# Patient Record
Sex: FEMALE | Race: BLACK OR AFRICAN AMERICAN | NOT HISPANIC OR LATINO | Employment: OTHER | URBAN - METROPOLITAN AREA
[De-identification: names, ages, dates, MRNs, and addresses within clinical notes are randomized per-mention and may not be internally consistent; named-entity substitution may affect disease eponyms.]

---

## 2019-06-27 ENCOUNTER — OFFICE VISIT (OUTPATIENT)
Dept: HEMATOLOGY/ONCOLOGY | Facility: CLINIC | Age: 84
End: 2019-06-27
Payer: MEDICARE

## 2019-06-27 VITALS
DIASTOLIC BLOOD PRESSURE: 59 MMHG | OXYGEN SATURATION: 97 % | WEIGHT: 125.38 LBS | HEART RATE: 66 BPM | HEIGHT: 62 IN | RESPIRATION RATE: 10 BRPM | BODY MASS INDEX: 23.07 KG/M2 | SYSTOLIC BLOOD PRESSURE: 111 MMHG | TEMPERATURE: 98 F

## 2019-06-27 DIAGNOSIS — G89.3 CHRONIC NEOPLASM-RELATED PAIN: ICD-10-CM

## 2019-06-27 DIAGNOSIS — D63.0 ANEMIA IN NEOPLASTIC DISEASE: ICD-10-CM

## 2019-06-27 DIAGNOSIS — R11.2 CHEMOTHERAPY-INDUCED NAUSEA AND VOMITING: ICD-10-CM

## 2019-06-27 DIAGNOSIS — C53.0 MALIGNANT NEOPLASM OF ENDOCERVIX: Primary | ICD-10-CM

## 2019-06-27 DIAGNOSIS — Z71.89 ADVANCE CARE PLANNING: ICD-10-CM

## 2019-06-27 DIAGNOSIS — T45.1X5A CHEMOTHERAPY-INDUCED NAUSEA AND VOMITING: ICD-10-CM

## 2019-06-27 DIAGNOSIS — E44.1 MILD PROTEIN-CALORIE MALNUTRITION: ICD-10-CM

## 2019-06-27 PROBLEM — C53.1 MALIGNANT NEOPLASM OF EXOCERVIX: Status: ACTIVE | Noted: 2019-06-27

## 2019-06-27 PROCEDURE — 99497 ADVNCD CARE PLAN 30 MIN: CPT | Mod: S$GLB,,, | Performed by: INTERNAL MEDICINE

## 2019-06-27 PROCEDURE — 99205 OFFICE O/P NEW HI 60 MIN: CPT | Mod: S$GLB,,, | Performed by: INTERNAL MEDICINE

## 2019-06-27 PROCEDURE — 99205 PR OFFICE/OUTPT VISIT, NEW, LEVL V, 60-74 MIN: ICD-10-PCS | Mod: S$GLB,,, | Performed by: INTERNAL MEDICINE

## 2019-06-27 PROCEDURE — 99497 PR ADVNCD CARE PLAN 30 MIN: ICD-10-PCS | Mod: S$GLB,,, | Performed by: INTERNAL MEDICINE

## 2019-06-27 RX ORDER — POTASSIUM CHLORIDE 750 MG/1
10 CAPSULE, EXTENDED RELEASE ORAL DAILY
COMMUNITY
End: 2020-01-24

## 2019-06-27 RX ORDER — ONDANSETRON 8 MG/1
8 TABLET, ORALLY DISINTEGRATING ORAL EVERY 8 HOURS PRN
Qty: 40 TABLET | Refills: 5 | Status: SHIPPED | OUTPATIENT
Start: 2019-06-27

## 2019-06-27 RX ORDER — SIMVASTATIN 20 MG/1
20 TABLET, FILM COATED ORAL NIGHTLY
COMMUNITY
End: 2021-03-11 | Stop reason: SDUPTHER

## 2019-06-27 RX ORDER — OXYCODONE HYDROCHLORIDE 5 MG/1
5 TABLET ORAL EVERY 6 HOURS PRN
Qty: 30 TABLET | Refills: 0 | Status: SHIPPED | OUTPATIENT
Start: 2019-06-27 | End: 2019-08-12 | Stop reason: SDUPTHER

## 2019-06-27 RX ORDER — NIFEDIPINE 30 MG/1
30 TABLET, EXTENDED RELEASE ORAL DAILY
COMMUNITY
End: 2020-03-27 | Stop reason: SDUPTHER

## 2019-06-27 RX ORDER — FERROUS SULFATE 325(65) MG
TABLET ORAL
Refills: 1 | COMMUNITY
Start: 2019-05-08 | End: 2021-05-06 | Stop reason: SDUPTHER

## 2019-06-27 RX ORDER — CLONIDINE HYDROCHLORIDE 0.3 MG/1
TABLET ORAL
Refills: 3 | COMMUNITY
Start: 2019-05-28 | End: 2020-02-21 | Stop reason: SDUPTHER

## 2019-06-27 RX ORDER — CYPROHEPTADINE HYDROCHLORIDE 4 MG/1
4 TABLET ORAL 2 TIMES DAILY WITH MEALS
Qty: 60 TABLET | Refills: 1 | Status: SHIPPED | OUTPATIENT
Start: 2019-06-27 | End: 2019-07-01 | Stop reason: ALTCHOICE

## 2019-06-27 NOTE — PROGRESS NOTES
"PATIENT: Adri Seay  MRN: [id number]  DATE: 6/27/2019    Diagnosis:   1. Malignant neoplasm of endocervix    2. Anemia in neoplastic disease    3. Chemotherapy-induced nausea and vomiting    4. Chronic neoplasm-related pain    5. Mild protein-calorie malnutrition    6. Advance care planning      Chief Complaint: Cervical Cancer    Oncologic History:      Oncologic History 1. Cervical cancer - stage IV      Oncologic Treatment Planned therapy: single-agent carboplatin +/- radiation      Pathology 6/19/19:  - uterine cervix, biopsy:  - poorly differentiated squamous carcinoma with extensive necrosis        Subjective:    History of Present Illness: Ms. Seay is a 84 y.o. female who presents for evaluation and management of cervical cancer. SHe was seen at [facility] on 6/25/19.     Information from Dr. Reese's and Dr. 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 The patient had a CT scan consistent with widely metastatic disease including multiple sites of lymphadenopathy, a possible lung metastases, liver metastases, nodule in the anterior abdominal wall, and carcinomatosis. The patient is mostly asymptomatic but does have a vaginal discharge. Her performance status is 0. On exam, there is a 6 centimeter mass in the anterior abdominal wall superior to the umbilicus. On bimanual examination, tumor is a place the entire cervix apex of the vagina, and on rectovaginal examination, there is a large pelvic mass extending to the bilateral pelvic sidewalls.    We will get her CT reviewed at [facility] and if it is consistent with metastatic disease, we have recommended palliative chemotherapy with either carboplatinum as a single agent or carboplatinum and paclitaxel. Due to concern for bowel involvement on the CT scan, I have recommended not giving bevacizumab as part of this " "regimen. Patient will return home to [place] and begin chemotherapy with a medical oncologist there. I've also recommended a consultation with radiation oncology in [place] for consideration of palliative radiation to lower the chances of significant vaginal bleeding. We will see her back on an as-needed basis."      Today, she endorses an unintentional weight loss (20 lbs) over the past two months, appetite change. She denies shortness of breath, chest pain, nausea, vomiting, diarrhea, constipation.    Past medical, surgical, family, and social histories have been reviewed and updated below.    Past Medical History:   Past Medical History:   Diagnosis Date    Anemia     Cataract     Cervical cancer 05/2019    Hypertension        Past Surgical History:   Past Surgical History:   Procedure Laterality Date    CATARACT EXTRACTION, BILATERAL      WRIST SURGERY  1984       Family History:   Family History   Problem Relation Age of Onset    No Known Problems Mother     No Known Problems Father     Breast cancer Sister     No Known Problems Brother     Leukemia Daughter     No Known Problems Son        Social History:      Allergies:  Review of patient's allergies indicates:  No Known Allergies    Medications:  Current Outpatient Medications   Medication Sig Dispense Refill    cloNIDine (CATAPRES) 0.3 MG tablet TK 1 T PO BID  3    ferrous sulfate (FEOSOL) 325 mg (65 mg iron) Tab tablet TK 1 T PO BID  1    NIFEdipine (PROCARDIA-XL) 30 MG (OSM) 24 hr tablet Take 30 mg by mouth once daily.      potassium chloride (MICRO-K) 10 MEQ CpSR Take 10 mEq by mouth once.      simvastatin (ZOCOR) 20 MG tablet Take 20 mg by mouth every evening.       No current facility-administered medications for this visit.        Review of Systems   Constitutional: Positive for fatigue and unexpected weight change.   HENT: Negative for sore throat.    Eyes: Negative for visual disturbance.   Respiratory: Negative for cough " "and shortness of breath.    Cardiovascular: Negative for chest pain.   Gastrointestinal: Negative for constipation, diarrhea, nausea and vomiting.   Genitourinary: Negative for dysuria.   Musculoskeletal: Negative for back pain.   Skin: Negative for rash.   Neurological: Negative for headaches.   Hematological: Negative for adenopathy.   Psychiatric/Behavioral: The patient is not nervous/anxious.      ECOG Performance Status:   ECOG SCORE 0       Objective:      Vitals:   Vitals:    06/27/19 0807   BP: (!) 111/59   BP Location: Right arm   Patient Position: Sitting   BP Method: Large (Automatic)   Pulse: 66   Resp: 10   Temp: 97.8 °F (36.6 °C)   TempSrc: Temporal   SpO2: 97%   Weight: 56.9 kg (125 lb 6.4 oz)   Height: 5' 2" (1.575 m)     BMI: Body mass index is 22.94 kg/m².    Physical Exam   Constitutional: She is oriented to person, place, and time. She appears well-developed and well-nourished.   HENT:   Head: Normocephalic and atraumatic.   Eyes: Pupils are equal, round, and reactive to light. EOM are normal.   Neck: Normal range of motion. Neck supple.   Cardiovascular: Normal rate and regular rhythm.   Pulmonary/Chest: Effort normal and breath sounds normal.   Abdominal: Soft. Bowel sounds are normal. She exhibits mass (mass palpated in ryann-umbilical/epigastric area).   Musculoskeletal: Normal range of motion. She exhibits edema.   Neurological: She is alert and oriented to person, place, and time.   Skin: Skin is warm and dry.   Psychiatric: She has a normal mood and affect. Her behavior is normal. Judgment and thought content normal.   Nursing note and vitals reviewed.    Laboratory Data:  Labs have been reviewed.    Imaging:     CT abdomen/pelvis (6/18/19):  1.  Large cervical mass that appears to invade the rectosigmoid and is inseparable from the bladder and urethra, compatible with the known primary.  2.  Large complex cystic mass probably arising from the left ovary that may represent metastatic " disease versus another primary.  3.  Retroperitoneal and anterior diaphragmatic adenopathy suspect for metastatic disease.  4.  Supraumbilical anterior abdominal wall mass compatible with metastatic disease.        Assessment:       1. Malignant neoplasm of endocervix    2. Anemia in neoplastic disease    3. Chemotherapy-induced nausea and vomiting    4. Chronic neoplasm-related pain    5. Mild protein-calorie malnutrition    6. Advance care planning           Plan:     1. Cervical cancer  - biopsy (6/19/19) reveals cervical cancer. Imaging reveals stage IV disease  - she has been evaluated at [facility]. Per their recommendations, as well as family preference, we will proceed with single-agent carboplatin AUC5 q28 days. I will refer to radiation oncology for evaluation; we may give radiation therapy after a few cycles of chemotherapy.  - The risks and benefits of chemotherapy were discussed, written information was given, and informed consent was obtained.  - refer for port-a-cath placement  - refer to radiation oncology for evaluation  - return to clinic in 1-2 weeks to initiate single-agent carboplatin chemotherapy.    2. Anemia in neoplastic disease  - hemoglobin from mid-June was 9.3 g/dL  - will monitor labs closely during chemotherapy    3. Chemotherapy-induced nausea and vomiting  - we will give premedications with chemotherapy  - I have sent a prescription for ondansetron ODT to her pharmacy  - continue to monitor    4. Mild protein malnutrition  - I will send a prescription for periactin to her pharmacy  - continue to monitor    5. Neoplasm-related pain  - she does not have pain at this time, but she would like an as-needed medication just in case.  - I will send a prescription for oxycodone to her pharmacy  - continue to monitor    6. Advance Care Planning   Power of   I initiated the process of advance care planning today and explained the importance of this process to the patient.  I  introduced the concept of advance directives to the patient, as well. Then the patient received detailed information about the importance of designating a Health Care Power of  (HCPOA). She was also instructed to communicate with this person about their wishes for future healthcare, should she become sick and lose decision-making capacity. The patient has not previously appointed a HCPOA. After our discussion, the patient has decided to complete a HCPOA and has appointed her daughters Erika Seay ([phone]) and Sophy Seay ([phone]) and grand-daughter Jessica King (cell [phone], office [phone]). I spent a total time of 16 minutes discussing this issue with the patient.     - return to clinic in 1-2 weeks to initiate single-agent carboplatin chemotherapy. Refer to internal medicine physician Dr. Clair Davis.    Jaime Pinon M.D.  Hematology/Oncology  [facility] - [address], [address]  [place] [address]  Phone: [phone]  Fax: [phone]

## 2019-06-27 NOTE — PLAN OF CARE
START OFF PATHWAY REGIMEN - [Other Dx]            Carboplatin (Paraplatin(R))           Additional Orders: Give with concurrent radiation. Carboplatin dose in   literature is 150 mg weekly x 6 doses (900 mg total).  The committee prefers   Carboplatin dose of AUC2 to account for renal dysfunction.  This is extrapolated   from AUC2 dosing in lung and esophageal.    **Always confirm dose/schedule in your pharmacy ordering system**    Patient Characteristics:  Intent of Therapy:  Non-Curative / Palliative Intent, Discussed with Patient

## 2019-06-27 NOTE — PLAN OF CARE
DISCONTINUE OFF PATHWAY REGIMEN - [Other Dx]            Carboplatin (Paraplatin(R))           Additional Orders: Give with concurrent radiation. Carboplatin dose in   literature is 150 mg weekly x 6 doses (900 mg total).  The committee prefers   Carboplatin dose of AUC2 to account for renal dysfunction.  This is extrapolated   from AUC2 dosing in lung and esophageal.    **Always confirm dose/schedule in your pharmacy ordering system**    REASON: Other Reason  PRIOR TREATMENT: Carboplatin 150 mg + RT  TREATMENT RESPONSE: Unable to Evaluate    START OFF PATHWAY REGIMEN - [Other Dx]            Paclitaxel (Taxol(R))       Carboplatin (Paraplatin(R))           Additional Orders: * All AUC calculations intended to be used in [place]   formula    **Always confirm dose/schedule in your pharmacy ordering system**    Patient Characteristics:  Intent of Therapy:  Non-Curative / Palliative Intent, Discussed with Patient

## 2019-07-01 DIAGNOSIS — E44.1 MILD PROTEIN-CALORIE MALNUTRITION: Primary | ICD-10-CM

## 2019-07-01 RX ORDER — MEGESTROL ACETATE 40 MG/1
40 TABLET ORAL 2 TIMES DAILY
Qty: 60 TABLET | Refills: 6 | Status: SHIPPED | OUTPATIENT
Start: 2019-07-01 | End: 2020-07-30

## 2019-07-09 ENCOUNTER — OFFICE VISIT (OUTPATIENT)
Dept: HEMATOLOGY/ONCOLOGY | Facility: CLINIC | Age: 84
End: 2019-07-09
Payer: MEDICARE

## 2019-07-09 VITALS
HEIGHT: 62 IN | SYSTOLIC BLOOD PRESSURE: 96 MMHG | OXYGEN SATURATION: 98 % | DIASTOLIC BLOOD PRESSURE: 59 MMHG | RESPIRATION RATE: 12 BRPM | TEMPERATURE: 98 F | BODY MASS INDEX: 22.38 KG/M2 | HEART RATE: 79 BPM | WEIGHT: 121.63 LBS

## 2019-07-09 DIAGNOSIS — D63.0 ANEMIA IN NEOPLASTIC DISEASE: ICD-10-CM

## 2019-07-09 DIAGNOSIS — C53.0 MALIGNANT NEOPLASM OF ENDOCERVIX: Primary | ICD-10-CM

## 2019-07-09 PROBLEM — D64.9 ABSOLUTE ANEMIA: Status: ACTIVE | Noted: 2019-07-09

## 2019-07-09 PROCEDURE — 99215 OFFICE O/P EST HI 40 MIN: CPT | Mod: S$GLB,,, | Performed by: NURSE PRACTITIONER

## 2019-07-09 PROCEDURE — 99215 PR OFFICE/OUTPT VISIT, EST, LEVL V, 40-54 MIN: ICD-10-PCS | Mod: S$GLB,,, | Performed by: NURSE PRACTITIONER

## 2019-07-09 RX ORDER — DIPHENHYDRAMINE HYDROCHLORIDE 50 MG/ML
50 INJECTION INTRAMUSCULAR; INTRAVENOUS ONCE AS NEEDED
Status: CANCELLED | OUTPATIENT
Start: 2019-07-09

## 2019-07-09 RX ORDER — SODIUM CHLORIDE 0.9 % (FLUSH) 0.9 %
10 SYRINGE (ML) INJECTION
Status: CANCELLED | OUTPATIENT
Start: 2019-07-09

## 2019-07-09 RX ORDER — HEPARIN 100 UNIT/ML
500 SYRINGE INTRAVENOUS
Status: CANCELLED | OUTPATIENT
Start: 2019-07-09

## 2019-07-09 RX ORDER — EPINEPHRINE 0.3 MG/.3ML
0.3 INJECTION SUBCUTANEOUS ONCE AS NEEDED
Status: CANCELLED | OUTPATIENT
Start: 2019-07-09

## 2019-07-09 RX ORDER — FAMOTIDINE 10 MG/ML
20 INJECTION INTRAVENOUS
Status: CANCELLED | OUTPATIENT
Start: 2019-07-09

## 2019-07-09 NOTE — PROGRESS NOTES
"PATIENT: Adri Seay  MRN: [id number]  DATE: 7/9/2019    Diagnosis:   1. Malignant neoplasm of endocervix    2. Anemia in neoplastic disease      Chief Complaint: Cancer (Neoplasm of endocervix )    Oncologic History:      Oncologic History 1. Cervical cancer - stage IV      Oncologic Treatment Planned therapy: single-agent carboplatin +/- radiation      Pathology 6/19/19:  - uterine cervix, biopsy:  - poorly differentiated squamous carcinoma with extensive necrosis        Subjective:    History of Present Illness: Ms. Seay is a 84 y.o. female who presents for evaluation and management of cervical cancer. SHe was seen at [facility] on 6/25/19.     Information from Dr. Reese's and Dr. 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 The patient had a CT scan consistent with widely metastatic disease including multiple sites of lymphadenopathy, a possible lung metastases, liver metastases, nodule in the anterior abdominal wall, and carcinomatosis. The patient is mostly asymptomatic but does have a vaginal discharge. Her performance status is 0. On exam, there is a 6 centimeter mass in the anterior abdominal wall superior to the umbilicus. On bimanual examination, tumor is a place the entire cervix apex of the vagina, and on rectovaginal examination, there is a large pelvic mass extending to the bilateral pelvic sidewalls.    We will get her CT reviewed at [facility] and if it is consistent with metastatic disease, we have recommended palliative chemotherapy with either carboplatinum as a single agent or carboplatinum and paclitaxel. Due to concern for bowel involvement on the CT scan, I have recommended not giving bevacizumab as part of this regimen. Patient will return home to [place] and begin chemotherapy with a medical oncologist there. I've also recommended a " "consultation with radiation oncology in [place] for consideration of palliative radiation to lower the chances of significant vaginal bleeding. We will see her back on an as-needed basis."      Today, she endorses an unintentional weight loss (20 lbs) over the past two months, appetite change. She c/o occasional bloody spotting but not daily. She is symptomatic for anemia with c/o of fatigue, LUNA, and feeling run down. We will plan to transfuse 1 unit of PRBC today.  She denies chest pain, nausea, vomiting, diarrhea, constipation.    Past medical, surgical, family, and social histories have been reviewed and updated below.    Past Medical History:   Past Medical History:   Diagnosis Date    Anemia     Cataract     Cervical cancer 05/2019    Hypertension        Past Surgical History:   Past Surgical History:   Procedure Laterality Date    CATARACT EXTRACTION, BILATERAL      WRIST SURGERY  1984       Family History:   Family History   Problem Relation Age of Onset    No Known Problems Mother     No Known Problems Father     Breast cancer Sister     No Known Problems Brother     Leukemia Daughter     No Known Problems Son        Social History:  reports that she has never smoked. She has never used smokeless tobacco. She reports that she drank alcohol. She reports that she does not use drugs.    Allergies:  Review of patient's allergies indicates:  No Known Allergies    Medications:  Current Outpatient Medications   Medication Sig Dispense Refill    cloNIDine (CATAPRES) 0.3 MG tablet TK 1 T PO BID  3    ferrous sulfate (FEOSOL) 325 mg (65 mg iron) Tab tablet TK 1 T PO BID  1    megestrol (MEGACE) 40 MG Tab Take 1 tablet (40 mg total) by mouth 2 (two) times daily. 60 tablet 6    NIFEdipine (PROCARDIA-XL) 30 MG (OSM) 24 hr tablet Take 30 mg by mouth once daily.      ondansetron (ZOFRAN-ODT) 8 MG TbDL Take 1 tablet (8 mg total) by mouth every 8 (eight) hours as needed (chemotherapy-induced nausea and " "vomiting). 40 tablet 5    oxyCODONE (ROXICODONE) 5 MG immediate release tablet Take 1 tablet (5 mg total) by mouth every 6 (six) hours as needed for Pain (cancer-related pain). 30 tablet 0    potassium chloride (MICRO-K) 10 MEQ CpSR Take 10 mEq by mouth once.      simvastatin (ZOCOR) 20 MG tablet Take 20 mg by mouth every evening.       No current facility-administered medications for this visit.        Review of Systems   Constitutional: Positive for fatigue and unexpected weight change.   HENT: Negative for sore throat.    Eyes: Negative for visual disturbance.   Respiratory: Positive for shortness of breath (LUNA). Negative for cough.    Cardiovascular: Negative for chest pain and palpitations.   Gastrointestinal: Negative for constipation, diarrhea, nausea and vomiting.   Genitourinary: Negative for dysuria.   Musculoskeletal: Negative for back pain.   Skin: Negative for rash.   Neurological: Positive for light-headedness. Negative for headaches.   Hematological: Negative for adenopathy.   Psychiatric/Behavioral: The patient is not nervous/anxious.      ECOG Performance Status:   ECOG SCORE 0       Objective:      Vitals:   Vitals:    07/09/19 0849   BP: (!) 96/59   BP Location: Right arm   Patient Position: Sitting   BP Method: Large (Automatic)   Pulse: 79   Resp: 12   Temp: 98.4 °F (36.9 °C)   TempSrc: Temporal   SpO2: 98%   Weight: 55.2 kg (121 lb 9.6 oz)   Height: 5' 2" (1.575 m)     BMI: Body mass index is 22.24 kg/m².    Physical Exam   Constitutional: She is oriented to person, place, and time. She appears well-developed and well-nourished.   HENT:   Head: Normocephalic and atraumatic.   Eyes: Pupils are equal, round, and reactive to light. EOM are normal.   Neck: Normal range of motion. Neck supple.   Cardiovascular: Normal rate and regular rhythm.   Pulmonary/Chest: Effort normal and breath sounds normal.   Abdominal: Soft. Bowel sounds are normal. She exhibits mass (mass palpated in " ryann-umbilical/epigastric area).   Musculoskeletal: Normal range of motion. She exhibits edema.   Neurological: She is alert and oriented to person, place, and time.   Skin: Skin is warm and dry.   Psychiatric: She has a normal mood and affect. Her behavior is normal. Judgment and thought content normal.   Nursing note and vitals reviewed.    Laboratory Data:    7/3/19 WBC 11.43 HGB 9.2 Hct 30.5  Cr 0.84 LFT WNL     Imaging:     CT abdomen/pelvis (6/18/19):  1.  Large cervical mass that appears to invade the rectosigmoid and is inseparable from the bladder and urethra, compatible with the known primary.  2.  Large complex cystic mass probably arising from the left ovary that may represent metastatic disease versus another primary.  3.  Retroperitoneal and anterior diaphragmatic adenopathy suspect for metastatic disease.  4.  Supraumbilical anterior abdominal wall mass compatible with metastatic disease.        Assessment:       1. Malignant neoplasm of endocervix    2. Anemia in neoplastic disease           Plan:     1. Cervical cancer  - biopsy (6/19/19) reveals cervical cancer. Imaging reveals stage IV disease  - she has been evaluated at [facility]. Per their recommendations, as well as family preference, we will proceed with single-agent carboplatin AUC5 q28 days. I will refer to radiation oncology for evaluation; we may give radiation therapy after a few cycles of chemotherapy.  - The risks and benefits of chemotherapy were discussed, written information was given, and informed consent was obtained.  - Pt is cleared to start cycle 1 of treatment today with single agent carbo.     -2. Anemia in neoplastic disease, pt symptomatic   - hemoglobin from mid-June was 9.2 g/dL  - transfuse 1 unit PRBC today    3. Chemotherapy-induced nausea and vomiting  - we will give premedications with chemotherapy  - I have sent a prescription for ondansetron ODT to her pharmacy  - continue to monitor    4. Mild protein  malnutrition  - I will send a prescription for periactin to her pharmacy  - continue to monitor    5. Neoplasm-related pain  - she does not have pain at this time, but she would like an as-needed medication just in case.  - I will send a prescription for oxycodone to her pharmacy  - continue to monitor        - return to clinic in 2 weeks to to evaluate SE profile of therapy.     Annette Donis NP

## 2019-07-12 ENCOUNTER — TELEPHONE (OUTPATIENT)
Dept: HEMATOLOGY/ONCOLOGY | Facility: CLINIC | Age: 84
End: 2019-07-12

## 2019-07-12 NOTE — TELEPHONE ENCOUNTER
Voicemail left for grand daughter.    Also called her on her cell, and was able to speak to her. Advised to use Miralax, increase fiber, increase fluids, and also a stool softener, docusate sodium. Educated on cause being related to pain medications and iron pills. Jessica verbalized understanding. Also instructed to call on Monday if no improvement, and we could set up for outpatient xray.

## 2019-07-12 NOTE — TELEPHONE ENCOUNTER
----- Message from Ayla Puga sent at 7/11/2019  1:34 PM CDT -----  Contact: Jessica called [phone]  Grandmother constipated... Only problem..and was wondering what to give her..

## 2019-07-23 ENCOUNTER — OFFICE VISIT (OUTPATIENT)
Dept: HEMATOLOGY/ONCOLOGY | Facility: CLINIC | Age: 84
End: 2019-07-23
Payer: MEDICARE

## 2019-07-23 VITALS
WEIGHT: 121.63 LBS | TEMPERATURE: 99 F | OXYGEN SATURATION: 99 % | HEIGHT: 62 IN | HEART RATE: 65 BPM | RESPIRATION RATE: 12 BRPM | DIASTOLIC BLOOD PRESSURE: 62 MMHG | SYSTOLIC BLOOD PRESSURE: 120 MMHG | BODY MASS INDEX: 22.38 KG/M2

## 2019-07-23 DIAGNOSIS — C53.0 MALIGNANT NEOPLASM OF ENDOCERVIX: ICD-10-CM

## 2019-07-23 DIAGNOSIS — D63.0 ANEMIA IN NEOPLASTIC DISEASE: Primary | ICD-10-CM

## 2019-07-23 PROBLEM — C53.9 CERVICAL CANCER: Status: ACTIVE | Noted: 2019-06-25

## 2019-07-23 PROCEDURE — 99214 PR OFFICE/OUTPT VISIT, EST, LEVL IV, 30-39 MIN: ICD-10-PCS | Mod: S$GLB,,, | Performed by: NURSE PRACTITIONER

## 2019-07-23 PROCEDURE — 99214 OFFICE O/P EST MOD 30 MIN: CPT | Mod: S$GLB,,, | Performed by: NURSE PRACTITIONER

## 2019-07-23 NOTE — PROGRESS NOTES
"PATIENT: Adri Seay  MRN: [id number]  DATE: 7/23/2019    Diagnosis:   1. Anemia in neoplastic disease    2. Malignant neoplasm of endocervix      Chief Complaint: Cancer (Endocervix )    Oncologic History:      Oncologic History 1. Cervical cancer - stage IV      Oncologic Treatment Planned therapy: single-agent carboplatin +/- radiation      Pathology 6/19/19:  - uterine cervix, biopsy:  - poorly differentiated squamous carcinoma with extensive necrosis        Subjective:    History of Present Illness: Ms. Seay is a 84 y.o. female who presents for evaluation and management of cervical cancer. SHe was seen at [facility] on 6/25/19.     Information from Dr. Reese's and Dr. 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 The patient had a CT scan consistent with widely metastatic disease including multiple sites of lymphadenopathy, a possible lung metastases, liver metastases, nodule in the anterior abdominal wall, and carcinomatosis. The patient is mostly asymptomatic but does have a vaginal discharge. Her performance status is 0. On exam, there is a 6 centimeter mass in the anterior abdominal wall superior to the umbilicus. On bimanual examination, tumor is a place the entire cervix apex of the vagina, and on rectovaginal examination, there is a large pelvic mass extending to the bilateral pelvic sidewalls.    We will get her CT reviewed at [facility] and if it is consistent with metastatic disease, we have recommended palliative chemotherapy with either carboplatinum as a single agent or carboplatinum and paclitaxel. Due to concern for bowel involvement on the CT scan, I have recommended not giving bevacizumab as part of this regimen. Patient will return home to [place] and begin chemotherapy with a medical oncologist there. I've also recommended a consultation " "with radiation oncology in [place] for consideration of palliative radiation to lower the chances of significant vaginal bleeding. We will see her back on an as-needed basis."      Today, she is s/p ccyle 1 of carbo approximately 2 weeks ago. She c/o occasional bloody spotting but not daily but states less since chemotherapy. She is symptomatic for anemia with c/o of fatigue, LUNA, and feeling run down. We will plan to transfuse 1 unit of PRBC tomorrow.  She denies chest pain, nausea, vomiting, mild nausea the day after chemo thougth. Bowel habits are normal with occasional constipation. Advised daily miralax.     Past medical, surgical, family, and social histories have been reviewed and updated below.    Past Medical History:   Past Medical History:   Diagnosis Date    Anemia     Cataract     Cervical cancer 05/2019    Hypertension        Past Surgical History:   Past Surgical History:   Procedure Laterality Date    CATARACT EXTRACTION, BILATERAL      WRIST SURGERY  1984       Family History:   Family History   Problem Relation Age of Onset    No Known Problems Mother     No Known Problems Father     Breast cancer Sister     No Known Problems Brother     Leukemia Daughter     No Known Problems Son        Social History:  reports that she has never smoked. She has never used smokeless tobacco. She reports that she drank alcohol. She reports that she does not use drugs.    Allergies:  Review of patient's allergies indicates:  No Known Allergies    Medications:  Current Outpatient Medications   Medication Sig Dispense Refill    cloNIDine (CATAPRES) 0.3 MG tablet TK 1 T PO BID  3    ferrous sulfate (FEOSOL) 325 mg (65 mg iron) Tab tablet TK 1 T PO BID  1    megestrol (MEGACE) 40 MG Tab Take 1 tablet (40 mg total) by mouth 2 (two) times daily. 60 tablet 6    NIFEdipine (PROCARDIA-XL) 30 MG (OSM) 24 hr tablet Take 30 mg by mouth once daily.      ondansetron (ZOFRAN-ODT) 8 MG TbDL Take 1 tablet (8 mg " "total) by mouth every 8 (eight) hours as needed (chemotherapy-induced nausea and vomiting). 40 tablet 5    oxyCODONE (ROXICODONE) 5 MG immediate release tablet Take 1 tablet (5 mg total) by mouth every 6 (six) hours as needed for Pain (cancer-related pain). 30 tablet 0    potassium chloride (MICRO-K) 10 MEQ CpSR Take 10 mEq by mouth once.      simvastatin (ZOCOR) 20 MG tablet Take 20 mg by mouth every evening.       No current facility-administered medications for this visit.        Review of Systems   Constitutional: Positive for fatigue and unexpected weight change.   HENT: Negative for sore throat.    Eyes: Negative for visual disturbance.   Respiratory: Positive for shortness of breath (LUNA). Negative for cough.    Cardiovascular: Negative for chest pain and palpitations.   Gastrointestinal: Negative for constipation, diarrhea, nausea and vomiting.   Genitourinary: Negative for dysuria.   Musculoskeletal: Negative for back pain.   Skin: Negative for rash.   Neurological: Positive for light-headedness. Negative for headaches.   Hematological: Negative for adenopathy.   Psychiatric/Behavioral: The patient is not nervous/anxious.      ECOG Performance Status:   ECOG SCORE 0       Objective:      Vitals:   Vitals:    07/23/19 1346   BP: 120/62   BP Location: Right arm   Patient Position: Sitting   BP Method: Large (Automatic)   Pulse: 65   Resp: 12   Temp: 98.7 °F (37.1 °C)   TempSrc: Temporal   SpO2: 99%   Weight: 55.2 kg (121 lb 9.6 oz)   Height: 5' 2" (1.575 m)     BMI: Body mass index is 22.24 kg/m².    Physical Exam   Constitutional: She is oriented to person, place, and time. She appears well-developed and well-nourished.   HENT:   Head: Normocephalic and atraumatic.   Eyes: Pupils are equal, round, and reactive to light. EOM are normal.   Neck: Normal range of motion. Neck supple.   Cardiovascular: Normal rate and regular rhythm.   Pulmonary/Chest: Effort normal and breath sounds normal.   Abdominal: Soft. " Bowel sounds are normal. She exhibits mass (mass palpated in ryann-umbilical/epigastric area).   Musculoskeletal: Normal range of motion. She exhibits edema.   Neurological: She is alert and oriented to person, place, and time.   Skin: Skin is warm and dry.   Psychiatric: She has a normal mood and affect. Her behavior is normal. Judgment and thought content normal.   Nursing note and vitals reviewed.    Laboratory Data:  7/19/19:   Glucose 82 - 115 mg/dL 123High     BUN, Bld 8 - 23 mg/dL 22.2    Creatinine 0.50 - 0.90 mg/dL 0.80    AST 0 - 32 U/L 25    ALT (SGPT) 0 - 33 U/L 19    Alkaline Phosphatase 35 - 105 IU/L 61    Calcium 8.6 - 10.2 mg/dL 9.3    Protein, Total 6.4 - 8.3 g/dL 6.6    Albumin 3.5 - 5.2 g/dL 3.5    BILIRUBIN, TOTAL 0.00 - 1.20 mg/dL 0.29    Sodium 136 - 145 mmol/L 138    Potassium 3.5 - 5.1 mmol/L 4.4    Chloride 98 - 107 mmol/L 107    CO2 22 - 29 mmol/L 21Low     Globulin 1.5 - 4.5 g/dL 3.1    Albumin/Globulin Ratio 1.0 - 2.7 1.1    BUN/Creatinine Ratio 6 - 20 27.8High     GFR ESTIMATION >60.00 68.67      WBC 4.3 - 10.8 X 10 3/ul 6.98    RBC 4.2 - 5.4 X 10 6/ul 2.53Low     RDW-SD 37 - 54 fl 47.9    Hemoglobin 12 - 16 g/dL 7.5Low     Hematocrit 37 - 47 % 24.1Low     MCV 82 - 100 fl 95.3    MCH 27 - 32 pg 29.6    MCHC 32 - 36 g/dL 31.1Low     Platelets 135 - 400 X 10 3/ul 210    Neutrophils % 70.4    Lymphocytes % 19.8    Monocytes % 8.3    Eosinophils % 0.9    Basophils % 0.3    Neutrophils Absolute 2.15 - 7.56 X 10 3/ul 4.92    ABSOLUTE LYMPHOCYTE 0.86 - 4.75 X 10 3/ul 1.38    ABSOLUTE MONOCYTE 0.22 - 1.08 X 10 3/ul 0.58    ABSOLUTE EOSINOPHIL 0.04 - 0.54 X 10 3/ul 0.06    ABSOLUTE BASOPHIL 0.00 - 0.22 X 10 3/ul 0.02    ABSOLUTE IMMATURE GRAN 0 - 0.04 X 10 3/ul 0.02    Immature Granulocytes 0 - 0.5 % 0.3          Imaging:     CT abdomen/pelvis (6/18/19):  1.  Large cervical mass that appears to invade the rectosigmoid and is inseparable from the bladder and urethra, compatible with the known  primary.  2.  Large complex cystic mass probably arising from the left ovary that may represent metastatic disease versus another primary.  3.  Retroperitoneal and anterior diaphragmatic adenopathy suspect for metastatic disease.  4.  Supraumbilical anterior abdominal wall mass compatible with metastatic disease.        Assessment:       1. Anemia in neoplastic disease    2. Malignant neoplasm of endocervix           Plan:     1. Cervical cancer  - biopsy (6/19/19) reveals cervical cancer. Imaging reveals stage IV disease  - she has been evaluated at [facility]. Per their recommendations, as well as family preference, we will proceed with single-agent carboplatin AUC5 q28 days. I will refer to radiation oncology for evaluation; we may give radiation therapy after a few cycles of chemotherapy.  - The risks and benefits of chemotherapy were discussed, written information was given, and informed consent was obtained.  - Pt is cleared to start cycle 1 of treatment today with single agent carbo.     -2. Anemia in neoplastic disease, pt symptomatic   - hemoglobin 7.5  - will set up for transfusion for tomorrow     3. Chemotherapy-induced nausea and vomiting  - we will give premedications with chemotherapy  - continue to monitor    4. Mild protein malnutrition  - periactin not covered by insurance, changed to megace BID  - continue to monitor    5. Neoplasm-related pain  - she does not have pain at this time, but she would like an as-needed medication just in case.  - I will send a prescription for oxycodone to her pharmacy  - continue to monitor        - return to clinic in 2 weeks with labs prior to cycle 2.     Annette Donis NP

## 2019-07-23 NOTE — PROGRESS NOTES
Clinic Note  7/24/2019      Subjective:       Patient ID:  Adri is a 84 y.o. female being seen for a new visit.      Chief Complaint: Establish Care; Hypertension; and Hyperlipidemia    HPI  Ms. Seay is a 84 y.o. female in clinic today to establish new PCP. She is followed by Gillian Donis NP (Hem-Onc) for the management of cervical cancer and anemia in neoplastic disease. Hypertension, chronic, stable  on medications. Hyperlipidemia, chronic, stable on medications. Chronic constipation, stable with OTC miralax. Anemia of chronic disease, reports that she will need blood transfusion today per QING Donis NP.  Reports unintentional weight loss related to cancer diagnosis. States that she does occasionally become nauseated, stable with current medication. Denies fever or chills.      Family History   Problem Relation Age of Onset    Hypertension Mother     Pneumonia Father     Breast cancer Sister     No Known Problems Brother     Leukemia Daughter     No Known Problems Son      Social History     Socioeconomic History    Marital status:      Spouse name: Not on file    Number of children: Not on file    Years of education: Not on file    Highest education level: Not on file   Occupational History    Not on file   Social Needs    Financial resource strain: Patient refused    Food insecurity:     Worry: Patient refused     Inability: Patient refused    Transportation needs:     Medical: Patient refused     Non-medical: Patient refused   Tobacco Use    Smoking status: Never Smoker    Smokeless tobacco: Never Used   Substance and Sexual Activity    Alcohol use: Not Currently    Drug use: Never    Sexual activity: Not on file   Lifestyle    Physical activity:     Days per week: Patient refused     Minutes per session: Patient refused    Stress: Patient refused   Relationships    Social connections:     Talks on phone: Patient refused     Gets together: Patient refused     Attends Muslim  service: Patient refused     Active member of club or organization: Patient refused     Attends meetings of clubs or organizations: Patient refused     Relationship status: Patient refused   Other Topics Concern    Not on file   Social History Narrative    Not on file     Past Surgical History:   Procedure Laterality Date    CATARACT EXTRACTION, BILATERAL      WRIST SURGERY  1984     Medication List with Changes/Refills   Current Medications    CLONIDINE (CATAPRES) 0.3 MG TABLET    TK 1 T PO BID    FERROUS SULFATE (FEOSOL) 325 MG (65 MG IRON) TAB TABLET    TK 1 T PO BID    MEGESTROL (MEGACE) 40 MG TAB    Take 1 tablet (40 mg total) by mouth 2 (two) times daily.    NIFEDIPINE (PROCARDIA-XL) 30 MG (OSM) 24 HR TABLET    Take 30 mg by mouth once daily.    ONDANSETRON (ZOFRAN-ODT) 8 MG TBDL    Take 1 tablet (8 mg total) by mouth every 8 (eight) hours as needed (chemotherapy-induced nausea and vomiting).    OXYCODONE (ROXICODONE) 5 MG IMMEDIATE RELEASE TABLET    Take 1 tablet (5 mg total) by mouth every 6 (six) hours as needed for Pain (cancer-related pain).    POTASSIUM CHLORIDE (MICRO-K) 10 MEQ CPSR    Take 10 mEq by mouth once.    SIMVASTATIN (ZOCOR) 20 MG TABLET    Take 20 mg by mouth every evening.     Patient Active Problem List   Diagnosis    Malignant neoplasm of endocervix    Absolute anemia    Cervical cancer     Review of Systems   Constitutional: Positive for weight loss. Negative for chills, fever and malaise/fatigue.   HENT: Negative for congestion, ear pain, sore throat and tinnitus.    Eyes: Negative for blurred vision, double vision, pain and discharge.   Respiratory: Negative for cough, sputum production, shortness of breath and wheezing.    Cardiovascular: Negative for chest pain, palpitations and claudication.   Gastrointestinal: Positive for constipation and nausea. Negative for diarrhea and vomiting.   Genitourinary: Negative for dysuria, frequency, hematuria and urgency.   Skin: Negative for  "itching and rash.   Neurological: Negative for dizziness, seizures, weakness and headaches.         Objective:      /67 (BP Location: Left arm, Patient Position: Sitting, BP Method: Medium (Automatic))   Pulse 82   Temp 98.2 °F (36.8 °C)   Ht 5' 2" (1.575 m)   Wt 55.1 kg (121 lb 8 oz)   SpO2 99%   BMI 22.22 kg/m²   Estimated body mass index is 22.22 kg/m² as calculated from the following:    Height as of this encounter: 5' 2" (1.575 m).    Weight as of this encounter: 55.1 kg (121 lb 8 oz).     Imaging:     CT abdomen/pelvis (6/18/19):  1.  Large cervical mass that appears to invade the rectosigmoid and is inseparable from the bladder and urethra, compatible with the known primary.  2.  Large complex cystic mass probably arising from the left ovary that may represent metastatic disease versus another primary.  3.  Retroperitoneal and anterior diaphragmatic adenopathy suspect for metastatic disease.  4.  Supraumbilical anterior abdominal wall mass compatible with metastatic disease.           Physical Exam   Constitutional: She is oriented to person, place, and time and well-developed, well-nourished, and in no distress. No distress.   HENT:   Head: Normocephalic and atraumatic.   Right Ear: External ear normal.   Left Ear: External ear normal.   Mouth/Throat: No oropharyngeal exudate.   Eyes: Conjunctivae and EOM are normal. Right eye exhibits no discharge. Left eye exhibits no discharge. No scleral icterus.   Neck: Normal range of motion. No JVD present. No tracheal deviation present.   Cardiovascular: Normal rate and regular rhythm. Exam reveals no friction rub.   No murmur heard.  Pulmonary/Chest: Effort normal and breath sounds normal. No respiratory distress. She has no wheezes. She has no rales.   Abdominal: Soft. Bowel sounds are normal. She exhibits mass. There is no tenderness.   Mass palpated at periumbilical area   Musculoskeletal: Normal range of motion. She exhibits no edema, tenderness or " deformity.   Neurological: She is alert and oriented to person, place, and time. Gait normal. GCS score is 15.   Skin: Skin is warm and dry. No rash noted. She is not diaphoretic. No erythema.   Left upper chest port inserted, incision without signs of infection.   Psychiatric: Mood, memory, affect and judgment normal.         Assessment and Plan:   Encounter for routine adult health examination with abnormal findings.    Continue all current medications.  Obtain previous PCP records  Lipid Profile if not done in last year  Follow up in 3 months and as needed        Problem List Items Addressed This Visit        Oncology    Malignant neoplasm of endocervix      Other Visit Diagnoses     Encounter for routine adult health examination with abnormal findings    -  Primary          Follow up:   Follow up in about 3 months (around 10/24/2019), or if symptoms worsen or fail to improve.     Other Orders Placed This Visit:  No orders of the defined types were placed in this encounter.          Bria Mitchell    Problem List Items Addressed This Visit        Oncology    Malignant neoplasm of endocervix      Other Visit Diagnoses     Encounter for routine adult health examination with abnormal findings    -  Primary

## 2019-07-24 ENCOUNTER — OFFICE VISIT (OUTPATIENT)
Dept: FAMILY MEDICINE | Facility: CLINIC | Age: 84
End: 2019-07-24
Payer: MEDICARE

## 2019-07-24 VITALS
HEIGHT: 62 IN | BODY MASS INDEX: 22.36 KG/M2 | HEART RATE: 82 BPM | SYSTOLIC BLOOD PRESSURE: 108 MMHG | WEIGHT: 121.5 LBS | OXYGEN SATURATION: 99 % | DIASTOLIC BLOOD PRESSURE: 67 MMHG | TEMPERATURE: 98 F

## 2019-07-24 DIAGNOSIS — C53.0 MALIGNANT NEOPLASM OF ENDOCERVIX: ICD-10-CM

## 2019-07-24 DIAGNOSIS — Z00.01 ENCOUNTER FOR ROUTINE ADULT HEALTH EXAMINATION WITH ABNORMAL FINDINGS: Primary | ICD-10-CM

## 2019-07-24 PROCEDURE — 99214 PR OFFICE/OUTPT VISIT, EST, LEVL IV, 30-39 MIN: ICD-10-PCS | Mod: ICN,S$GLB,, | Performed by: NURSE PRACTITIONER

## 2019-07-24 PROCEDURE — 99214 OFFICE O/P EST MOD 30 MIN: CPT | Mod: ICN,S$GLB,, | Performed by: NURSE PRACTITIONER

## 2019-07-24 NOTE — PATIENT INSTRUCTIONS
Iron-Deficiency Anemia (Adult)  Red blood cells carry oxygen to the tissues of your body. Anemia is a condition in which you have too few red blood cells. You need iron to make red cells. Anemia makes you feel tired and run down. When anemia becomes severe, your skin becomes pale. You may feel short of breath after physical activity. Other symptoms include:  · Headaches  · Dizziness  · Leg cramps with physical activity  · Drowsiness  · Restless legs  Your anemia is caused by not having enough iron in your body. This may be because of:  · Loss of blood. This can be caused by heavy menstrual periods. It can also be caused by bleeding from the stomach or intestines.  · Poor diet. You may not be eating enough foods that contain iron.  · Inability to absorb iron from the foods you eat  · Pregnancy  If your blood count is low enough, your healthcare provider may prescribe an iron supplement. It usually takes about 2 to 3 months of treatment with iron supplements to correct anemia. Severe cases of anemia need a blood transfusion to quickly ease symptoms and deliver more oxygen to the cells.  Home care  Follow these guidelines when caring for yourself at home:  · Eat foods high in iron. This will boost the amount of iron stored in your body. It is a natural way to build up the number of blood cells. Good sources of iron include beef, liver, spinach and other dark green leafy vegetables, whole grains, beans, and nuts.  · Do not overexert yourself.  · Talk with your healthcare provider before traveling by air or traveling to high altitudes.  Follow-up care  Follow up with your healthcare provider in 2 months, or as advised. This is to have another red blood cell count to be sure your anemia has been fixed.  When to seek medical advice  Call your healthcare provider right away if any of these occur:  · Shortness of breath or chest pain  · Dizziness or fainting  · Vomiting blood or passing red or black-colored stool   Date  Last Reviewed: 2/25/2016  © 8623-3389 [employer]. [address] [place] [address], [place] [address]. All rights reserved. This information is not intended as a substitute for professional medical care. Always follow your healthcare professional's instructions.

## 2019-08-05 ENCOUNTER — OFFICE VISIT (OUTPATIENT)
Dept: HEMATOLOGY/ONCOLOGY | Facility: CLINIC | Age: 84
End: 2019-08-05
Payer: MEDICARE

## 2019-08-05 VITALS
DIASTOLIC BLOOD PRESSURE: 65 MMHG | TEMPERATURE: 98 F | SYSTOLIC BLOOD PRESSURE: 113 MMHG | BODY MASS INDEX: 22.08 KG/M2 | HEART RATE: 63 BPM | OXYGEN SATURATION: 97 % | WEIGHT: 120 LBS | RESPIRATION RATE: 10 BRPM | HEIGHT: 62 IN

## 2019-08-05 DIAGNOSIS — C53.0 MALIGNANT NEOPLASM OF ENDOCERVIX: Primary | ICD-10-CM

## 2019-08-05 DIAGNOSIS — D63.0 ANEMIA IN NEOPLASTIC DISEASE: ICD-10-CM

## 2019-08-05 PROCEDURE — 99214 OFFICE O/P EST MOD 30 MIN: CPT | Mod: S$GLB,,, | Performed by: NURSE PRACTITIONER

## 2019-08-05 PROCEDURE — 99214 PR OFFICE/OUTPT VISIT, EST, LEVL IV, 30-39 MIN: ICD-10-PCS | Mod: S$GLB,,, | Performed by: NURSE PRACTITIONER

## 2019-08-05 RX ORDER — HEPARIN 100 UNIT/ML
500 SYRINGE INTRAVENOUS
Status: CANCELLED | OUTPATIENT
Start: 2019-08-06

## 2019-08-05 RX ORDER — FAMOTIDINE 10 MG/ML
20 INJECTION INTRAVENOUS
Status: CANCELLED | OUTPATIENT
Start: 2019-08-06

## 2019-08-05 RX ORDER — SODIUM CHLORIDE 0.9 % (FLUSH) 0.9 %
10 SYRINGE (ML) INJECTION
Status: CANCELLED | OUTPATIENT
Start: 2019-08-06

## 2019-08-05 RX ORDER — EPINEPHRINE 0.3 MG/.3ML
0.3 INJECTION SUBCUTANEOUS ONCE AS NEEDED
Status: CANCELLED | OUTPATIENT
Start: 2019-08-06

## 2019-08-05 RX ORDER — DIPHENHYDRAMINE HYDROCHLORIDE 50 MG/ML
50 INJECTION INTRAMUSCULAR; INTRAVENOUS ONCE AS NEEDED
Status: CANCELLED | OUTPATIENT
Start: 2019-08-06

## 2019-08-05 NOTE — PROGRESS NOTES
"PATIENT: Adri Seay  MRN: [id number]  DATE: 8/5/2019    Diagnosis:   1. Malignant neoplasm of endocervix    2. Anemia in neoplastic disease      Chief Complaint: Other (Malignant neoplasm of Endocerix )    Oncologic History:      Oncologic History 1. Cervical cancer - stage IV      Oncologic Treatment Planned therapy: single-agent carboplatin +/- radiation      Pathology 6/19/19:  - uterine cervix, biopsy:  - poorly differentiated squamous carcinoma with extensive necrosis        Subjective:    History of Present Illness: Ms. Seay is a 84 y.o. female who presents for evaluation and management of cervical cancer. SHe was seen at [facility] on 6/25/19.     Information from Dr. Reese's and Dr. 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 The patient had a CT scan consistent with widely metastatic disease including multiple sites of lymphadenopathy, a possible lung metastases, liver metastases, nodule in the anterior abdominal wall, and carcinomatosis. The patient is mostly asymptomatic but does have a vaginal discharge. Her performance status is 0. On exam, there is a 6 centimeter mass in the anterior abdominal wall superior to the umbilicus. On bimanual examination, tumor is a place the entire cervix apex of the vagina, and on rectovaginal examination, there is a large pelvic mass extending to the bilateral pelvic sidewalls.    We will get her CT reviewed at [facility] and if it is consistent with metastatic disease, we have recommended palliative chemotherapy with either carboplatinum as a single agent or carboplatinum and paclitaxel. Due to concern for bowel involvement on the CT scan, I have recommended not giving bevacizumab as part of this regimen. Patient will return home to [place] and begin chemotherapy with a medical oncologist there. I've also recommended " "a consultation with radiation oncology in [place] for consideration of palliative radiation to lower the chances of significant vaginal bleeding. We will see her back on an as-needed basis."      Today, she is here for consideration of  cycle 2 of carbo. She states spotting has resolved since last visit. She received 1 unit of blood aproximately 2 weeks ago for HB 7.4. Her HBG last week was 8.6 and she denies any fatigue, LUNA, and feeling run down. Bowel habits are better since starting Miralax.      Past medical, surgical, family, and social histories have been reviewed and updated below.    Past Medical History:   Past Medical History:   Diagnosis Date    Anemia     Cataract     Cervical cancer 05/2019    Hypertension        Past Surgical History:   Past Surgical History:   Procedure Laterality Date    CATARACT EXTRACTION, BILATERAL      WRIST SURGERY  1984       Family History:   Family History   Problem Relation Age of Onset    Hypertension Mother     Pneumonia Father     Breast cancer Sister     No Known Problems Brother     Leukemia Daughter     No Known Problems Son        Social History:  reports that she has never smoked. She has never used smokeless tobacco. She reports that she drank alcohol. She reports that she does not use drugs.    Allergies:  Review of patient's allergies indicates:  No Known Allergies    Medications:  Current Outpatient Medications   Medication Sig Dispense Refill    cloNIDine (CATAPRES) 0.3 MG tablet TK 1 T PO BID  3    ferrous sulfate (FEOSOL) 325 mg (65 mg iron) Tab tablet TK 1 T PO BID  1    megestrol (MEGACE) 40 MG Tab Take 1 tablet (40 mg total) by mouth 2 (two) times daily. 60 tablet 6    NIFEdipine (PROCARDIA-XL) 30 MG (OSM) 24 hr tablet Take 30 mg by mouth once daily.      ondansetron (ZOFRAN-ODT) 8 MG TbDL Take 1 tablet (8 mg total) by mouth every 8 (eight) hours as needed (chemotherapy-induced nausea and vomiting). 40 tablet 5    oxyCODONE " "(ROXICODONE) 5 MG immediate release tablet Take 1 tablet (5 mg total) by mouth every 6 (six) hours as needed for Pain (cancer-related pain). 30 tablet 0    potassium chloride (MICRO-K) 10 MEQ CpSR Take 10 mEq by mouth once.      simvastatin (ZOCOR) 20 MG tablet Take 20 mg by mouth every evening.       No current facility-administered medications for this visit.        Review of Systems   Constitutional: Positive for fatigue and unexpected weight change.   HENT: Negative for sore throat.    Eyes: Negative for visual disturbance.   Respiratory: Negative for cough and shortness of breath (LUNA).    Cardiovascular: Negative for chest pain and palpitations.   Gastrointestinal: Negative for constipation, diarrhea, nausea and vomiting.   Genitourinary: Negative for dysuria.   Musculoskeletal: Negative for back pain.   Skin: Negative for rash.   Neurological: Negative for light-headedness and headaches.   Hematological: Negative for adenopathy.   Psychiatric/Behavioral: The patient is not nervous/anxious.      ECOG Performance Status:   ECOG SCORE 0       Objective:      Vitals:   Vitals:    08/05/19 1055   BP: 113/65   BP Location: Left arm   Patient Position: Sitting   BP Method: Large (Automatic)   Pulse: 63   Resp: 10   Temp: 98.2 °F (36.8 °C)   TempSrc: Temporal   SpO2: 97%   Weight: 54.4 kg (120 lb)   Height: 5' 2" (1.575 m)     BMI: Body mass index is 21.95 kg/m².    Physical Exam   Constitutional: She is oriented to person, place, and time. She appears well-developed and well-nourished.   HENT:   Head: Normocephalic and atraumatic.   Eyes: Pupils are equal, round, and reactive to light. EOM are normal.   Neck: Normal range of motion. Neck supple.   Cardiovascular: Normal rate and regular rhythm.   Pulmonary/Chest: Effort normal and breath sounds normal.   Abdominal: Soft. Bowel sounds are normal. She exhibits mass (mass palpated in ryann-umbilical/epigastric area).   Musculoskeletal: Normal range of motion. She " exhibits edema.   Neurological: She is alert and oriented to person, place, and time.   Skin: Skin is warm and dry.   Psychiatric: She has a normal mood and affect. Her behavior is normal. Judgment and thought content normal.   Nursing note and vitals reviewed.    Laboratory Data:  7/19/19:  WBC   Date Value Ref Range Status   07/31/2019 6.83 4.3 - 10.8 X 10 3/ul Final   07/19/2019 6.98 4.3 - 10.8 X 10 3/ul Final   07/03/2019 11.43 (H) 4.3 - 10.8 X 10 3/ul Final     Hemoglobin   Date Value Ref Range Status   07/31/2019 8.7 (L) 12 - 16 g/dL Final   07/19/2019 7.5 (L) 12 - 16 g/dL Final   07/03/2019 9.2 (L) 12 - 16 g/dL Final     Hematocrit   Date Value Ref Range Status   07/31/2019 27.7 (L) 37 - 47 % Final   07/19/2019 24.1 (L) 37 - 47 % Final   07/03/2019 30.5 (L) 37 - 47 % Final     Platelets   Date Value Ref Range Status   07/31/2019 113 (L) 135 - 400 X 10 3/ul Final   07/19/2019 210 135 - 400 X 10 3/ul Final   07/03/2019 349 135 - 400 X 10 3/ul Final     Magnesium   Date Value Ref Range Status   07/31/2019 1.64 1.60 - 2.40 mg/dL Final     Comment:     NOTE  Testing performed at:  [facility], [address] [place] [address]  [id number] CLIA #:[id number]     07/19/2019 1.77 1.60 - 2.40 mg/dL Final     Comment:     NOTE  Testing performed at:  [facility], [address] [place] [address]  [id number] CLIA #:[id number]     07/03/2019 1.68 1.60 - 2.40 mg/dL Final     Comment:     NOTE  Testing performed at:  [facility], [address] [place] [address]  [address] CLIA #:[id number]       Phosphorus   Date Value Ref Range Status   07/31/2019 3.1 2.5 - 4.5 mg/dL Final     Comment:     NOTE  Testing performed at:  [facility], [address] [place] [address]  [address] CLIA #:[id number]     07/19/2019 3.5 2.5 - 4.5 mg/dL Final     Comment:     NOTE  Testing performed at:  [facility], [address] [place] [address]  [address] CLIA #:[id number]     07/03/2019 3.0 2.5 - 4.5 mg/dL  Final     Comment:     NOTE  Testing performed at:  [facility], [address] [place]  [address] CLIA #:[id number]       Imaging:     CT abdomen/pelvis (6/18/19):  1.  Large cervical mass that appears to invade the rectosigmoid and is inseparable from the bladder and urethra, compatible with the known primary.  2.  Large complex cystic mass probably arising from the left ovary that may represent metastatic disease versus another primary.  3.  Retroperitoneal and anterior diaphragmatic adenopathy suspect for metastatic disease.  4.  Supraumbilical anterior abdominal wall mass compatible with metastatic disease.        Assessment:       1. Malignant neoplasm of endocervix    2. Anemia in neoplastic disease           Plan:     1. Cervical cancer  - biopsy (6/19/19) reveals cervical cancer. Imaging reveals stage IV disease  - [facility] Per [facility] recommendations, as well as family preference, we will proceed with single-agent carboplatin AUC5 q28 days. I will refer to radiation oncology for evaluation; we may give radiation therapy after a few cycles of chemotherapy.  - The risks and benefits of chemotherapy were discussed, written information was given, and informed consent was obtained.  - Pt is cleared to start cycle 2 of treatment today with single agent carbo.     -2. Anemia in neoplastic disease, pt asymptomatic   - hemoglobin 8.6  - will continue to monitor  - advised to call if she has any new c/o    3. Chemotherapy-induced nausea and vomiting  - we will give premedications with chemotherapy  - continue to monitor    4. Mild protein malnutrition  - periactin not covered by insurance, changed to megace BID  - continue to monitor      - return to clinic in 4 weeks with labs prior to cycle 3. Will order scans after 3 cycles of treatment.     Annette Donis NP

## 2019-08-12 DIAGNOSIS — G89.3 CHRONIC NEOPLASM-RELATED PAIN: ICD-10-CM

## 2019-08-12 DIAGNOSIS — C53.0 MALIGNANT NEOPLASM OF ENDOCERVIX: ICD-10-CM

## 2019-08-12 RX ORDER — OXYCODONE HYDROCHLORIDE 5 MG/1
5 TABLET ORAL EVERY 6 HOURS PRN
Qty: 30 TABLET | Refills: 0 | Status: SHIPPED | OUTPATIENT
Start: 2019-08-12 | End: 2020-05-01

## 2019-09-03 ENCOUNTER — OFFICE VISIT (OUTPATIENT)
Dept: HEMATOLOGY/ONCOLOGY | Facility: CLINIC | Age: 84
End: 2019-09-03
Payer: MEDICARE

## 2019-09-03 VITALS
HEART RATE: 60 BPM | BODY MASS INDEX: 22.6 KG/M2 | DIASTOLIC BLOOD PRESSURE: 67 MMHG | RESPIRATION RATE: 10 BRPM | OXYGEN SATURATION: 99 % | SYSTOLIC BLOOD PRESSURE: 117 MMHG | HEIGHT: 62 IN | WEIGHT: 122.81 LBS | TEMPERATURE: 97 F

## 2019-09-03 DIAGNOSIS — D63.0 ANEMIA IN NEOPLASTIC DISEASE: ICD-10-CM

## 2019-09-03 DIAGNOSIS — C53.0 MALIGNANT NEOPLASM OF ENDOCERVIX: ICD-10-CM

## 2019-09-03 DIAGNOSIS — L08.9 SKIN INFECTION: Primary | ICD-10-CM

## 2019-09-03 PROCEDURE — 99214 PR OFFICE/OUTPT VISIT, EST, LEVL IV, 30-39 MIN: ICD-10-PCS | Mod: S$GLB,,, | Performed by: NURSE PRACTITIONER

## 2019-09-03 PROCEDURE — 99214 OFFICE O/P EST MOD 30 MIN: CPT | Mod: S$GLB,,, | Performed by: NURSE PRACTITIONER

## 2019-09-03 RX ORDER — EPINEPHRINE 0.3 MG/.3ML
0.3 INJECTION SUBCUTANEOUS ONCE AS NEEDED
Status: CANCELLED | OUTPATIENT
Start: 2019-09-03

## 2019-09-03 RX ORDER — MUPIROCIN CALCIUM 20 MG/G
CREAM TOPICAL
Qty: 30 G | Refills: 0 | Status: SHIPPED | OUTPATIENT
Start: 2019-09-03 | End: 2020-05-01

## 2019-09-03 RX ORDER — FAMOTIDINE 10 MG/ML
20 INJECTION INTRAVENOUS
Status: CANCELLED | OUTPATIENT
Start: 2019-09-03

## 2019-09-03 RX ORDER — SODIUM CHLORIDE 0.9 % (FLUSH) 0.9 %
10 SYRINGE (ML) INJECTION
Status: CANCELLED | OUTPATIENT
Start: 2019-09-03

## 2019-09-03 RX ORDER — HEPARIN 100 UNIT/ML
500 SYRINGE INTRAVENOUS
Status: CANCELLED | OUTPATIENT
Start: 2019-09-03

## 2019-09-03 RX ORDER — DIPHENHYDRAMINE HYDROCHLORIDE 50 MG/ML
50 INJECTION INTRAMUSCULAR; INTRAVENOUS ONCE AS NEEDED
Status: CANCELLED | OUTPATIENT
Start: 2019-09-03

## 2019-09-03 NOTE — PROGRESS NOTES
"PATIENT: Adri Seay  MRN: [id number]  DATE: 9/3/2019    Diagnosis:   No diagnosis found.  Chief Complaint: Cancer (Malignant neoplasm of Endocervix )    Oncologic History:      Oncologic History 1. Cervical cancer - stage IV      Oncologic Treatment Planned therapy: single-agent carboplatin +/- radiation      Pathology 6/19/19:  - uterine cervix, biopsy:  - poorly differentiated squamous carcinoma with extensive necrosis        Subjective:    History of Present Illness: Ms. Seay is a 84 y.o. female who presents for evaluation and management of cervical cancer. SHe was seen at [facility] on 6/25/19.     Information from Dr. Reese's and Dr. 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 The patient had a CT scan consistent with widely metastatic disease including multiple sites of lymphadenopathy, a possible lung metastases, liver metastases, nodule in the anterior abdominal wall, and carcinomatosis. The patient is mostly asymptomatic but does have a vaginal discharge. Her performance status is 0. On exam, there is a 6 centimeter mass in the anterior abdominal wall superior to the umbilicus. On bimanual examination, tumor is a place the entire cervix apex of the vagina, and on rectovaginal examination, there is a large pelvic mass extending to the bilateral pelvic sidewalls.    We will get her CT reviewed at [facility] and if it is consistent with metastatic disease, we have recommended palliative chemotherapy with either carboplatinum as a single agent or carboplatinum and paclitaxel. Due to concern for bowel involvement on the CT scan, I have recommended not giving bevacizumab as part of this regimen. Patient will return home to [place] and begin chemotherapy with a medical oncologist there. I've also recommended a consultation with radiation oncology in [place] for consideration of palliative radiation to lower the chances of significant vaginal bleeding. We will see her back on an as-needed basis."      8/5/19 Visit: She is here for consideration of  cycle 2 of carbo. She states spotting has resolved since last visit. She received 1 unit of blood aproximately 2 weeks ago for HB 7.4. Her HBG last week was 8.6 and she denies any fatigue, LUNA, and feeling run down. Bowel habits are better since starting Miralax.      9/3/19 Visit: Today she is in clinic with her daughter, she is feeling good and has resumed some driving. She states skin around port itchy. Mild redness noted at incision line and lower neck, advised bactroban x 5 days but if worsens change to OTC lotrim. Labs reviewed and HGB 7.4, will set up for 1 unit PRBC for Thursday, but will proceed with treatment as planned for today.     Past medical, surgical, family, and social histories have been reviewed and updated below.    Past Medical History:   Past Medical History:   Diagnosis Date    Anemia     Cataract     Cervical cancer 05/2019    Hypertension        Past Surgical History:   Past Surgical History:   Procedure Laterality Date    CATARACT EXTRACTION, BILATERAL      WRIST SURGERY  1984       Family History:   Family History   Problem Relation Age of Onset    Hypertension Mother     Pneumonia Father     Breast cancer Sister     No Known Problems Brother     Leukemia Daughter     No Known Problems Son        Social History:  reports that she has never smoked. She has never used smokeless tobacco. She reports that she drank alcohol. She reports that she does not use drugs.    Allergies:  Review of patient's allergies indicates:  No Known Allergies    Medications:  Current Outpatient Medications   Medication Sig Dispense Refill    cloNIDine (CATAPRES) 0.3 MG tablet TK 1 T PO BID  3    ferrous sulfate (FEOSOL) 325 mg (65 mg iron) Tab tablet TK 1 T PO BID  1    megestrol (MEGACE) 40 MG Tab " "Take 1 tablet (40 mg total) by mouth 2 (two) times daily. 60 tablet 6    NIFEdipine (PROCARDIA-XL) 30 MG (OSM) 24 hr tablet Take 30 mg by mouth once daily.      ondansetron (ZOFRAN-ODT) 8 MG TbDL Take 1 tablet (8 mg total) by mouth every 8 (eight) hours as needed (chemotherapy-induced nausea and vomiting). 40 tablet 5    oxyCODONE (ROXICODONE) 5 MG immediate release tablet Take 1 tablet (5 mg total) by mouth every 6 (six) hours as needed for Pain (cancer-related pain). 30 tablet 0    potassium chloride (MICRO-K) 10 MEQ CpSR Take 10 mEq by mouth once.      simvastatin (ZOCOR) 20 MG tablet Take 20 mg by mouth every evening.       No current facility-administered medications for this visit.        Review of Systems   Constitutional: Positive for fatigue and unexpected weight change.   HENT: Negative for sore throat.    Eyes: Negative for visual disturbance.   Respiratory: Negative for cough and shortness of breath (LUNA).    Cardiovascular: Negative for chest pain and palpitations.   Gastrointestinal: Negative for constipation, diarrhea, nausea and vomiting.   Genitourinary: Negative for dysuria.   Musculoskeletal: Negative for back pain.   Skin: Negative for rash.   Neurological: Negative for light-headedness and headaches.   Hematological: Negative for adenopathy.   Psychiatric/Behavioral: The patient is not nervous/anxious.      ECOG Performance Status:   ECOG SCORE 0       Objective:      Vitals:   Vitals:    09/03/19 0836   BP: 117/67   BP Location: Right arm   Patient Position: Sitting   BP Method: Large (Automatic)   Pulse: 60   Resp: 10   Temp: 97.2 °F (36.2 °C)   TempSrc: Temporal   SpO2: 99%   Weight: 55.7 kg (122 lb 12.8 oz)   Height: 5' 2" (1.575 m)     BMI: Body mass index is 22.46 kg/m².    Physical Exam   Constitutional: She is oriented to person, place, and time. She appears well-developed and well-nourished.   HENT:   Head: Normocephalic and atraumatic.   Eyes: Pupils are equal, round, and reactive " to light. EOM are normal.   Neck: Normal range of motion. Neck supple.   Cardiovascular: Normal rate and regular rhythm.   Pulmonary/Chest: Effort normal and breath sounds normal.   Abdominal: Soft. Bowel sounds are normal. She exhibits mass (mass palpated in ryann-umbilical/epigastric area).   Musculoskeletal: Normal range of motion. She exhibits edema.   Neurological: She is alert and oriented to person, place, and time.   Skin: Skin is warm and dry.   Psychiatric: She has a normal mood and affect. Her behavior is normal. Judgment and thought content normal.   Nursing note and vitals reviewed.    Laboratory Data:  7/19/19:  WBC   Date Value Ref Range Status   08/30/2019 5.07 4.3 - 10.8 X 10 3/ul Final   07/31/2019 6.83 4.3 - 10.8 X 10 3/ul Final   07/19/2019 6.98 4.3 - 10.8 X 10 3/ul Final   07/03/2019 11.43 (H) 4.3 - 10.8 X 10 3/ul Final     Hemoglobin   Date Value Ref Range Status   08/30/2019 7.4 (L) 12 - 16 g/dL Final   07/31/2019 8.7 (L) 12 - 16 g/dL Final   07/19/2019 7.5 (L) 12 - 16 g/dL Final   07/03/2019 9.2 (L) 12 - 16 g/dL Final     Hematocrit   Date Value Ref Range Status   08/30/2019 23.9 (L) 37 - 47 % Final   07/31/2019 27.7 (L) 37 - 47 % Final   07/19/2019 24.1 (L) 37 - 47 % Final   07/03/2019 30.5 (L) 37 - 47 % Final     Platelets   Date Value Ref Range Status   08/30/2019 121 (L) 135 - 400 X 10 3/ul Final   07/31/2019 113 (L) 135 - 400 X 10 3/ul Final   07/19/2019 210 135 - 400 X 10 3/ul Final   07/03/2019 349 135 - 400 X 10 3/ul Final     Magnesium   Date Value Ref Range Status   08/30/2019 1.50 (L) 1.60 - 2.40 mg/dL Final     Comment:     NOTE  Testing performed at:  [facility], [address] [place] [address]  [address] CLIA #:[id number]     07/31/2019 1.64 1.60 - 2.40 mg/dL Final     Comment:     NOTE  Testing performed at:  [facility], [address] [place] [address]  [address] CLIA #:[id number]     07/19/2019 1.77 1.60 - 2.40 mg/dL Final     Comment:     NOTE  Testing  performed at:  [facility], [address] [place] [address]  [address] CLIA #:[id number]     07/03/2019 1.68 1.60 - 2.40 mg/dL Final     Comment:     NOTE  Testing performed at:  [facility], [address] [place] [address]  [address] CLIA #:[id number]       Phosphorus   Date Value Ref Range Status   08/30/2019 3.7 2.5 - 4.5 mg/dL Final     Comment:     NOTE  Testing performed at:  [facility], [address] [place] [address]  [address] CLIA #:[id number]     07/31/2019 3.1 2.5 - 4.5 mg/dL Final     Comment:     NOTE  Testing performed at:  [facility], [address] [place] [address]  [address] CLIA #:[id number]     07/19/2019 3.5 2.5 - 4.5 mg/dL Final     Comment:     NOTE  Testing performed at:  [facility], [address] [place] [address]  [address] CLIA #:[id number]     07/03/2019 3.0 2.5 - 4.5 mg/dL Final     Comment:     NOTE  Testing performed at:  [facility], [address] [place] [address]  [address] CLIA #:[id number]       Imaging:     CT abdomen/pelvis (6/18/19):  1.  Large cervical mass that appears to invade the rectosigmoid and is inseparable from the bladder and urethra, compatible with the known primary.  2.  Large complex cystic mass probably arising from the left ovary that may represent metastatic disease versus another primary.  3.  Retroperitoneal and anterior diaphragmatic adenopathy suspect for metastatic disease.  4.  Supraumbilical anterior abdominal wall mass compatible with metastatic disease.        Assessment:       1. Skin infection    2. Malignant neoplasm of endocervix    3. Anemia in neoplastic disease           Plan:     1. Cervical cancer  - biopsy (6/19/19) reveals cervical cancer. Imaging reveals stage IV disease  - [facility] Per [facility] recommendations, as well as family preference, we will proceed with single-agent carboplatin AUC5 q28 days. I will refer to radiation oncology for evaluation; we  may give radiation therapy after a few cycles of chemotherapy.  - The risks and benefits of chemotherapy were discussed, written information was given, and informed consent was obtained.  - Pt is cleared to start cycle 3 of treatment today with single agent carbo.     -2. Anemia in neoplastic disease, pt asymptomatic   - hemoglobin 7.4  - will set up for out pt blood     3. Chemotherapy-induced nausea and vomiting  - currently well controlled with meds    4. Mild protein malnutrition  - periactin not covered by insurance, changed to megace BID  - continue to monitor      - return to clinic in 4 weeks with labs prior to cycle 4. Will order scans after 4 cycles of treatment.     Annette Donis NP

## 2019-09-26 DIAGNOSIS — C53.0 MALIGNANT NEOPLASM OF ENDOCERVIX: Primary | ICD-10-CM

## 2019-10-01 ENCOUNTER — OFFICE VISIT (OUTPATIENT)
Dept: HEMATOLOGY/ONCOLOGY | Facility: CLINIC | Age: 84
End: 2019-10-01
Payer: MEDICARE

## 2019-10-01 VITALS
SYSTOLIC BLOOD PRESSURE: 121 MMHG | HEART RATE: 58 BPM | TEMPERATURE: 98 F | DIASTOLIC BLOOD PRESSURE: 58 MMHG | OXYGEN SATURATION: 98 % | WEIGHT: 124.63 LBS | BODY MASS INDEX: 22.93 KG/M2 | RESPIRATION RATE: 14 BRPM | HEIGHT: 62 IN

## 2019-10-01 DIAGNOSIS — C53.0 MALIGNANT NEOPLASM OF ENDOCERVIX: Primary | ICD-10-CM

## 2019-10-01 PROCEDURE — 99214 PR OFFICE/OUTPT VISIT, EST, LEVL IV, 30-39 MIN: ICD-10-PCS | Mod: S$GLB,,, | Performed by: NURSE PRACTITIONER

## 2019-10-01 PROCEDURE — 99214 OFFICE O/P EST MOD 30 MIN: CPT | Mod: S$GLB,,, | Performed by: NURSE PRACTITIONER

## 2019-10-01 RX ORDER — HEPARIN 100 UNIT/ML
500 SYRINGE INTRAVENOUS
Status: CANCELLED | OUTPATIENT
Start: 2019-10-01

## 2019-10-01 RX ORDER — SODIUM CHLORIDE 0.9 % (FLUSH) 0.9 %
10 SYRINGE (ML) INJECTION
Status: CANCELLED | OUTPATIENT
Start: 2019-10-01

## 2019-10-01 RX ORDER — DIPHENHYDRAMINE HYDROCHLORIDE 50 MG/ML
50 INJECTION INTRAMUSCULAR; INTRAVENOUS ONCE AS NEEDED
Status: CANCELLED | OUTPATIENT
Start: 2019-10-01

## 2019-10-01 RX ORDER — FAMOTIDINE 10 MG/ML
20 INJECTION INTRAVENOUS
Status: CANCELLED | OUTPATIENT
Start: 2019-10-01

## 2019-10-01 RX ORDER — EPINEPHRINE 0.3 MG/.3ML
0.3 INJECTION SUBCUTANEOUS ONCE AS NEEDED
Status: CANCELLED | OUTPATIENT
Start: 2019-10-01

## 2019-10-01 NOTE — PROGRESS NOTES
"PATIENT: Adri Seay  MRN: [id number]  DATE: 10/1/2019    Diagnosis:   No diagnosis found.  Chief Complaint: Malignant of Endocervix    Oncologic History:      Oncologic History 1. Cervical cancer - stage IV      Oncologic Treatment Planned therapy: single-agent carboplatin +/- radiation      Pathology 6/19/19:  - uterine cervix, biopsy:  - poorly differentiated squamous carcinoma with extensive necrosis        Subjective:    History of Present Illness: Ms. Seay is a 84 y.o. female who presents for evaluation and management of cervical cancer. SHe was seen at [facility] on 6/25/19.     Information from Dr. Reese's and Dr. 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 The patient had a CT scan consistent with widely metastatic disease including multiple sites of lymphadenopathy, a possible lung metastases, liver metastases, nodule in the anterior abdominal wall, and carcinomatosis. The patient is mostly asymptomatic but does have a vaginal discharge. Her performance status is 0. On exam, there is a 6 centimeter mass in the anterior abdominal wall superior to the umbilicus. On bimanual examination, tumor is a place the entire cervix apex of the vagina, and on rectovaginal examination, there is a large pelvic mass extending to the bilateral pelvic sidewalls.    We will get her CT reviewed at [facility] and if it is consistent with metastatic disease, we have recommended palliative chemotherapy with either carboplatinum as a single agent or carboplatinum and paclitaxel. Due to concern for bowel involvement on the CT scan, I have recommended not giving bevacizumab as part of this regimen. Patient will return home to [place] and begin chemotherapy with a medical oncologist there. I've also recommended a consultation with radiation oncology in [place] for " "consideration of palliative radiation to lower the chances of significant vaginal bleeding. We will see her back on an as-needed basis."      8/5/19 Visit: She is here for consideration of  cycle 2 of carbo. She states spotting has resolved since last visit. She received 1 unit of blood aproximately 2 weeks ago for HB 7.4. Her HBG last week was 8.6 and she denies any fatigue, LUNA, and feeling run down. Bowel habits are better since starting Miralax.      9/3/19 Visit: Today she is in clinic with her daughter, she is feeling good and has resumed some driving. She states skin around port itchy. Mild redness noted at incision line and lower neck, advised bactroban x 5 days but if worsens change to OTC lotrim. Labs reviewed and HGB 7.4, will set up for 1 unit PRBC for Thursday, but will proceed with treatment as planned for today.     10/1/19 visit: She states she is feeling good except for fatigue after her 3rd cycle of chemo. Denies any bleeding, belly pain, or difficulty with bowels. Labs reviewed and mild dehydration noted, Will increase post chemo fluids to 1 L today. Plan is to repeat scans after cycle 4 .     Past medical, surgical, family, and social histories have been reviewed and updated below.    Past Medical History:   Past Medical History:   Diagnosis Date    Anemia     Cataract     Cervical cancer 05/2019    Hypertension        Past Surgical History:   Past Surgical History:   Procedure Laterality Date    CATARACT EXTRACTION, BILATERAL      WRIST SURGERY  1984       Family History:   Family History   Problem Relation Age of Onset    Hypertension Mother     Pneumonia Father     Breast cancer Sister     No Known Problems Brother     Leukemia Daughter     No Known Problems Son        Social History:  reports that she has never smoked. She has never used smokeless tobacco. She reports that she drank alcohol. She reports that she does not use drugs.    Allergies:  Review of patient's allergies " indicates:  No Known Allergies    Medications:  Current Outpatient Medications   Medication Sig Dispense Refill    cloNIDine (CATAPRES) 0.3 MG tablet TK 1 T PO BID  3    ferrous sulfate (FEOSOL) 325 mg (65 mg iron) Tab tablet TK 1 T PO BID  1    megestrol (MEGACE) 40 MG Tab Take 1 tablet (40 mg total) by mouth 2 (two) times daily. 60 tablet 6    mupirocin calcium 2% (BACTROBAN) 2 % cream Apply to affected area 3 times daily 30 g 0    NIFEdipine (PROCARDIA-XL) 30 MG (OSM) 24 hr tablet Take 30 mg by mouth once daily.      ondansetron (ZOFRAN-ODT) 8 MG TbDL Take 1 tablet (8 mg total) by mouth every 8 (eight) hours as needed (chemotherapy-induced nausea and vomiting). 40 tablet 5    oxyCODONE (ROXICODONE) 5 MG immediate release tablet Take 1 tablet (5 mg total) by mouth every 6 (six) hours as needed for Pain (cancer-related pain). 30 tablet 0    potassium chloride (MICRO-K) 10 MEQ CpSR Take 10 mEq by mouth once.      simvastatin (ZOCOR) 20 MG tablet Take 20 mg by mouth every evening.       No current facility-administered medications for this visit.        Review of Systems   Constitutional: Positive for fatigue and unexpected weight change.   HENT: Negative for sore throat.    Eyes: Negative for visual disturbance.   Respiratory: Negative for cough and shortness of breath (LUNA).    Cardiovascular: Negative for chest pain and palpitations.   Gastrointestinal: Negative for constipation, diarrhea, nausea and vomiting.   Genitourinary: Negative for dysuria.   Musculoskeletal: Negative for back pain.   Skin: Negative for rash.   Neurological: Negative for light-headedness and headaches.   Hematological: Negative for adenopathy.   Psychiatric/Behavioral: The patient is not nervous/anxious.      ECOG Performance Status:   ECOG SCORE 0       Objective:      Vitals:   Vitals:    10/01/19 0923   BP: (!) 121/58   BP Location: Right arm   Patient Position: Sitting   BP Method: Large (Automatic)   Pulse: (!) 58   Resp: 14  "  Temp: 97.7 °F (36.5 °C)   TempSrc: Temporal   SpO2: 98%   Weight: 56.5 kg (124 lb 9.6 oz)   Height: 5' 2" (1.575 m)     BMI: Body mass index is 22.79 kg/m².    Physical Exam   Constitutional: She is oriented to person, place, and time. She appears well-developed and well-nourished.   HENT:   Head: Normocephalic and atraumatic.   Eyes: Pupils are equal, round, and reactive to light. EOM are normal.   Neck: Normal range of motion. Neck supple.   Cardiovascular: Normal rate and regular rhythm.   Pulmonary/Chest: Effort normal and breath sounds normal.   Abdominal: Soft. Bowel sounds are normal. She exhibits mass (mass palpated in ryann-umbilical/epigastric area).   Musculoskeletal: Normal range of motion. She exhibits edema.   Neurological: She is alert and oriented to person, place, and time.   Skin: Skin is warm and dry.   Psychiatric: She has a normal mood and affect. Her behavior is normal. Judgment and thought content normal.   Nursing note and vitals reviewed.    Laboratory Data:  7/19/19:  WBC   Date Value Ref Range Status   09/30/2019 6.50 4.3 - 10.8 X 10 3/ul Final   08/30/2019 5.07 4.3 - 10.8 X 10 3/ul Final   07/31/2019 6.83 4.3 - 10.8 X 10 3/ul Final   07/19/2019 6.98 4.3 - 10.8 X 10 3/ul Final   07/03/2019 11.43 (H) 4.3 - 10.8 X 10 3/ul Final     Hemoglobin   Date Value Ref Range Status   09/30/2019 9.1 (L) 12 - 16 g/dL Final   08/30/2019 7.4 (L) 12 - 16 g/dL Final   07/31/2019 8.7 (L) 12 - 16 g/dL Final   07/19/2019 7.5 (L) 12 - 16 g/dL Final   07/03/2019 9.2 (L) 12 - 16 g/dL Final     Hematocrit   Date Value Ref Range Status   09/30/2019 28.7 (L) 37 - 47 % Final   08/30/2019 23.9 (L) 37 - 47 % Final   07/31/2019 27.7 (L) 37 - 47 % Final   07/19/2019 24.1 (L) 37 - 47 % Final   07/03/2019 30.5 (L) 37 - 47 % Final     Platelets   Date Value Ref Range Status   09/30/2019 136 135 - 400 X 10 3/ul Final   08/30/2019 121 (L) 135 - 400 X 10 3/ul Final   07/31/2019 113 (L) 135 - 400 X 10 3/ul Final   07/19/2019 " 210 135 - 400 X 10 3/ul Final   07/03/2019 349 135 - 400 X 10 3/ul Final     Magnesium   Date Value Ref Range Status   09/30/2019 1.72 1.60 - 2.40 mg/dL Final     Comment:     NOTE  Testing performed at:  [facility], [address] [place], [place] [id number] CLIA #:[id number]     08/30/2019 1.50 (L) 1.60 - 2.40 mg/dL Final     Comment:     NOTE  Testing performed at:  [facility], [address] [place], [place] [id number] CLIA #:[id number]     07/31/2019 1.64 1.60 - 2.40 mg/dL Final     Comment:     NOTE  Testing performed at:  [facility], [address] [place] [address]  [address] CLIA #:[id number]     07/19/2019 1.77 1.60 - 2.40 mg/dL Final     Comment:     NOTE  Testing performed at:  [facility], [address] [place] [address]  [address] CLIA #:[id number]     07/03/2019 1.68 1.60 - 2.40 mg/dL Final     Comment:     NOTE  Testing performed at:  [facility], [address] [place] [address]  [address] CLIA #:[id number]       Phosphorus   Date Value Ref Range Status   09/30/2019 3.2 2.5 - 4.5 mg/dL Final     Comment:     NOTE  Testing performed at:  [facility], [address] [place] [address]  [address] CLIA #:[id number]     08/30/2019 3.7 2.5 - 4.5 mg/dL Final     Comment:     NOTE  Testing performed at:  [facility], [address] [place] [address]  [address] CLIA #:[id number]     07/31/2019 3.1 2.5 - 4.5 mg/dL Final     Comment:     NOTE  Testing performed at:  [facility], [address] [place] [address]  [address] CLIA #:[id number]     07/19/2019 3.5 2.5 - 4.5 mg/dL Final     Comment:     NOTE  Testing performed at:  [facility], [address] [place] [address]  [address] CLIA #:[id number]     07/03/2019 3.0 2.5 - 4.5 mg/dL Final     Comment:     NOTE  Testing performed at:  [facility], [address] [place] [address]  [address] CLIA #:[id number]       Imaging:     CT abdomen/pelvis (6/18/19):  1.  Large cervical  mass that appears to invade the rectosigmoid and is inseparable from the bladder and urethra, compatible with the known primary.  2.  Large complex cystic mass probably arising from the left ovary that may represent metastatic disease versus another primary.  3.  Retroperitoneal and anterior diaphragmatic adenopathy suspect for metastatic disease.  4.  Supraumbilical anterior abdominal wall mass compatible with metastatic disease.        Assessment:       No diagnosis found.       Plan:     1. Cervical cancer  - biopsy (6/19/19) reveals cervical cancer. Imaging reveals stage IV disease  - [facility] Per their recommendations, as well as family preference, we will proceed with single-agent carboplatin AUC5 q28 days. I will refer to radiation oncology for evaluation; we may give radiation therapy after a few cycles of chemotherapy.  - The risks and benefits of chemotherapy were discussed, written information was given, and informed consent was obtained.  - Pt is cleared to start cycle 4 of treatment today with single agent carbo.     -2. Anemia in neoplastic disease, pt asymptomatic   - hemoglobin 9.1    3. Chemotherapy-induced nausea and vomiting  - currently well controlled with meds    4. Mild protein malnutrition  - periactin not covered by insurance, changed to megace BID  - continue to monitor      - return to clinic in 4 weeks with labs prior to cycle 5. with scans after 4 cycles of treatment.     Annette Donis NP

## 2019-10-24 ENCOUNTER — OFFICE VISIT (OUTPATIENT)
Dept: FAMILY MEDICINE | Facility: CLINIC | Age: 84
End: 2019-10-24
Payer: MEDICARE

## 2019-10-24 VITALS
SYSTOLIC BLOOD PRESSURE: 160 MMHG | OXYGEN SATURATION: 100 % | DIASTOLIC BLOOD PRESSURE: 63 MMHG | HEART RATE: 51 BPM | TEMPERATURE: 99 F | WEIGHT: 126 LBS | HEIGHT: 62 IN | BODY MASS INDEX: 23.19 KG/M2

## 2019-10-24 DIAGNOSIS — J00 ACUTE RHINITIS: ICD-10-CM

## 2019-10-24 DIAGNOSIS — I10 ESSENTIAL HYPERTENSION: Primary | ICD-10-CM

## 2019-10-24 PROCEDURE — 99213 OFFICE O/P EST LOW 20 MIN: CPT | Mod: ICN,CMP,S$GLB, | Performed by: NURSE PRACTITIONER

## 2019-10-24 PROCEDURE — 99213 PR OFFICE/OUTPT VISIT, EST, LEVL III, 20-29 MIN: ICD-10-PCS | Mod: ICN,CMP,S$GLB, | Performed by: NURSE PRACTITIONER

## 2019-10-24 RX ORDER — LORATADINE 10 MG/1
10 TABLET ORAL DAILY
Qty: 90 TABLET | Refills: 3 | Status: SHIPPED | OUTPATIENT
Start: 2019-10-24 | End: 2020-02-04 | Stop reason: ALTCHOICE

## 2019-10-24 NOTE — PROGRESS NOTES
Clinic Note  10/24/2019      Subjective:       Patient ID:  Adri is a 84 y.o. female being seen for an established visit.     Chief Complaint: Follow-up (discuss medications)    HPI   Adri is an 84 year old female in clinic today 3 month follow up. Third cycle of chemo complete. States that she is feeling good, fatigue has subsided no complaints. BP in clinic today is elevated, however she had not taken morning medication. Instructed her to take, will recheck manual BP after visit.   Hypertension  Patient is here for follow-up of elevated blood pressure. She is exercising and is adherent to a low-salt diet. Blood pressure is well controlled at home. Cardiac symptoms: none. Patient denies chest pressure/discomfort, dyspnea, lower extremity edema and near-syncope. Cardiovascular risk factors: dyslipidemia and hypertension. Use of agents associated with hypertension: none. History of target organ damage: none.      Review of Systems   Constitutional: Negative for chills, diaphoresis, fever, malaise/fatigue and weight loss.   HENT: Positive for congestion. Negative for ear discharge, ear pain, nosebleeds, sinus pain and sore throat.         Clear nasal drainage, acute, began 2 weeks ago.   Respiratory: Negative for cough, hemoptysis, sputum production, shortness of breath and wheezing.    Cardiovascular: Negative for chest pain, palpitations, claudication and leg swelling.   Gastrointestinal: Positive for constipation. Negative for diarrhea, nausea and vomiting.        Occasional constipation, chronic, treated with OTC stool softener as needed.   Genitourinary: Negative for dysuria, frequency, hematuria and urgency.       Medication List with Changes/Refills   New Medications    LORATADINE (CLARITIN) 10 MG TABLET    Take 1 tablet (10 mg total) by mouth once daily.   Current Medications    CLONIDINE (CATAPRES) 0.3 MG TABLET    TK 1 T PO BID    FERROUS SULFATE (FEOSOL) 325 MG (65 MG IRON) TAB TABLET    TK 1 T PO BID     "MEGESTROL (MEGACE) 40 MG TAB    Take 1 tablet (40 mg total) by mouth 2 (two) times daily.    MUPIROCIN CALCIUM 2% (BACTROBAN) 2 % CREAM    Apply to affected area 3 times daily    NIFEDIPINE (PROCARDIA-XL) 30 MG (OSM) 24 HR TABLET    Take 30 mg by mouth once daily.    ONDANSETRON (ZOFRAN-ODT) 8 MG TBDL    Take 1 tablet (8 mg total) by mouth every 8 (eight) hours as needed (chemotherapy-induced nausea and vomiting).    OXYCODONE (ROXICODONE) 5 MG IMMEDIATE RELEASE TABLET    Take 1 tablet (5 mg total) by mouth every 6 (six) hours as needed for Pain (cancer-related pain).    POTASSIUM CHLORIDE (MICRO-K) 10 MEQ CPSR    Take 10 mEq by mouth once.    SIMVASTATIN (ZOCOR) 20 MG TABLET    Take 20 mg by mouth every evening.       Patient Active Problem List   Diagnosis    Malignant neoplasm of endocervix    Absolute anemia    Cervical cancer    Skin infection    Acute rhinitis           Objective:      BP (!) 160/63 (BP Location: Left arm, Patient Position: Sitting, BP Method: Medium (Manual))   Pulse (!) 51   Temp 98.9 °F (37.2 °C)   Ht 5' 2" (1.575 m)   Wt 57.2 kg (126 lb)   SpO2 100%   BMI 23.05 kg/m²   Estimated body mass index is 23.05 kg/m² as calculated from the following:    Height as of this encounter: 5' 2" (1.575 m).    Weight as of this encounter: 57.2 kg (126 lb).  Physical Exam   Constitutional: She is oriented to person, place, and time and well-developed, well-nourished, and in no distress. Vital signs are normal. She appears not dehydrated. She appears healthy. She does not have a sickly appearance. No distress.   HENT:   Head: Normocephalic and atraumatic.   Right Ear: External ear normal.   Left Ear: External ear normal.   Nose: Rhinorrhea present. No mucosal edema, sinus tenderness or nasal deformity.   Mouth/Throat: Oropharynx is clear and moist. No oropharyngeal exudate, posterior oropharyngeal edema or posterior oropharyngeal erythema.   Eyes: Conjunctivae are normal. Right eye exhibits no " discharge. Left eye exhibits no discharge. No scleral icterus.   Cardiovascular: Normal rate, regular rhythm and normal heart sounds.   No murmur heard.  Pulmonary/Chest: Effort normal and breath sounds normal. No respiratory distress. She has no wheezes.   Neurological: She is alert and oriented to person, place, and time. Gait normal. GCS score is 15.   Skin: She is not diaphoretic.   Psychiatric: Mood, memory, affect and judgment normal.   Nursing note and vitals reviewed.        Assessment and Plan:   Hypertension, essential   Medication: no change.  Dietary sodium restriction.  Regular aerobic exercise.  Check blood pressures weekly and record.  Recheck manual BP in clinic 160/63  Allergic rhinitis  Claritin 10 mg by mouth daily  Follow up: 3 months and as needed.         Problem List Items Addressed This Visit        ENT    Acute rhinitis      Other Visit Diagnoses     Essential hypertension    -  Primary          Follow Up:   Follow up in about 3 months (around 1/24/2020), or if symptoms worsen or fail to improve.    Other Orders Placed This Visit:  No orders of the defined types were placed in this encounter.        Bria Mitchell    Problem List Items Addressed This Visit        ENT    Acute rhinitis      Other Visit Diagnoses     Essential hypertension    -  Primary

## 2019-10-29 ENCOUNTER — OFFICE VISIT (OUTPATIENT)
Dept: HEMATOLOGY/ONCOLOGY | Facility: CLINIC | Age: 84
End: 2019-10-29
Payer: MEDICARE

## 2019-10-29 VITALS
BODY MASS INDEX: 23.59 KG/M2 | OXYGEN SATURATION: 99 % | WEIGHT: 128.19 LBS | RESPIRATION RATE: 10 BRPM | DIASTOLIC BLOOD PRESSURE: 64 MMHG | HEIGHT: 62 IN | SYSTOLIC BLOOD PRESSURE: 110 MMHG | HEART RATE: 73 BPM | TEMPERATURE: 98 F

## 2019-10-29 DIAGNOSIS — C53.0 MALIGNANT NEOPLASM OF ENDOCERVIX: Primary | ICD-10-CM

## 2019-10-29 DIAGNOSIS — D63.0 ANEMIA IN NEOPLASTIC DISEASE: ICD-10-CM

## 2019-10-29 PROCEDURE — 99214 PR OFFICE/OUTPT VISIT, EST, LEVL IV, 30-39 MIN: ICD-10-PCS | Mod: S$GLB,,, | Performed by: NURSE PRACTITIONER

## 2019-10-29 PROCEDURE — 99214 OFFICE O/P EST MOD 30 MIN: CPT | Mod: S$GLB,,, | Performed by: NURSE PRACTITIONER

## 2019-10-29 RX ORDER — DIPHENHYDRAMINE HYDROCHLORIDE 50 MG/ML
50 INJECTION INTRAMUSCULAR; INTRAVENOUS ONCE AS NEEDED
Status: CANCELLED | OUTPATIENT
Start: 2019-10-29

## 2019-10-29 RX ORDER — SODIUM CHLORIDE 0.9 % (FLUSH) 0.9 %
10 SYRINGE (ML) INJECTION
Status: CANCELLED | OUTPATIENT
Start: 2019-10-29

## 2019-10-29 RX ORDER — EPINEPHRINE 0.3 MG/.3ML
0.3 INJECTION SUBCUTANEOUS ONCE AS NEEDED
Status: CANCELLED | OUTPATIENT
Start: 2019-10-29

## 2019-10-29 RX ORDER — FAMOTIDINE 10 MG/ML
20 INJECTION INTRAVENOUS
Status: CANCELLED | OUTPATIENT
Start: 2019-10-29

## 2019-10-29 RX ORDER — HEPARIN 100 UNIT/ML
500 SYRINGE INTRAVENOUS
Status: CANCELLED | OUTPATIENT
Start: 2019-10-29

## 2019-10-29 NOTE — PROGRESS NOTES
"PATIENT: Adri Seay  MRN: [id number]  DATE: 10/29/2019    Diagnosis:   No diagnosis found.  Chief Complaint: No chief complaint on file.    Oncologic History:      Oncologic History 1. Cervical cancer - stage IV      Oncologic Treatment Planned therapy: single-agent carboplatin +/- radiation      Pathology 6/19/19:  - uterine cervix, biopsy:  - poorly differentiated squamous carcinoma with extensive necrosis        Subjective:    History of Present Illness: Ms. Seay is a 84 y.o. female who presents for evaluation and management of cervical cancer. SHe was seen at [facility] on 6/25/19.     Information from Dr. Reese's and Dr. 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 The patient had a CT scan consistent with widely metastatic disease including multiple sites of lymphadenopathy, a possible lung metastases, liver metastases, nodule in the anterior abdominal wall, and carcinomatosis. The patient is mostly asymptomatic but does have a vaginal discharge. Her performance status is 0. On exam, there is a 6 centimeter mass in the anterior abdominal wall superior to the umbilicus. On bimanual examination, tumor is a place the entire cervix apex of the vagina, and on rectovaginal examination, there is a large pelvic mass extending to the bilateral pelvic sidewalls.    We will get her CT reviewed at [facility] and if it is consistent with metastatic disease, we have recommended palliative chemotherapy with either carboplatinum as a single agent or carboplatinum and paclitaxel. Due to concern for bowel involvement on the CT scan, I have recommended not giving bevacizumab as part of this regimen. Patient will return home to [place] and begin chemotherapy with a medical oncologist there. I've also recommended a consultation with radiation oncology in [place] for " "consideration of palliative radiation to lower the chances of significant vaginal bleeding. We will see her back on an as-needed basis."      8/5/19 Visit: She is here for consideration of  cycle 2 of carbo. She states spotting has resolved since last visit. She received 1 unit of blood aproximately 2 weeks ago for HB 7.4. Her HBG last week was 8.6 and she denies any fatigue, LUNA, and feeling run down. Bowel habits are better since starting Miralax.      9/3/19 Visit: Today she is in clinic with her daughter, she is feeling good and has resumed some driving. She states skin around port itchy. Mild redness noted at incision line and lower neck, advised bactroban x 5 days but if worsens change to OTC lotrim. Labs reviewed and HGB 7.4, will set up for 1 unit PRBC for Thursday, but will proceed with treatment as planned for today.     10/1/19 visit: She states she is feeling good except for fatigue after her 3rd cycle of chemo. Denies any bleeding, belly pain, or difficulty with bowels. Labs reviewed and mild dehydration noted, Will increase post chemo fluids to 1 L today. Plan is to repeat scans after cycle 4 .     10/29/19 visit:  Today she is in clinic with her daughter. States she is feeling fine, not has noticed some SOB with exertion, talking and reports patient seems more tired. Labs reviewed and HGB 7.6 will set up for blood transfusion today with chemo. She denies any bleeding and states she is tolerating chemo very well.     Past medical, surgical, family, and social histories have been reviewed and updated below.    Past Medical History:   Past Medical History:   Diagnosis Date    Anemia     Cataract     Cervical cancer 05/2019    Hypertension        Past Surgical History:   Past Surgical History:   Procedure Laterality Date    CATARACT EXTRACTION, BILATERAL      WRIST SURGERY  1984       Family History:   Family History   Problem Relation Age of Onset    Hypertension Mother     Pneumonia Father     " Breast cancer Sister     No Known Problems Brother     Leukemia Daughter     No Known Problems Son        Social History:  reports that she has never smoked. She has never used smokeless tobacco. She reports that she drank alcohol. She reports that she does not use drugs.    Allergies:  Review of patient's allergies indicates:  No Known Allergies    Medications:  Current Outpatient Medications   Medication Sig Dispense Refill    cloNIDine (CATAPRES) 0.3 MG tablet TK 1 T PO BID  3    ferrous sulfate (FEOSOL) 325 mg (65 mg iron) Tab tablet TK 1 T PO BID  1    loratadine (CLARITIN) 10 mg tablet Take 1 tablet (10 mg total) by mouth once daily. 90 tablet 3    megestrol (MEGACE) 40 MG Tab Take 1 tablet (40 mg total) by mouth 2 (two) times daily. 60 tablet 6    mupirocin calcium 2% (BACTROBAN) 2 % cream Apply to affected area 3 times daily 30 g 0    NIFEdipine (PROCARDIA-XL) 30 MG (OSM) 24 hr tablet Take 30 mg by mouth once daily.      ondansetron (ZOFRAN-ODT) 8 MG TbDL Take 1 tablet (8 mg total) by mouth every 8 (eight) hours as needed (chemotherapy-induced nausea and vomiting). 40 tablet 5    oxyCODONE (ROXICODONE) 5 MG immediate release tablet Take 1 tablet (5 mg total) by mouth every 6 (six) hours as needed for Pain (cancer-related pain). 30 tablet 0    potassium chloride (MICRO-K) 10 MEQ CpSR Take 10 mEq by mouth once.      simvastatin (ZOCOR) 20 MG tablet Take 20 mg by mouth every evening.       No current facility-administered medications for this visit.        Review of Systems   Constitutional: Positive for fatigue and unexpected weight change.   HENT: Negative for sore throat.    Eyes: Negative for visual disturbance.   Respiratory: Negative for cough and shortness of breath (LUNA).    Cardiovascular: Negative for chest pain and palpitations.   Gastrointestinal: Negative for constipation, diarrhea, nausea and vomiting.   Genitourinary: Negative for dysuria.   Musculoskeletal: Negative for back pain.    Skin: Negative for rash.   Neurological: Negative for light-headedness and headaches.   Hematological: Negative for adenopathy.   Psychiatric/Behavioral: The patient is not nervous/anxious.      ECOG Performance Status:   ECOG SCORE 0       Objective:      Vitals:   There were no vitals filed for this visit.  BMI: There is no height or weight on file to calculate BMI.    Physical Exam   Constitutional: She is oriented to person, place, and time. She appears well-developed and well-nourished.   HENT:   Head: Normocephalic and atraumatic.   Eyes: Pupils are equal, round, and reactive to light. EOM are normal.   Neck: Normal range of motion. Neck supple.   Cardiovascular: Normal rate and regular rhythm.   Pulmonary/Chest: Effort normal and breath sounds normal.   Abdominal: Soft. Bowel sounds are normal. She exhibits mass (mass palpated in ryann-umbilical/epigastric area).   Musculoskeletal: Normal range of motion. She exhibits edema.   Neurological: She is alert and oriented to person, place, and time.   Skin: Skin is warm and dry.   Psychiatric: She has a normal mood and affect. Her behavior is normal. Judgment and thought content normal.   Nursing note and vitals reviewed.    Laboratory Data:    Lab Results   Component Value Date    WBC 3.88 (L) 10/28/2019    HGB 7.8 (L) 10/28/2019    HCT 24.7 (L) 10/28/2019       Lab Results   Component Value Date     (L) 10/28/2019     CMP  Sodium   Date Value Ref Range Status   10/28/2019 140 136 - 145 mmol/L Final     Potassium   Date Value Ref Range Status   10/28/2019 4.2 3.5 - 5.1 mmol/L Final     Chloride   Date Value Ref Range Status   10/28/2019 109 (H) 98 - 107 mmol/L Final     CO2   Date Value Ref Range Status   10/28/2019 23 22 - 29 mmol/L Final     BUN, Bld   Date Value Ref Range Status   10/28/2019 15.5 8 - 23 mg/dL Final     Creatinine   Date Value Ref Range Status   10/28/2019 0.91 (H) 0.50 - 0.90 mg/dL Final     Calcium   Date Value Ref Range Status    10/28/2019 9.3 8.6 - 10.2 mg/dL Final     Albumin   Date Value Ref Range Status   10/28/2019 4.3 3.5 - 5.2 g/dL Final     AST   Date Value Ref Range Status   10/28/2019 17 0 - 32 U/L Final     Anion Gap   Date Value Ref Range Status   10/28/2019 8.0 8.0 - 17.0 mmol/L Final     Comment:     NOTE  Testing performed at:  [facility], [address] [place] [address]  [id number] CLIA #:[id number]               Imaging:     CT abdomen/pelvis (6/18/19):  1.  Large cervical mass that appears to invade the rectosigmoid and is inseparable from the bladder and urethra, compatible with the known primary.  2.  Large complex cystic mass probably arising from the left ovary that may represent metastatic disease versus another primary.  3.  Retroperitoneal and anterior diaphragmatic adenopathy suspect for metastatic disease.  4.  Supraumbilical anterior abdominal wall mass compatible with metastatic disease.        Assessment:       1. Malignant neoplasm of endocervix    2. Anemia in neoplastic disease           Plan:     1. Cervical cancer  - biopsy (6/19/19) reveals cervical cancer. Imaging reveals stage IV disease  - [facility] Per [facility] recommendations, as well as family preference, we will proceed with single-agent carboplatin AUC5 q28 days. I will refer to radiation oncology for evaluation; we may give radiation therapy after a few cycles of chemotherapy.  - The risks and benefits of chemotherapy were discussed, written information was given, and informed consent was obtained.  - Pt is cleared to start cycle 5 of treatment today with single agent carbo.     -2. Anemia in neoplastic disease, pt asymptomatic   - hemoglobin 7.6   -Set up for 1 unit of PRBC today     3. Chemotherapy-induced nausea and vomiting  - currently well controlled with meds    4. Mild protein malnutrition  - periactin not covered by insurance, changed to megace BID  - continue to monitor      - return to clinic in 4  weeks with labs prior to cycle 6. with scans after 6 cycles of treatment.     Annette Donis, NP

## 2019-11-26 ENCOUNTER — OFFICE VISIT (OUTPATIENT)
Dept: HEMATOLOGY/ONCOLOGY | Facility: CLINIC | Age: 84
End: 2019-11-26
Payer: MEDICARE

## 2019-11-26 VITALS
SYSTOLIC BLOOD PRESSURE: 135 MMHG | HEART RATE: 58 BPM | BODY MASS INDEX: 23.25 KG/M2 | HEIGHT: 62 IN | OXYGEN SATURATION: 99 % | WEIGHT: 126.38 LBS | RESPIRATION RATE: 16 BRPM | DIASTOLIC BLOOD PRESSURE: 79 MMHG | TEMPERATURE: 98 F

## 2019-11-26 DIAGNOSIS — D63.0 ANEMIA IN NEOPLASTIC DISEASE: ICD-10-CM

## 2019-11-26 DIAGNOSIS — D69.6 THROMBOCYTOPENIA: ICD-10-CM

## 2019-11-26 DIAGNOSIS — C53.0 MALIGNANT NEOPLASM OF ENDOCERVIX: Primary | ICD-10-CM

## 2019-11-26 PROCEDURE — 1126F AMNT PAIN NOTED NONE PRSNT: CPT | Mod: S$GLB,,, | Performed by: NURSE PRACTITIONER

## 2019-11-26 PROCEDURE — 1126F PR PAIN SEVERITY QUANTIFIED, NO PAIN PRESENT: ICD-10-PCS | Mod: S$GLB,,, | Performed by: NURSE PRACTITIONER

## 2019-11-26 PROCEDURE — 99215 PR OFFICE/OUTPT VISIT, EST, LEVL V, 40-54 MIN: ICD-10-PCS | Mod: S$GLB,,, | Performed by: NURSE PRACTITIONER

## 2019-11-26 PROCEDURE — 1159F MED LIST DOCD IN RCRD: CPT | Mod: S$GLB,,, | Performed by: NURSE PRACTITIONER

## 2019-11-26 PROCEDURE — 1159F PR MEDICATION LIST DOCUMENTED IN MEDICAL RECORD: ICD-10-PCS | Mod: S$GLB,,, | Performed by: NURSE PRACTITIONER

## 2019-11-26 PROCEDURE — 99215 OFFICE O/P EST HI 40 MIN: CPT | Mod: S$GLB,,, | Performed by: NURSE PRACTITIONER

## 2019-11-26 NOTE — PROGRESS NOTES
"PATIENT: Adri Seay  MRN: [id number]  DATE: 11/26/2019    Diagnosis:   No diagnosis found.  Chief Complaint: Malignant neoplasm of endocervix    Oncologic History:      Oncologic History 1. Cervical cancer - stage IV      Oncologic Treatment Planned therapy: single-agent carboplatin +/- radiation      Pathology 6/19/19:  - uterine cervix, biopsy:  - poorly differentiated squamous carcinoma with extensive necrosis        Subjective:    History of Present Illness: Ms. Seay is a 84 y.o. female who presents for evaluation and management of cervical cancer. SHe was seen at [facility] on 6/25/19.     Information from Dr. Reese's and Dr. 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 The patient had a CT scan consistent with widely metastatic disease including multiple sites of lymphadenopathy, a possible lung metastases, liver metastases, nodule in the anterior abdominal wall, and carcinomatosis. The patient is mostly asymptomatic but does have a vaginal discharge. Her performance status is 0. On exam, there is a 6 centimeter mass in the anterior abdominal wall superior to the umbilicus. On bimanual examination, tumor is a place the entire cervix apex of the vagina, and on rectovaginal examination, there is a large pelvic mass extending to the bilateral pelvic sidewalls.    We will get her CT reviewed at [facility] and if it is consistent with metastatic disease, we have recommended palliative chemotherapy with either carboplatinum as a single agent or carboplatinum and paclitaxel. Due to concern for bowel involvement on the CT scan, I have recommended not giving bevacizumab as part of this regimen. Patient will return home to [place] and begin chemotherapy with a medical oncologist there. I've also recommended a consultation with radiation oncology in [place] for " "consideration of palliative radiation to lower the chances of significant vaginal bleeding. We will see her back on an as-needed basis."      8/5/19 Visit: She is here for consideration of  cycle 2 of carbo. She states spotting has resolved since last visit. She received 1 unit of blood aproximately 2 weeks ago for HB 7.4. Her HBG last week was 8.6 and she denies any fatigue, LUNA, and feeling run down. Bowel habits are better since starting Miralax.      9/3/19 Visit: Today she is in clinic with her daughter, she is feeling good and has resumed some driving. She states skin around port itchy. Mild redness noted at incision line and lower neck, advised bactroban x 5 days but if worsens change to OTC lotrim. Labs reviewed and HGB 7.4, will set up for 1 unit PRBC for Thursday, but will proceed with treatment as planned for today.     10/1/19 visit: She states she is feeling good except for fatigue after her 3rd cycle of chemo. Denies any bleeding, belly pain, or difficulty with bowels. Labs reviewed and mild dehydration noted, Will increase post chemo fluids to 1 L today. Plan is to repeat scans after cycle 4 .     10/29/19 visit:  Today she is in clinic with her daughter. States she is feeling fine, not has noticed some SOB with exertion, talking and reports patient seems more tired. Labs reviewed and HGB 7.6 will set up for blood transfusion today with chemo. She denies any bleeding and states she is tolerating chemo very well.     11/26/19 visit:  Today in clinic she is here with her grandaughter and daughter. She states she is feeling well, tolerating chemo without any signifciant SE,  Energy and diet good, no bleeding noted, no changes in bowels. Labs reviewed and thrombocytopenia noted (91). Will hold treatment this week and proceed with scans, previously schedule after cycle 6 but due to tx delay will proceed with imaging.     Past medical, surgical, family, and social histories have been reviewed and updated " below.    Past Medical History:   Past Medical History:   Diagnosis Date    Anemia     Cataract     Cervical cancer 05/2019    Hypertension        Past Surgical History:   Past Surgical History:   Procedure Laterality Date    CATARACT EXTRACTION, BILATERAL      WRIST SURGERY  1984       Family History:   Family History   Problem Relation Age of Onset    Hypertension Mother     Pneumonia Father     Breast cancer Sister     No Known Problems Brother     Leukemia Daughter     No Known Problems Son        Social History:  reports that she has never smoked. She has never used smokeless tobacco. She reports that she drank alcohol. She reports that she does not use drugs.    Allergies:  Review of patient's allergies indicates:  No Known Allergies    Medications:  Current Outpatient Medications   Medication Sig Dispense Refill    cloNIDine (CATAPRES) 0.3 MG tablet TK 1 T PO BID  3    ferrous sulfate (FEOSOL) 325 mg (65 mg iron) Tab tablet TK 1 T PO BID  1    loratadine (CLARITIN) 10 mg tablet Take 1 tablet (10 mg total) by mouth once daily. 90 tablet 3    megestrol (MEGACE) 40 MG Tab Take 1 tablet (40 mg total) by mouth 2 (two) times daily. 60 tablet 6    mupirocin calcium 2% (BACTROBAN) 2 % cream Apply to affected area 3 times daily 30 g 0    NIFEdipine (PROCARDIA-XL) 30 MG (OSM) 24 hr tablet Take 30 mg by mouth once daily.      ondansetron (ZOFRAN-ODT) 8 MG TbDL Take 1 tablet (8 mg total) by mouth every 8 (eight) hours as needed (chemotherapy-induced nausea and vomiting). 40 tablet 5    oxyCODONE (ROXICODONE) 5 MG immediate release tablet Take 1 tablet (5 mg total) by mouth every 6 (six) hours as needed for Pain (cancer-related pain). 30 tablet 0    potassium chloride (MICRO-K) 10 MEQ CpSR Take 10 mEq by mouth once.      simvastatin (ZOCOR) 20 MG tablet Take 20 mg by mouth every evening.       No current facility-administered medications for this visit.        Review of Systems   Constitutional:  "Positive for fatigue and unexpected weight change.   HENT: Negative for sore throat.    Eyes: Negative for visual disturbance.   Respiratory: Negative for cough and shortness of breath (LUNA).    Cardiovascular: Negative for chest pain and palpitations.   Gastrointestinal: Negative for constipation, diarrhea, nausea and vomiting.   Genitourinary: Negative for dysuria.   Musculoskeletal: Negative for back pain.   Skin: Negative for rash.   Neurological: Negative for light-headedness and headaches.   Hematological: Negative for adenopathy.   Psychiatric/Behavioral: The patient is not nervous/anxious.      ECOG Performance Status:   ECOG SCORE 0       Objective:      Vitals:   Vitals:    11/26/19 0845   BP: 135/79   BP Location: Left arm   Patient Position: Sitting   BP Method: Large (Automatic)   Pulse: (!) 58   Resp: 16   Temp: 98 °F (36.7 °C)   TempSrc: Temporal   SpO2: 99%   Weight: 57.3 kg (126 lb 6.4 oz)   Height: 5' 2" (1.575 m)     BMI: Body mass index is 23.12 kg/m².    Physical Exam   Constitutional: She is oriented to person, place, and time. She appears well-developed and well-nourished.   HENT:   Head: Normocephalic and atraumatic.   Eyes: Pupils are equal, round, and reactive to light. EOM are normal.   Neck: Normal range of motion. Neck supple.   Cardiovascular: Normal rate and regular rhythm.   Pulmonary/Chest: Effort normal and breath sounds normal.   Abdominal: Soft. Bowel sounds are normal. She exhibits mass (mass palpated in ryann-umbilical/epigastric area).   Musculoskeletal: Normal range of motion. She exhibits edema.   Neurological: She is alert and oriented to person, place, and time.   Skin: Skin is warm and dry.   Psychiatric: She has a normal mood and affect. Her behavior is normal. Judgment and thought content normal.   Nursing note and vitals reviewed.    Laboratory Data:    Lab Results   11/26/19 WBC 5.6 HGB 9.3 Hct 28.7 PLT 91          Imaging:     CT abdomen/pelvis (6/18/19):  1.  Large " cervical mass that appears to invade the rectosigmoid and is inseparable from the bladder and urethra, compatible with the known primary.  2.  Large complex cystic mass probably arising from the left ovary that may represent metastatic disease versus another primary.  3.  Retroperitoneal and anterior diaphragmatic adenopathy suspect for metastatic disease.  4.  Supraumbilical anterior abdominal wall mass compatible with metastatic disease.        Assessment:       1. Malignant neoplasm of endocervix    2. Thrombocytopenia    3. Anemia in neoplastic disease           Plan:     1. Thrombocytoponia  - will hold cycle 6 to allow counts to recover  - will schedule PET scan for restaging while pt chemo delayed  -discussed bleeding precautions.     2. Cervical cancer  - biopsy (6/19/19) reveals cervical cancer. Imaging reveals stage IV disease  - [facility] Per [facility] recommendations, as well as family preference, we will proceed with single-agent carboplatin AUC5 q28 days. I will refer to radiation oncology for evaluation; we may give radiation therapy after a few cycles of chemotherapy.  - The risks and benefits of chemotherapy were discussed, written information was given, and informed consent was obtained.  - Pt is s/p 5 cycles of single agent carbo.     -3. Anemia in neoplastic disease, pt asymptomatic   - hemoglobin 9.3 will continue to monitor   -Set up for 1 unit of PRBC today       4. Mild protein malnutrition  - periactin not covered by insurance, changed to megace BID  - continue to monitor      - return to clinic in 1 week with labs.     Annette Donis NP

## 2019-12-05 LAB
ALBUMIN SERPL BCP-MCNC: 3.6 G/DL (ref 3.4–5)
ALBUMIN/GLOBULIN RATIO: 1.06 RATIO (ref 1.1–1.8)
ALP SERPL-CCNC: 91 U/L (ref 46–116)
ALT SERPL W P-5'-P-CCNC: 16 U/L (ref 12–78)
ANISOCYTOSIS: ABNORMAL
AST SERPL-CCNC: 13 U/L (ref 15–37)
BILIRUB SERPL-MCNC: 0.5 MG/DL (ref 0–1)
BUN SERPL-MCNC: 19 MG/DL (ref 7–18)
BUN/CREAT SERPL: 17.92 RATIO (ref 7–18)
CALCIUM SERPL-MCNC: 9.3 MG/DL (ref 8.8–10.5)
CELLS COUNTED: 50
CHLORIDE SERPL-SCNC: 108 MMOL/L (ref 100–108)
CO2 SERPL-SCNC: 23 MMOL/L (ref 21–32)
CREAT SERPL-MCNC: 1.06 MG/DL (ref 0.55–1.02)
EOSINOPHIL NFR BLD: 4 % (ref 1–3)
ERYTHROCYTE [DISTWIDTH] IN BLOOD BY AUTOMATED COUNT: 20.2 % (ref 12.5–18)
GFR ESTIMATION: 60
GLOBULIN: 3.4 G/DL (ref 2.3–3.5)
GLUCOSE SERPL-MCNC: 104 MG/DL (ref 70–110)
HCT VFR BLD AUTO: 23.1 % (ref 37–47)
HGB BLD-MCNC: 7.4 G/DL (ref 12–16)
HYPOCHROMIA BLD QL SMEAR: ABNORMAL
LYMPHOCYTES NFR BLD: 46 % (ref 25–40)
MACROCYTES BLD QL SMEAR: ABNORMAL
MAGNESIUM SERPL-MCNC: 1.5 MG/DL (ref 1.8–2.4)
MANUAL NRBC PER 100 CELLS: 0 %
MCH RBC QN AUTO: 34.4 PG (ref 27–31.2)
MCHC RBC AUTO-ENTMCNC: 32 G/DL (ref 31.8–35.4)
MCV RBC AUTO: 107.4 FL (ref 80–97)
MONOCYTES NFR BLD MANUAL: 4 % (ref 1–15)
NEUTROPHILS ABSOLUTE COUNT: 1.4 10*3/UL (ref 1.8–7.7)
NEUTROPHILS NFR BLD: 46 % (ref 37–80)
PHOSPHATE FLD-MCNC: 3.8 MG/DL (ref 2.6–4.7)
PLATELETS: 180 10*3/UL (ref 142–424)
POTASSIUM SERPL-SCNC: 4.3 MMOL/L (ref 3.6–5.2)
PROT SERPL-MCNC: 7 G/DL (ref 6.4–8.2)
RBC # BLD AUTO: 2.15 10*6/UL (ref 4.2–5.4)
SMALL PLATELETS BLD QL SMEAR: ADEQUATE
SODIUM BLD-SCNC: 140 MMOL/L (ref 135–145)
WBC # BLD: 3.1 10*3/UL (ref 4.6–10.2)

## 2019-12-06 ENCOUNTER — OFFICE VISIT (OUTPATIENT)
Dept: HEMATOLOGY/ONCOLOGY | Facility: CLINIC | Age: 84
End: 2019-12-06
Payer: MEDICARE

## 2019-12-06 VITALS
OXYGEN SATURATION: 99 % | TEMPERATURE: 98 F | DIASTOLIC BLOOD PRESSURE: 69 MMHG | BODY MASS INDEX: 23.62 KG/M2 | RESPIRATION RATE: 10 BRPM | SYSTOLIC BLOOD PRESSURE: 121 MMHG | WEIGHT: 128.38 LBS | HEIGHT: 62 IN | HEART RATE: 66 BPM

## 2019-12-06 DIAGNOSIS — C53.0 MALIGNANT NEOPLASM OF ENDOCERVIX: Primary | ICD-10-CM

## 2019-12-06 DIAGNOSIS — D69.6 THROMBOCYTOPENIA: ICD-10-CM

## 2019-12-06 DIAGNOSIS — D63.0 ANEMIA IN NEOPLASTIC DISEASE: ICD-10-CM

## 2019-12-06 PROCEDURE — 1126F AMNT PAIN NOTED NONE PRSNT: CPT | Mod: S$GLB,,, | Performed by: NURSE PRACTITIONER

## 2019-12-06 PROCEDURE — 99215 PR OFFICE/OUTPT VISIT, EST, LEVL V, 40-54 MIN: ICD-10-PCS | Mod: S$GLB,,, | Performed by: NURSE PRACTITIONER

## 2019-12-06 PROCEDURE — 99215 OFFICE O/P EST HI 40 MIN: CPT | Mod: S$GLB,,, | Performed by: NURSE PRACTITIONER

## 2019-12-06 PROCEDURE — 1159F PR MEDICATION LIST DOCUMENTED IN MEDICAL RECORD: ICD-10-PCS | Mod: S$GLB,,, | Performed by: NURSE PRACTITIONER

## 2019-12-06 PROCEDURE — 1126F PR PAIN SEVERITY QUANTIFIED, NO PAIN PRESENT: ICD-10-PCS | Mod: S$GLB,,, | Performed by: NURSE PRACTITIONER

## 2019-12-06 PROCEDURE — 1159F MED LIST DOCD IN RCRD: CPT | Mod: S$GLB,,, | Performed by: NURSE PRACTITIONER

## 2019-12-06 NOTE — PROGRESS NOTES
"PATIENT: Adri Seay  MRN: [id number]  DATE: 12/6/2019    Diagnosis:   1. Malignant neoplasm of endocervix    2. Thrombocytopenia    3. Anemia in neoplastic disease      Chief Complaint: Malignant neoplasm of Endocerix and Results (PET scan )    Oncologic History:      Oncologic History 1. Cervical cancer - stage IV      Oncologic Treatment Planned therapy: single-agent carboplatin +/- radiation      Pathology 6/19/19:  - uterine cervix, biopsy:  - poorly differentiated squamous carcinoma with extensive necrosis        Subjective:    History of Present Illness: Ms. Seay is a 84 y.o. female who presents for evaluation and management of cervical cancer. SHe was seen at [facility] on 6/25/19.     Information from Dr. Reese's and Dr. 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 The patient had a CT scan consistent with widely metastatic disease including multiple sites of lymphadenopathy, a possible lung metastases, liver metastases, nodule in the anterior abdominal wall, and carcinomatosis. The patient is mostly asymptomatic but does have a vaginal discharge. Her performance status is 0. On exam, there is a 6 centimeter mass in the anterior abdominal wall superior to the umbilicus. On bimanual examination, tumor is a place the entire cervix apex of the vagina, and on rectovaginal examination, there is a large pelvic mass extending to the bilateral pelvic sidewalls.    We will get her CT reviewed at [facility] and if it is consistent with metastatic disease, we have recommended palliative chemotherapy with either carboplatinum as a single agent or carboplatinum and paclitaxel. Due to concern for bowel involvement on the CT scan, I have recommended not giving bevacizumab as part of this regimen. Patient will return home to [place] and begin chemotherapy with a medical " "oncologist there. I've also recommended a consultation with radiation oncology in [place] for consideration of palliative radiation to lower the chances of significant vaginal bleeding. We will see her back on an as-needed basis."      8/5/19 Visit: She is here for consideration of  cycle 2 of carbo. She states spotting has resolved since last visit. She received 1 unit of blood aproximately 2 weeks ago for HB 7.4. Her HBG last week was 8.6 and she denies any fatigue, LUNA, and feeling run down. Bowel habits are better since starting Miralax.      9/3/19 Visit: Today she is in clinic with her daughter, she is feeling good and has resumed some driving. She states skin around port itchy. Mild redness noted at incision line and lower neck, advised bactroban x 5 days but if worsens change to OTC lotrim. Labs reviewed and HGB 7.4, will set up for 1 unit PRBC for Thursday, but will proceed with treatment as planned for today.     10/1/19 visit: She states she is feeling good except for fatigue after her 3rd cycle of chemo. Denies any bleeding, belly pain, or difficulty with bowels. Labs reviewed and mild dehydration noted, Will increase post chemo fluids to 1 L today. Plan is to repeat scans after cycle 4 .     10/29/19 visit:  Today she is in clinic with her daughter. States she is feeling fine, not has noticed some SOB with exertion, talking and reports patient seems more tired. Labs reviewed and HGB 7.6 will set up for blood transfusion today with chemo. She denies any bleeding and states she is tolerating chemo very well.     11/26/19 visit:  Today in clinic she is here with her grandaughter and daughter. She states she is feeling well, tolerating chemo without any signifciant SE except fatigue and diet good, no bleeding noted, no changes in bowels. Labs reviewed and thrombocytopenia noted (91). Will hold treatment this week and proceed with scans, previously schedule after cycle 6 but due to tx delay will proceed " with imaging.     12/2/19 visit:  Today in clinic with family to discuss recent PET/CT done on 12/2/19. Previous CT imaging done at outside facility, discussed with Dr Nguyen for comparison and he stated pelvic mass smaller, abdominal mass smaller liver lesion likely benign stable but difficult to say if peritoneal implants stable to progressed.      Past medical, surgical, family, and social histories have been reviewed and updated below.    Past Medical History:   Past Medical History:   Diagnosis Date    Anemia     Cataract     Cervical cancer 05/2019    Hypertension        Past Surgical History:   Past Surgical History:   Procedure Laterality Date    CATARACT EXTRACTION, BILATERAL      WRIST SURGERY  1984       Family History:   Family History   Problem Relation Age of Onset    Hypertension Mother     Pneumonia Father     Breast cancer Sister     No Known Problems Brother     Leukemia Daughter     No Known Problems Son        Social History:  reports that she has never smoked. She has never used smokeless tobacco. She reports that she drank alcohol. She reports that she does not use drugs.    Allergies:  Review of patient's allergies indicates:  No Known Allergies    Medications:  Current Outpatient Medications   Medication Sig Dispense Refill    cloNIDine (CATAPRES) 0.3 MG tablet TK 1 T PO BID  3    ferrous sulfate (FEOSOL) 325 mg (65 mg iron) Tab tablet TK 1 T PO BID  1    loratadine (CLARITIN) 10 mg tablet Take 1 tablet (10 mg total) by mouth once daily. 90 tablet 3    megestrol (MEGACE) 40 MG Tab Take 1 tablet (40 mg total) by mouth 2 (two) times daily. 60 tablet 6    mupirocin calcium 2% (BACTROBAN) 2 % cream Apply to affected area 3 times daily 30 g 0    NIFEdipine (PROCARDIA-XL) 30 MG (OSM) 24 hr tablet Take 30 mg by mouth once daily.      ondansetron (ZOFRAN-ODT) 8 MG TbDL Take 1 tablet (8 mg total) by mouth every 8 (eight) hours as needed (chemotherapy-induced nausea and vomiting). 40  "tablet 5    oxyCODONE (ROXICODONE) 5 MG immediate release tablet Take 1 tablet (5 mg total) by mouth every 6 (six) hours as needed for Pain (cancer-related pain). 30 tablet 0    potassium chloride (MICRO-K) 10 MEQ CpSR Take 10 mEq by mouth once.      simvastatin (ZOCOR) 20 MG tablet Take 20 mg by mouth every evening.       No current facility-administered medications for this visit.        Review of Systems   Constitutional: Positive for fatigue and unexpected weight change.   HENT: Negative for sore throat.    Eyes: Negative for visual disturbance.   Respiratory: Negative for cough and shortness of breath (LUNA).    Cardiovascular: Negative for chest pain and palpitations.   Gastrointestinal: Negative for constipation, diarrhea, nausea and vomiting.   Genitourinary: Negative for dysuria.   Musculoskeletal: Negative for back pain.   Skin: Negative for rash.   Neurological: Negative for light-headedness and headaches.   Hematological: Negative for adenopathy.   Psychiatric/Behavioral: The patient is not nervous/anxious.      ECOG Performance Status:   ECOG SCORE 0       Objective:      Vitals:   Vitals:    12/06/19 0842   BP: 121/69   BP Location: Right arm   Patient Position: Sitting   BP Method: Large (Automatic)   Pulse: 66   Resp: 10   Temp: 97.7 °F (36.5 °C)   TempSrc: Temporal   SpO2: 99%   Weight: 58.2 kg (128 lb 6.4 oz)   Height: 5' 2" (1.575 m)     BMI: Body mass index is 23.48 kg/m².    Physical Exam   Constitutional: She is oriented to person, place, and time. She appears well-developed and well-nourished.   HENT:   Head: Normocephalic and atraumatic.   Eyes: Pupils are equal, round, and reactive to light. EOM are normal.   Neck: Normal range of motion. Neck supple.   Cardiovascular: Normal rate and regular rhythm.   Pulmonary/Chest: Effort normal and breath sounds normal.   Abdominal: Soft. Bowel sounds are normal. She exhibits mass (mass palpated in ryann-umbilical/epigastric area).   Musculoskeletal: " Normal range of motion. She exhibits edema.   Neurological: She is alert and oriented to person, place, and time.   Skin: Skin is warm and dry.   Psychiatric: She has a normal mood and affect. Her behavior is normal. Judgment and thought content normal.   Nursing note and vitals reviewed.    Laboratory Data:    Lab Results   19 WBC 5.6 HGB 9.3 Hct 28.7 PLT 91    19:          Imagin/2/19 PET:      CT abdomen/pelvis (19):  1.  Large cervical mass that appears to invade the rectosigmoid and is inseparable from the bladder and urethra, compatible with the known primary.  2.  Large complex cystic mass probably arising from the left ovary that may represent metastatic disease versus another primary.  3.  Retroperitoneal and anterior diaphragmatic adenopathy suspect for metastatic disease.  4.  Supraumbilical anterior abdominal wall mass compatible with metastatic disease.        Assessment:       1. Malignant neoplasm of endocervix    2. Thrombocytopenia    3. Anemia in neoplastic disease           Plan:     1. Cervical cancer  - biopsy (19) reveals cervical cancer. Imaging reveals stage IV disease  - [facility]. Per their recommendations, as well as family preference, we will proceed with single-agent carboplatin AUC5 q28 days. I will refer to radiation oncology for evaluation; we may give radiation therapy after a few cycles of chemotherapy.  - The risks and benefits of chemotherapy were discussed, written information was given, and informed consent was obtained.  - Pt is s/p 5 cycles of single agent carbo with thrombocytopenia. Discussed recent scan with suspected progression of peritoneal implants noted on Scan  along with recent thrombocytopenia  And worsening fatigue discussed testing for IO therapy vs change in chemotherapy.   - will request IO testing for  PDL1/CPS if measurable on tissue block  - will also check NGS with [email or web address] for MMR,  BRCA 1 and 2 as she reports a sister with Breast cancer who  at age 29 and a niece with pancreatic cancer, age 50.  - If IO is not a tx option, discussed weekly taxol 60-80 mg/m2 as alternative to carbo.   - pt would like to wait for NGS testing to be completed before she makes any treatment decisions.    2. Thrombocytoponia  - improved    -3. Anemia in neoplastic disease, pt asymptomatic   - will continue to monitor     4. Mild protein malnutrition  - periactin not covered by insurance, changed to megace BID  - continue to monitor    - return to clinic in 1 week.     Annette Donis NP

## 2019-12-20 ENCOUNTER — OFFICE VISIT (OUTPATIENT)
Dept: HEMATOLOGY/ONCOLOGY | Facility: CLINIC | Age: 84
End: 2019-12-20
Payer: MEDICARE

## 2019-12-20 VITALS
TEMPERATURE: 97 F | DIASTOLIC BLOOD PRESSURE: 69 MMHG | SYSTOLIC BLOOD PRESSURE: 106 MMHG | BODY MASS INDEX: 24.26 KG/M2 | HEIGHT: 62 IN | RESPIRATION RATE: 14 BRPM | WEIGHT: 131.81 LBS | HEART RATE: 62 BPM | OXYGEN SATURATION: 98 %

## 2019-12-20 DIAGNOSIS — Z15.01 BRCA2 GENE MUTATION POSITIVE IN FEMALE: Primary | ICD-10-CM

## 2019-12-20 DIAGNOSIS — Z15.09 BRCA2 GENE MUTATION POSITIVE IN FEMALE: Primary | ICD-10-CM

## 2019-12-20 DIAGNOSIS — Z15.02 BRCA2 GENE MUTATION POSITIVE IN FEMALE: Primary | ICD-10-CM

## 2019-12-20 DIAGNOSIS — C53.0 MALIGNANT NEOPLASM OF ENDOCERVIX: ICD-10-CM

## 2019-12-20 DIAGNOSIS — E44.1 MILD PROTEIN-CALORIE MALNUTRITION: ICD-10-CM

## 2019-12-20 PROCEDURE — 1159F MED LIST DOCD IN RCRD: CPT | Mod: S$GLB,,, | Performed by: NURSE PRACTITIONER

## 2019-12-20 PROCEDURE — 1126F PR PAIN SEVERITY QUANTIFIED, NO PAIN PRESENT: ICD-10-PCS | Mod: S$GLB,,, | Performed by: NURSE PRACTITIONER

## 2019-12-20 PROCEDURE — 99215 OFFICE O/P EST HI 40 MIN: CPT | Mod: S$GLB,,, | Performed by: NURSE PRACTITIONER

## 2019-12-20 PROCEDURE — 1126F AMNT PAIN NOTED NONE PRSNT: CPT | Mod: S$GLB,,, | Performed by: NURSE PRACTITIONER

## 2019-12-20 PROCEDURE — 1159F PR MEDICATION LIST DOCUMENTED IN MEDICAL RECORD: ICD-10-PCS | Mod: S$GLB,,, | Performed by: NURSE PRACTITIONER

## 2019-12-20 PROCEDURE — 99215 PR OFFICE/OUTPT VISIT, EST, LEVL V, 40-54 MIN: ICD-10-PCS | Mod: S$GLB,,, | Performed by: NURSE PRACTITIONER

## 2019-12-20 NOTE — PROGRESS NOTES
"PATIENT: Adri Seay  MRN: [id number]  DATE: 12/20/2019    Diagnosis:   No diagnosis found.  Chief Complaint: Malignant neoplasm of endocerivx    Oncologic History:      Oncologic History 1. Cervical cancer - stage IV      Oncologic Treatment Planned therapy: single-agent carboplatin +/- radiation      Pathology 6/19/19:  - uterine cervix, biopsy:  - poorly differentiated squamous carcinoma with extensive necrosis        Subjective:    History of Present Illness: Ms. Seay is a 84 y.o. female who presents for evaluation and management of cervical cancer. SHe was seen at [facility] on 6/25/19.     Information from Dr. Reese's and Dr. 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 The patient had a CT scan consistent with widely metastatic disease including multiple sites of lymphadenopathy, a possible lung metastases, liver metastases, nodule in the anterior abdominal wall, and carcinomatosis. The patient is mostly asymptomatic but does have a vaginal discharge. Her performance status is 0. On exam, there is a 6 centimeter mass in the anterior abdominal wall superior to the umbilicus. On bimanual examination, tumor is a place the entire cervix apex of the vagina, and on rectovaginal examination, there is a large pelvic mass extending to the bilateral pelvic sidewalls.    We will get her CT reviewed at [facility] and if it is consistent with metastatic disease, we have recommended palliative chemotherapy with either carboplatinum as a single agent or carboplatinum and paclitaxel. Due to concern for bowel involvement on the CT scan, I have recommended not giving bevacizumab as part of this regimen. Patient will return home to [place] and begin chemotherapy with a medical oncologist there. I've also recommended a consultation with radiation oncology in [place] for " "consideration of palliative radiation to lower the chances of significant vaginal bleeding. We will see her back on an as-needed basis."      8/5/19 Visit: She is here for consideration of  cycle 2 of carbo. She states spotting has resolved since last visit. She received 1 unit of blood aproximately 2 weeks ago for HB 7.4. Her HBG last week was 8.6 and she denies any fatigue, LUNA, and feeling run down. Bowel habits are better since starting Miralax.      9/3/19 Visit: Today she is in clinic with her daughter, she is feeling good and has resumed some driving. She states skin around port itchy. Mild redness noted at incision line and lower neck, advised bactroban x 5 days but if worsens change to OTC lotrim. Labs reviewed and HGB 7.4, will set up for 1 unit PRBC for Thursday, but will proceed with treatment as planned for today.     10/1/19 visit: She states she is feeling good except for fatigue after her 3rd cycle of chemo. Denies any bleeding, belly pain, or difficulty with bowels. Labs reviewed and mild dehydration noted, Will increase post chemo fluids to 1 L today. Plan is to repeat scans after cycle 4 .     10/29/19 visit:  Today she is in clinic with her daughter. States she is feeling fine, not has noticed some SOB with exertion, talking and reports patient seems more tired. Labs reviewed and HGB 7.6 will set up for blood transfusion today with chemo. She denies any bleeding and states she is tolerating chemo very well.     11/26/19 visit:  Today in clinic she is here with her grandaughter and daughter. She states she is feeling well, tolerating chemo without any signifciant SE except fatigue and diet good, no bleeding noted, no changes in bowels. Labs reviewed and thrombocytopenia noted (91). Will hold treatment this week and proceed with scans, previously schedule after cycle 6 but due to tx delay will proceed with imaging.     12/2/19 visit:  Today in clinic with family to discuss recent PET/CT done on " 12/2/19. Previous CT imaging done at outside facility, discussed with Dr Nguyen for comparison and he stated pelvic mass smaller, abdominal mass smaller liver lesion likely benign stable but difficult to say if peritoneal implants stable to progressed.      12/20/19 visit:  Today she and her family are here to discuss recent result from [employer] on 12/16/19. Results show Mrs Seay has a BRCA 2 gene mutation. She does state that she had a sister die at age 29 from Breast cancer about 40-50 years ago. We discussed the accountability of BRCA 2 mutation with targeted PARP inhibitors as a treatment option as well as family implications and recommendation of further testing of children for BRCA 2 mutations. She and her family members [facility] with licensed Genetic counsler. Wanda Ma RN will help facilitate appointment.     Past medical, surgical, family, and social histories have been reviewed and updated below.    Past Medical History:   Past Medical History:   Diagnosis Date    Anemia     Cataract     Cervical cancer 05/2019    Hypertension        Past Surgical History:   Past Surgical History:   Procedure Laterality Date    CATARACT EXTRACTION, BILATERAL      WRIST SURGERY  1984       Family History:   Family History   Problem Relation Age of Onset    Hypertension Mother     Pneumonia Father     Breast cancer Sister     No Known Problems Brother     Leukemia Daughter     No Known Problems Son        Social History:  reports that she has never smoked. She has never used smokeless tobacco. She reports that she drank alcohol. She reports that she does not use drugs.    Allergies:  Review of patient's allergies indicates:  No Known Allergies    Medications:  Current Outpatient Medications   Medication Sig Dispense Refill    cloNIDine (CATAPRES) 0.3 MG tablet TK 1 T PO BID  3    ferrous sulfate (FEOSOL) 325 mg (65 mg iron) Tab tablet TK 1 T PO BID  1  "   loratadine (CLARITIN) 10 mg tablet Take 1 tablet (10 mg total) by mouth once daily. 90 tablet 3    megestrol (MEGACE) 40 MG Tab Take 1 tablet (40 mg total) by mouth 2 (two) times daily. 60 tablet 6    mupirocin calcium 2% (BACTROBAN) 2 % cream Apply to affected area 3 times daily 30 g 0    NIFEdipine (PROCARDIA-XL) 30 MG (OSM) 24 hr tablet Take 30 mg by mouth once daily.      ondansetron (ZOFRAN-ODT) 8 MG TbDL Take 1 tablet (8 mg total) by mouth every 8 (eight) hours as needed (chemotherapy-induced nausea and vomiting). 40 tablet 5    oxyCODONE (ROXICODONE) 5 MG immediate release tablet Take 1 tablet (5 mg total) by mouth every 6 (six) hours as needed for Pain (cancer-related pain). 30 tablet 0    potassium chloride (MICRO-K) 10 MEQ CpSR Take 10 mEq by mouth once.      simvastatin (ZOCOR) 20 MG tablet Take 20 mg by mouth every evening.       No current facility-administered medications for this visit.        Review of Systems   Constitutional: Positive for fatigue and unexpected weight change.   HENT: Negative for sore throat.    Eyes: Negative for visual disturbance.   Respiratory: Negative for cough and shortness of breath (LUNA).    Cardiovascular: Negative for chest pain and palpitations.   Gastrointestinal: Negative for constipation, diarrhea, nausea and vomiting.   Genitourinary: Negative for dysuria.   Musculoskeletal: Negative for back pain.   Skin: Negative for rash.   Neurological: Negative for light-headedness and headaches.   Hematological: Negative for adenopathy.   Psychiatric/Behavioral: The patient is not nervous/anxious.      ECOG Performance Status:   ECOG SCORE 0       Objective:      Vitals:   Vitals:    12/20/19 0922   BP: 106/69   BP Location: Right arm   Patient Position: Sitting   BP Method: Large (Automatic)   Pulse: 62   Resp: 14   Temp: 97.3 °F (36.3 °C)   TempSrc: Temporal   SpO2: 98%   Weight: 59.8 kg (131 lb 12.8 oz)   Height: 5' 2" (1.575 m)     BMI: Body mass index is 24.11 " kg/m².    Physical Exam   Constitutional: She is oriented to person, place, and time. She appears well-developed and well-nourished.   HENT:   Head: Normocephalic and atraumatic.   Eyes: Pupils are equal, round, and reactive to light. EOM are normal.   Neck: Normal range of motion. Neck supple.   Cardiovascular: Normal rate and regular rhythm.   Pulmonary/Chest: Effort normal and breath sounds normal.   Abdominal: Soft. Bowel sounds are normal. She exhibits mass (mass palpated in ryann-umbilical/epigastric area).   Musculoskeletal: Normal range of motion. She exhibits edema.   Neurological: She is alert and oriented to person, place, and time.   Skin: Skin is warm and dry.   Psychiatric: She has a normal mood and affect. Her behavior is normal. Judgment and thought content normal.   Nursing note and vitals reviewed.    Laboratory Data:    Lab Results   19 WBC 5.6 HGB 9.3 Hct 28.7 PLT 91    19:          Imagin/2/19 PET:      CT abdomen/pelvis (19):  1.  Large cervical mass that appears to invade the rectosigmoid and is inseparable from the bladder and urethra, compatible with the known primary.  2.  Large complex cystic mass probably arising from the left ovary that may represent metastatic disease versus another primary.  3.  Retroperitoneal and anterior diaphragmatic adenopathy suspect for metastatic disease.  4.  Supraumbilical anterior abdominal wall mass compatible with metastatic disease.        Assessment:       1. 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 Malignant neoplasm of endocervix    3. Mild protein-calorie malnutrition           Plan:     1. Cervical cancer  - biopsy (19) reveals cervical cancer. Imaging reveals stage IV disease  - she has been evaluated at [facility]. Per their recommendations, as well as family preference, we will proceed with single-agent carboplatin AUC5 q28 days. I will refer to radiation oncology for evaluation; we may give radiation therapy  after a few cycles of chemotherapy.  - The risks and benefits of chemotherapy were discussed, written information was given, and informed consent was obtained.  - Pt is s/p 5 cycles of single agent carbo with thrombocytopenia. Discussed recent scan with suspected progression of peritoneal implants noted on Scan  along with recent thrombocytopenia  And worsening fatigue discussed testing for IO therapy vs change in chemotherapy.   - will request IO testing for  PDL1/CPS if measurable on tissue block  - will also check NGS with [employer] [facility] for MMR, BRCA 1 and 2 as she reports a sister with Breast cancer who  at age 29 and a niece with pancreatic cancer, age 50.  - If IO is not a tx option, discussed weekly taxol 60-80 mg/m2 as alternative to carbo.   - pt would like to wait for NGS testing to be completed before she makes any treatment decisions.    2.BRCA2 +  mutation   -discussed results of [facility] showing BRCA 2 mutation, implications, accountability and additional family testing recommendations  - Discussed attempting to obtain PARP inhibitor approval, will proceed with oral lynparza 300 mg BID, discussed SE and risks vs chemotherapy options. She states she does not want additional chemo.     -3. Anemia in neoplastic disease, pt asymptomatic   - will continue to monitor     4. Mild protein malnutrition  - periactin not covered by insurance, changed to megace BID  - notes increased appetite and wt gain    - return to clinic in 1-2 weeks with labs prior to starting lynparza.     Annette Donis NP

## 2019-12-23 ENCOUNTER — TELEPHONE (OUTPATIENT)
Dept: HEMATOLOGY/ONCOLOGY | Facility: CLINIC | Age: 84
End: 2019-12-23

## 2019-12-23 ENCOUNTER — TELEPHONE (OUTPATIENT)
Dept: PHARMACY | Facility: CLINIC | Age: 84
End: 2019-12-23

## 2019-12-23 NOTE — TELEPHONE ENCOUNTER
Spoke with [facility] they received the medication and spoke to pt. The next step will be getting it approved then sent out.

## 2019-12-23 NOTE — TELEPHONE ENCOUNTER
----- Message from Flor Mitchell, PharmD sent at 12/23/2019 10:32 AM CST -----  Regarding: Lynparza dose  Good morning,    We have received the new script for Ms. Seay for her Lynparza. Based on her most recent Cr 1.02, her CrCl is 34mL/min. From the PI, recommendations for CrCl 31 to 50 mL/minute: Tablets: Reduce dose to 200 mg twice daily. Please let us know if you would like to reduce the dose or continue with current dose.     Thank you!    Flor Mitchell, PharmD  [facility]   [phone]

## 2019-12-23 NOTE — TELEPHONE ENCOUNTER
----- Message from Brianna York RN sent at 12/20/2019  3:46 PM CST -----  Tomas    ----- Message -----  From: Annette Donis NP  Sent: 12/20/2019  12:01 PM CST  To: Brianna York RN    Sent script to [facility]

## 2019-12-31 NOTE — TELEPHONE ENCOUNTER
Attempted to reach patient in regards to initial Lynparza consult/shipment. No answer or VM either mobile or home numbers.  
DOCUMENTATION ONLY:  Prior authorization for  Lynparza 100 mg Tablet #120/30 approved from 10/01/2019 to 12/29/2022    Co-pay: $8.50    Patient assistance IS NOT required. Sending to clinical pharmacist for  and shipment. Jose LEZAMA  
No answer/VM to inform patient that [facility] received prescription for Lynparza and prior authorization is required.  [facility] will be back in touch once insurance determination is received.    
18

## 2020-01-02 LAB
ALBUMIN SERPL BCP-MCNC: 3.6 G/DL (ref 3.4–5)
ALBUMIN/GLOBULIN RATIO: 1 RATIO (ref 1.1–1.8)
ALP SERPL-CCNC: 96 U/L (ref 46–116)
ALT SERPL W P-5'-P-CCNC: 13 U/L (ref 12–78)
AST SERPL-CCNC: 15 U/L (ref 15–37)
BASOPHILS NFR SNV MANUAL: 0.3 % (ref 0–3)
BILIRUB SERPL-MCNC: 0.3 MG/DL (ref 0–1)
BUN SERPL-MCNC: 25 MG/DL (ref 7–18)
BUN/CREAT SERPL: 21.73 RATIO (ref 7–18)
CALCIUM SERPL-MCNC: 9.2 MG/DL (ref 8.8–10.5)
CHLORIDE SERPL-SCNC: 107 MMOL/L (ref 100–108)
CO2 SERPL-SCNC: 23 MMOL/L (ref 21–32)
CREAT SERPL-MCNC: 1.15 MG/DL (ref 0.55–1.02)
EOSINOPHIL NFR SNV MANUAL: 0.8 % (ref 1–3)
ERYTHROCYTE [DISTWIDTH] IN BLOOD BY AUTOMATED COUNT: 14.2 % (ref 12.5–18)
GFR ESTIMATION: 55
GLOBULIN: 3.6 G/DL (ref 2.3–3.5)
GLUCOSE SERPL-MCNC: 154 MG/DL (ref 70–110)
HCT VFR BLD AUTO: 28.7 % (ref 37–47)
HGB BLD-MCNC: 9.1 G/DL (ref 12–16)
LYMPHOCYTES NFR SNV MANUAL: 18.9 % (ref 25–40)
MACROCYTES BLD QL SMEAR: NORMAL
MAGNESIUM SERPL-MCNC: 1.5 MG/DL (ref 1.8–2.4)
MANUAL NRBC PER 100 CELLS: 0 %
MCH RBC QN AUTO: 34 PG (ref 27–31.2)
MCHC RBC AUTO-ENTMCNC: 31.7 G/DL (ref 31.8–35.4)
MCV RBC AUTO: 107.1 FL (ref 80–97)
MONOCYTES/100 LEUKOCYTES: 8.3 % (ref 1–15)
NEUTROPHILS NFR BLD: 2.83 10*3/UL (ref 1.8–7.7)
NEUTROPHILS NFR SNV MANUAL: 71.4 % (ref 37–80)
PHOSPHATE FLD-MCNC: 3 MG/DL (ref 2.6–4.7)
PLATELETS: 165 10*3/UL (ref 142–424)
POTASSIUM SERPL-SCNC: 4.2 MMOL/L (ref 3.6–5.2)
PROT SERPL-MCNC: 7.2 G/DL (ref 6.4–8.2)
RBC # BLD AUTO: 2.68 10*6/UL (ref 4.2–5.4)
SODIUM BLD-SCNC: 139 MMOL/L (ref 135–145)
WBC # BLD: 4 10*3/UL (ref 4.6–10.2)

## 2020-01-03 ENCOUNTER — TELEPHONE (OUTPATIENT)
Dept: PHARMACY | Facility: CLINIC | Age: 85
End: 2020-01-03

## 2020-01-03 ENCOUNTER — OFFICE VISIT (OUTPATIENT)
Dept: HEMATOLOGY/ONCOLOGY | Facility: CLINIC | Age: 85
End: 2020-01-03
Payer: MEDICARE

## 2020-01-03 VITALS
OXYGEN SATURATION: 98 % | RESPIRATION RATE: 14 BRPM | TEMPERATURE: 99 F | HEIGHT: 62 IN | DIASTOLIC BLOOD PRESSURE: 68 MMHG | WEIGHT: 130 LBS | SYSTOLIC BLOOD PRESSURE: 109 MMHG | HEART RATE: 63 BPM | BODY MASS INDEX: 23.92 KG/M2

## 2020-01-03 DIAGNOSIS — D63.0 ANEMIA IN NEOPLASTIC DISEASE: ICD-10-CM

## 2020-01-03 DIAGNOSIS — Z15.01 BRCA2 GENE MUTATION POSITIVE IN FEMALE: Primary | ICD-10-CM

## 2020-01-03 DIAGNOSIS — C53.0 MALIGNANT NEOPLASM OF ENDOCERVIX: ICD-10-CM

## 2020-01-03 DIAGNOSIS — Z15.02 BRCA2 GENE MUTATION POSITIVE IN FEMALE: Primary | ICD-10-CM

## 2020-01-03 DIAGNOSIS — Z15.09 BRCA2 GENE MUTATION POSITIVE IN FEMALE: Primary | ICD-10-CM

## 2020-01-03 PROCEDURE — 1126F AMNT PAIN NOTED NONE PRSNT: CPT | Mod: S$GLB,,, | Performed by: NURSE PRACTITIONER

## 2020-01-03 PROCEDURE — 1159F MED LIST DOCD IN RCRD: CPT | Mod: S$GLB,,, | Performed by: NURSE PRACTITIONER

## 2020-01-03 PROCEDURE — 99214 PR OFFICE/OUTPT VISIT, EST, LEVL IV, 30-39 MIN: ICD-10-PCS | Mod: S$GLB,,, | Performed by: NURSE PRACTITIONER

## 2020-01-03 PROCEDURE — 1126F PR PAIN SEVERITY QUANTIFIED, NO PAIN PRESENT: ICD-10-PCS | Mod: S$GLB,,, | Performed by: NURSE PRACTITIONER

## 2020-01-03 PROCEDURE — 99214 OFFICE O/P EST MOD 30 MIN: CPT | Mod: S$GLB,,, | Performed by: NURSE PRACTITIONER

## 2020-01-03 PROCEDURE — 1159F PR MEDICATION LIST DOCUMENTED IN MEDICAL RECORD: ICD-10-PCS | Mod: S$GLB,,, | Performed by: NURSE PRACTITIONER

## 2020-01-03 NOTE — PROGRESS NOTES
"PATIENT: Adri Seay  MRN: [id number]  DATE: 1/3/2020    Diagnosis:   No diagnosis found.  Chief Complaint: Milgnant neoplam of endocervix    Oncologic History:      Oncologic History 1. Cervical cancer - stage IV      Oncologic Treatment Planned therapy: single-agent carboplatin +/- radiation      Pathology 6/19/19:  - uterine cervix, biopsy:  - poorly differentiated squamous carcinoma with extensive necrosis        Subjective:    History of Present Illness: Ms. Seay is a 84 y.o. female who presents for evaluation and management of cervical cancer. SHe was seen at [facility] on 6/25/19.     Information from Dr. Reese's and Dr. 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 The patient had a CT scan consistent with widely metastatic disease including multiple sites of lymphadenopathy, a possible lung metastases, liver metastases, nodule in the anterior abdominal wall, and carcinomatosis. The patient is mostly asymptomatic but does have a vaginal discharge. Her performance status is 0. On exam, there is a 6 centimeter mass in the anterior abdominal wall superior to the umbilicus. On bimanual examination, tumor is a place the entire cervix apex of the vagina, and on rectovaginal examination, there is a large pelvic mass extending to the bilateral pelvic sidewalls.    We will get her CT reviewed at [facility] and if it is consistent with metastatic disease, we have recommended palliative chemotherapy with either carboplatinum as a single agent or carboplatinum and paclitaxel. Due to concern for bowel involvement on the CT scan, I have recommended not giving bevacizumab as part of this regimen. Patient will return home to [place] and begin chemotherapy with a medical oncologist there. I've also recommended a consultation with radiation oncology in [place] for " "consideration of palliative radiation to lower the chances of significant vaginal bleeding. We will see her back on an as-needed basis."      8/5/19 Visit: She is here for consideration of  cycle 2 of carbo. She states spotting has resolved since last visit. She received 1 unit of blood aproximately 2 weeks ago for HB 7.4. Her HBG last week was 8.6 and she denies any fatigue, LUNA, and feeling run down. Bowel habits are better since starting Miralax.      9/3/19 Visit: Today she is in clinic with her daughter, she is feeling good and has resumed some driving. She states skin around port itchy. Mild redness noted at incision line and lower neck, advised bactroban x 5 days but if worsens change to OTC lotrim. Labs reviewed and HGB 7.4, will set up for 1 unit PRBC for Thursday, but will proceed with treatment as planned for today.     10/1/19 visit: She states she is feeling good except for fatigue after her 3rd cycle of chemo. Denies any bleeding, belly pain, or difficulty with bowels. Labs reviewed and mild dehydration noted, Will increase post chemo fluids to 1 L today. Plan is to repeat scans after cycle 4 .     10/29/19 visit:  Today she is in clinic with her daughter. States she is feeling fine, not has noticed some SOB with exertion, talking and reports patient seems more tired. Labs reviewed and HGB 7.6 will set up for blood transfusion today with chemo. She denies any bleeding and states she is tolerating chemo very well.     11/26/19 visit:  Today in clinic she is here with her grandaughter and daughter. She states she is feeling well, tolerating chemo without any signifciant SE except fatigue and diet good, no bleeding noted, no changes in bowels. Labs reviewed and thrombocytopenia noted (91). Will hold treatment this week and proceed with scans, previously schedule after cycle 6 but due to tx delay will proceed with imaging.     12/2/19 visit:  Today in clinic with family to discuss recent PET/CT done on " 12/2/19. Previous CT imaging done at outside facility, discussed with Dr Nguyen for comparison and he stated pelvic mass smaller, abdominal mass smaller liver lesion likely benign stable but difficult to say if peritoneal implants stable to progressed.      12/20/19 visit:  Today she and her family are here to discuss recent result from [facility]pd [facility] on 12/16/19. Results show Mrs Seay has a BRCA 2 gene mutation. She does state that she had a sister die at age 29 from Breast cancer about 40-50 years ago. We discussed the accountability of BRCA 2 mutation with targeted PARP inhibitors as a treatment option as well as family implications and recommendation of further testing of children for BRCA 2 mutations. She and her family members [facility] with licensed Genetic counsler. Wanda Ma RN will help facilitate appointment.     1/3/20 visit:  PT has yet to receive lynparza, reduced dose due to impared renal fxn. Will f/u with [facility] pharmacy to verify delivery. Progressive anemia noted, will set up 1 unit of PRBC for Monday and will monitor weekly once pt starts PARP inhibitor.      Past medical, surgical, family, and social histories have been reviewed and updated below.    Past Medical History:   Past Medical History:   Diagnosis Date    Anemia     Cataract     Cervical cancer 05/2019    Hypertension        Past Surgical History:   Past Surgical History:   Procedure Laterality Date    CATARACT EXTRACTION, BILATERAL      WRIST SURGERY  1984       Family History:   Family History   Problem Relation Age of Onset    Hypertension Mother     Pneumonia Father     Breast cancer Sister     No Known Problems Brother     Leukemia Daughter     No Known Problems Son        Social History:  reports that she has never smoked. She has never used smokeless tobacco. She reports that she drank alcohol. She reports that she does not use drugs.    Allergies:  Review of  patient's allergies indicates:  No Known Allergies    Medications:  Current Outpatient Medications   Medication Sig Dispense Refill    cloNIDine (CATAPRES) 0.3 MG tablet TK 1 T PO BID  3    ferrous sulfate (FEOSOL) 325 mg (65 mg iron) Tab tablet TK 1 T PO BID  1    loratadine (CLARITIN) 10 mg tablet Take 1 tablet (10 mg total) by mouth once daily. 90 tablet 3    megestrol (MEGACE) 40 MG Tab Take 1 tablet (40 mg total) by mouth 2 (two) times daily. 60 tablet 6    mupirocin calcium 2% (BACTROBAN) 2 % cream Apply to affected area 3 times daily 30 g 0    NIFEdipine (PROCARDIA-XL) 30 MG (OSM) 24 hr tablet Take 30 mg by mouth once daily.      olaparib 100 mg Tab Take 2 tablets (200mg) by mouth 2 (two) times daily 120 tablet 6    ondansetron (ZOFRAN-ODT) 8 MG TbDL Take 1 tablet (8 mg total) by mouth every 8 (eight) hours as needed (chemotherapy-induced nausea and vomiting). 40 tablet 5    oxyCODONE (ROXICODONE) 5 MG immediate release tablet Take 1 tablet (5 mg total) by mouth every 6 (six) hours as needed for Pain (cancer-related pain). 30 tablet 0    potassium chloride (MICRO-K) 10 MEQ CpSR Take 10 mEq by mouth once.      simvastatin (ZOCOR) 20 MG tablet Take 20 mg by mouth every evening.       No current facility-administered medications for this visit.        Review of Systems   Constitutional: Positive for fatigue and unexpected weight change.   HENT: Negative for sore throat.    Eyes: Negative for visual disturbance.   Respiratory: Negative for cough and shortness of breath (LUNA).    Cardiovascular: Negative for chest pain and palpitations.   Gastrointestinal: Negative for constipation, diarrhea, nausea and vomiting.   Genitourinary: Negative for dysuria.   Musculoskeletal: Negative for back pain.   Skin: Negative for rash.   Neurological: Negative for light-headedness and headaches.   Hematological: Negative for adenopathy.   Psychiatric/Behavioral: The patient is not nervous/anxious.      ECOG Performance  "Status:   ECOG SCORE 0       Objective:      Vitals:   Vitals:    01/03/20 0913   BP: 109/68   BP Location: Right arm   Patient Position: Sitting   BP Method: Large (Automatic)   Pulse: 63   Resp: 14   Temp: 98.6 °F (37 °C)   TempSrc: Temporal   SpO2: 98%   Weight: 59 kg (130 lb)   Height: 5' 2" (1.575 m)     BMI: Body mass index is 23.78 kg/m².    Physical Exam   Constitutional: She is oriented to person, place, and time. She appears well-developed and well-nourished.   HENT:   Head: Normocephalic and atraumatic.   Eyes: Pupils are equal, round, and reactive to light. EOM are normal.   Neck: Normal range of motion. Neck supple.   Cardiovascular: Normal rate and regular rhythm.   Pulmonary/Chest: Effort normal and breath sounds normal.   Abdominal: Soft. Bowel sounds are normal. She exhibits mass (mass palpated in ryann-umbilical/epigastric area).   Musculoskeletal: Normal range of motion. She exhibits edema.   Neurological: She is alert and oriented to person, place, and time.   Skin: Skin is warm and dry.   Psychiatric: She has a normal mood and affect. Her behavior is normal. Judgment and thought content normal.   Nursing note and vitals reviewed.    Laboratory Data:    Lab Results   Component Value Date    WBC 4.0 (L) 01/02/2020    HGB 9.1 (L) 01/02/2020    HCT 28.7 (L) 01/02/2020    LABPLAT 165 01/02/2020       CMP  Sodium   Date Value Ref Range Status   01/02/2020 139 135 - 145 mmol/L Final   11/25/2019 140 136 - 145 mmol/L Final     Potassium   Date Value Ref Range Status   01/02/2020 4.2 3.6 - 5.2 mmol/L Final   11/25/2019 4.5 3.5 - 5.1 mmol/L Final     Chloride   Date Value Ref Range Status   01/02/2020 107 100 - 108 mmol/L Final   11/25/2019 106 98 - 107 mmol/L Final     CO2   Date Value Ref Range Status   01/02/2020 23 21 - 32 mmol/L Final   11/25/2019 21 (L) 22 - 29 mmol/L Final     Glucose   Date Value Ref Range Status   01/02/2020 154 (H) 70 - 110 mg/dL Final     BUN, Bld   Date Value Ref Range Status "   2020 25 (H) 7 - 18 mg/dL Final   2019 17.9 8 - 23 mg/dL Final     Creatinine   Date Value Ref Range Status   2020 1.15 (H) 0.55 - 1.02 mg/dL Final   2019 1.02 (H) 0.50 - 0.90 mg/dL Final     Calcium   Date Value Ref Range Status   2020 9.2 8.8 - 10.5 mg/dL Final   2019 10.0 8.6 - 10.2 mg/dL Final     Total Protein   Date Value Ref Range Status   2020 7.2 6.4 - 8.2 g/dL Final     Albumin   Date Value Ref Range Status   2020 3.6 3.4 - 5.0 g/dL Final   2019 4.7 3.5 - 5.2 g/dL Final     Total Bilirubin   Date Value Ref Range Status   2020 0.3 0.0 - 1.0 mg/dL Final     Alkaline Phosphatase   Date Value Ref Range Status   2020 96 46 - 116 U/L Final     AST   Date Value Ref Range Status   2020 15 15 - 37 U/L Final   2019 19 0 - 32 U/L Final     ALT   Date Value Ref Range Status   2020 13 12 - 78 U/L Final     Anion Gap   Date Value Ref Range Status   2019 13.0 8.0 - 17.0 mmol/L Final     Comment:     NOTE  Testing performed at:  [facility], [address] [place] [address] CLIA #:[id number]             Imagin/2/19 PET:      CT abdomen/pelvis (19):  1.  Large cervical mass that appears to invade the rectosigmoid and is inseparable from the bladder and urethra, compatible with the known primary.  2.  Large complex cystic mass probably arising from the left ovary that may represent metastatic disease versus another primary.  3.  Retroperitoneal and anterior diaphragmatic adenopathy suspect for metastatic disease.  4.  Supraumbilical anterior abdominal wall mass compatible with metastatic disease.        Assessment:       1. 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 Malignant neoplasm of endocervix    3. Anemia in neoplastic disease           Plan:     1. Cervical cancer  - biopsy (19) reveals cervical cancer. Imaging reveals stage IV disease  - she has been evaluated at [facility] Per their  recommendations, as well as family preference, we will proceed with single-agent carboplatin AUC5 q28 days. I will refer to radiation oncology for evaluation; we may give radiation therapy after a few cycles of chemotherapy.  - The risks and benefits of chemotherapy were discussed, written information was given, and informed consent was obtained.  - Pt is s/p 5 cycles of single agent carbo with thrombocytopenia. Discussed recent scan with suspected progression of peritoneal implants noted on Scan  along with recent thrombocytopenia  And worsening fatigue discussed testing for IO therapy vs change in chemotherapy.   - will request IO testing for  PDL1/CPS if measurable on tissue block  - will also check NGS with Pixtr CDx for MMR, BRCA 1 and 2 as she reports a sister with Breast cancer who  at age 29 and a niece with pancreatic cancer, age 50.  - If IO is not a tx option, discussed weekly taxol 60-80 mg/m2 as alternative to carbo.   - pt would like to wait for NGS testing to be completed before she makes any treatment decisions.    2.BRCA2 +  mutation   -discussed results of Moximed LqBx showing BRCA 2 mutation, implications, accountability and additional family testing recommendations  - Discussed attempting to obtain PARP inhibitor approval, will proceed with oral lynparza 300 mg BID, discussed SE and risks vs chemotherapy options. She states she does not want additional chemo.   - awaiting lynparza delivery, dtr will call today to schedule .     -3. Anemia in neoplastic disease, pt symptomatic   - will set up for 1 unit PRBC for Monday    4. Mild protein malnutrition  - periactin not covered by insurance, changed to megace BID  - notes increased appetite and wt gain    - return to clinic in 1-2 weeks with labs prior to starting lynparza.     Annette Donis NP

## 2020-01-03 NOTE — PATIENT INSTRUCTIONS
Olaparib oral capsules  What is this medicine?  OLAPARIB (oh LA pa rib) is a chemotherapy drug. It targets specific enzymes within cancer cells and stops the cancer cell from growing. This medicine is used to treat ovarian cancer.  How should I use this medicine?  Take this medicine by mouth with a glass of water. Follow the directions on the prescription label. Do not cut, crush, or chew this medicine. You may take it with food. However, avoid grapefruit juice, grapefruit or [place] oranges while on this medicine. Take your medicine at regular intervals. Do not take it more often than directed. Do not stop taking except on your doctor's advice.  What side effects may I notice from receiving this medicine?  Side effects that you should report to your doctor or health care professional as soon as possible:  · allergic reactions like skin rash, itching or hives, swelling of the face, lips, or tongue  · breathing problems, like shortness of breath, cough, or wheezing  · fever  · low blood counts - this medicine may decrease the number of white blood cells, red blood cells and platelets. You may be at increased risk for infections and bleeding.  · signs and symptoms of bleeding such as bloody or black, tarry stools; red or dark-brown urine; spitting up blood or brown material that looks like coffee grounds; red spots on the skin; unusual bruising or bleeding from the eye, gums, or nose  · signs and symptoms of a blood clot such as changes in vision; chest pain with breathing problems; severe, sudden headache; pain, swelling, warmth in the leg; trouble speaking; sudden numbness or weakness of the face, arm or leg  · signs and symptoms of low magnesium like muscle cramps or muscle pain; tingling or tremors; muscle weakness; seizures; or fast, irregular heartbeat  · signs and symptoms of infection like fever or chills; cough; sore throat; pain or trouble passing urine  · weak or tired  Side effects that usually do not  require medical attention (Report these to your doctor or health care professional if they continue or are bothersome.):  · changes in taste  · diarrhea  · headache  · heartburn, indigestion  · loss of appetite  · muscle or joint pain  · nausea/vomiting  · runny nose  · stomach pain  · weight loss  What may interact with this medicine?  · antiviral medicines for hepatitis, HIV or AIDS  · aprepitant  · boceprevir  · bosentan  · carbamazepine  · certain medicines for fungal infections like fluconazole, ketoconazole, itraconazole, posaconazole, and voriconazole  · certain medicines for infections, such as ciprofloxacin, clarithromycin, erythromycin, telithromycin  · crizotinib  · diltiazem  · grapefruit juice  · imatinib  · modafinil  · nafcillin  · nefazodone  · phenobarbital  · phenytoin  · rifampin  · [place] oranges  · [place] Wort  · telaprevir  · verapamil  What if I miss a dose?  If you miss a dose, take it as soon as you can. If it is almost time for your next dose, take only that dose. Do not take double or extra doses.  Where should I keep my medicine?  Keep out of the reach of children.  Store between 20 and 25 degrees C (68 and 77 degrees F). Do not store at temperatures greater than 40 degrees C (104 degrees F) and do not take this medicine if you think it may have been stored at a temperature greater than 104 degrees F. Throw away any unused medicine after the expiration date.  What should I tell my health care provider before I take this medicine?  They need to know if you have any of these conditions:  · anemia  · kidney disease  · liver disease  · lung disease  · low blood counts, like low white cell, platelet, or red cell counts  · an unusual or allergic reaction to olaparib, other medicines, foods, dyes, or preservatives  · pregnant or trying to get pregnant  · breast-feeding  What should I watch for while using this medicine?  This drug may make you feel generally unwell. This is not uncommon, as  chemotherapy can affect healthy cells as well as cancer cells. Report any side effects. Continue your course of treatment even though you feel ill unless your doctor tells you to stop. You will need blood work done while you are taking this medicine.  This medicine may increase your risk to bruise or bleed. Call your doctor or health care professional if you notice any unusual bleeding.  Call your doctor or health care professional for advice if you get a fever, chills or sore throat, or other symptoms of a cold or flu. Do not treat yourself. This drug decreases your body's ability to fight infections. Try to avoid being around people who are sick.  If you are going to have surgery or any other procedures, tell your doctor you are taking this medicine.  Do not become pregnant while taking this medicine. Women should inform their doctor if they wish to become pregnant or think they might be pregnant. There is a potential for serious side effects to an unborn child. Talk to your health care professional or pharmacist for more information. Women who are able to become pregnant should use effective birth control during treatment and for at least 1 month after receiving the last dose. Talk to your healthcare provider about birth control methods that may be right for you. Do not breast-feed an infant while taking this medicine.  NOTE:This sheet is a summary. It may not cover all possible information. If you have questions about this medicine, talk to your doctor, pharmacist, or health care provider. Copyright© 2017 [employer]        BRCA  Does this test have other names?  BRCA gene 1, BRCA gene 2, breast cancer susceptibility gene 1, breast cancer susceptibility gene 2  What is this test?  This blood test checks for mutations in the BRCA1 and BRCA2 genes. Mutations in these genes can raise the risk for certain cancers, especially breast cancer (in both men and women) and ovarian cancer in women. In both men and women,  BRCA1 and BRCA2 mutations raise the risk for other types of cancers.  The BRCA genes are the most common cause of gene-related breast and ovarian cancers. In the [place] 5% to 10% of all breast cancers and 10% to 15% of all ovarian cancers in white women are related to BRCA mutations.  Why do I need this test?  You may choose to have the BRCA test if you have a personal or family history of breast cancer and want to learn more about your risk. Insurance companies may cover the cost of this test if you have a family history of cancer.  If the test shows that you have a gene mutation, you can take steps to protect your health. This may include having breast cancer screenings more often.  What other tests might I have along with this test?  Other tests can screen for mutations in other genes. If your health care provider thinks you have a genetic risk for cancer, he or she may order other blood tests to screen for other genetic mutations.  What do my test results mean?  Many things may affect your lab test results. These include the method each lab uses to do the test. Even if your test results are different from the normal value, you may not have a problem. To learn what the results mean for you, talk with your health care provider.  The results of a BRCA test are usually simple. They will show any mutation in BRCA1 or BRCA2 genes. As with all genetic tests, though, there is the chance of a false positive or an unclear result. It's important to understand these possibilities before you have genetic testing.  A positive test generally means you have a known mutation in the BRCA1 or BRCA2 genes. You have a higher risk of getting certain cancers. But not all people with the mutation will get cancer.  A negative test means you likely don't have a known mutation of BRCA1 or BRCA2. But it does not mean that you will never get cancer.  It can take several weeks to get your test results.  How is this test done?  The test  requires a blood sample, which is drawn through a needle from a vein in your arm.  Does this test pose any risks?  Taking a blood sample with a needle carries risks that include bleeding, infection, bruising, or feeling dizzy. When the needle pricks your arm, you may feel a slight stinging sensation or pain. Afterward, the site may be slightly sore.  Knowing your genetic status can affect you emotionally and financially. While laws protect against genetic discrimination, there is the potential for privacy and confidentiality issues. Discuss these risks with your genetic counselor.  What might affect my test results?  Other things aren't likely to affect your test results.  How do I get ready for this test?  Your health care provider will recommend genetic counseling before and after testing. This is to help you understand possible risks or unclear test results.  Counseling can also make it easier to cope with the emotional reaction to a positive test result and provide guidance about family planning.   Be sure your provider knows about all medicines, herbs, vitamins, and supplements you are taking. This includes medicines that don't need a prescription and any illicit drugs you may use.  © 4556-0381 [employer]. [address] [place] [address], [place] [address]. All rights reserved. This information is not intended as a substitute for professional medical care. Always follow your healthcare professional's instructions.

## 2020-01-03 NOTE — TELEPHONE ENCOUNTER
"Initial Lynparza consult completed on 1/3/2020 . Lynparza will be shipped on 2020 to arrive at patient's home on 2020 via [employer]. $ 8.50 copay. Patient will start Lynparza on 2020. Address confirmed, CC on file. Confirmed 2 patient identifiers - name and . Therapy Appropriate.     **Patient gave authorization to speak to daughter if we cannot reach her.**    Patient was counseled on the administration directions:  -Take 2 tablets (200mg) by mouth twice a day, with or without food.   -If possible, patient was instructed tip the tablets from the RX bottle to the cap, and take directly from the cap to the mouth.  Patient may handle the medication with their hands if they wear with a latex or nitrile glove and wash their hands before and after handling the tablets.    Patient was counseled on the following possible side effects, which include, but are not limited to:  nausea/vomiting, fatigue, diarrhea, indigestion/heartburn, headache, decreased appetite/taste changes, upper respiratory symptoms, abdominal pain, joint and muscle pain, rash - Hydrocortisone cream provided.  Patient advised to contact provider if signs of infection, and more severe side effects such as: bleeding, irregular heartbeat, shortness of breath, chest pain.     DDIs: medications reviewed, no clinically significant DDIs.  Patient also takes Fenofibrate BID (patient unsure of dose). Patient advised to AVOID Grapefruit and [place] oranges.    No changes in allergies or health conditions.     Energy: "Pretty good" - able to complete daily and social activities.  Appetite: "Real good" - able to eat 3 meals/day.  Pain: 0/10 - denies pain.    Patient was given 2 patient education handouts on how to handle oral chemotherapy and specific recommendations- do's and don'ts. Instructed the patient that if they have any remaining oral chemotherapy, not to flush down the toilet or throw away in the trash; the patient or caregiver should return the " unused oral chemotherapy to either the clinic or to myself in the Pharmacy where the oral chemotherapy can be disposed of properly.     Patient confirmed understanding. Patient did not have additional questions.  Patient will start Lynparza on 1/8/2020. Consultation included: indication; goals of treatment; administration; storage and handling; side effects; how to handle side effects; the importance of compliance; how to handle missed doses; the importance of laboratory monitoring; the importance of keeping all follow up appointments.  Patient understands to report any medication changes to [facility] and provider. All questions answered and addressed to patients satisfaction. I will f/u with her in 1 week from start, [facility] to contact patient in 3 weeks for refills.

## 2020-01-13 LAB — MACROCYTES BLD QL SMEAR: NORMAL

## 2020-01-14 ENCOUNTER — OFFICE VISIT (OUTPATIENT)
Dept: HEMATOLOGY/ONCOLOGY | Facility: CLINIC | Age: 85
End: 2020-01-14
Payer: MEDICARE

## 2020-01-14 VITALS
DIASTOLIC BLOOD PRESSURE: 66 MMHG | HEIGHT: 62 IN | BODY MASS INDEX: 24.59 KG/M2 | WEIGHT: 133.63 LBS | OXYGEN SATURATION: 98 % | TEMPERATURE: 99 F | HEART RATE: 55 BPM | RESPIRATION RATE: 14 BRPM | SYSTOLIC BLOOD PRESSURE: 118 MMHG

## 2020-01-14 DIAGNOSIS — Z15.02 BRCA2 GENE MUTATION POSITIVE IN FEMALE: Primary | ICD-10-CM

## 2020-01-14 DIAGNOSIS — Z15.09 BRCA2 GENE MUTATION POSITIVE IN FEMALE: Primary | ICD-10-CM

## 2020-01-14 DIAGNOSIS — D63.0 ANEMIA IN NEOPLASTIC DISEASE: ICD-10-CM

## 2020-01-14 DIAGNOSIS — Z15.01 BRCA2 GENE MUTATION POSITIVE IN FEMALE: Primary | ICD-10-CM

## 2020-01-14 DIAGNOSIS — C53.0 MALIGNANT NEOPLASM OF ENDOCERVIX: ICD-10-CM

## 2020-01-14 PROCEDURE — 1159F MED LIST DOCD IN RCRD: CPT | Mod: S$GLB,,, | Performed by: NURSE PRACTITIONER

## 2020-01-14 PROCEDURE — 1159F PR MEDICATION LIST DOCUMENTED IN MEDICAL RECORD: ICD-10-PCS | Mod: S$GLB,,, | Performed by: NURSE PRACTITIONER

## 2020-01-14 PROCEDURE — 1126F PR PAIN SEVERITY QUANTIFIED, NO PAIN PRESENT: ICD-10-PCS | Mod: S$GLB,,, | Performed by: NURSE PRACTITIONER

## 2020-01-14 PROCEDURE — 99214 OFFICE O/P EST MOD 30 MIN: CPT | Mod: S$GLB,,, | Performed by: NURSE PRACTITIONER

## 2020-01-14 PROCEDURE — 99214 PR OFFICE/OUTPT VISIT, EST, LEVL IV, 30-39 MIN: ICD-10-PCS | Mod: S$GLB,,, | Performed by: NURSE PRACTITIONER

## 2020-01-14 PROCEDURE — 1126F AMNT PAIN NOTED NONE PRSNT: CPT | Mod: S$GLB,,, | Performed by: NURSE PRACTITIONER

## 2020-01-14 NOTE — PROGRESS NOTES
"PATIENT: Adri Seay  MRN: [id number]  DATE: 1/14/2020    Diagnosis:   No diagnosis found.  Chief Complaint: Malignant neoplasm of endocervix    Oncologic History:      Oncologic History 1. Cervical cancer - stage IV      Oncologic Treatment Planned therapy: single-agent carboplatin +/- radiation      Pathology 6/19/19:  - uterine cervix, biopsy:  - poorly differentiated squamous carcinoma with extensive necrosis        Subjective:    History of Present Illness: Ms. Seay is a 84 y.o. female who presents for evaluation and management of cervical cancer. SHe was seen at [facility] on 6/25/19.     Information from Dr. Reese's and Dr. 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 The patient had a CT scan consistent with widely metastatic disease including multiple sites of lymphadenopathy, a possible lung metastases, liver metastases, nodule in the anterior abdominal wall, and carcinomatosis. The patient is mostly asymptomatic but does have a vaginal discharge. Her performance status is 0. On exam, there is a 6 centimeter mass in the anterior abdominal wall superior to the umbilicus. On bimanual examination, tumor is a place the entire cervix apex of the vagina, and on rectovaginal examination, there is a large pelvic mass extending to the bilateral pelvic sidewalls.    We will get her CT reviewed at [facility] and if it is consistent with metastatic disease, we have recommended palliative chemotherapy with either carboplatinum as a single agent or carboplatinum and paclitaxel. Due to concern for bowel involvement on the CT scan, I have recommended not giving bevacizumab as part of this regimen. Patient will return home to [place] and begin chemotherapy with a medical oncologist there. I've also recommended a consultation with radiation oncology in [place] for " "consideration of palliative radiation to lower the chances of significant vaginal bleeding. We will see her back on an as-needed basis."      8/5/19 Visit: She is here for consideration of  cycle 2 of carbo. She states spotting has resolved since last visit. She received 1 unit of blood aproximately 2 weeks ago for HB 7.4. Her HBG last week was 8.6 and she denies any fatigue, LUNA, and feeling run down. Bowel habits are better since starting Miralax.      9/3/19 Visit: Today she is in clinic with her daughter, she is feeling good and has resumed some driving. She states skin around port itchy. Mild redness noted at incision line and lower neck, advised bactroban x 5 days but if worsens change to OTC lotrim. Labs reviewed and HGB 7.4, will set up for 1 unit PRBC for Thursday, but will proceed with treatment as planned for today.     10/1/19 visit: She states she is feeling good except for fatigue after her 3rd cycle of chemo. Denies any bleeding, belly pain, or difficulty with bowels. Labs reviewed and mild dehydration noted, Will increase post chemo fluids to 1 L today. Plan is to repeat scans after cycle 4 .     10/29/19 visit:  Today she is in clinic with her daughter. States she is feeling fine, not has noticed some SOB with exertion, talking and reports patient seems more tired. Labs reviewed and HGB 7.6 will set up for blood transfusion today with chemo. She denies any bleeding and states she is tolerating chemo very well.     11/26/19 visit:  Today in clinic she is here with her grandaughter and daughter. She states she is feeling well, tolerating chemo without any signifciant SE except fatigue and diet good, no bleeding noted, no changes in bowels. Labs reviewed and thrombocytopenia noted (91). Will hold treatment this week and proceed with scans, previously schedule after cycle 6 but due to tx delay will proceed with imaging.     12/2/19 visit:  Today in clinic with family to discuss recent PET/CT done on " 12/2/19. Previous CT imaging done at outside facility, discussed with Dr Nguyen for comparison and he stated pelvic mass smaller, abdominal mass smaller liver lesion likely benign stable but difficult to say if peritoneal implants stable to progressed.      12/20/19 visit:  Today she and her family are here to discuss recent result from [facility] on 12/16/19. Results show Mrs Seay has a BRCA 2 gene mutation. She does state that she had a sister die at age 29 from Breast cancer about 40-50 years ago. We discussed the accountability of BRCA 2 mutation with targeted PARP inhibitors as a treatment option as well as family implications and recommendation of further testing of children for BRCA 2 mutations. She and her family members [facility] with licensed Genetic counsler. Wanda Ma RN will help facilitate appointment.     1/3/20 visit:  PT has yet to receive lynparza, reduced dose due to impared renal fxn. Will f/u with [facility] pharmacy to verify delivery. Progressive anemia noted, will set up 1 unit of PRBC for Monday and will monitor weekly once pt starts PARP inhibitor.      1/14/20 visit:   Pt started lynparza, low dose 1 week ago and is tolerating very well. Denies any SE at this time. Labs reviewed with mild anemia noted but indication for transfusion. Will continue to monitor.     Past medical, surgical, family, and social histories have been reviewed and updated below.    Past Medical History:   Past Medical History:   Diagnosis Date    Anemia     Cataract     Cervical cancer 05/2019    Hypertension        Past Surgical History:   Past Surgical History:   Procedure Laterality Date    CATARACT EXTRACTION, BILATERAL      WRIST SURGERY  1984       Family History:   Family History   Problem Relation Age of Onset    Hypertension Mother     Pneumonia Father     Breast cancer Sister     No Known Problems Brother     Leukemia Daughter     No Known  Problems Son        Social History:  reports that she has never smoked. She has never used smokeless tobacco. She reports that she drank alcohol. She reports that she does not use drugs.    Allergies:  Review of patient's allergies indicates:  No Known Allergies    Medications:  Current Outpatient Medications   Medication Sig Dispense Refill    cloNIDine (CATAPRES) 0.3 MG tablet TK 1 T PO BID  3    FENOFIBRATE ORAL Take by mouth 2 (two) times daily.      ferrous sulfate (FEOSOL) 325 mg (65 mg iron) Tab tablet TK 1 T PO BID  1    loratadine (CLARITIN) 10 mg tablet Take 1 tablet (10 mg total) by mouth once daily. (Patient taking differently: Take 10 mg by mouth daily as needed. ) 90 tablet 3    megestrol (MEGACE) 40 MG Tab Take 1 tablet (40 mg total) by mouth 2 (two) times daily. 60 tablet 6    mupirocin calcium 2% (BACTROBAN) 2 % cream Apply to affected area 3 times daily (Patient taking differently: daily as needed. Apply to affected area 3 times daily) 30 g 0    NIFEdipine (PROCARDIA-XL) 30 MG (OSM) 24 hr tablet Take 30 mg by mouth once daily.      olaparib 100 mg Tab Take 2 tablets (200mg) by mouth 2 (two) times daily 120 tablet 6    ondansetron (ZOFRAN-ODT) 8 MG TbDL Take 1 tablet (8 mg total) by mouth every 8 (eight) hours as needed (chemotherapy-induced nausea and vomiting). 40 tablet 5    oxyCODONE (ROXICODONE) 5 MG immediate release tablet Take 1 tablet (5 mg total) by mouth every 6 (six) hours as needed for Pain (cancer-related pain). 30 tablet 0    potassium chloride (MICRO-K) 10 MEQ CpSR Take 10 mEq by mouth once daily.       simvastatin (ZOCOR) 20 MG tablet Take 20 mg by mouth every evening.       No current facility-administered medications for this visit.        Review of Systems   Constitutional: Positive for fatigue and unexpected weight change.   HENT: Negative for sore throat.    Eyes: Negative for visual disturbance.   Respiratory: Negative for cough and shortness of breath (LUNA).   "  Cardiovascular: Negative for chest pain and palpitations.   Gastrointestinal: Negative for constipation, diarrhea, nausea and vomiting.   Genitourinary: Negative for dysuria.   Musculoskeletal: Negative for back pain.   Skin: Negative for rash.   Neurological: Negative for light-headedness and headaches.   Hematological: Negative for adenopathy.   Psychiatric/Behavioral: The patient is not nervous/anxious.      ECOG Performance Status:   ECOG SCORE 0       Objective:      Vitals:   Vitals:    20 0922   BP: 118/66   BP Location: Left arm   Patient Position: Sitting   BP Method: Large (Automatic)   Pulse: (!) 55   Resp: 14   Temp: 98.6 °F (37 °C)   TempSrc: Temporal   SpO2: 98%   Weight: 60.6 kg (133 lb 9.6 oz)   Height: 5' 2" (1.575 m)     BMI: Body mass index is 24.44 kg/m².    Physical Exam   Constitutional: She is oriented to person, place, and time. She appears well-developed and well-nourished.   HENT:   Head: Normocephalic and atraumatic.   Eyes: Pupils are equal, round, and reactive to light. EOM are normal.   Neck: Normal range of motion. Neck supple.   Cardiovascular: Normal rate and regular rhythm.   Pulmonary/Chest: Effort normal and breath sounds normal.   Abdominal: Soft. Bowel sounds are normal. She exhibits mass (mass palpated in ryann-umbilical/epigastric area).   Musculoskeletal: Normal range of motion. She exhibits edema.   Neurological: She is alert and oriented to person, place, and time.   Skin: Skin is warm and dry.   Psychiatric: She has a normal mood and affect. Her behavior is normal. Judgment and thought content normal.   Nursing note and vitals reviewed.    Laboratory Data:    Lab Results   20  WBC 7.4 HGB 10.4 HCT 32.4  Cr 1.37 GFR 45     Imagin/2/19 PET:      CT abdomen/pelvis (19):  1.  Large cervical mass that appears to invade the rectosigmoid and is inseparable from the bladder and urethra, compatible with the known primary.  2.  Large complex cystic " mass probably arising from the left ovary that may represent metastatic disease versus another primary.  3.  Retroperitoneal and anterior diaphragmatic adenopathy suspect for metastatic disease.  4.  Supraumbilical anterior abdominal wall mass compatible with metastatic disease.        Assessment:       1. 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 Malignant neoplasm of endocervix    3. Anemia in neoplastic disease           Plan:     1. Cervical cancer  - biopsy (19) reveals cervical cancer. Imaging reveals stage IV disease  - [facility] Per their recommendations, as well as family preference, we will proceed with single-agent carboplatin AUC5 q28 days. I will refer to radiation oncology for evaluation; we may give radiation therapy after a few cycles of chemotherapy.  - The risks and benefits of chemotherapy were discussed, written information was given, and informed consent was obtained.  - Pt is s/p 5 cycles of single agent carbo with thrombocytopenia. Discussed recent scan with suspected progression of peritoneal implants noted on Scan  along with recent thrombocytopenia  And worsening fatigue discussed testing for IO therapy vs change in chemotherapy.   - will request IO testing for  PDL1/CPS if measurable on tissue block  - will also check NGS with [employer] [facility] for MMR, BRCA 1 and 2 as she reports a sister with Breast cancer who  at age 29 and a niece with pancreatic cancer, age 50.  - If IO is not a tx option, discussed weekly taxol 60-80 mg/m2 as alternative to carbo.   - pt would like to wait for NGS testing to be completed before she makes any treatment decisions.    2.BRCA2 +  mutation   -discussed results of [employer] [facility] showing BRCA 2 mutation, implications, accountability and additional family testing recommendations  - Discussed attempting to obtain PARP inhibitor approval, will proceed with oral lynparza 300 mg BID, discussed SE and risks vs  chemotherapy options. She states she does not want additional chemo.   - pt started Lynparza 1 week ago, cory well.     -3. Anemia in neoplastic disease, pt asymptomatic       4. Mild protein malnutrition  - periactin not covered by insurance, changed to megace BID  - notes increased appetite and wt gain    - return to clinic in 1 week with labs.     Annette Donis NP

## 2020-01-20 LAB
ALBUMIN SERPL BCP-MCNC: 4.2 G/DL (ref 3.4–5)
ALBUMIN/GLOBULIN RATIO: 1.31 RATIO (ref 1.1–1.8)
ALP SERPL-CCNC: 108 U/L (ref 46–116)
ALT SERPL W P-5'-P-CCNC: 19 U/L (ref 12–78)
AST SERPL-CCNC: 17 U/L (ref 15–37)
BASOPHILS NFR SNV MANUAL: 0.4 % (ref 0–3)
BILIRUB SERPL-MCNC: 0.7 MG/DL (ref 0–1)
BUN SERPL-MCNC: 27 MG/DL (ref 7–18)
BUN/CREAT SERPL: 20.14 RATIO (ref 7–18)
CALCIUM SERPL-MCNC: 9.6 MG/DL (ref 8.8–10.5)
CHLORIDE SERPL-SCNC: 105 MMOL/L (ref 100–108)
CO2 SERPL-SCNC: 22 MMOL/L (ref 21–32)
CREAT SERPL-MCNC: 1.34 MG/DL (ref 0.55–1.02)
EOSINOPHIL NFR SNV MANUAL: 2.5 % (ref 1–3)
ERYTHROCYTE [DISTWIDTH] IN BLOOD BY AUTOMATED COUNT: 14.7 % (ref 12.5–18)
GFR ESTIMATION: 46
GLOBULIN: 3.2 G/DL (ref 2.3–3.5)
GLUCOSE SERPL-MCNC: 102 MG/DL (ref 70–110)
HCT VFR BLD AUTO: 29.8 % (ref 37–47)
HGB BLD-MCNC: 9.4 G/DL (ref 12–16)
LYMPHOCYTES NFR SNV MANUAL: 31.5 % (ref 25–40)
MACROCYTES BLD QL SMEAR: NORMAL
MANUAL NRBC PER 100 CELLS: 0 %
MCH RBC QN AUTO: 32.6 PG (ref 27–31.2)
MCHC RBC AUTO-ENTMCNC: 31.5 G/DL (ref 31.8–35.4)
MCV RBC AUTO: 103.5 FL (ref 80–97)
MONOCYTES/100 LEUKOCYTES: 6.6 % (ref 1–15)
NEUTROPHILS NFR BLD: 4.21 10*3/UL (ref 1.8–7.7)
NEUTROPHILS NFR SNV MANUAL: 58.7 % (ref 37–80)
PLATELETS: 179 10*3/UL (ref 142–424)
POTASSIUM SERPL-SCNC: 3.9 MMOL/L (ref 3.6–5.2)
PROT SERPL-MCNC: 7.4 G/DL (ref 6.4–8.2)
RBC # BLD AUTO: 2.88 10*6/UL (ref 4.2–5.4)
SODIUM BLD-SCNC: 139 MMOL/L (ref 135–145)
WBC # BLD: 7.2 10*3/UL (ref 4.6–10.2)

## 2020-01-21 ENCOUNTER — OFFICE VISIT (OUTPATIENT)
Dept: HEMATOLOGY/ONCOLOGY | Facility: CLINIC | Age: 85
End: 2020-01-21
Payer: MEDICARE

## 2020-01-21 VITALS
OXYGEN SATURATION: 98 % | SYSTOLIC BLOOD PRESSURE: 123 MMHG | HEART RATE: 63 BPM | RESPIRATION RATE: 10 BRPM | HEIGHT: 62 IN | WEIGHT: 133.38 LBS | TEMPERATURE: 97 F | DIASTOLIC BLOOD PRESSURE: 72 MMHG | BODY MASS INDEX: 24.54 KG/M2

## 2020-01-21 DIAGNOSIS — Z15.01 BRCA2 GENE MUTATION POSITIVE IN FEMALE: Primary | ICD-10-CM

## 2020-01-21 DIAGNOSIS — C53.9 MALIGNANT NEOPLASM OF CERVIX, UNSPECIFIED SITE: ICD-10-CM

## 2020-01-21 DIAGNOSIS — D63.0 ANEMIA IN NEOPLASTIC DISEASE: ICD-10-CM

## 2020-01-21 DIAGNOSIS — E44.1 MILD PROTEIN-CALORIE MALNUTRITION: ICD-10-CM

## 2020-01-21 DIAGNOSIS — R74.8 INCREASED CREATINE KINASE LEVEL: ICD-10-CM

## 2020-01-21 DIAGNOSIS — Z15.09 BRCA2 GENE MUTATION POSITIVE IN FEMALE: Primary | ICD-10-CM

## 2020-01-21 DIAGNOSIS — Z15.02 BRCA2 GENE MUTATION POSITIVE IN FEMALE: Primary | ICD-10-CM

## 2020-01-21 PROCEDURE — 99214 OFFICE O/P EST MOD 30 MIN: CPT | Mod: S$GLB,,, | Performed by: NURSE PRACTITIONER

## 2020-01-21 PROCEDURE — 1159F PR MEDICATION LIST DOCUMENTED IN MEDICAL RECORD: ICD-10-PCS | Mod: S$GLB,,, | Performed by: NURSE PRACTITIONER

## 2020-01-21 PROCEDURE — 1126F PR PAIN SEVERITY QUANTIFIED, NO PAIN PRESENT: ICD-10-PCS | Mod: S$GLB,,, | Performed by: NURSE PRACTITIONER

## 2020-01-21 PROCEDURE — 1126F AMNT PAIN NOTED NONE PRSNT: CPT | Mod: S$GLB,,, | Performed by: NURSE PRACTITIONER

## 2020-01-21 PROCEDURE — 99214 PR OFFICE/OUTPT VISIT, EST, LEVL IV, 30-39 MIN: ICD-10-PCS | Mod: S$GLB,,, | Performed by: NURSE PRACTITIONER

## 2020-01-21 PROCEDURE — 1159F MED LIST DOCD IN RCRD: CPT | Mod: S$GLB,,, | Performed by: NURSE PRACTITIONER

## 2020-01-21 NOTE — PROGRESS NOTES
Clinic Note  1/24/2020      Subjective:       Patient ID:  Adri is a 84 y.o. female being seen for an established visit.    Chief Complaint: Follow-up and Hypertension    HPI  Adri is an 84 year old female in clinic today for 3 month follow up, hypertension. Follows QING Donis NP, hematology/oncology, for cervical cancer treatment. Recently began oral medication Lynparza, denies any side effects. Report overall feeling well without complaints.  Hypertension  Patient is here for follow-up of elevated blood pressure chronic, controlled on current medications. She is exercising and is adherent to a low-salt diet. Blood pressure is well controlled at home. Cardiac symptoms: none. Patient denies chest pressure/discomfort, dyspnea, lower extremity edema and near-syncope. Cardiovascular risk factors: dyslipidemia and hypertension. Use of agents associated with hypertension: none. History of target organ damage: none.    Review of Systems   Constitutional: Negative for chills, fever, malaise/fatigue and weight loss.   HENT: Negative for congestion, ear discharge, ear pain and sinus pain.    Respiratory: Negative for cough, hemoptysis, sputum production, shortness of breath and wheezing.    Cardiovascular: Negative for chest pain, palpitations, orthopnea, claudication and leg swelling.   Gastrointestinal: Negative for abdominal pain, constipation, diarrhea, heartburn and nausea.   Genitourinary: Negative for dysuria, frequency, hematuria and urgency.   Musculoskeletal: Negative for back pain, falls, joint pain and neck pain.   Neurological: Negative for dizziness and headaches.       Medication List with Changes/Refills   Current Medications    CLONIDINE (CATAPRES) 0.3 MG TABLET    TK 1 T PO BID    FENOFIBRATE ORAL    Take by mouth 2 (two) times daily.    FERROUS SULFATE (FEOSOL) 325 MG (65 MG IRON) TAB TABLET    TK 1 T PO BID    LORATADINE (CLARITIN) 10 MG TABLET    Take 1 tablet (10 mg total) by mouth once daily.     "MEGESTROL (MEGACE) 40 MG TAB    Take 1 tablet (40 mg total) by mouth 2 (two) times daily.    MUPIROCIN CALCIUM 2% (BACTROBAN) 2 % CREAM    Apply to affected area 3 times daily    NIFEDIPINE (PROCARDIA-XL) 30 MG (OSM) 24 HR TABLET    Take 30 mg by mouth once daily.    OLAPARIB 100 MG TAB    Take 2 tablets (200mg) by mouth 2 (two) times daily    ONDANSETRON (ZOFRAN-ODT) 8 MG TBDL    Take 1 tablet (8 mg total) by mouth every 8 (eight) hours as needed (chemotherapy-induced nausea and vomiting).    OXYCODONE (ROXICODONE) 5 MG IMMEDIATE RELEASE TABLET    Take 1 tablet (5 mg total) by mouth every 6 (six) hours as needed for Pain (cancer-related pain).    SIMVASTATIN (ZOCOR) 20 MG TABLET    Take 20 mg by mouth every evening.   Discontinued Medications    POTASSIUM CHLORIDE (MICRO-K) 10 MEQ CPSR    Take 10 mEq by mouth once daily.        Patient Active Problem List   Diagnosis    Malignant neoplasm of endocervix    Absolute anemia    Cervical cancer    Skin infection    Acute rhinitis    Thrombocytopenia    BRCA2 gene mutation positive in female    Mild protein-calorie malnutrition    Increased creatine kinase level    Essential hypertension         Objective:      BP (!) 108/57 (BP Location: Left arm, Patient Position: Sitting, BP Method: Medium (Automatic))   Pulse 63   Temp 97.1 °F (36.2 °C)   Ht 5' 2" (1.575 m)   Wt 59.6 kg (131 lb 6 oz)   SpO2 99%   BMI 24.03 kg/m²   Estimated body mass index is 24.03 kg/m² as calculated from the following:    Height as of this encounter: 5' 2" (1.575 m).    Weight as of this encounter: 59.6 kg (131 lb 6 oz).  Physical Exam   Constitutional: She is oriented to person, place, and time and well-developed, well-nourished, and in no distress. Vital signs are normal.  Non-toxic appearance. She does not have a sickly appearance. No distress.   HENT:   Head: Normocephalic and atraumatic.   Right Ear: External ear normal.   Left Ear: External ear normal.   Mouth/Throat: " Oropharynx is clear and moist. No oropharyngeal exudate.   Eyes: Pupils are equal, round, and reactive to light. Conjunctivae are normal. Right eye exhibits no discharge. Left eye exhibits no discharge. No scleral icterus.   Cardiovascular: Normal rate, regular rhythm and normal heart sounds. Exam reveals no friction rub.   No murmur heard.  Pulmonary/Chest: Effort normal and breath sounds normal. No respiratory distress. She has no wheezes. She has no rales.   Abdominal: She exhibits mass. There is no tenderness.   Musculoskeletal: She exhibits edema.        Right lower leg: She exhibits edema.        Left lower leg: She exhibits edema.   BLE 1+ edema   Neurological: She is alert and oriented to person, place, and time. Gait normal. GCS score is 15.   Skin: She is not diaphoretic.   Psychiatric: Mood, memory, affect and judgment normal.   Nursing note and vitals reviewed.        Assessment and Plan:   Essential hypertension, chronic, stable on current medications  Medication: no change.  Dietary sodium restriction.  Regular aerobic exercise.  Keep appointments with Hem/Onc as scheduled  Follow up in 3 months and as needed       Problem List Items Addressed This Visit        Cardiac/Vascular    Essential hypertension          Follow Up:   Follow up in about 3 months (around 4/24/2020), or if symptoms worsen or fail to improve.    Other Orders Placed This Visit:  No orders of the defined types were placed in this encounter.        Bria Mitchell    Problem List Items Addressed This Visit        Cardiac/Vascular    Essential hypertension

## 2020-01-21 NOTE — PROGRESS NOTES
"PATIENT: Adri Seay  MRN: [id number]  DATE: 1/21/2020    Diagnosis:   1. 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 Mild protein-calorie malnutrition    3. Malignant neoplasm of cervix, unspecified site    4. Anemia in neoplastic disease      Chief Complaint: Malignant neoplasm of Endocerix    Oncologic History:      Oncologic History 1. Cervical cancer - stage IV      Oncologic Treatment Planned therapy: single-agent carboplatin +/- radiation      Pathology 6/19/19:  - uterine cervix, biopsy:  - poorly differentiated squamous carcinoma with extensive necrosis        Subjective:    History of Present Illness: Ms. Seay is a 84 y.o. female who presents for evaluation and management of cervical cancer. SHe was seen at [facility] on 6/25/19.     Information from Dr. Reese's and Dr. 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 The patient had a CT scan consistent with widely metastatic disease including multiple sites of lymphadenopathy, a possible lung metastases, liver metastases, nodule in the anterior abdominal wall, and carcinomatosis. The patient is mostly asymptomatic but does have a vaginal discharge. Her performance status is 0. On exam, there is a 6 centimeter mass in the anterior abdominal wall superior to the umbilicus. On bimanual examination, tumor is a place the entire cervix apex of the vagina, and on rectovaginal examination, there is a large pelvic mass extending to the bilateral pelvic sidewalls.    We will get her CT reviewed at [facility] and if it is consistent with metastatic disease, we have recommended palliative chemotherapy with either carboplatinum as a single agent or carboplatinum and paclitaxel. Due to concern for bowel involvement on the CT scan, I have recommended not giving bevacizumab as part of this regimen. Patient will return home to " [place] and begin chemotherapy with a medical oncologist there. I've also recommended a consultation with radiation oncology in [place] for consideration of palliative radiation to lower the chances of significant vaginal bleeding. We will see her back on an as-needed basis."      8/5/19 Visit: She is here for consideration of  cycle 2 of carbo. She states spotting has resolved since last visit. She received 1 unit of blood aproximately 2 weeks ago for HB 7.4. Her HBG last week was 8.6 and she denies any fatigue, LUNA, and feeling run down. Bowel habits are better since starting Miralax.      9/3/19 Visit: Today she is in clinic with her daughter, she is feeling good and has resumed some driving. She states skin around port itchy. Mild redness noted at incision line and lower neck, advised bactroban x 5 days but if worsens change to OTC lotrim. Labs reviewed and HGB 7.4, will set up for 1 unit PRBC for Thursday, but will proceed with treatment as planned for today.     10/1/19 visit: She states she is feeling good except for fatigue after her 3rd cycle of chemo. Denies any bleeding, belly pain, or difficulty with bowels. Labs reviewed and mild dehydration noted, Will increase post chemo fluids to 1 L today. Plan is to repeat scans after cycle 4 .     10/29/19 visit:  Today she is in clinic with her daughter. States she is feeling fine, not has noticed some SOB with exertion, talking and reports patient seems more tired. Labs reviewed and HGB 7.6 will set up for blood transfusion today with chemo. She denies any bleeding and states she is tolerating chemo very well.     11/26/19 visit:  Today in clinic she is here with her grandaughter and daughter. She states she is feeling well, tolerating chemo without any signifciant SE except fatigue and diet good, no bleeding noted, no changes in bowels. Labs reviewed and thrombocytopenia noted (91). Will hold treatment this week and proceed with scans, previously " schedule after cycle 6 but due to tx delay will proceed with imaging.     12/2/19 visit:  Today in clinic with family to discuss recent PET/CT done on 12/2/19. Previous CT imaging done at outside facility, discussed with Dr Nguyen for comparison and he stated pelvic mass smaller, abdominal mass smaller liver lesion likely benign stable but difficult to say if peritoneal implants stable to progressed.      12/20/19 visit:  Today she and her family are here to discuss recent result from [employer] [facility] on 12/16/19. Results show Mrs Seay has a BRCA 2 gene mutation. She does state that she had a sister die at age 29 from Breast cancer about 40-50 years ago. We discussed the accountability of BRCA 2 mutation with targeted PARP inhibitors as a treatment option as well as family implications and recommendation of further testing of children for BRCA 2 mutations. She and her family members [facility] with licensed Genetic counsler. Wanda Ma RN will help facilitate appointment.     1/3/20 visit:  PT has yet to receive lynparza, reduced dose due to impared renal fxn. Will f/u with [facility] pharmacy to verify delivery. Progressive anemia noted, will set up 1 unit of PRBC for Monday and will monitor weekly once pt starts PARP inhibitor.      1/14/20 visit:   Pt started lynparza, low dose 1 week ago and is tolerating very well. Denies any SE at this time. Labs reviewed with mild anemia noted but indication for transfusion. Will continue to monitor.     1/21/20 visit:  She continues to tolerate oral meds well. No c/o SE. Labs reveiwed and SrCr elevated. Will continue to monitor weekly.     Past medical, surgical, family, and social histories have been reviewed and updated below.    Past Medical History:   Past Medical History:   Diagnosis Date    Anemia     Cataract     Cervical cancer 05/2019    Hypertension        Past Surgical History:   Past Surgical History:   Procedure  Laterality Date    CATARACT EXTRACTION, BILATERAL      WRIST SURGERY  1984       Family History:   Family History   Problem Relation Age of Onset    Hypertension Mother     Pneumonia Father     Breast cancer Sister     No Known Problems Brother     Leukemia Daughter     No Known Problems Son        Social History:  reports that she has never smoked. She has never used smokeless tobacco. She reports that she drank alcohol. She reports that she does not use drugs.    Allergies:  Review of patient's allergies indicates:  No Known Allergies    Medications:  Current Outpatient Medications   Medication Sig Dispense Refill    cloNIDine (CATAPRES) 0.3 MG tablet TK 1 T PO BID  3    FENOFIBRATE ORAL Take by mouth 2 (two) times daily.      ferrous sulfate (FEOSOL) 325 mg (65 mg iron) Tab tablet TK 1 T PO BID  1    loratadine (CLARITIN) 10 mg tablet Take 1 tablet (10 mg total) by mouth once daily. (Patient taking differently: Take 10 mg by mouth daily as needed. ) 90 tablet 3    megestrol (MEGACE) 40 MG Tab Take 1 tablet (40 mg total) by mouth 2 (two) times daily. 60 tablet 6    mupirocin calcium 2% (BACTROBAN) 2 % cream Apply to affected area 3 times daily (Patient taking differently: daily as needed. Apply to affected area 3 times daily) 30 g 0    NIFEdipine (PROCARDIA-XL) 30 MG (OSM) 24 hr tablet Take 30 mg by mouth once daily.      olaparib 100 mg Tab Take 2 tablets (200mg) by mouth 2 (two) times daily 120 tablet 6    ondansetron (ZOFRAN-ODT) 8 MG TbDL Take 1 tablet (8 mg total) by mouth every 8 (eight) hours as needed (chemotherapy-induced nausea and vomiting). 40 tablet 5    oxyCODONE (ROXICODONE) 5 MG immediate release tablet Take 1 tablet (5 mg total) by mouth every 6 (six) hours as needed for Pain (cancer-related pain). 30 tablet 0    potassium chloride (MICRO-K) 10 MEQ CpSR Take 10 mEq by mouth once daily.       simvastatin (ZOCOR) 20 MG tablet Take 20 mg by mouth every evening.       No current  "facility-administered medications for this visit.        Review of Systems   Constitutional: Positive for fatigue and unexpected weight change.   HENT: Negative for sore throat.    Eyes: Negative for visual disturbance.   Respiratory: Negative for cough and shortness of breath (LUNA).    Cardiovascular: Negative for chest pain and palpitations.   Gastrointestinal: Negative for constipation, diarrhea, nausea and vomiting.   Genitourinary: Negative for dysuria.   Musculoskeletal: Negative for back pain.   Skin: Negative for rash.   Neurological: Negative for light-headedness and headaches.   Hematological: Negative for adenopathy.   Psychiatric/Behavioral: The patient is not nervous/anxious.      ECOG Performance Status:   ECOG SCORE 0       Objective:      Vitals:   Vitals:    01/21/20 1116   BP: 123/72   BP Location: Right arm   Patient Position: Sitting   BP Method: Large (Automatic)   Pulse: 63   Resp: 10   Temp: 96.6 °F (35.9 °C)   TempSrc: Temporal   SpO2: 98%   Weight: 60.5 kg (133 lb 6.4 oz)   Height: 5' 2" (1.575 m)     BMI: Body mass index is 24.4 kg/m².    Physical Exam   Constitutional: She is oriented to person, place, and time. She appears well-developed and well-nourished.   HENT:   Head: Normocephalic and atraumatic.   Eyes: Pupils are equal, round, and reactive to light. EOM are normal.   Neck: Normal range of motion. Neck supple.   Cardiovascular: Normal rate and regular rhythm.   Pulmonary/Chest: Effort normal and breath sounds normal.   Abdominal: Soft. Bowel sounds are normal. She exhibits mass (mass palpated in ryann-umbilical/epigastric area).   Musculoskeletal: Normal range of motion. She exhibits edema.   Neurological: She is alert and oriented to person, place, and time.   Skin: Skin is warm and dry.   Psychiatric: She has a normal mood and affect. Her behavior is normal. Judgment and thought content normal.   Nursing note and vitals reviewed.    Laboratory Data:    Lab Results   1/13/20  WBC " 7.4 HGB 10.4 HCT 32.4  Cr 1.37 GFR 45     Imagin/2/19 PET:      CT abdomen/pelvis (19):  1.  Large cervical mass that appears to invade the rectosigmoid and is inseparable from the bladder and urethra, compatible with the known primary.  2.  Large complex cystic mass probably arising from the left ovary that may represent metastatic disease versus another primary.  3.  Retroperitoneal and anterior diaphragmatic adenopathy suspect for metastatic disease.  4.  Supraumbilical anterior abdominal wall mass compatible with metastatic disease.        Assessment:       1. 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 Mild protein-calorie malnutrition    3. Malignant neoplasm of cervix, unspecified site    4. Anemia in neoplastic disease    5. Increased creatine kinase level           Plan:     1. Cervical cancer  - biopsy (19) reveals cervical cancer. Imaging reveals stage IV disease  - [facility] Per their recommendations, as well as family preference, we will proceed with single-agent carboplatin AUC5 q28 days. I will refer to radiation oncology for evaluation; we may give radiation therapy after a few cycles of chemotherapy.  - The risks and benefits of chemotherapy were discussed, written information was given, and informed consent was obtained.  - Pt is s/p 5 cycles of single agent carbo with thrombocytopenia. Discussed recent scan with suspected progression of peritoneal implants noted on Scan  along with recent thrombocytopenia  And worsening fatigue discussed testing for IO therapy vs change in chemotherapy.   - will request IO testing for  PDL1/CPS if measurable on tissue block  - will also check NGS with [facility] CD for MMR, BRCA 1 and 2 as she reports a sister with Breast cancer who  at age 29 and a niece with pancreatic cancer, age 50.  - If IO is not a tx option, discussed weekly taxol 60-80 mg/m2 as alternative to carbo.   - pt would like to  wait for NGS testing to be completed before she makes any treatment decisions.    2.BRCA2 +  mutation   -discussed results of [facility] showing BRCA 2 mutation, implications, accountability and additional family testing recommendations  - Discussed attempting to obtain PARP inhibitor approval, will proceed with oral lynparza 300 mg BID, discussed SE and risks vs chemotherapy options. She states she does not want additional chemo.   - pt started Lynparza 2 week ago, cory well.     -3. Anemia in neoplastic disease, pt asymptomatic       4Elevated SrCr  -weekly labs to monitor  - if continues to increase may need to hold lynparza    - return to clinic in 2 week with weekly labs.     Annette Donis NP

## 2020-01-24 ENCOUNTER — OFFICE VISIT (OUTPATIENT)
Dept: FAMILY MEDICINE | Facility: CLINIC | Age: 85
End: 2020-01-24
Payer: MEDICARE

## 2020-01-24 VITALS
TEMPERATURE: 97 F | WEIGHT: 131.38 LBS | HEART RATE: 63 BPM | DIASTOLIC BLOOD PRESSURE: 57 MMHG | OXYGEN SATURATION: 99 % | SYSTOLIC BLOOD PRESSURE: 108 MMHG | HEIGHT: 62 IN | BODY MASS INDEX: 24.18 KG/M2

## 2020-01-24 DIAGNOSIS — I10 ESSENTIAL HYPERTENSION: ICD-10-CM

## 2020-01-24 PROCEDURE — 1159F PR MEDICATION LIST DOCUMENTED IN MEDICAL RECORD: ICD-10-PCS | Mod: S$GLB,,, | Performed by: NURSE PRACTITIONER

## 2020-01-24 PROCEDURE — 99213 PR OFFICE/OUTPT VISIT, EST, LEVL III, 20-29 MIN: ICD-10-PCS | Mod: S$GLB,,, | Performed by: NURSE PRACTITIONER

## 2020-01-24 PROCEDURE — 99213 OFFICE O/P EST LOW 20 MIN: CPT | Mod: S$GLB,,, | Performed by: NURSE PRACTITIONER

## 2020-01-24 PROCEDURE — 1159F MED LIST DOCD IN RCRD: CPT | Mod: S$GLB,,, | Performed by: NURSE PRACTITIONER

## 2020-01-27 LAB
BASOPHILS NFR SNV MANUAL: 0.4 % (ref 0–3)
EOSINOPHIL NFR SNV MANUAL: 1.9 % (ref 1–3)
ERYTHROCYTE [DISTWIDTH] IN BLOOD BY AUTOMATED COUNT: 15.3 % (ref 12.5–18)
HCT VFR BLD AUTO: 26.7 % (ref 37–47)
HGB BLD-MCNC: 8.6 G/DL (ref 12–16)
HYPOCHROMIA BLD QL SMEAR: NORMAL
LYMPHOCYTES NFR SNV MANUAL: 29.5 % (ref 25–40)
MACROCYTES BLD QL SMEAR: NORMAL
MANUAL NRBC PER 100 CELLS: 0.6 %
MCH RBC QN AUTO: 33 PG (ref 27–31.2)
MCHC RBC AUTO-ENTMCNC: 32.2 G/DL (ref 31.8–35.4)
MCV RBC AUTO: 102.3 FL (ref 80–97)
MONOCYTES/100 LEUKOCYTES: 9.1 % (ref 1–15)
NEUTROPHILS NFR BLD: 3.1 10*3/UL (ref 1.8–7.7)
NEUTROPHILS NFR SNV MANUAL: 58.7 % (ref 37–80)
PLATELETS: 183 10*3/UL (ref 142–424)
RBC # BLD AUTO: 2.61 10*6/UL (ref 4.2–5.4)
WBC # BLD: 5.3 10*3/UL (ref 4.6–10.2)

## 2020-01-31 ENCOUNTER — TELEPHONE (OUTPATIENT)
Dept: PHARMACY | Facility: CLINIC | Age: 85
End: 2020-01-31

## 2020-01-31 NOTE — TELEPHONE ENCOUNTER
RX call attempt 1 regarding refill on Lynparza from [facility]. Patient reached-- shipping out  for  arrival with patients consent. Copay of $8.95 charging [employer] ([id number]) @ 821. Address and  confirmed. Patient has 7 days of medication on hand at this time. Patient has not started any new medications, has had no missed doses and no side effects present. Patient is currently taking the medication as directed by doctors instruction:    Lynparza- Take 2 tablets (200mg) by mouth 2 (two) times daily    Patient does have a safe place in their residence to keep medication at desired temperature away from small children and pets. Patient also does have the capability of contacting 911 in the event of an emergency. Patient states they do not have any questions or concerns at this time.

## 2020-02-03 LAB
BASOPHILS NFR SNV MANUAL: 0.6 % (ref 0–3)
EOSINOPHIL NFR SNV MANUAL: 2.1 % (ref 1–3)
ERYTHROCYTE [DISTWIDTH] IN BLOOD BY AUTOMATED COUNT: 16.5 % (ref 12.5–18)
HCT VFR BLD AUTO: 27.5 % (ref 37–47)
HGB BLD-MCNC: 8.8 G/DL (ref 12–16)
HYPOCHROMIA BLD QL SMEAR: NORMAL
LYMPHOCYTES NFR SNV MANUAL: 30.1 % (ref 25–40)
MACROCYTES BLD QL SMEAR: NORMAL
MANUAL NRBC PER 100 CELLS: 0.6 %
MCH RBC QN AUTO: 33.7 PG (ref 27–31.2)
MCHC RBC AUTO-ENTMCNC: 32 G/DL (ref 31.8–35.4)
MCV RBC AUTO: 105.4 FL (ref 80–97)
MONOCYTES/100 LEUKOCYTES: 10.6 % (ref 1–15)
NEUTROPHILS NFR BLD: 2.71 10*3/UL (ref 1.8–7.7)
NEUTROPHILS NFR SNV MANUAL: 56.2 % (ref 37–80)
PLATELETS: 164 10*3/UL (ref 142–424)
RBC # BLD AUTO: 2.61 10*6/UL (ref 4.2–5.4)
WBC # BLD: 4.8 10*3/UL (ref 4.6–10.2)

## 2020-02-04 ENCOUNTER — OFFICE VISIT (OUTPATIENT)
Dept: HEMATOLOGY/ONCOLOGY | Facility: CLINIC | Age: 85
End: 2020-02-04
Payer: MEDICARE

## 2020-02-04 VITALS
RESPIRATION RATE: 16 BRPM | SYSTOLIC BLOOD PRESSURE: 125 MMHG | HEIGHT: 62 IN | BODY MASS INDEX: 24.63 KG/M2 | HEART RATE: 68 BPM | TEMPERATURE: 98 F | DIASTOLIC BLOOD PRESSURE: 65 MMHG | OXYGEN SATURATION: 99 % | WEIGHT: 133.81 LBS

## 2020-02-04 DIAGNOSIS — Z15.01 BRCA2 GENE MUTATION POSITIVE IN FEMALE: ICD-10-CM

## 2020-02-04 DIAGNOSIS — Z15.02 BRCA2 GENE MUTATION POSITIVE IN FEMALE: ICD-10-CM

## 2020-02-04 DIAGNOSIS — C53.0 MALIGNANT NEOPLASM OF ENDOCERVIX: ICD-10-CM

## 2020-02-04 DIAGNOSIS — C53.9 MALIGNANT NEOPLASM OF CERVIX, UNSPECIFIED SITE: Primary | ICD-10-CM

## 2020-02-04 DIAGNOSIS — Z15.09 BRCA2 GENE MUTATION POSITIVE IN FEMALE: ICD-10-CM

## 2020-02-04 PROCEDURE — 99213 OFFICE O/P EST LOW 20 MIN: CPT | Mod: S$GLB,,, | Performed by: NURSE PRACTITIONER

## 2020-02-04 PROCEDURE — 99213 PR OFFICE/OUTPT VISIT, EST, LEVL III, 20-29 MIN: ICD-10-PCS | Mod: S$GLB,,, | Performed by: NURSE PRACTITIONER

## 2020-02-04 NOTE — PROGRESS NOTES
"PATIENT: Adri Seay  MRN: [id number]  DATE: 2/4/2020    Diagnosis:   No diagnosis found.  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 Cervical cancer - stage IV      Oncologic Treatment Planned therapy: single-agent carboplatin +/- radiation      Pathology 6/19/19:  - uterine cervix, biopsy:  - poorly differentiated squamous carcinoma with extensive necrosis        Subjective:    History of Present Illness: Ms. Seay is a 84 y.o. female who presents for evaluation and management of cervical cancer. SHe was seen at [facility] on 6/25/19.     Information from Dr. Reese's and Dr. 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 The patient had a CT scan consistent with widely metastatic disease including multiple sites of lymphadenopathy, a possible lung metastases, liver metastases, nodule in the anterior abdominal wall, and carcinomatosis. The patient is mostly asymptomatic but does have a vaginal discharge. Her performance status is 0. On exam, there is a 6 centimeter mass in the anterior abdominal wall superior to the umbilicus. On bimanual examination, tumor is a place the entire cervix apex of the vagina, and on rectovaginal examination, there is a large pelvic mass extending to the bilateral pelvic sidewalls.    We will get her CT reviewed at [facility] and if it is consistent with metastatic disease, we have recommended palliative chemotherapy with either carboplatinum as a single agent or carboplatinum and paclitaxel. Due to concern for bowel involvement on the CT scan, I have recommended not giving bevacizumab as part of this regimen. Patient will return home to [place] and begin chemotherapy with a medical oncologist there. I've also recommended a consultation with radiation " "oncology in [place] for consideration of palliative radiation to lower the chances of significant vaginal bleeding. We will see her back on an as-needed basis."      8/5/19 Visit: She is here for consideration of  cycle 2 of carbo. She states spotting has resolved since last visit. She received 1 unit of blood aproximately 2 weeks ago for HB 7.4. Her HBG last week was 8.6 and she denies any fatigue, LUNA, and feeling run down. Bowel habits are better since starting Miralax.      9/3/19 Visit: Today she is in clinic with her daughter, she is feeling good and has resumed some driving. She states skin around port itchy. Mild redness noted at incision line and lower neck, advised bactroban x 5 days but if worsens change to OTC lotrim. Labs reviewed and HGB 7.4, will set up for 1 unit PRBC for Thursday, but will proceed with treatment as planned for today.     10/1/19 visit: She states she is feeling good except for fatigue after her 3rd cycle of chemo. Denies any bleeding, belly pain, or difficulty with bowels. Labs reviewed and mild dehydration noted, Will increase post chemo fluids to 1 L today. Plan is to repeat scans after cycle 4 .     10/29/19 visit:  Today she is in clinic with her daughter. States she is feeling fine, not has noticed some SOB with exertion, talking and reports patient seems more tired. Labs reviewed and HGB 7.6 will set up for blood transfusion today with chemo. She denies any bleeding and states she is tolerating chemo very well.     11/26/19 visit:  Today in clinic she is here with her grandaughter and daughter. She states she is feeling well, tolerating chemo without any signifciant SE except fatigue and diet good, no bleeding noted, no changes in bowels. Labs reviewed and thrombocytopenia noted (91). Will hold treatment this week and proceed with scans, previously schedule after cycle 6 but due to tx delay will proceed with imaging.     12/2/19 visit:  Today in clinic with family to " discuss recent PET/CT done on 12/2/19. Previous CT imaging done at outside facility, discussed with Dr Nguyen for comparison and he stated pelvic mass smaller, abdominal mass smaller liver lesion likely benign stable but difficult to say if peritoneal implants stable to progressed.      12/20/19 visit:  Today she and her family are here to discuss recent result from [email or web address] on 12/16/19. Results show Mrs Seay has a BRCA 2 gene mutation. She does state that she had a sister die at age 29 from Breast cancer about 40-50 years ago. We discussed the accountability of BRCA 2 mutation with targeted PARP inhibitors as a treatment option as well as family implications and recommendation of further testing of children for BRCA 2 mutations. She and her family members [facility] with licensed Genetic counsler. Wanda Ma RN will help facilitate appointment.     1/3/20 visit:  PT has yet to receive lynparza, reduced dose due to impared renal fxn. Will f/u with [facility] pharmacy to verify delivery. Progressive anemia noted, will set up 1 unit of PRBC for Monday and will monitor weekly once pt starts PARP inhibitor.      1/14/20 visit:   Pt started lynparza, low dose 1 week ago and is tolerating very well. Denies any SE at this time. Labs reviewed with mild anemia noted but indication for transfusion. Will continue to monitor.     2/4/20 visit:  She continues to tolerate oral meds well. No c/o SE. Labs reveiwed and SrCr elevated. Will continue to monitor.     Past medical, surgical, family, and social histories have been reviewed and updated below.    Past Medical History:   Past Medical History:   Diagnosis Date    Anemia     Cataract     Cervical cancer 05/2019    Hypertension        Past Surgical History:   Past Surgical History:   Procedure Laterality Date    CATARACT EXTRACTION, BILATERAL      WRIST SURGERY  1984       Family History:   Family History   Problem  Relation Age of Onset    Hypertension Mother     Pneumonia Father     Breast cancer Sister     No Known Problems Brother     Leukemia Daughter     No Known Problems Son        Social History:  reports that she has never smoked. She has never used smokeless tobacco. She reports that she drank alcohol. She reports that she does not use drugs.    Allergies:  Review of patient's allergies indicates:  No Known Allergies    Medications:  Current Outpatient Medications   Medication Sig Dispense Refill    cloNIDine (CATAPRES) 0.3 MG tablet TK 1 T PO BID  3    FENOFIBRATE ORAL Take by mouth 2 (two) times daily.      ferrous sulfate (FEOSOL) 325 mg (65 mg iron) Tab tablet TK 1 T PO BID  1    loratadine (CLARITIN) 10 mg tablet Take 1 tablet (10 mg total) by mouth once daily. (Patient taking differently: Take 10 mg by mouth daily as needed. ) 90 tablet 3    megestrol (MEGACE) 40 MG Tab Take 1 tablet (40 mg total) by mouth 2 (two) times daily. 60 tablet 6    mupirocin calcium 2% (BACTROBAN) 2 % cream Apply to affected area 3 times daily (Patient taking differently: daily as needed. Apply to affected area 3 times daily) 30 g 0    NIFEdipine (PROCARDIA-XL) 30 MG (OSM) 24 hr tablet Take 30 mg by mouth once daily.      olaparib 100 mg Tab Take 2 tablets (200mg) by mouth 2 (two) times daily 120 tablet 6    ondansetron (ZOFRAN-ODT) 8 MG TbDL Take 1 tablet (8 mg total) by mouth every 8 (eight) hours as needed (chemotherapy-induced nausea and vomiting). 40 tablet 5    oxyCODONE (ROXICODONE) 5 MG immediate release tablet Take 1 tablet (5 mg total) by mouth every 6 (six) hours as needed for Pain (cancer-related pain). 30 tablet 0    simvastatin (ZOCOR) 20 MG tablet Take 20 mg by mouth every evening.       No current facility-administered medications for this visit.        Review of Systems   Constitutional: Positive for fatigue and unexpected weight change.   HENT: Negative for sore throat.    Eyes: Negative for visual  "disturbance.   Respiratory: Negative for cough and shortness of breath (LUNA).    Cardiovascular: Negative for chest pain and palpitations.   Gastrointestinal: Negative for constipation, diarrhea, nausea and vomiting.   Genitourinary: Negative for dysuria.   Musculoskeletal: Negative for back pain.   Skin: Negative for rash.   Neurological: Negative for light-headedness and headaches.   Hematological: Negative for adenopathy.   Psychiatric/Behavioral: The patient is not nervous/anxious.      ECOG Performance Status:   ECOG SCORE 0       Objective:      Vitals:   Vitals:    20 0946   BP: 125/65   BP Location: Right arm   Patient Position: Sitting   BP Method: Large (Automatic)   Pulse: 68   Resp: 16   Temp: 97.7 °F (36.5 °C)   TempSrc: Temporal   SpO2: 99%   Weight: 60.7 kg (133 lb 12.8 oz)   Height: 5' 2" (1.575 m)     BMI: Body mass index is 24.47 kg/m².    Physical Exam   Constitutional: She is oriented to person, place, and time. She appears well-developed and well-nourished.   HENT:   Head: Normocephalic and atraumatic.   Eyes: Pupils are equal, round, and reactive to light. EOM are normal.   Neck: Normal range of motion. Neck supple.   Cardiovascular: Normal rate and regular rhythm.   Pulmonary/Chest: Effort normal and breath sounds normal.   Abdominal: Soft. Bowel sounds are normal. She exhibits mass (mass palpated in ryann-umbilical/epigastric area).   Musculoskeletal: Normal range of motion. She exhibits edema.   Neurological: She is alert and oriented to person, place, and time.   Skin: Skin is warm and dry.   Psychiatric: She has a normal mood and affect. Her behavior is normal. Judgment and thought content normal.   Nursing note and vitals reviewed.    Laboratory Data:    Lab Results   20  WBC 7.4 HGB 10.4 HCT 32.4  Cr 1.37 GFR 45     Imagin/2/19 PET:      CT abdomen/pelvis (19):  1.  Large cervical mass that appears to invade the rectosigmoid and is inseparable from the " bladder and urethra, compatible with the known primary.  2.  Large complex cystic mass probably arising from the left ovary that may represent metastatic disease versus another primary.  3.  Retroperitoneal and anterior diaphragmatic adenopathy suspect for metastatic disease.  4.  Supraumbilical anterior abdominal wall mass compatible with metastatic disease.        Assessment:       1. Malignant neoplasm of cervix, unspecified site    2. BRCA2 gene mutation positive in female    3. Malignant neoplasm of endocervix           Plan:     1. Cervical cancer  - biopsy (19) reveals cervical cancer. Imaging reveals stage IV disease  - [facility] Per [facility] recommendations, as well as family preference, we will proceed with single-agent carboplatin AUC5 q28 days. I will refer to radiation oncology for evaluation; we may give radiation therapy after a few cycles of chemotherapy.  - The risks and benefits of chemotherapy were discussed, written information was given, and informed consent was obtained.  - Pt is s/p 5 cycles of single agent carbo with thrombocytopenia. Discussed recent scan with suspected progression of peritoneal implants noted on Scan  along with recent thrombocytopenia  And worsening fatigue discussed testing for IO therapy vs change in chemotherapy.   - will request IO testing for  PDL1/CPS if measurable on tissue block  - will also check NGS with [employer] [facility] for MMR, BRCA 1 and 2 as she reports a sister with Breast cancer who  at age 29 and a niece with pancreatic cancer, age 50.  - If IO is not a tx option, discussed weekly taxol 60-80 mg/m2 as alternative to carbo.   - pt would like to wait for NGS testing to be completed before she makes any treatment decisions.    2.BRCA2 +  mutation   -discussed results of [facility] showing BRCA 2 mutation, implications, accountability and additional family testing recommendations  - Discussed attempting to  obtain PARP inhibitor approval, will proceed with oral lynparza 300 mg BID, discussed SE and risks vs chemotherapy options. She states she does not want additional chemo.   - pt started Lynparza 2 week ago, cory well.     -3. Anemia in neoplastic disease, pt asymptomatic       4Elevated SrCr  -weekly labs to monitor  - if continues to increase may need to hold lynparza    - return to clinic in 2 week with  labs.     Annette Donis NP

## 2020-02-17 LAB
ALBUMIN SERPL BCP-MCNC: 4.3 G/DL (ref 3.4–5)
ALBUMIN/GLOBULIN RATIO: 1.34 RATIO (ref 1.1–1.8)
ALP SERPL-CCNC: 102 U/L (ref 46–116)
ALT SERPL W P-5'-P-CCNC: 22 U/L (ref 12–78)
AST SERPL-CCNC: 21 U/L (ref 15–37)
BASOPHILS NFR SNV MANUAL: 0.6 % (ref 0–3)
BILIRUB SERPL-MCNC: 0.6 MG/DL (ref 0–1)
BUN SERPL-MCNC: 22 MG/DL (ref 7–18)
BUN/CREAT SERPL: 16.79 RATIO (ref 7–18)
CALCIUM SERPL-MCNC: 9.3 MG/DL (ref 8.8–10.5)
CHLORIDE SERPL-SCNC: 105 MMOL/L (ref 100–108)
CO2 SERPL-SCNC: 22 MMOL/L (ref 21–32)
CREAT SERPL-MCNC: 1.31 MG/DL (ref 0.55–1.02)
EOSINOPHIL NFR SNV MANUAL: 2.3 % (ref 1–3)
ERYTHROCYTE [DISTWIDTH] IN BLOOD BY AUTOMATED COUNT: 16.9 % (ref 12.5–18)
GFR ESTIMATION: 47
GLOBULIN: 3.2 G/DL (ref 2.3–3.5)
GLUCOSE SERPL-MCNC: 101 MG/DL (ref 70–110)
HCT VFR BLD AUTO: 27.9 % (ref 37–47)
HGB BLD-MCNC: 9.3 G/DL (ref 12–16)
LYMPHOCYTES NFR SNV MANUAL: 32.5 % (ref 25–40)
MACROCYTES BLD QL SMEAR: NORMAL
MAGNESIUM SERPL-MCNC: 1.7 MG/DL (ref 1.8–2.4)
MANUAL NRBC PER 100 CELLS: 0.4 %
MCH RBC QN AUTO: 35.9 PG (ref 27–31.2)
MCHC RBC AUTO-ENTMCNC: 33.3 G/DL (ref 31.8–35.4)
MCV RBC AUTO: 107.7 FL (ref 80–97)
MONOCYTES/100 LEUKOCYTES: 9.8 % (ref 1–15)
NEUTROPHILS NFR BLD: 2.86 10*3/UL (ref 1.8–7.7)
NEUTROPHILS NFR SNV MANUAL: 54.6 % (ref 37–80)
PHOSPHATE FLD-MCNC: 3.5 MG/DL (ref 2.6–4.7)
PLATELETS: 173 10*3/UL (ref 142–424)
POTASSIUM SERPL-SCNC: 3.7 MMOL/L (ref 3.6–5.2)
PROT SERPL-MCNC: 7.5 G/DL (ref 6.4–8.2)
RBC # BLD AUTO: 2.59 10*6/UL (ref 4.2–5.4)
SODIUM BLD-SCNC: 139 MMOL/L (ref 135–145)
WBC # BLD: 5.2 10*3/UL (ref 4.6–10.2)

## 2020-02-18 ENCOUNTER — OFFICE VISIT (OUTPATIENT)
Dept: HEMATOLOGY/ONCOLOGY | Facility: CLINIC | Age: 85
End: 2020-02-18
Payer: MEDICARE

## 2020-02-18 VITALS
RESPIRATION RATE: 18 BRPM | WEIGHT: 131 LBS | HEART RATE: 68 BPM | HEIGHT: 62 IN | OXYGEN SATURATION: 99 % | TEMPERATURE: 98 F | SYSTOLIC BLOOD PRESSURE: 148 MMHG | DIASTOLIC BLOOD PRESSURE: 75 MMHG | BODY MASS INDEX: 24.11 KG/M2

## 2020-02-18 DIAGNOSIS — Z15.02 BRCA2 GENE MUTATION POSITIVE IN FEMALE: ICD-10-CM

## 2020-02-18 DIAGNOSIS — C53.9 MALIGNANT NEOPLASM OF CERVIX, UNSPECIFIED SITE: Primary | ICD-10-CM

## 2020-02-18 DIAGNOSIS — Z15.01 BRCA2 GENE MUTATION POSITIVE IN FEMALE: ICD-10-CM

## 2020-02-18 DIAGNOSIS — Z15.09 BRCA2 GENE MUTATION POSITIVE IN FEMALE: ICD-10-CM

## 2020-02-18 DIAGNOSIS — D63.0 ANEMIA IN NEOPLASTIC DISEASE: ICD-10-CM

## 2020-02-18 PROCEDURE — 99213 PR OFFICE/OUTPT VISIT, EST, LEVL III, 20-29 MIN: ICD-10-PCS | Mod: S$GLB,,, | Performed by: NURSE PRACTITIONER

## 2020-02-18 PROCEDURE — 99213 OFFICE O/P EST LOW 20 MIN: CPT | Mod: S$GLB,,, | Performed by: NURSE PRACTITIONER

## 2020-02-18 NOTE — PROGRESS NOTES
"PATIENT: Adri Seay  MRN: [id number]  DATE: 2/18/2020    Diagnosis:   No diagnosis found.  Chief Complaint: No chief complaint on file.    Oncologic History:      Oncologic History 1. Cervical cancer - stage IV      Oncologic Treatment Planned therapy: single-agent carboplatin +/- radiation      Pathology 6/19/19:  - uterine cervix, biopsy:  - poorly differentiated squamous carcinoma with extensive necrosis        Subjective:    History of Present Illness: Ms. Seay is a 84 y.o. female who presents for evaluation and management of cervical cancer. SHe was seen at [facility] on 6/25/19.     Information from Dr. Reese's and Dr. 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 The patient had a CT scan consistent with widely metastatic disease including multiple sites of lymphadenopathy, a possible lung metastases, liver metastases, nodule in the anterior abdominal wall, and carcinomatosis. The patient is mostly asymptomatic but does have a vaginal discharge. Her performance status is 0. On exam, there is a 6 centimeter mass in the anterior abdominal wall superior to the umbilicus. On bimanual examination, tumor is a place the entire cervix apex of the vagina, and on rectovaginal examination, there is a large pelvic mass extending to the bilateral pelvic sidewalls.    We will get her CT reviewed at [facility] and if it is consistent with metastatic disease, we have recommended palliative chemotherapy with either carboplatinum as a single agent or carboplatinum and paclitaxel. Due to concern for bowel involvement on the CT scan, I have recommended not giving bevacizumab as part of this regimen. Patient will return home to [place] and begin chemotherapy with a medical oncologist there. I've also recommended a consultation with radiation oncology in [place] for " "consideration of palliative radiation to lower the chances of significant vaginal bleeding. We will see her back on an as-needed basis."      8/5/19 Visit: She is here for consideration of  cycle 2 of carbo. She states spotting has resolved since last visit. She received 1 unit of blood aproximately 2 weeks ago for HB 7.4. Her HBG last week was 8.6 and she denies any fatigue, LUNA, and feeling run down. Bowel habits are better since starting Miralax.      9/3/19 Visit: Today she is in clinic with her daughter, she is feeling good and has resumed some driving. She states skin around port itchy. Mild redness noted at incision line and lower neck, advised bactroban x 5 days but if worsens change to OTC lotrim. Labs reviewed and HGB 7.4, will set up for 1 unit PRBC for Thursday, but will proceed with treatment as planned for today.     10/1/19 visit: She states she is feeling good except for fatigue after her 3rd cycle of chemo. Denies any bleeding, belly pain, or difficulty with bowels. Labs reviewed and mild dehydration noted, Will increase post chemo fluids to 1 L today. Plan is to repeat scans after cycle 4 .     10/29/19 visit:  Today she is in clinic with her daughter. States she is feeling fine, not has noticed some SOB with exertion, talking and reports patient seems more tired. Labs reviewed and HGB 7.6 will set up for blood transfusion today with chemo. She denies any bleeding and states she is tolerating chemo very well.     11/26/19 visit:  Today in clinic she is here with her grandaughter and daughter. She states she is feeling well, tolerating chemo without any signifciant SE except fatigue and diet good, no bleeding noted, no changes in bowels. Labs reviewed and thrombocytopenia noted (91). Will hold treatment this week and proceed with scans, previously schedule after cycle 6 but due to tx delay will proceed with imaging.     12/2/19 visit:  Today in clinic with family to discuss recent PET/CT done on " 12/2/19. Previous CT imaging done at outside facility, discussed with Dr Nguyen for comparison and he stated pelvic mass smaller, abdominal mass smaller liver lesion likely benign stable but difficult to say if peritoneal implants stable to progressed.      12/20/19 visit:  Today she and her family are here to discuss recent result from [facility] on 12/16/19. Results show Mrs Seay has a BRCA 2 gene mutation. She does state that she had a sister die at age 29 from Breast cancer about 40-50 years ago. We discussed the accountability of BRCA 2 mutation with targeted PARP inhibitors as a treatment option as well as family implications and recommendation of further testing of children for BRCA 2 mutations. She and her family members [facility] with licensed Genetic counsler. Wanda Ma RN will help facilitate appointment.     1/3/20 visit:  PT has yet to receive lynparza, reduced dose due to impared renal fxn. Will f/u with [facility] pharmacy to verify delivery. Progressive anemia noted, will set up 1 unit of PRBC for Monday and will monitor weekly once pt starts PARP inhibitor.      1/14/20 visit:   Pt started lynparza, low dose 1 week ago and is tolerating very well. Denies any SE at this time. Labs reviewed with mild anemia noted but indication for transfusion. Will continue to monitor.     2/18/20 visit:  She continues to tolerate oral meds well. No c/o SE. Labs reveiwed and SrCr elevated. Will continue to monitor monthly. Scans due in April.     Past medical, surgical, family, and social histories have been reviewed and updated below.    Past Medical History:   Past Medical History:   Diagnosis Date    Anemia     Cataract     Cervical cancer 05/2019    Hypertension        Past Surgical History:   Past Surgical History:   Procedure Laterality Date    CATARACT EXTRACTION, BILATERAL      WRIST SURGERY  1984       Family History:   Family History   Problem  Relation Age of Onset    Hypertension Mother     Pneumonia Father     Breast cancer Sister     No Known Problems Brother     Leukemia Daughter     No Known Problems Son        Social History:  reports that she has never smoked. She has never used smokeless tobacco. She reports that she drank alcohol. She reports that she does not use drugs.    Allergies:  Review of patient's allergies indicates:  No Known Allergies    Medications:  Current Outpatient Medications   Medication Sig Dispense Refill    cloNIDine (CATAPRES) 0.3 MG tablet TK 1 T PO BID  3    FENOFIBRATE ORAL Take by mouth 2 (two) times daily.      ferrous sulfate (FEOSOL) 325 mg (65 mg iron) Tab tablet TK 1 T PO BID  1    megestrol (MEGACE) 40 MG Tab Take 1 tablet (40 mg total) by mouth 2 (two) times daily. 60 tablet 6    mupirocin calcium 2% (BACTROBAN) 2 % cream Apply to affected area 3 times daily (Patient taking differently: daily as needed. Apply to affected area 3 times daily) 30 g 0    NIFEdipine (PROCARDIA-XL) 30 MG (OSM) 24 hr tablet Take 30 mg by mouth once daily.      olaparib 100 mg Tab Take 2 tablets (200mg) by mouth 2 (two) times daily 120 tablet 6    ondansetron (ZOFRAN-ODT) 8 MG TbDL Take 1 tablet (8 mg total) by mouth every 8 (eight) hours as needed (chemotherapy-induced nausea and vomiting). 40 tablet 5    oxyCODONE (ROXICODONE) 5 MG immediate release tablet Take 1 tablet (5 mg total) by mouth every 6 (six) hours as needed for Pain (cancer-related pain). 30 tablet 0    simvastatin (ZOCOR) 20 MG tablet Take 20 mg by mouth every evening.       No current facility-administered medications for this visit.        Review of Systems   Constitutional: Positive for fatigue and unexpected weight change.   HENT: Negative for sore throat.    Eyes: Negative for visual disturbance.   Respiratory: Negative for cough and shortness of breath (LUNA).    Cardiovascular: Negative for chest pain and palpitations.   Gastrointestinal: Negative  "for constipation, diarrhea, nausea and vomiting.   Genitourinary: Negative for dysuria.   Musculoskeletal: Negative for back pain.   Skin: Negative for rash.   Neurological: Negative for light-headedness and headaches.   Hematological: Negative for adenopathy.   Psychiatric/Behavioral: The patient is not nervous/anxious.      ECOG Performance Status:   ECOG SCORE 0       Objective:      Vitals:   Vitals:    20 0957   BP: (!) 148/75   BP Location: Left arm   Patient Position: Sitting   BP Method: Medium (Automatic)   Pulse: 68   Resp: 18   Temp: 98 °F (36.7 °C)   TempSrc: Oral   SpO2: 99%   Weight: 59.4 kg (131 lb)   Height: 5' 2" (1.575 m)     BMI: Body mass index is 23.96 kg/m².    Physical Exam   Constitutional: She is oriented to person, place, and time. She appears well-developed and well-nourished.   HENT:   Head: Normocephalic and atraumatic.   Eyes: Pupils are equal, round, and reactive to light. EOM are normal.   Neck: Normal range of motion. Neck supple.   Cardiovascular: Normal rate and regular rhythm.   Pulmonary/Chest: Effort normal and breath sounds normal.   Abdominal: Soft. Bowel sounds are normal. She exhibits mass (mass palpated in ryann-umbilical/epigastric area).   Musculoskeletal: Normal range of motion. She exhibits edema.   Neurological: She is alert and oriented to person, place, and time.   Skin: Skin is warm and dry.   Psychiatric: She has a normal mood and affect. Her behavior is normal. Judgment and thought content normal.   Nursing note and vitals reviewed.    Laboratory Data:    Lab Results   20  WBC 7.4 HGB 10.4 HCT 32.4  Cr 1.37 GFR 45     Imagin/2/19 PET:      CT abdomen/pelvis (19):  1.  Large cervical mass that appears to invade the rectosigmoid and is inseparable from the bladder and urethra, compatible with the known primary.  2.  Large complex cystic mass probably arising from the left ovary that may represent metastatic disease versus another " primary.  3.  Retroperitoneal and anterior diaphragmatic adenopathy suspect for metastatic disease.  4.  Supraumbilical anterior abdominal wall mass compatible with metastatic disease.        Assessment:       1. Malignant neoplasm of cervix, unspecified site    2. BRCA2 gene mutation positive in female    3. Anemia in neoplastic disease           Plan:     1. Cervical cancer  - biopsy (19) reveals cervical cancer. Imaging reveals stage IV disease  - she has been evaluated at [facility]. Per their recommendations, as well as family preference, we will proceed with single-agent carboplatin AUC5 q28 days. I will refer to radiation oncology for evaluation; we may give radiation therapy after a few cycles of chemotherapy.  - The risks and benefits of chemotherapy were discussed, written information was given, and informed consent was obtained.  - Pt is s/p 5 cycles of single agent carbo with thrombocytopenia. Discussed recent scan with suspected progression of peritoneal implants noted on Scan  along with recent thrombocytopenia  And worsening fatigue discussed testing for IO therapy vs change in chemotherapy.   - will request IO testing for  PDL1/CPS if measurable on tissue block  - will also check NGS with [employer] [facility] for MMR, BRCA 1 and 2 as she reports a sister with Breast cancer who  at age 29 and a niece with pancreatic cancer, age 50.  - If IO is not a tx option, discussed weekly taxol 60-80 mg/m2 as alternative to carbo.   - pt would like to wait for NGS testing to be completed before she makes any treatment decisions.    2.BRCA2 +  mutation   -discussed results of [facility] showing BRCA 2 mutation, implications, accountability and additional family testing recommendations  - Discussed attempting to obtain PARP inhibitor approval, will proceed with oral lynparza 300 mg BID, discussed SE and risks vs chemotherapy options. She states she does not want additional chemo.   - pt  started Lynparza in early January. Will repeat scans in April.      -3. Anemia in neoplastic disease, pt asymptomatic will continue to monitor.      4Elevated SrCr stable   -weekly labs to monitor  - if continues to increase may need to hold lynparza    - return to clinic in 2 week with  Labs 4 weeks for visit.      Annette Donis NP

## 2020-02-21 DIAGNOSIS — I10 ESSENTIAL HYPERTENSION: Primary | ICD-10-CM

## 2020-02-21 RX ORDER — CLONIDINE HYDROCHLORIDE 0.3 MG/1
0.3 TABLET ORAL 2 TIMES DAILY
Qty: 180 TABLET | Refills: 2 | Status: SHIPPED | OUTPATIENT
Start: 2020-02-21 | End: 2020-08-24

## 2020-03-02 ENCOUNTER — TELEPHONE (OUTPATIENT)
Dept: PHARMACY | Facility: CLINIC | Age: 85
End: 2020-03-02

## 2020-03-02 LAB
BASOPHILS NFR SNV MANUAL: 0.8 % (ref 0–3)
EOSINOPHIL NFR SNV MANUAL: 1.5 % (ref 1–3)
ERYTHROCYTE [DISTWIDTH] IN BLOOD BY AUTOMATED COUNT: 16.3 % (ref 12.5–18)
HCT VFR BLD AUTO: 27.3 % (ref 37–47)
HGB BLD-MCNC: 9.1 G/DL (ref 12–16)
LYMPHOCYTES NFR SNV MANUAL: 24.2 % (ref 25–40)
MACROCYTES BLD QL SMEAR: NORMAL
MANUAL NRBC PER 100 CELLS: 0.4 %
MCH RBC QN AUTO: 35.5 PG (ref 27–31.2)
MCHC RBC AUTO-ENTMCNC: 33.3 G/DL (ref 31.8–35.4)
MCV RBC AUTO: 106.6 FL (ref 80–97)
MONOCYTES/100 LEUKOCYTES: 8.5 % (ref 1–15)
NEUTROPHILS NFR BLD: 3.37 10*3/UL (ref 1.8–7.7)
NEUTROPHILS NFR SNV MANUAL: 64.8 % (ref 37–80)
PLATELETS: 171 10*3/UL (ref 142–424)
RBC # BLD AUTO: 2.56 10*6/UL (ref 4.2–5.4)
WBC # BLD: 5.2 10*3/UL (ref 4.6–10.2)

## 2020-03-02 NOTE — TELEPHONE ENCOUNTER
Refill call regarding Lynparza at [facility]. Will prepare for shipment with consent of patient on 3/9 to arrive 3/10. Copay 0.00. Patient has not started any new medications including OTC drugs. Patient has not had any medication/ dose or instruction changes. No new allergies or side effects reported with this shipment. Medication is being taken as prescribed by physician and properly stored. Two patient identifiers:  and Address verified. Patient has 8 days on hand.

## 2020-03-16 LAB
ABS NRBC COUNT: 0.02 X 10 3/UL (ref 0–0.01)
ABSOLUTE BASOPHIL: 0.02 X 10 3/UL (ref 0–0.22)
ABSOLUTE EOSINOPHIL: 0.07 X 10 3/UL (ref 0.04–0.54)
ABSOLUTE IMMATURE GRAN: 0.01 X 10 3/UL (ref 0–0.04)
ABSOLUTE LYMPHOCYTE: 1.4 X 10 3/UL (ref 0.86–4.75)
ABSOLUTE MONOCYTE: 0.5 X 10 3/UL (ref 0.22–1.08)
ALBUMIN SERPL-MCNC: 4.6 G/DL (ref 3.5–5.2)
ALBUMIN/GLOB SERPL ELPH: 1.5 {RATIO} (ref 1–2.7)
ALP ISOS SERPL LEV INH-CCNC: 97 U/L (ref 35–105)
ALT (SGPT): 11 U/L (ref 0–33)
ANION GAP SERPL CALC-SCNC: 12 MMOL/L (ref 8–17)
AST SERPL-CCNC: 17 U/L (ref 0–32)
BASOPHILS NFR BLD: 0.4 % (ref 0.2–1.2)
BILIRUBIN, TOTAL: 0.47 MG/DL (ref 0–1.2)
BUN/CREAT SERPL: 14.7 (ref 6–20)
CALCIUM SERPL-MCNC: 10 MG/DL (ref 8.6–10.2)
CARBON DIOXIDE, CO2: 21 MMOL/L (ref 22–29)
CHLORIDE: 107 MMOL/L (ref 98–107)
CREAT SERPL-MCNC: 1.17 MG/DL (ref 0.5–0.9)
EOSINOPHIL NFR BLD: 1.5 % (ref 0.7–7)
GFR ESTIMATION: 44.28
GLOBULIN: 3 G/DL (ref 1.5–4.5)
GLUCOSE: 103 MG/DL (ref 82–115)
HCT VFR BLD AUTO: 30.3 % (ref 37–47)
HGB BLD-MCNC: 9.7 G/DL (ref 12–16)
IMMATURE GRANULOCYTES: 0.2 % (ref 0–0.5)
LYMPHOCYTES NFR BLD: 29.2 % (ref 19.3–53.1)
MAGNESIUM SERPL-MCNC: 1.81 MG/DL (ref 1.6–2.4)
MCH RBC QN AUTO: 36.2 PG (ref 27–32)
MCHC RBC AUTO-ENTMCNC: 32 G/DL (ref 32–36)
MCV RBC AUTO: 113.1 FL (ref 82–100)
MONOCYTES NFR BLD: 10.4 % (ref 4.7–12.5)
NEUTROPHILS ABSOLUTE COUNT: 2.79 X 10 3/UL (ref 2.15–7.56)
NEUTROPHILS NFR BLD: 58.3 %
NUCLEATED RED BLOOD CELLS: 0.4 /100 WBC (ref 0–0.2)
PHOSPHATE FLD-MCNC: 3.2 MG/DL (ref 2.5–4.5)
PLATELET # BLD AUTO: 163 X 10 3/UL (ref 135–400)
POTASSIUM: 4.5 MMOL/L (ref 3.5–5.1)
PROT SNV-MCNC: 7.6 G/DL (ref 6.4–8.3)
RBC # BLD AUTO: 2.68 X 10 6/UL (ref 4.2–5.4)
RDW-SD: 67.5 FL (ref 37–54)
SODIUM: 140 MMOL/L (ref 136–145)
UREA NITROGEN (BUN): 17.2 MG/DL (ref 8–23)
WBC # BLD: 4.79 X 10 3/UL (ref 4.3–10.8)

## 2020-03-19 ENCOUNTER — OFFICE VISIT (OUTPATIENT)
Dept: HEMATOLOGY/ONCOLOGY | Facility: CLINIC | Age: 85
End: 2020-03-19
Payer: MEDICARE

## 2020-03-19 DIAGNOSIS — Z15.02 BRCA2 GENE MUTATION POSITIVE IN FEMALE: Primary | ICD-10-CM

## 2020-03-19 DIAGNOSIS — C53.9 MALIGNANT NEOPLASM OF CERVIX, UNSPECIFIED SITE: ICD-10-CM

## 2020-03-19 DIAGNOSIS — C53.0 MALIGNANT NEOPLASM OF ENDOCERVIX: ICD-10-CM

## 2020-03-19 DIAGNOSIS — Z15.01 BRCA2 GENE MUTATION POSITIVE IN FEMALE: Primary | ICD-10-CM

## 2020-03-19 DIAGNOSIS — D63.0 ANEMIA IN NEOPLASTIC DISEASE: ICD-10-CM

## 2020-03-19 DIAGNOSIS — Z15.09 BRCA2 GENE MUTATION POSITIVE IN FEMALE: Primary | ICD-10-CM

## 2020-03-19 PROCEDURE — 99214 OFFICE O/P EST MOD 30 MIN: CPT | Mod: 95,,, | Performed by: NURSE PRACTITIONER

## 2020-03-19 PROCEDURE — 99214 PR OFFICE/OUTPT VISIT, EST, LEVL IV, 30-39 MIN: ICD-10-PCS | Mod: 95,,, | Performed by: NURSE PRACTITIONER

## 2020-03-19 NOTE — PROGRESS NOTES
"PATIENT: Adri Seay  MRN: [id number]  DATE: 3/19/2020    Diagnosis:   1. 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 Malignant neoplasm of cervix, unspecified site    3. Malignant neoplasm of endocervix    4. Anemia in neoplastic disease      Chief Complaint: No chief complaint on file.    Oncologic History:      Oncologic History 1. Cervical cancer - stage IV      Oncologic Treatment Planned therapy: single-agent carboplatin +/- radiation      Pathology 6/19/19:  - uterine cervix, biopsy:  - poorly differentiated squamous carcinoma with extensive necrosis        Subjective:    History of Present Illness: Ms. Seay is a 84 y.o. female who presents for evaluation and management of cervical cancer. SHe was seen at [facility] on 6/25/19.     Information from Dr. Reese's and Dr. 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 The patient had a CT scan consistent with widely metastatic disease including multiple sites of lymphadenopathy, a possible lung metastases, liver metastases, nodule in the anterior abdominal wall, and carcinomatosis. The patient is mostly asymptomatic but does have a vaginal discharge. Her performance status is 0. On exam, there is a 6 centimeter mass in the anterior abdominal wall superior to the umbilicus. On bimanual examination, tumor is a place the entire cervix apex of the vagina, and on rectovaginal examination, there is a large pelvic mass extending to the bilateral pelvic sidewalls.    We will get her CT reviewed at [facility] and if it is consistent with metastatic disease, we have recommended palliative chemotherapy with either carboplatinum as a single agent or carboplatinum and paclitaxel. Due to concern for bowel involvement on the CT scan, I have recommended not giving bevacizumab as part of this regimen. Patient will return [place] to [place] and begin chemotherapy with a medical oncologist there. I've also recommended a consultation with radiation oncology in [place] for consideration of palliative radiation to lower the chances of significant vaginal bleeding. We will see her back on an as-needed basis."      8/5/19 Visit: She is here for consideration of  cycle 2 of carbo. She states spotting has resolved since last visit. She received 1 unit of blood aproximately 2 weeks ago for HB 7.4. Her HBG last week was 8.6 and she denies any fatigue, LUNA, and feeling run down. Bowel habits are better since starting Miralax.      9/3/19 Visit: Today she is in clinic with her daughter, she is feeling good and has resumed some driving. She states skin around port itchy. Mild redness noted at incision line and lower neck, advised bactroban x 5 days but if worsens change to OTC lotrim. Labs reviewed and HGB 7.4, will set up for 1 unit PRBC for Thursday, but will proceed with treatment as planned for today.     10/1/19 visit: She states she is feeling good except for fatigue after her 3rd cycle of chemo. Denies any bleeding, belly pain, or difficulty with bowels. Labs reviewed and mild dehydration noted, Will increase post chemo fluids to 1 L today. Plan is to repeat scans after cycle 4 .     10/29/19 visit:  Today she is in clinic with her daughter. States she is feeling fine, not has noticed some SOB with exertion, talking and reports patient seems more tired. Labs reviewed and HGB 7.6 will set up for blood transfusion today with chemo. She denies any bleeding and states she is tolerating chemo very well.     11/26/19 visit:  Today in clinic she is here with her grandaughter and daughter. She states she is feeling well, tolerating chemo without any signifciant SE except fatigue and diet good, no bleeding noted, no changes in bowels. Labs reviewed and thrombocytopenia noted (91). Will hold treatment this week and proceed with scans, previously schedule " after cycle 6 but due to tx delay will proceed with imaging.     12/2/19 visit:  Today in clinic with family to discuss recent PET/CT done on 12/2/19. Previous CT imaging done at outside facility, discussed with Dr Nguyen for comparison and he stated pelvic mass smaller, abdominal mass smaller liver lesion likely benign stable but difficult to say if peritoneal implants stable to progressed.      12/20/19 visit:  Today she and her family are here to discuss recent result from [facility] on 12/16/19. Results show Mrs Seay has a BRCA 2 gene mutation. She does state that she had a sister die at age 29 from Breast cancer about 40-50 years ago. We discussed the accountability of BRCA 2 mutation with targeted PARP inhibitors as a treatment option as well as family implications and recommendation of further testing of children for BRCA 2 mutations. She and her family members [facility] with licensed Genetic counsler. Wanda Ma, RN will help facilitate appointment.     1/3/20 visit:  PT has yet to receive lynparza, reduced dose due to impared renal fxn. Will f/u with [facility] pharmacy to verify delivery. Progressive anemia noted, will set up 1 unit of PRBC for Monday and will monitor weekly once pt starts PARP inhibitor.      1/14/20 visit:   Pt started lynparza, low dose 1 week ago and is tolerating very well. Denies any SE at this time. Labs reviewed with mild anemia noted but indication for transfusion. Will continue to monitor.     2/18/20 visit:  She continues to tolerate oral meds well. No c/o SE. Labs reveiwed and SrCr elevated. Will continue to monitor monthly. Scans due in April.     Past medical, surgical, family, and social histories have been reviewed and updated below.    Past Medical History:   Past Medical History:   Diagnosis Date    Anemia     Cataract     Cervical cancer 05/2019    Hypertension        Past Surgical History:   Past Surgical History:    Procedure Laterality Date    CATARACT EXTRACTION, BILATERAL      WRIST SURGERY  1984       Family History:   Family History   Problem Relation Age of Onset    Hypertension Mother     Pneumonia Father     Breast cancer Sister     No Known Problems Brother     Leukemia Daughter     No Known Problems Son        Social History:  reports that she has never smoked. She has never used smokeless tobacco. She reports that she drank alcohol. She reports that she does not use drugs.    Allergies:  Review of patient's allergies indicates:  No Known Allergies    Medications:  Current Outpatient Medications   Medication Sig Dispense Refill    cloNIDine (CATAPRES) 0.3 MG tablet Take 1 tablet (0.3 mg total) by mouth 2 (two) times daily. 180 tablet 2    FENOFIBRATE ORAL Take by mouth 2 (two) times daily.      ferrous sulfate (FEOSOL) 325 mg (65 mg iron) Tab tablet TK 1 T PO BID  1    megestrol (MEGACE) 40 MG Tab Take 1 tablet (40 mg total) by mouth 2 (two) times daily. 60 tablet 6    mupirocin calcium 2% (BACTROBAN) 2 % cream Apply to affected area 3 times daily (Patient taking differently: daily as needed. Apply to affected area 3 times daily) 30 g 0    NIFEdipine (PROCARDIA-XL) 30 MG (OSM) 24 hr tablet Take 30 mg by mouth once daily.      olaparib 100 mg Tab Take 2 tablets (200mg) by mouth 2 (two) times daily 120 tablet 6    ondansetron (ZOFRAN-ODT) 8 MG TbDL Take 1 tablet (8 mg total) by mouth every 8 (eight) hours as needed (chemotherapy-induced nausea and vomiting). 40 tablet 5    oxyCODONE (ROXICODONE) 5 MG immediate release tablet Take 1 tablet (5 mg total) by mouth every 6 (six) hours as needed for Pain (cancer-related pain). 30 tablet 0    simvastatin (ZOCOR) 20 MG tablet Take 20 mg by mouth every evening.       No current facility-administered medications for this visit.        Review of Systems   Constitutional: Positive for fatigue and unexpected weight change.   HENT: Negative for sore throat.     Eyes: Negative for visual disturbance.   Respiratory: Negative for cough and shortness of breath (LUNA).    Cardiovascular: Negative for chest pain and palpitations.   Gastrointestinal: Negative for constipation, diarrhea, nausea and vomiting.   Genitourinary: Negative for dysuria.   Musculoskeletal: Negative for back pain.   Skin: Negative for rash.   Neurological: Negative for light-headedness and headaches.   Hematological: Negative for adenopathy.   Psychiatric/Behavioral: The patient is not nervous/anxious.      ECOG Performance Status:   ECOG SCORE 0       Objective:      Vitals:   There were no vitals filed for this visit.  BMI: There is no height or weight on file to calculate BMI.    Physical Exam   Constitutional: She is oriented to person, place, and time. She appears well-developed and well-nourished.   HENT:   Head: Normocephalic and atraumatic.   Eyes: Pupils are equal, round, and reactive to light. EOM are normal.   Neck: Normal range of motion. Neck supple.   Cardiovascular: Denies any CP or palpations.   Pulmonary/Chest: Effort normal    Abdominal: Soft. No c/o of pain, swelling or bowel problems.   Musculoskeletal:   Neurological: She is alert and oriented to person, place, and time.   Skin:   Psychiatric: She has a normal mood and affect. Her behavior is normal. Judgment and thought content normal.   Nursing note and vitals reviewed.    Laboratory Data:    Lab Results   3/16/20 Reviewed, mild anemia noted, improved SrCr noted     Imagin/2/19 PET:      CT abdomen/pelvis (19):  1.  Large cervical mass that appears to invade the rectosigmoid and is inseparable from the bladder and urethra, compatible with the known primary.  2.  Large complex cystic mass probably arising from the left ovary that may represent metastatic disease versus another primary.  3.  Retroperitoneal and anterior diaphragmatic adenopathy suspect for metastatic disease.  4.  Supraumbilical anterior abdominal wall  mass compatible with metastatic disease.        Assessment:       1. 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 Malignant neoplasm of cervix, unspecified site    3. Malignant neoplasm of endocervix    4. Anemia in neoplastic disease           Plan:     1. Cervical cancer  - biopsy (19) reveals cervical cancer. Imaging reveals stage IV disease  - she has been evaluated at [facility]. Per their recommendations, as well as family preference, we will proceed with single-agent carboplatin AUC5 q28 days. I will refer to radiation oncology for evaluation; we may give radiation therapy after a few cycles of chemotherapy.  - The risks and benefits of chemotherapy were discussed, written information was given, and informed consent was obtained.  - Pt is s/p 5 cycles of single agent carbo with thrombocytopenia. Discussed recent scan with suspected progression of peritoneal implants noted on Scan  along with recent thrombocytopenia  And worsening fatigue discussed testing for IO therapy vs change in chemotherapy.   - will request IO testing for  PDL1/CPS if measurable on tissue block  - will also check NGS with [facility] for MMR, BRCA 1 and 2 as she reports a sister with Breast cancer who  at age 29 and a niece with pancreatic cancer, age 50.  - If IO is not a tx option, discussed weekly taxol 60-80 mg/m2 as alternative to carbo.   - pt would like to wait for NGS testing to be completed before she makes any treatment decisions.    2.BRCA2 +  mutation   -discussed results of [facility] showing BRCA 2 mutation, implications, accountability and additional family testing recommendations  - Discussed attempting to obtain PARP inhibitor approval, will proceed with oral lynparza 300 mg BID, discussed SE and risks vs chemotherapy options. She states she does not want additional chemo.   - pt started Lynparza in early January. Will repeat scans in April.      -3. Anemia in neoplastic disease, pt  asymptomatic will continue to monitor.      4Elevated SrCr stable   -weekly labs to monitor  - if continues to increase may need to hold lynparza    - return to clinic in 2 week with  Labs 4 weeks for visit.      Annette Donis NP The patient location is: home  The chief complaint leading to consultation is: medication SE evaluation  Visit type: Virtual visit with synchronous audio and video  Total time spent with patient: 15 minutes   Each patient to whom he or she provides medical services by telemedicine is:  (1) informed of the relationship between the physician and patient and the respective role of any other health care provider with respect to management of the patient; and (2) notified that he or she may decline to receive medical services by telemedicine and may withdraw from such care at any time.    Notes: see above

## 2020-03-26 ENCOUNTER — TELEPHONE (OUTPATIENT)
Dept: PHARMACY | Facility: CLINIC | Age: 85
End: 2020-03-26

## 2020-03-26 NOTE — TELEPHONE ENCOUNTER
"Dosing, how taking Lynparza: Takes 2 tablets (200mg) in the morning and at night. Eats a little bit. Denies any missed doses.   Storage: room temperature  Handling: aware of proper handling precautions and pours medication into cap of container and takes directly.   Side effects: no side effects - denies any N/V/D  Recent infections: denies any s/sx of infection (fever, chills, cough)  Pain: 0/10 - denies any pain.  Appetite: pretty good. 3 meals a day with snacks between. Drinks plenty of water. Taking Megace to help with appetite.   Energy, fatigue: "pretty good." Staying active in the house. Taking proper precautions to limit exposure to coronavirus.   Health, mood, QOL: 8-10/10  ED/UC visits: no  Next clinical follow up: 3 months  Medication list reviewed. No new allergies or health conditions.     "

## 2020-03-27 DIAGNOSIS — Z15.09 BRCA2 GENE MUTATION POSITIVE IN FEMALE: Primary | ICD-10-CM

## 2020-03-27 DIAGNOSIS — Z15.02 BRCA2 GENE MUTATION POSITIVE IN FEMALE: Primary | ICD-10-CM

## 2020-03-27 DIAGNOSIS — Z15.01 BRCA2 GENE MUTATION POSITIVE IN FEMALE: Primary | ICD-10-CM

## 2020-03-27 RX ORDER — NIFEDIPINE 30 MG/1
30 TABLET, EXTENDED RELEASE ORAL DAILY
Status: CANCELLED | OUTPATIENT
Start: 2020-03-27

## 2020-03-27 RX ORDER — NIFEDIPINE 30 MG/1
30 TABLET, EXTENDED RELEASE ORAL DAILY
Qty: 30 TABLET | Refills: 0 | Status: SHIPPED | OUTPATIENT
Start: 2020-03-27 | End: 2020-03-30

## 2020-03-27 NOTE — TELEPHONE ENCOUNTER
----- Message from Yolanda Tinsley sent at 3/27/2020  1:21 PM CDT -----  .Type:  RX Refill Request    Who Called: self  Refill or New Rx:new  RX Name and Strength:NIFEdipine 30mg ER  How is the patient currently taking it? (ex. 1XDay): 1/day  Is this a 30 day or 90 day RX: 90  Preferred Pharmacy with phone number:.  [id number] - [place], [place] - [address] AT [place] [address]  [address]  [place] [address]  Phone: [phone] Fax: [phone]    Local or Mail Order:local  Ordering Provider:see  Would the patient rather a call back or a response via [facility]? call  Best Call Back Number:.[phone] (home)   Additional Information: URGENT PER PT//NONE LEFT

## 2020-03-30 DIAGNOSIS — Z15.09 BRCA2 GENE MUTATION POSITIVE IN FEMALE: ICD-10-CM

## 2020-03-30 DIAGNOSIS — Z15.02 BRCA2 GENE MUTATION POSITIVE IN FEMALE: ICD-10-CM

## 2020-03-30 DIAGNOSIS — Z15.01 BRCA2 GENE MUTATION POSITIVE IN FEMALE: ICD-10-CM

## 2020-03-30 RX ORDER — NIFEDIPINE 30 MG/1
TABLET, EXTENDED RELEASE ORAL
Qty: 90 TABLET | Refills: 5 | Status: SHIPPED | OUTPATIENT
Start: 2020-03-30 | End: 2021-03-25 | Stop reason: SDUPTHER

## 2020-04-01 ENCOUNTER — TELEPHONE (OUTPATIENT)
Dept: PHARMACY | Facility: CLINIC | Age: 85
End: 2020-04-01

## 2020-04-22 ENCOUNTER — OFFICE VISIT (OUTPATIENT)
Dept: HEMATOLOGY/ONCOLOGY | Facility: CLINIC | Age: 85
End: 2020-04-22
Payer: MEDICARE

## 2020-04-22 VITALS — BODY MASS INDEX: 23.96 KG/M2 | HEIGHT: 62 IN

## 2020-04-22 DIAGNOSIS — Z15.09 BRCA2 GENE MUTATION POSITIVE IN FEMALE: ICD-10-CM

## 2020-04-22 DIAGNOSIS — Z15.02 BRCA2 GENE MUTATION POSITIVE IN FEMALE: ICD-10-CM

## 2020-04-22 DIAGNOSIS — C53.9 MALIGNANT NEOPLASM OF CERVIX, UNSPECIFIED SITE: Primary | ICD-10-CM

## 2020-04-22 DIAGNOSIS — Z15.01 BRCA2 GENE MUTATION POSITIVE IN FEMALE: ICD-10-CM

## 2020-04-22 LAB
ALBUMIN SERPL BCP-MCNC: 4 G/DL (ref 3.4–5)
ALBUMIN/GLOBULIN RATIO: 1.08 RATIO (ref 1.1–1.8)
ALP SERPL-CCNC: 98 U/L (ref 46–116)
ALT SERPL W P-5'-P-CCNC: 18 U/L (ref 12–78)
AST SERPL-CCNC: 18 U/L (ref 15–37)
BASOPHILS NFR SNV MANUAL: 0.8 % (ref 0–3)
BILIRUB SERPL-MCNC: 0.6 MG/DL (ref 0–1)
BUN SERPL-MCNC: 21 MG/DL (ref 7–18)
BUN/CREAT SERPL: 14.09 RATIO (ref 7–18)
CALCIUM SERPL-MCNC: 9.6 MG/DL (ref 8.8–10.5)
CHLORIDE SERPL-SCNC: 104 MMOL/L (ref 100–108)
CO2 SERPL-SCNC: 23 MMOL/L (ref 21–32)
CREAT SERPL-MCNC: 1.49 MG/DL (ref 0.55–1.02)
EOSINOPHIL NFR SNV MANUAL: 1.9 % (ref 1–3)
ERYTHROCYTE [DISTWIDTH] IN BLOOD BY AUTOMATED COUNT: 15 % (ref 12.5–18)
GFR ESTIMATION: 41
GLOBULIN: 3.7 G/DL (ref 2.3–3.5)
GLUCOSE SERPL-MCNC: 110 MG/DL (ref 70–110)
HCT VFR BLD AUTO: 27.3 % (ref 37–47)
HGB BLD-MCNC: 9.3 G/DL (ref 12–16)
LYMPHOCYTES NFR SNV MANUAL: 27.4 % (ref 25–40)
MACROCYTES BLD QL SMEAR: NORMAL
MAGNESIUM SERPL-MCNC: 1.5 MG/DL (ref 1.8–2.4)
MANUAL NRBC PER 100 CELLS: 0.6 %
MCH RBC QN AUTO: 38.1 PG (ref 27–31.2)
MCHC RBC AUTO-ENTMCNC: 34.1 G/DL (ref 31.8–35.4)
MCV RBC AUTO: 111.9 FL (ref 80–97)
MONOCYTES/100 LEUKOCYTES: 9 % (ref 1–15)
NEUTROPHILS NFR BLD: 3.18 10*3/UL (ref 1.8–7.7)
NEUTROPHILS NFR SNV MANUAL: 60.5 % (ref 37–80)
PHOSPHATE FLD-MCNC: 3.9 MG/DL (ref 2.6–4.7)
PLATELETS: 164 10*3/UL (ref 142–424)
POTASSIUM SERPL-SCNC: 3.9 MMOL/L (ref 3.6–5.2)
PROT SERPL-MCNC: 7.7 G/DL (ref 6.4–8.2)
RBC # BLD AUTO: 2.44 10*6/UL (ref 4.2–5.4)
SODIUM BLD-SCNC: 137 MMOL/L (ref 135–145)
WBC # BLD: 5.3 10*3/UL (ref 4.6–10.2)

## 2020-04-22 PROCEDURE — 99442 PR PHYSICIAN TELEPHONE EVALUATION 11-20 MIN: CPT | Mod: 95,,, | Performed by: NURSE PRACTITIONER

## 2020-04-22 PROCEDURE — 99442 PR PHYSICIAN TELEPHONE EVALUATION 11-20 MIN: ICD-10-PCS | Mod: 95,,, | Performed by: NURSE PRACTITIONER

## 2020-04-22 NOTE — PROGRESS NOTES
"Established Patient - Audio Only Telehealth Visit     The patient location is: home  The chief complaint leading to consultation is: chemo evaluation  Visit type: Virtual visit with audio only (telephone)     The reason for the audio only service rather than synchronous audio and video virtual visit was related to technical difficulties or patient preference/necessity.     Each patient to whom I provide medical services by telemedicine is:  (1) informed of the relationship between the physician and patient and the respective role of any other health care provider with respect to management of the patient; and (2) notified that they may decline to receive medical services by telemedicine and may withdraw from such care at any time. Patient verbally consented to receive this service via voice-only telephone call.           PATIENT: Adri Seay  MRN: [id number]  DATE: 4/22/2020    Diagnosis:   1. Malignant neoplasm of cervix, unspecified site    2. BRCA2 gene mutation positive in female      Chief Complaint: Maligant Neoplasm of cervix    Oncologic History:      Oncologic History 1. Cervical cancer - stage IV      Oncologic Treatment Planned therapy: single-agent carboplatin +/- radiation      Pathology 6/19/19:  - uterine cervix, biopsy:  - poorly differentiated squamous carcinoma with extensive necrosis        Subjective:    History of Present Illness: Ms. Seay is a 84 y.o. female who presents for evaluation and management of cervical cancer. [facility]     Information from Dr. Reese's and Dr. 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 The patient had a CT scan consistent with widely metastatic disease including multiple sites of lymphadenopathy, a possible lung metastases, liver metastases, nodule in the anterior abdominal wall, and " "carcinomatosis. The patient is mostly asymptomatic but does have a vaginal discharge. Her performance status is 0. On exam, there is a 6 centimeter mass in the anterior abdominal wall superior to the umbilicus. On bimanual examination, tumor is a place the entire cervix apex of the vagina, and on rectovaginal examination, there is a large pelvic mass extending to the bilateral pelvic sidewalls.    [facility] and if it is consistent with metastatic disease, we have recommended palliative chemotherapy with either carboplatinum as a single agent or carboplatinum and paclitaxel. Due to concern for bowel involvement on the CT scan, I have recommended not giving bevacizumab as part of this regimen. Patient will return home to [place] and begin chemotherapy with a medical oncologist there. I've also recommended a consultation with radiation oncology in [place] for consideration of palliative radiation to lower the chances of significant vaginal bleeding. We will see her back on an as-needed basis."      8/5/19 Visit: She is here for consideration of  cycle 2 of carbo. She states spotting has resolved since last visit. She received 1 unit of blood aproximately 2 weeks ago for HB 7.4. Her HBG last week was 8.6 and she denies any fatigue, LUNA, and feeling run down. Bowel habits are better since starting Miralax.      9/3/19 Visit: Today she is in clinic with her daughter, she is feeling good and has resumed some driving. She states skin around port itchy. Mild redness noted at incision line and lower neck, advised bactroban x 5 days but if worsens change to OTC lotrim. Labs reviewed and HGB 7.4, will set up for 1 unit PRBC for Thursday, but will proceed with treatment as planned for today.     10/1/19 visit: She states she is feeling good except for fatigue after her 3rd cycle of chemo. Denies any bleeding, belly pain, or difficulty with bowels. Labs reviewed and mild dehydration " noted, Will increase post chemo fluids to 1 L today. Plan is to repeat scans after cycle 4 .     10/29/19 visit:  Today she is in clinic with her daughter. States she is feeling fine, not has noticed some SOB with exertion, talking and reports patient seems more tired. Labs reviewed and HGB 7.6 will set up for blood transfusion today with chemo. She denies any bleeding and states she is tolerating chemo very well.     11/26/19 visit:  Today in clinic she is here with her grandaughter and daughter. She states she is feeling well, tolerating chemo without any signifciant SE except fatigue and diet good, no bleeding noted, no changes in bowels. Labs reviewed and thrombocytopenia noted (91). Will hold treatment this week and proceed with scans, previously schedule after cycle 6 but due to tx delay will proceed with imaging.     12/2/19 visit:  Today in clinic with family to discuss recent PET/CT done on 12/2/19. Previous CT imaging done at outside facility, discussed with Dr Nguyen for comparison and he stated pelvic mass smaller, abdominal mass smaller liver lesion likely benign stable but difficult to say if peritoneal implants stable to progressed.      12/20/19 visit:  Today she and her family are here to discuss recent result from [facility] on 12/16/19. Results show Mrs Seay has a BRCA 2 gene mutation. She does state that she had a sister die at age 29 from Breast cancer about 40-50 years ago. We discussed the accountability of BRCA 2 mutation with targeted PARP inhibitors as a treatment option as well as family implications and recommendation of further testing of children for BRCA 2 mutations. She and her family members [facility] with licensed Genetic counsler. Wanda Ma RN will help facilitate appointment.     1/3/20 visit:  PT has yet to receive lynparza, reduced dose due to impared renal fxn. Will f/u with [facility] pharmacy to verify delivery.  Progressive anemia noted, will set up 1 unit of PRBC for Monday and will monitor weekly once pt starts PARP inhibitor.      1/14/20 visit:   Pt started lynparza, low dose 1 week ago and is tolerating very well. Denies any SE at this time. Labs reviewed with mild anemia noted but indication for transfusion. Will continue to monitor.     2/18/20 visit:  She continues to tolerate oral meds well. No c/o SE. Labs reveiwed and SrCr elevated. Will continue to monitor monthly. Scans due in April.     4/22/2020 visit:  Pt doing very well at home. No new c/o, denies any bleeding abd pain wt loss or changes in bowel habits. Labs reviewed and PET scan ordered .     Past medical, surgical, family, and social histories have been reviewed and updated below.    Past Medical History:   Past Medical History:   Diagnosis Date    Anemia     Cataract     Cervical cancer 05/2019    Hypertension        Past Surgical History:   Past Surgical History:   Procedure Laterality Date    CATARACT EXTRACTION, BILATERAL      WRIST SURGERY  1984       Family History:   Family History   Problem Relation Age of Onset    Hypertension Mother     Pneumonia Father     Breast cancer Sister     No Known Problems Brother     Leukemia Daughter     No Known Problems Son        Social History:  reports that she has never smoked. She has never used smokeless tobacco. She reports that she drank alcohol. She reports that she does not use drugs.    Allergies:  Review of patient's allergies indicates:  No Known Allergies    Medications:  Current Outpatient Medications   Medication Sig Dispense Refill    cloNIDine (CATAPRES) 0.3 MG tablet Take 1 tablet (0.3 mg total) by mouth 2 (two) times daily. 180 tablet 2    FENOFIBRATE ORAL Take by mouth 2 (two) times daily.      ferrous sulfate (FEOSOL) 325 mg (65 mg iron) Tab tablet TK 1 T PO BID  1    megestrol (MEGACE) 40 MG Tab Take 1 tablet (40 mg total) by mouth 2 (two) times daily. 60 tablet 6     "mupirocin calcium 2% (BACTROBAN) 2 % cream Apply to affected area 3 times daily (Patient taking differently: daily as needed. Apply to affected area 3 times daily) 30 g 0    NIFEdipine (PROCARDIA-XL) 30 MG (OSM) 24 hr tablet TAKE 1 TABLET BY MOUTH ONCE DAILY 90 tablet 5    olaparib 100 mg Tab Take 2 tablets (200mg) by mouth 2 (two) times daily 120 tablet 6    ondansetron (ZOFRAN-ODT) 8 MG TbDL Take 1 tablet (8 mg total) by mouth every 8 (eight) hours as needed (chemotherapy-induced nausea and vomiting). 40 tablet 5    oxyCODONE (ROXICODONE) 5 MG immediate release tablet Take 1 tablet (5 mg total) by mouth every 6 (six) hours as needed for Pain (cancer-related pain). 30 tablet 0    simvastatin (ZOCOR) 20 MG tablet Take 20 mg by mouth every evening.       No current facility-administered medications for this visit.        Review of Systems   Constitutional: Positive for fatigue and unexpected weight change.   HENT: Negative for sore throat.    Eyes: Negative for visual disturbance.   Respiratory: Negative for cough and shortness of breath (LUNA).    Cardiovascular: Negative for chest pain and palpitations.   Gastrointestinal: Negative for constipation, diarrhea, nausea and vomiting.   Genitourinary: Negative for dysuria.   Musculoskeletal: Negative for back pain.   Skin: Negative for rash.   Neurological: Negative for light-headedness and headaches.   Hematological: Negative for adenopathy.   Psychiatric/Behavioral: The patient is not nervous/anxious.      ECOG Performance Status:   ECOG SCORE 0       Objective:      Vitals:   Vitals:    04/22/20 1502   Height: 5' 2" (1.575 m)     BMI: Body mass index is 23.96 kg/m².    Physical Exam   Constitutional: She is oriented to person, place, and time. She appears well-developed and well-nourished.   HENT:   Head: Normocephalic and atraumatic.   Eyes: Pupils are equal, round, and reactive to light. EOM are normal.   Neck: Normal range of motion. Neck supple. "   Cardiovascular: Denies any CP or palpations.   Pulmonary/Chest: Effort normal    Abdominal: Soft. No c/o of pain, swelling or bowel problems.   Musculoskeletal:   Neurological: She is alert and oriented to person, place, and time.   Skin:   Psychiatric: She has a normal mood and affect. Her behavior is normal. Judgment and thought content normal.   Nursing note and vitals reviewed.    Laboratory Data:    Lab Results   3/16/20 Reviewed, mild anemia noted, improved SrCr noted     Imagin/2/19 PET:      CT abdomen/pelvis (19):  1.  Large cervical mass that appears to invade the rectosigmoid and is inseparable from the bladder and urethra, compatible with the known primary.  2.  Large complex cystic mass probably arising from the left ovary that may represent metastatic disease versus another primary.  3.  Retroperitoneal and anterior diaphragmatic adenopathy suspect for metastatic disease.  4.  Supraumbilical anterior abdominal wall mass compatible with metastatic disease.        Assessment:       1. Malignant neoplasm of cervix, unspecified site    2. BRCA2 gene mutation positive in female           Plan:     1. Cervical cancer  - biopsy (19) reveals cervical cancer. Imaging reveals stage IV disease  - [facility] Per [facility] recommendations, as well as family preference, we will proceed with single-agent carboplatin AUC5 q28 days. I will refer to radiation oncology for evaluation; we may give radiation therapy after a few cycles of chemotherapy.  - The risks and benefits of chemotherapy were discussed, written information was given, and informed consent was obtained.  - Pt is s/p 5 cycles of single agent carbo with thrombocytopenia. Discussed recent scan with suspected progression of peritoneal implants noted on Scan  along with recent thrombocytopenia  And worsening fatigue discussed testing for IO therapy vs change in chemotherapy.   - will request IO testing for   PDL1/CPS if measurable on tissue block  - will also check NGS with [facility] for MMR, BRCA 1 and 2 as she reports a sister with Breast cancer who  at age 29 and a niece with pancreatic cancer, age 50.  - If IO is not a tx option, discussed weekly taxol 60-80 mg/m2 as alternative to carbo.   - pt would like to wait for NGS testing to be completed before she makes any treatment decisions.    2.BRCA2 +  mutation   -discussed results of [facility] showing BRCA 2 mutation, implications, accountability and additional family testing recommendations  - Discussed attempting to obtain PARP inhibitor approval, will proceed with oral lynparza 300 mg BID, discussed SE and risks vs chemotherapy options. She states she does not want additional chemo.   - pt started Lynparza in early January. PET scan ordered today for re-staging purposes.     -3. Anemia in neoplastic disease, pt asymptomatic will continue to monitor.      4Elevated SrCr stable   -weekly labs to monitor  - if continues to increase may need to hold lynparza    - return to clinic in 2 week with  Labs 4 weeks for visit.      Annette Donis NP The patient location is: home  The chief complaint leading to consultation is: medication SE evaluation  Visit type: Virtual visit with synchronous audio and video  Total time spent with patient: 15 minutes   Each patient to whom he or she provides medical services by telemedicine is:  (1) informed of the relationship between the physician and patient and the respective role of any other health care provider with respect to management of the patient; and (2) notified that he or she may decline to receive medical services by telemedicine and may withdraw from such care at any time.    Notes: see above                  This service was not originating from a related E/M service provided within the previous 7 days nor will  to an E/M service or procedure within the next 24 hours or my soonest available  appointment.  Prevailing standard of care was able to be met in this audio-only visit.

## 2020-04-30 ENCOUNTER — TELEPHONE (OUTPATIENT)
Dept: PHARMACY | Facility: CLINIC | Age: 85
End: 2020-04-30

## 2020-04-30 NOTE — TELEPHONE ENCOUNTER
Contacted patient in regards to Lynparza refill. She has ~1 week on hand - refill needed around 5/7. Will ship 5/4 for her to receive 5/5. $0 copay, address confirmed. No changes in other medications, allergies, health conditions or missed doses.

## 2020-05-01 ENCOUNTER — PATIENT OUTREACH (OUTPATIENT)
Dept: ADMINISTRATIVE | Facility: HOSPITAL | Age: 85
End: 2020-05-01

## 2020-05-01 ENCOUNTER — OFFICE VISIT (OUTPATIENT)
Dept: INTERNAL MEDICINE | Facility: CLINIC | Age: 85
End: 2020-05-01

## 2020-05-01 DIAGNOSIS — Z23 NEED FOR SHINGLES VACCINE: ICD-10-CM

## 2020-05-01 DIAGNOSIS — Z78.0 POST-MENOPAUSAL: ICD-10-CM

## 2020-05-01 DIAGNOSIS — I10 ESSENTIAL HYPERTENSION: Primary | ICD-10-CM

## 2020-05-01 DIAGNOSIS — D53.9 MACROCYTIC ANEMIA: ICD-10-CM

## 2020-05-01 DIAGNOSIS — N18.2 CKD (CHRONIC KIDNEY DISEASE) STAGE 2, GFR 60-89 ML/MIN: ICD-10-CM

## 2020-05-01 PROCEDURE — 99214 PR OFFICE/OUTPT VISIT, EST, LEVL IV, 30-39 MIN: ICD-10-PCS | Mod: 95,,, | Performed by: INTERNAL MEDICINE

## 2020-05-01 PROCEDURE — 99214 OFFICE O/P EST MOD 30 MIN: CPT | Mod: 95,,, | Performed by: INTERNAL MEDICINE

## 2020-05-01 NOTE — PROGRESS NOTES
Subjective:      Patient ID: Adri Seay is a 84 y.o. female.    Chief Complaint: Hypertension  The patient location is: (home) [facility]  The chief complaint leading to consultation is: HTN   Visit type: Audio/Visual.  Total time spent with patient: 20    Each patient to whom he or she provides medical services by telemedicine is:  (1) informed of the relationship between the physician and patient and the respective role of any other health care provider with respect to management of the patient; and (2) notified that he or she may decline to receive medical services by telemedicine and may withdraw from such care at any time.      HPI:  Patient with history of hypertension taking clonidine and nifedipine and report blood pressures are under good control.     Patient has history of cervical cancer that is diagnosed in 2018 and is under care of Hematology/Oncology (Annette Donis).  Patient reports improved appetite and has stop taking megestrol.  Review of labs suggest patient has macrocytic anemia.  Will check folate and B12    Review of Systems   Constitutional: Negative for chills, diaphoresis, fever, malaise/fatigue and weight loss.   HENT: Negative for congestion, ear pain, sinus pain, sore throat and tinnitus.    Eyes: Negative for blurred vision and photophobia.   Respiratory: Negative for cough, hemoptysis, shortness of breath and wheezing.    Cardiovascular: Negative for chest pain, palpitations, orthopnea, leg swelling and PND.   Gastrointestinal: Negative for abdominal pain, blood in stool, constipation, diarrhea, heartburn, melena, nausea and vomiting.   Genitourinary: Negative for dysuria, frequency and urgency.   Musculoskeletal: Negative for back pain, myalgias and neck pain.   Skin: Negative for rash.   Neurological: Negative for dizziness, tremors, seizures, loss of consciousness and weakness.   Endo/Heme/Allergies: Negative for polydipsia.   Psychiatric/Behavioral: Negative for depression  and hallucinations. The patient does not have insomnia.      Objective:     Patient not examined    Assessment:       ICD-10-CM ICD-9-CM   1. Essential hypertension I10 401.9   2. CKD (chronic kidney disease) stage 2, GFR 60-89 ml/min N18.2 585.2   3. Post-menopausal Z78.0 V49.81   4. Need for shingles vaccine Z23 V04.89   5. Macrocytic anemia D53.9 281.9       Plan:   Patient is using clonidine for hypertension.  Will try to taper it down and use losartan  Patient has CKD 2 with GFR of about 54.  GFR is stable will continue to monitor.  Will order DEXA scan and shingle vaccine.  Patient has macrocytic anemia will check B12 and folate

## 2020-05-01 NOTE — PROGRESS NOTES
Health Maintenance Updated.  Care everywhere  search bar   Immunizations: Abstracted.  [facility]  abstracted   [facility]  abstracted  Clean up hm done     No records found

## 2020-05-11 ENCOUNTER — TELEPHONE (OUTPATIENT)
Dept: HEMATOLOGY/ONCOLOGY | Facility: CLINIC | Age: 85
End: 2020-05-11

## 2020-05-11 NOTE — TELEPHONE ENCOUNTER
Spoke with Ms. Seay about labs and pt. Understood that she may come in tomorrow to do her labs between 8-12 noon.

## 2020-05-11 NOTE — TELEPHONE ENCOUNTER
----- Message from Ariadne Greco sent at 5/11/2020 11:38 AM CDT -----  Contact: Self  Pt requesting a call back to know if labs will be taking place tomorrow. She has labs that need to be done before her 05/13/2020. Please call pt back at [phone]

## 2020-05-12 LAB
ALBUMIN SERPL BCP-MCNC: 4.2 G/DL (ref 3.4–5)
ALBUMIN/GLOBULIN RATIO: 1.31 RATIO (ref 1.1–1.8)
ALP SERPL-CCNC: 100 U/L (ref 46–116)
ALT SERPL W P-5'-P-CCNC: 21 U/L (ref 12–78)
AST SERPL-CCNC: 22 U/L (ref 15–37)
BASOPHILS NFR SNV MANUAL: 0.8 % (ref 0–3)
BILIRUB SERPL-MCNC: 0.6 MG/DL (ref 0–1)
BUN SERPL-MCNC: 25 MG/DL (ref 7–18)
BUN/CREAT SERPL: 21.36 RATIO (ref 7–18)
CALCIUM SERPL-MCNC: 9.4 MG/DL (ref 8.8–10.5)
CHLORIDE SERPL-SCNC: 104 MMOL/L (ref 100–108)
CO2 SERPL-SCNC: 27 MMOL/L (ref 21–32)
CREAT SERPL-MCNC: 1.17 MG/DL (ref 0.55–1.02)
EOSINOPHIL NFR SNV MANUAL: 1.5 % (ref 1–3)
ERYTHROCYTE [DISTWIDTH] IN BLOOD BY AUTOMATED COUNT: 14.4 % (ref 12.5–18)
GFR ESTIMATION: 54
GLOBULIN: 3.2 G/DL (ref 2.3–3.5)
GLUCOSE SERPL-MCNC: 107 MG/DL (ref 70–110)
HCT VFR BLD AUTO: 30.3 % (ref 37–47)
HGB BLD-MCNC: 10.1 G/DL (ref 12–16)
LYMPHOCYTES NFR SNV MANUAL: 28.5 % (ref 25–40)
MACROCYTES BLD QL SMEAR: NORMAL
MAGNESIUM SERPL-MCNC: 1.5 MG/DL (ref 1.8–2.4)
MANUAL NRBC PER 100 CELLS: 0.4 %
MCH RBC QN AUTO: 37.1 PG (ref 27–31.2)
MCHC RBC AUTO-ENTMCNC: 33.3 G/DL (ref 31.8–35.4)
MCV RBC AUTO: 111.4 FL (ref 80–97)
MONOCYTES/100 LEUKOCYTES: 12.1 % (ref 1–15)
NEUTROPHILS NFR BLD: 2.68 10*3/UL (ref 1.8–7.7)
NEUTROPHILS NFR SNV MANUAL: 56.9 % (ref 37–80)
PHOSPHATE FLD-MCNC: 3.3 MG/DL (ref 2.6–4.7)
PLATELETS: 149 10*3/UL (ref 142–424)
POTASSIUM SERPL-SCNC: 3.8 MMOL/L (ref 3.6–5.2)
PROT SERPL-MCNC: 7.4 G/DL (ref 6.4–8.2)
RBC # BLD AUTO: 2.72 10*6/UL (ref 4.2–5.4)
SODIUM BLD-SCNC: 139 MMOL/L (ref 135–145)
WBC # BLD: 4.7 10*3/UL (ref 4.6–10.2)

## 2020-05-13 ENCOUNTER — PATIENT MESSAGE (OUTPATIENT)
Dept: HEMATOLOGY/ONCOLOGY | Facility: CLINIC | Age: 85
End: 2020-05-13

## 2020-05-13 ENCOUNTER — OFFICE VISIT (OUTPATIENT)
Dept: HEMATOLOGY/ONCOLOGY | Facility: CLINIC | Age: 85
End: 2020-05-13
Payer: MEDICARE

## 2020-05-13 VITALS — HEIGHT: 62 IN | WEIGHT: 129 LBS | BODY MASS INDEX: 23.74 KG/M2

## 2020-05-13 DIAGNOSIS — Z15.01 BRCA2 GENE MUTATION POSITIVE IN FEMALE: ICD-10-CM

## 2020-05-13 DIAGNOSIS — R74.8 INCREASED CREATINE KINASE LEVEL: Primary | ICD-10-CM

## 2020-05-13 DIAGNOSIS — C53.0 MALIGNANT NEOPLASM OF ENDOCERVIX: ICD-10-CM

## 2020-05-13 DIAGNOSIS — Z15.09 BRCA2 GENE MUTATION POSITIVE IN FEMALE: ICD-10-CM

## 2020-05-13 DIAGNOSIS — Z15.02 BRCA2 GENE MUTATION POSITIVE IN FEMALE: ICD-10-CM

## 2020-05-13 PROCEDURE — 99214 OFFICE O/P EST MOD 30 MIN: CPT | Mod: 95,,, | Performed by: NURSE PRACTITIONER

## 2020-05-13 PROCEDURE — 99214 PR OFFICE/OUTPT VISIT, EST, LEVL IV, 30-39 MIN: ICD-10-PCS | Mod: 95,,, | Performed by: NURSE PRACTITIONER

## 2020-05-13 NOTE — PROGRESS NOTES
"Established Patient - Audio Only Telehealth Visit     The patient location is: home  The chief complaint leading to consultation is: chemo evaluation  Visit type: Virtual visit with audio only (telephone)     The reason for the audio only service rather than synchronous audio and video virtual visit was related to technical difficulties or patient preference/necessity.     Each patient to whom I provide medical services by telemedicine is:  (1) informed of the relationship between the physician and patient and the respective role of any other health care provider with respect to management of the patient; and (2) notified that they may decline to receive medical services by telemedicine and may withdraw from such care at any time. Patient verbally consented to receive this service via voice-only telephone call.           PATIENT: Adri Seay  MRN: [id number]  DATE: 5/13/2020    Diagnosis:   1. Increased creatine kinase level    2. Malignant neoplasm of endocervix    3. BRCA2 gene mutation positive in female      Chief Complaint: Malignant neoplasm of cervix, unspecified site    Oncologic History:      Oncologic History 1. Cervical cancer - stage IV      Oncologic Treatment Planned therapy: single-agent carboplatin +/- radiation      Pathology 6/19/19:  - uterine cervix, biopsy:  - poorly differentiated squamous carcinoma with extensive necrosis        Subjective:    History of Present Illness: Ms. Seay is a 84 y.o. female who presents for evaluation and management of cervical cancer. [facility] [place] [facility]     Information from Dr. Reese's and Dr. 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 The patient had a CT scan consistent with widely metastatic disease including multiple sites of lymphadenopathy, a possible lung metastases, liver metastases, nodule in " "the anterior abdominal wall, and carcinomatosis. The patient is mostly asymptomatic but does have a vaginal discharge. Her performance status is 0. On exam, there is a 6 centimeter mass in the anterior abdominal wall superior to the umbilicus. On bimanual examination, tumor is a place the entire cervix apex of the vagina, and on rectovaginal examination, there is a large pelvic mass extending to the bilateral pelvic sidewalls.    [facility] and if it is consistent with metastatic disease, we have recommended palliative chemotherapy with either carboplatinum as a single agent or carboplatinum and paclitaxel. Due to concern for bowel involvement on the CT scan, I have recommended not giving bevacizumab as part of this regimen. Patient will return home to [place] and begin chemotherapy with a medical oncologist there. I've also recommended a consultation with radiation oncology in [place] for consideration of palliative radiation to lower the chances of significant vaginal bleeding. We will see her back on an as-needed basis."      8/5/19 Visit: She is here for consideration of  cycle 2 of carbo. She states spotting has resolved since last visit. She received 1 unit of blood aproximately 2 weeks ago for HB 7.4. Her HBG last week was 8.6 and she denies any fatigue, LUNA, and feeling run down. Bowel habits are better since starting Miralax.      9/3/19 Visit: Today she is in clinic with her daughter, she is feeling good and has resumed some driving. She states skin around port itchy. Mild redness noted at incision line and lower neck, advised bactroban x 5 days but if worsens change to OTC lotrim. Labs reviewed and HGB 7.4, will set up for 1 unit PRBC for Thursday, but will proceed with treatment as planned for today.     10/1/19 visit: She states she is feeling good except for fatigue after her 3rd cycle of chemo. Denies any bleeding, belly pain, or difficulty with bowels. Labs " reviewed and mild dehydration noted, Will increase post chemo fluids to 1 L today. Plan is to repeat scans after cycle 4 .     10/29/19 visit:  Today she is in clinic with her daughter. States she is feeling fine, not has noticed some SOB with exertion, talking and reports patient seems more tired. Labs reviewed and HGB 7.6 will set up for blood transfusion today with chemo. She denies any bleeding and states she is tolerating chemo very well.     11/26/19 visit:  Today in clinic she is here with her grandaughter and daughter. She states she is feeling well, tolerating chemo without any signifciant SE except fatigue and diet good, no bleeding noted, no changes in bowels. Labs reviewed and thrombocytopenia noted (91). Will hold treatment this week and proceed with scans, previously schedule after cycle 6 but due to tx delay will proceed with imaging.     12/2/19 visit:  Today in clinic with family to discuss recent PET/CT done on 12/2/19. Previous CT imaging done at outside facility, discussed with Dr Nguyen for comparison and he stated pelvic mass smaller, abdominal mass smaller liver lesion likely benign stable but difficult to say if peritoneal implants stable to progressed.      12/20/19 visit:  Today she and her family are here to discuss recent result from [facility] on 12/16/19. Results show Mrs Seay has a BRCA 2 gene mutation. She does state that she had a sister die at age 29 from Breast cancer about 40-50 years ago. We discussed the accountability of BRCA 2 mutation with targeted PARP inhibitors as a treatment option as well as family implications and recommendation of further testing of children for BRCA 2 mutations. She and her family members [facility] with licensed Genetic counsler. Wanda Ma RN will help facilitate appointment.     1/3/20 visit:  PT has yet to receive lynparza, reduced dose due to impared renal fxn. Will f/u with [facility]  to verify delivery. Progressive anemia noted, will set up 1 unit of PRBC for Monday and will monitor weekly once pt starts PARP inhibitor.      1/14/20 visit:   Pt started lynparza, low dose 1 week ago and is tolerating very well. Denies any SE at this time. Labs reviewed with mild anemia noted but indication for transfusion. Will continue to monitor.     2/18/20 visit:  She continues to tolerate oral meds well. No c/o SE. Labs reveiwed and SrCr elevated. Will continue to monitor monthly. Scans due in April.     4/22/2020 visit:  Pt doing very well at home. No new c/o, denies any bleeding abd pain wt loss or changes in bowel habits. Labs reviewed and PET scan ordered .     5/13/2020  Discussed recent PET showing early progression. Discussed with Dr Tabares and recommended to resume low dose carbo and taxol.     Past medical, surgical, family, and social histories have been reviewed and updated below.    Past Medical History:   Past Medical History:   Diagnosis Date    Anemia     Cataract     Cervical cancer 05/2019    Hypertension        Past Surgical History:   Past Surgical History:   Procedure Laterality Date    CATARACT EXTRACTION, BILATERAL      WRIST SURGERY  1984       Family History:   Family History   Problem Relation Age of Onset    Hypertension Mother     Pneumonia Father     Breast cancer Sister     No Known Problems Brother     Leukemia Daughter     No Known Problems Son        Social History:  reports that she has never smoked. She has never used smokeless tobacco. She reports that she drank alcohol. She reports that she does not use drugs.    Allergies:  Review of patient's allergies indicates:  No Known Allergies    Medications:  Current Outpatient Medications   Medication Sig Dispense Refill    cloNIDine (CATAPRES) 0.3 MG tablet Take 1 tablet (0.3 mg total) by mouth 2 (two) times daily. 180 tablet 2    FENOFIBRATE ORAL Take by mouth 2 (two) times daily.      ferrous sulfate (FEOSOL) 325  "mg (65 mg iron) Tab tablet TK 1 T PO BID  1    loratadine (CLARITIN) 10 mg tablet Take 10 mg by mouth once daily.      megestrol (MEGACE) 40 MG Tab Take 1 tablet (40 mg total) by mouth 2 (two) times daily. 60 tablet 6    NIFEdipine (PROCARDIA-XL) 30 MG (OSM) 24 hr tablet TAKE 1 TABLET BY MOUTH ONCE DAILY 90 tablet 5    olaparib 100 mg Tab Take 2 tablets (200mg) by mouth 2 (two) times daily 120 tablet 6    ondansetron (ZOFRAN-ODT) 8 MG TbDL Take 1 tablet (8 mg total) by mouth every 8 (eight) hours as needed (chemotherapy-induced nausea and vomiting). 40 tablet 5    simvastatin (ZOCOR) 20 MG tablet Take 20 mg by mouth every evening.      varicella-zoster gE-AS01B, PF, (SHINGRIX, PF,) 50 mcg/0.5 mL injection Administer two doses 2 months apart 0.5 mL 0     No current facility-administered medications for this visit.        Review of Systems   Constitutional: Positive for fatigue and unexpected weight change.   HENT: Negative for sore throat.    Eyes: Negative for visual disturbance.   Respiratory: Negative for cough and shortness of breath (LUNA).    Cardiovascular: Negative for chest pain and palpitations.   Gastrointestinal: Negative for constipation, diarrhea, nausea and vomiting.   Genitourinary: Negative for dysuria.   Musculoskeletal: Negative for back pain.   Skin: Negative for rash.   Neurological: Negative for light-headedness and headaches.   Hematological: Negative for adenopathy.   Psychiatric/Behavioral: The patient is not nervous/anxious.      ECOG Performance Status:   ECOG SCORE 0       Objective:      Vitals:   Vitals:    05/13/20 1335   Weight: 58.5 kg (129 lb)   Height: 5' 2" (1.575 m)     BMI: Body mass index is 23.59 kg/m².    Physical Exam   Constitutional: She is oriented to person, place, and time. She appears well-developed and well-nourished.   HENT:   Head: Normocephalic and atraumatic.   Eyes: Pupils are equal, round, and reactive to light. EOM are normal.   Neck: Normal range of " motion. Neck supple.   Cardiovascular: Denies any CP or palpations.   Pulmonary/Chest: Effort normal    Abdominal: Soft. No c/o of pain, swelling or bowel problems.   Musculoskeletal:   Neurological: She is alert and oriented to person, place, and time.   Skin:   Psychiatric: She has a normal mood and affect. Her behavior is normal. Judgment and thought content normal.   Nursing note and vitals reviewed.    Laboratory Data:    Lab Results   3/16/20 Reviewed, mild anemia noted, improved SrCr noted     Imagin/2/19 PET:      CT abdomen/pelvis (19):  1.  Large cervical mass that appears to invade the rectosigmoid and is inseparable from the bladder and urethra, compatible with the known primary.  2.  Large complex cystic mass probably arising from the left ovary that may represent metastatic disease versus another primary.  3.  Retroperitoneal and anterior diaphragmatic adenopathy suspect for metastatic disease.  4.  Supraumbilical anterior abdominal wall mass compatible with metastatic disease.        Assessment:       1. Increased creatine kinase level    2. Malignant neoplasm of endocervix    3. BRCA2 gene mutation positive in female           Plan:     1. Cervical cancer  - biopsy (19) reveals cervical cancer. Imaging reveals stage IV disease  - [facility] Per their recommendations, as well as family preference, we will proceed with single-agent carboplatin AUC5 q28 days. I will refer to radiation oncology for evaluation; we may give radiation therapy after a few cycles of chemotherapy.  - The risks and benefits of chemotherapy were discussed, written information was given, and informed consent was obtained.  - Pt is s/p 5 cycles of single agent carbo with thrombocytopenia. Discussed recent scan with suspected progression of peritoneal implants noted on Scan  along with recent thrombocytopenia  And worsening fatigue discussed testing for IO therapy vs change  in chemotherapy.   - will request IO testing for  PDL1/CPS if measurable on tissue block  - will also check NGS with [facility] for MMR, BRCA 1 and 2 as she reports a sister with Breast cancer who  at age 29 and a niece with pancreatic cancer, age 50.  - If IO is not a tx option, discussed weekly taxol 60-80 mg/m2 as alternative to carbo.   - pt would like to wait for NGS testing to be completed before she makes any treatment decisions.    2.BRCA2 +  mutation   -discussed results of [facility] showing BRCA 2 mutation, implications, accountability and additional family testing recommendations  - Discussed attempting to obtain PARP inhibitor approval, will proceed with oral lynparza 300 mg BID, discussed SE and risks vs chemotherapy options. She states she does not want additional chemo.   - pt started Lynparza in early January. PET scan shows early progression  -plan to resume low dose weekly carbo/taxol once approved        - return to clinic in 2 week to start chemo.      Annette Donis NP The patient location is: home  The chief complaint leading to consultation is: medication SE evaluation  Visit type: Virtual visit with synchronous audio and video  Total time spent with patient: 40 minutes   Each patient to whom he or she provides medical services by telemedicine is:  (1) informed of the relationship between the physician and patient and the respective role of any other health care provider with respect to management of the patient; and (2) notified that he or she may decline to receive medical services by telemedicine and may withdraw from such care at any time.    Notes: see above                  This service was not originating from a related E/M service provided within the previous 7 days nor will  to an E/M service or procedure within the next 24 hours or my soonest available appointment.  Prevailing standard of care was able to be met in this audio-only visit.

## 2020-05-15 ENCOUNTER — OFFICE VISIT (OUTPATIENT)
Dept: HEMATOLOGY/ONCOLOGY | Facility: CLINIC | Age: 85
End: 2020-05-15
Payer: MEDICARE

## 2020-05-15 VITALS
TEMPERATURE: 98 F | OXYGEN SATURATION: 98 % | BODY MASS INDEX: 24.04 KG/M2 | HEIGHT: 62 IN | HEART RATE: 72 BPM | WEIGHT: 130.63 LBS | DIASTOLIC BLOOD PRESSURE: 74 MMHG | SYSTOLIC BLOOD PRESSURE: 160 MMHG | RESPIRATION RATE: 16 BRPM

## 2020-05-15 DIAGNOSIS — C53.9 MALIGNANT NEOPLASM OF CERVIX, UNSPECIFIED SITE: ICD-10-CM

## 2020-05-15 DIAGNOSIS — Z15.01 BRCA2 GENE MUTATION POSITIVE IN FEMALE: Primary | ICD-10-CM

## 2020-05-15 DIAGNOSIS — C53.0 MALIGNANT NEOPLASM OF ENDOCERVIX: ICD-10-CM

## 2020-05-15 DIAGNOSIS — Z15.02 BRCA2 GENE MUTATION POSITIVE IN FEMALE: Primary | ICD-10-CM

## 2020-05-15 DIAGNOSIS — Z15.09 BRCA2 GENE MUTATION POSITIVE IN FEMALE: Primary | ICD-10-CM

## 2020-05-15 PROCEDURE — 99215 OFFICE O/P EST HI 40 MIN: CPT | Mod: S$GLB,,, | Performed by: NURSE PRACTITIONER

## 2020-05-15 PROCEDURE — 99215 PR OFFICE/OUTPT VISIT, EST, LEVL V, 40-54 MIN: ICD-10-PCS | Mod: S$GLB,,, | Performed by: NURSE PRACTITIONER

## 2020-05-15 RX ORDER — SODIUM CHLORIDE 0.9 % (FLUSH) 0.9 %
10 SYRINGE (ML) INJECTION
Status: CANCELLED | OUTPATIENT
Start: 2020-05-26

## 2020-05-15 RX ORDER — HEPARIN 100 UNIT/ML
500 SYRINGE INTRAVENOUS
Status: CANCELLED | OUTPATIENT
Start: 2020-06-16

## 2020-05-15 RX ORDER — SODIUM CHLORIDE 0.9 % (FLUSH) 0.9 %
10 SYRINGE (ML) INJECTION
Status: CANCELLED | OUTPATIENT
Start: 2020-06-09

## 2020-05-15 RX ORDER — HEPARIN 100 UNIT/ML
500 SYRINGE INTRAVENOUS
Status: CANCELLED | OUTPATIENT
Start: 2020-06-02

## 2020-05-15 RX ORDER — FAMOTIDINE 10 MG/ML
20 INJECTION INTRAVENOUS
Status: CANCELLED | OUTPATIENT
Start: 2020-06-16

## 2020-05-15 RX ORDER — DIPHENHYDRAMINE HYDROCHLORIDE 50 MG/ML
50 INJECTION INTRAMUSCULAR; INTRAVENOUS ONCE AS NEEDED
Status: CANCELLED | OUTPATIENT
Start: 2020-05-26

## 2020-05-15 RX ORDER — FAMOTIDINE 10 MG/ML
20 INJECTION INTRAVENOUS
Status: CANCELLED | OUTPATIENT
Start: 2020-06-02

## 2020-05-15 RX ORDER — DIPHENHYDRAMINE HYDROCHLORIDE 50 MG/ML
50 INJECTION INTRAMUSCULAR; INTRAVENOUS ONCE AS NEEDED
Status: CANCELLED | OUTPATIENT
Start: 2020-06-09

## 2020-05-15 RX ORDER — FAMOTIDINE 10 MG/ML
20 INJECTION INTRAVENOUS
Status: CANCELLED | OUTPATIENT
Start: 2020-05-19

## 2020-05-15 RX ORDER — SODIUM CHLORIDE 0.9 % (FLUSH) 0.9 %
10 SYRINGE (ML) INJECTION
Status: CANCELLED | OUTPATIENT
Start: 2020-06-16

## 2020-05-15 RX ORDER — SODIUM CHLORIDE 0.9 % (FLUSH) 0.9 %
10 SYRINGE (ML) INJECTION
Status: CANCELLED | OUTPATIENT
Start: 2020-06-02

## 2020-05-15 RX ORDER — DIPHENHYDRAMINE HYDROCHLORIDE 50 MG/ML
50 INJECTION INTRAMUSCULAR; INTRAVENOUS ONCE AS NEEDED
Status: CANCELLED | OUTPATIENT
Start: 2020-06-02

## 2020-05-15 RX ORDER — HEPARIN 100 UNIT/ML
500 SYRINGE INTRAVENOUS
Status: CANCELLED | OUTPATIENT
Start: 2020-06-09

## 2020-05-15 RX ORDER — EPINEPHRINE 0.3 MG/.3ML
0.3 INJECTION SUBCUTANEOUS ONCE AS NEEDED
Status: CANCELLED | OUTPATIENT
Start: 2020-05-26

## 2020-05-15 RX ORDER — EPINEPHRINE 0.3 MG/.3ML
0.3 INJECTION SUBCUTANEOUS ONCE AS NEEDED
Status: CANCELLED | OUTPATIENT
Start: 2020-06-02

## 2020-05-15 RX ORDER — SODIUM CHLORIDE 0.9 % (FLUSH) 0.9 %
10 SYRINGE (ML) INJECTION
Status: CANCELLED | OUTPATIENT
Start: 2020-05-19

## 2020-05-15 RX ORDER — HEPARIN 100 UNIT/ML
500 SYRINGE INTRAVENOUS
Status: CANCELLED | OUTPATIENT
Start: 2020-05-19

## 2020-05-15 RX ORDER — DIPHENHYDRAMINE HYDROCHLORIDE 50 MG/ML
50 INJECTION INTRAMUSCULAR; INTRAVENOUS ONCE AS NEEDED
Status: CANCELLED | OUTPATIENT
Start: 2020-05-19

## 2020-05-15 RX ORDER — EPINEPHRINE 0.3 MG/.3ML
0.3 INJECTION SUBCUTANEOUS ONCE AS NEEDED
Status: CANCELLED | OUTPATIENT
Start: 2020-05-19

## 2020-05-15 RX ORDER — EPINEPHRINE 0.3 MG/.3ML
0.3 INJECTION SUBCUTANEOUS ONCE AS NEEDED
Status: CANCELLED | OUTPATIENT
Start: 2020-06-09

## 2020-05-15 RX ORDER — DIPHENHYDRAMINE HYDROCHLORIDE 50 MG/ML
50 INJECTION INTRAMUSCULAR; INTRAVENOUS ONCE AS NEEDED
Status: CANCELLED | OUTPATIENT
Start: 2020-06-16

## 2020-05-15 RX ORDER — EPINEPHRINE 0.3 MG/.3ML
0.3 INJECTION SUBCUTANEOUS ONCE AS NEEDED
Status: CANCELLED | OUTPATIENT
Start: 2020-06-16

## 2020-05-15 RX ORDER — HEPARIN 100 UNIT/ML
500 SYRINGE INTRAVENOUS
Status: CANCELLED | OUTPATIENT
Start: 2020-05-26

## 2020-05-15 RX ORDER — FAMOTIDINE 10 MG/ML
20 INJECTION INTRAVENOUS
Status: CANCELLED | OUTPATIENT
Start: 2020-05-26

## 2020-05-15 RX ORDER — FAMOTIDINE 10 MG/ML
20 INJECTION INTRAVENOUS
Status: CANCELLED | OUTPATIENT
Start: 2020-06-09

## 2020-05-15 NOTE — PROGRESS NOTES
"Established Patient - Audio Only Telehealth Visit     The patient location is: home  The chief complaint leading to consultation is: chemo evaluation  Visit type: Virtual visit with audio only (telephone)     The reason for the audio only service rather than synchronous audio and video virtual visit was related to technical difficulties or patient preference/necessity.     Each patient to whom I provide medical services by telemedicine is:  (1) informed of the relationship between the physician and patient and the respective role of any other health care provider with respect to management of the patient; and (2) notified that they may decline to receive medical services by telemedicine and may withdraw from such care at any time. Patient verbally consented to receive this service via voice-only telephone call.           PATIENT: Adri Seay  MRN: [id number]  DATE: 5/15/2020    Diagnosis:   1. 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 Malignant neoplasm of cervix, unspecified site    3. Malignant neoplasm of endocervix      Chief Complaint: Increased Creatine Kinase Level    Oncologic History:      Oncologic History 1. Cervical cancer - stage IV      Oncologic Treatment Planned therapy: single-agent carboplatin +/- radiation      Pathology 6/19/19:  - uterine cervix, biopsy:  - poorly differentiated squamous carcinoma with extensive necrosis        Subjective:    History of Present Illness: Ms. Seay is a 84 y.o. female who presents for evaluation and management of cervical cancer. [facility]     Information from Dr. Reese's and Dr. 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 The patient had a CT scan consistent with widely metastatic disease including multiple sites of lymphadenopathy, a possible lung metastases, liver metastases, nodule in " "the anterior abdominal wall, and carcinomatosis. The patient is mostly asymptomatic but does have a vaginal discharge. Her performance status is 0. On exam, there is a 6 centimeter mass in the anterior abdominal wall superior to the umbilicus. On bimanual examination, tumor is a place the entire cervix apex of the vagina, and on rectovaginal examination, there is a large pelvic mass extending to the bilateral pelvic sidewalls.    [facility] and if it is consistent with metastatic disease, we have recommended palliative chemotherapy with either carboplatinum as a single agent or carboplatinum and paclitaxel. Due to concern for bowel involvement on the CT scan, I have recommended not giving bevacizumab as part of this regimen. Patient will return home to [place] and begin chemotherapy with a medical oncologist there. I've also recommended a consultation with radiation oncology in [place] for consideration of palliative radiation to lower the chances of significant vaginal bleeding. We will see her back on an as-needed basis."      8/5/19 Visit: She is here for consideration of  cycle 2 of carbo. She states spotting has resolved since last visit. She received 1 unit of blood aproximately 2 weeks ago for HB 7.4. Her HBG last week was 8.6 and she denies any fatigue, LUNA, and feeling run down. Bowel habits are better since starting Miralax.      9/3/19 Visit: Today she is in clinic with her daughter, she is feeling good and has resumed some driving. She states skin around port itchy. Mild redness noted at incision line and lower neck, advised bactroban x 5 days but if worsens change to OTC lotrim. Labs reviewed and HGB 7.4, will set up for 1 unit PRBC for Thursday, but will proceed with treatment as planned for today.     10/1/19 visit: She states she is feeling good except for fatigue after her 3rd cycle of chemo. Denies any bleeding, belly pain, or difficulty with bowels. Labs " reviewed and mild dehydration noted, Will increase post chemo fluids to 1 L today. Plan is to repeat scans after cycle 4 .     10/29/19 visit:  Today she is in clinic with her daughter. States she is feeling fine, not has noticed some SOB with exertion, talking and reports patient seems more tired. Labs reviewed and HGB 7.6 will set up for blood transfusion today with chemo. She denies any bleeding and states she is tolerating chemo very well.     11/26/19 visit:  Today in clinic she is here with her grandaughter and daughter. She states she is feeling well, tolerating chemo without any signifciant SE except fatigue and diet good, no bleeding noted, no changes in bowels. Labs reviewed and thrombocytopenia noted (91). Will hold treatment this week and proceed with scans, previously schedule after cycle 6 but due to tx delay will proceed with imaging.     12/2/19 visit:  Today in clinic with family to discuss recent PET/CT done on 12/2/19. Previous CT imaging done at outside facility, discussed with Dr Nguyen for comparison and he stated pelvic mass smaller, abdominal mass smaller liver lesion likely benign stable but difficult to say if peritoneal implants stable to progressed.      12/20/19 visit:  Today she and her family are here to discuss recent result from [facility] on 12/16/19. Results show Mrs Seay has a BRCA 2 gene mutation. She does state that she had a sister die at age 29 from Breast cancer about 40-50 years ago. We discussed the accountability of BRCA 2 mutation with targeted PARP inhibitors as a treatment option as well as family implications and recommendation of further testing of children for BRCA 2 mutations. She and her family members [facility] speak with licensed Genetic counsler. Wanda Ma RN will help facilitate appointment.     1/3/20 visit:  PT has yet to receive lynparza, reduced dose due to impared renal fxn. Will f/u with [facility] pharmacy  to verify delivery. Progressive anemia noted, will set up 1 unit of PRBC for Monday and will monitor weekly once pt starts PARP inhibitor.      1/14/20 visit:   Pt started lynparza, low dose 1 week ago and is tolerating very well. Denies any SE at this time. Labs reviewed with mild anemia noted but indication for transfusion. Will continue to monitor.     2/18/20 visit:  She continues to tolerate oral meds well. No c/o SE. Labs reveiwed and SrCr elevated. Will continue to monitor monthly. Scans due in April.     4/22/2020 visit:  Pt doing very well at home. No new c/o, denies any bleeding abd pain wt loss or changes in bowel habits. Labs reviewed and PET scan ordered .     5/13/2020  Discussed recent PET showing early progression. Discussed with Dr Tabares and recommended to resume low dose carbo and taxol.     Past medical, surgical, family, and social histories have been reviewed and updated below.    Past Medical History:   Past Medical History:   Diagnosis Date    Anemia     Cataract     Cervical cancer 05/2019    Hypertension        Past Surgical History:   Past Surgical History:   Procedure Laterality Date    CATARACT EXTRACTION, BILATERAL      WRIST SURGERY  1984       Family History:   Family History   Problem Relation Age of Onset    Hypertension Mother     Pneumonia Father     Breast cancer Sister     No Known Problems Brother     Leukemia Daughter     No Known Problems Son        Social History:  reports that she has never smoked. She has never used smokeless tobacco. She reports that she drank alcohol. She reports that she does not use drugs.    Allergies:  Review of patient's allergies indicates:  No Known Allergies    Medications:  Current Outpatient Medications   Medication Sig Dispense Refill    cloNIDine (CATAPRES) 0.3 MG tablet Take 1 tablet (0.3 mg total) by mouth 2 (two) times daily. 180 tablet 2    FENOFIBRATE ORAL Take by mouth 2 (two) times daily.      ferrous sulfate (FEOSOL) 325  "mg (65 mg iron) Tab tablet TK 1 T PO BID  1    loratadine (CLARITIN) 10 mg tablet Take 10 mg by mouth once daily.      megestrol (MEGACE) 40 MG Tab Take 1 tablet (40 mg total) by mouth 2 (two) times daily. 60 tablet 6    NIFEdipine (PROCARDIA-XL) 30 MG (OSM) 24 hr tablet TAKE 1 TABLET BY MOUTH ONCE DAILY 90 tablet 5    olaparib 100 mg Tab Take 2 tablets (200mg) by mouth 2 (two) times daily 120 tablet 6    ondansetron (ZOFRAN-ODT) 8 MG TbDL Take 1 tablet (8 mg total) by mouth every 8 (eight) hours as needed (chemotherapy-induced nausea and vomiting). 40 tablet 5    simvastatin (ZOCOR) 20 MG tablet Take 20 mg by mouth every evening.      varicella-zoster gE-AS01B, PF, (SHINGRIX, PF,) 50 mcg/0.5 mL injection Administer two doses 2 months apart 0.5 mL 0     No current facility-administered medications for this visit.        Review of Systems   Constitutional: Positive for fatigue and unexpected weight change.   HENT: Negative for sore throat.    Eyes: Negative for visual disturbance.   Respiratory: Negative for cough and shortness of breath (LUNA).    Cardiovascular: Negative for chest pain and palpitations.   Gastrointestinal: Negative for constipation, diarrhea, nausea and vomiting.   Genitourinary: Negative for dysuria.   Musculoskeletal: Negative for back pain.   Skin: Negative for rash.   Neurological: Negative for light-headedness and headaches.   Hematological: Negative for adenopathy.   Psychiatric/Behavioral: The patient is not nervous/anxious.      ECOG Performance Status:   ECOG SCORE 0       Objective:      Vitals:   Vitals:    05/15/20 0958   BP: (!) 160/74   BP Location: Left arm   Patient Position: Sitting   BP Method: Large (Automatic)   Pulse: 72   Resp: 16   Temp: 98.3 °F (36.8 °C)   TempSrc: Oral   SpO2: 98%   Weight: 59.2 kg (130 lb 9.6 oz)   Height: 5' 2" (1.575 m)     BMI: Body mass index is 23.89 kg/m².    Physical Exam   Constitutional: She is oriented to person, place, and time. She " appears well-developed and well-nourished.   HENT:   Head: Normocephalic and atraumatic.   Eyes: Pupils are equal, round, and reactive to light. EOM are normal.   Neck: Normal range of motion. Neck supple.   Cardiovascular: Denies any CP or palpations.   Pulmonary/Chest: Effort normal    Abdominal: Soft. No c/o of pain, swelling or bowel problems.   Musculoskeletal:   Neurological: She is alert and oriented to person, place, and time.   Skin:   Psychiatric: She has a normal mood and affect. Her behavior is normal. Judgment and thought content normal.   Nursing note and vitals reviewed.    Laboratory Data:    Lab Results   3/16/20 Reviewed, mild anemia noted, improved SrCr noted     Imagin/2/19 PET:      CT abdomen/pelvis (19):  1.  Large cervical mass that appears to invade the rectosigmoid and is inseparable from the bladder and urethra, compatible with the known primary.  2.  Large complex cystic mass probably arising from the left ovary that may represent metastatic disease versus another primary.  3.  Retroperitoneal and anterior diaphragmatic adenopathy suspect for metastatic disease.  4.  Supraumbilical anterior abdominal wall mass compatible with metastatic disease.        Assessment:       1. 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 Malignant neoplasm of cervix, unspecified site    3. Malignant neoplasm of endocervix           Plan:     1. Cervical cancer  - biopsy (19) reveals cervical cancer. Imaging reveals stage IV disease  - [facility] Per [facility] recommendations, as well as family preference, we will proceed with single-agent carboplatin AUC5 q28 days. I will refer to radiation oncology for evaluation; we may give radiation therapy after a few cycles of chemotherapy.  - The risks and benefits of chemotherapy were discussed, written information was given, and informed consent was obtained.  - Pt is s/p 5 cycles of single agent carbo with  thrombocytopenia. Discussed recent scan with suspected progression of peritoneal implants noted on Scan  along with recent thrombocytopenia  And worsening fatigue discussed testing for IO therapy vs change in chemotherapy.   - will request IO testing for  PDL1/CPS if measurable on tissue block  - will also check NGS with [facility] for MMR, BRCA 1 and 2 as she reports a sister with Breast cancer who  at age 29 and a niece with pancreatic cancer, age 50.  - If IO is not a tx option, discussed weekly taxol 60-80 mg/m2 as alternative to carbo.   - pt would like to wait for NGS testing to be completed before she makes any treatment decisions.    2.BRCA2 +  mutation   -discussed results of [facility] showing BRCA 2 mutation, implications, accountability and additional family testing recommendations  - Discussed attempting to obtain PARP inhibitor approval, will proceed with oral lynparza 300 mg BID, discussed SE and risks vs chemotherapy options. She states she does not want additional chemo.   - pt started Lynparza in early January. PET scan shows early progression 2020  -plan to resume low dose weekly carbo/taxol next week, chemo consents signed, se profile discussed  -- Today I spent  30-45  minutes in patient care.  More than 50% of that time was spent in direct face-to-face patient counseling.   - return to clinic in 1 week.      Annette Donis NP

## 2020-05-22 ENCOUNTER — OFFICE VISIT (OUTPATIENT)
Dept: HEMATOLOGY/ONCOLOGY | Facility: CLINIC | Age: 85
End: 2020-05-22
Payer: MEDICARE

## 2020-05-22 VITALS
HEART RATE: 62 BPM | DIASTOLIC BLOOD PRESSURE: 73 MMHG | HEIGHT: 62 IN | WEIGHT: 125 LBS | BODY MASS INDEX: 23 KG/M2 | RESPIRATION RATE: 17 BRPM | OXYGEN SATURATION: 98 % | SYSTOLIC BLOOD PRESSURE: 106 MMHG | TEMPERATURE: 98 F

## 2020-05-22 DIAGNOSIS — Z15.09 BRCA2 GENE MUTATION POSITIVE IN FEMALE: Primary | ICD-10-CM

## 2020-05-22 DIAGNOSIS — Z15.01 BRCA2 GENE MUTATION POSITIVE IN FEMALE: Primary | ICD-10-CM

## 2020-05-22 DIAGNOSIS — Z15.02 BRCA2 GENE MUTATION POSITIVE IN FEMALE: Primary | ICD-10-CM

## 2020-05-22 DIAGNOSIS — C53.9 MALIGNANT NEOPLASM OF CERVIX, UNSPECIFIED SITE: ICD-10-CM

## 2020-05-22 DIAGNOSIS — R74.8 INCREASED CREATINE KINASE LEVEL: ICD-10-CM

## 2020-05-22 DIAGNOSIS — R53.0 NEOPLASTIC MALIGNANT RELATED FATIGUE: ICD-10-CM

## 2020-05-22 LAB
ALBUMIN SERPL BCP-MCNC: 3.9 G/DL (ref 3.4–5)
ALBUMIN/GLOBULIN RATIO: 1.34 RATIO (ref 1.1–1.8)
ALP SERPL-CCNC: 92 U/L (ref 46–116)
ALT SERPL W P-5'-P-CCNC: 21 U/L (ref 12–78)
ANION GAP SERPL CALC-SCNC: 10 MMOL/L (ref 3–11)
AST SERPL-CCNC: 21 U/L (ref 15–37)
BASOPHILS NFR SNV MANUAL: 0.3 % (ref 0–3)
BILIRUB SERPL-MCNC: 0.5 MG/DL (ref 0–1)
BUN SERPL-MCNC: 33 MG/DL (ref 7–18)
BUN/CREAT SERPL: 23.57 RATIO (ref 7–18)
CALCIUM SERPL-MCNC: 8.5 MG/DL (ref 8.8–10.5)
CHLORIDE SERPL-SCNC: 102 MMOL/L (ref 100–108)
CO2 SERPL-SCNC: 26 MMOL/L (ref 21–32)
CREAT SERPL-MCNC: 1.4 MG/DL (ref 0.55–1.02)
EOSINOPHIL NFR SNV MANUAL: 0.9 % (ref 1–3)
ERYTHROCYTE [DISTWIDTH] IN BLOOD BY AUTOMATED COUNT: 14.4 % (ref 12.5–18)
GFR ESTIMATION: 44
GLOBULIN: 2.9 G/DL (ref 2.3–3.5)
GLUCOSE SERPL-MCNC: 130 MG/DL (ref 70–110)
HCT VFR BLD AUTO: 27.5 % (ref 37–47)
HGB BLD-MCNC: 9.4 G/DL (ref 12–16)
LYMPHOCYTES NFR SNV MANUAL: 13 % (ref 25–40)
MACROCYTES BLD QL SMEAR: NORMAL
MANUAL NRBC PER 100 CELLS: 0.3 %
MCH RBC QN AUTO: 38.1 PG (ref 27–31.2)
MCHC RBC AUTO-ENTMCNC: 34.2 G/DL (ref 31.8–35.4)
MCV RBC AUTO: 111.3 FL (ref 80–97)
MONOCYTES/100 LEUKOCYTES: 3.3 % (ref 1–15)
NEUTROPHILS NFR BLD: 4.79 10*3/UL (ref 1.8–7.7)
NEUTROPHILS NFR SNV MANUAL: 82 % (ref 37–80)
PLATELETS: 140 10*3/UL (ref 142–424)
POTASSIUM SERPL-SCNC: 4.1 MMOL/L (ref 3.6–5.2)
PROT SERPL-MCNC: 6.8 G/DL (ref 6.4–8.2)
RBC # BLD AUTO: 2.47 10*6/UL (ref 4.2–5.4)
SODIUM BLD-SCNC: 138 MMOL/L (ref 135–145)
WBC # BLD: 5.8 10*3/UL (ref 4.6–10.2)

## 2020-05-22 PROCEDURE — 99214 PR OFFICE/OUTPT VISIT, EST, LEVL IV, 30-39 MIN: ICD-10-PCS | Mod: S$GLB,,, | Performed by: NURSE PRACTITIONER

## 2020-05-22 PROCEDURE — 99214 OFFICE O/P EST MOD 30 MIN: CPT | Mod: S$GLB,,, | Performed by: NURSE PRACTITIONER

## 2020-05-22 RX ORDER — HEPARIN 100 UNIT/ML
500 SYRINGE INTRAVENOUS
Status: CANCELLED | OUTPATIENT
Start: 2020-06-30

## 2020-05-22 RX ORDER — HEPARIN 100 UNIT/ML
500 SYRINGE INTRAVENOUS
Status: CANCELLED | OUTPATIENT
Start: 2020-07-14

## 2020-05-22 RX ORDER — EPINEPHRINE 0.3 MG/.3ML
0.3 INJECTION SUBCUTANEOUS ONCE AS NEEDED
Status: CANCELLED | OUTPATIENT
Start: 2020-06-30

## 2020-05-22 RX ORDER — FAMOTIDINE 10 MG/ML
20 INJECTION INTRAVENOUS
Status: CANCELLED | OUTPATIENT
Start: 2020-07-14

## 2020-05-22 RX ORDER — EPINEPHRINE 0.3 MG/.3ML
0.3 INJECTION SUBCUTANEOUS ONCE AS NEEDED
Status: CANCELLED | OUTPATIENT
Start: 2020-05-22

## 2020-05-22 RX ORDER — DIPHENHYDRAMINE HYDROCHLORIDE 50 MG/ML
50 INJECTION INTRAMUSCULAR; INTRAVENOUS ONCE AS NEEDED
Status: CANCELLED | OUTPATIENT
Start: 2020-07-14

## 2020-05-22 RX ORDER — SODIUM CHLORIDE 0.9 % (FLUSH) 0.9 %
10 SYRINGE (ML) INJECTION
Status: CANCELLED | OUTPATIENT
Start: 2020-05-26

## 2020-05-22 RX ORDER — FAMOTIDINE 10 MG/ML
20 INJECTION INTRAVENOUS
Status: CANCELLED | OUTPATIENT
Start: 2020-05-22

## 2020-05-22 RX ORDER — DIPHENHYDRAMINE HYDROCHLORIDE 50 MG/ML
50 INJECTION INTRAMUSCULAR; INTRAVENOUS ONCE AS NEEDED
Status: CANCELLED | OUTPATIENT
Start: 2020-05-22

## 2020-05-22 RX ORDER — DIPHENHYDRAMINE HYDROCHLORIDE 50 MG/ML
50 INJECTION INTRAMUSCULAR; INTRAVENOUS ONCE AS NEEDED
Status: CANCELLED | OUTPATIENT
Start: 2020-05-26

## 2020-05-22 RX ORDER — HEPARIN 100 UNIT/ML
500 SYRINGE INTRAVENOUS
Status: CANCELLED | OUTPATIENT
Start: 2020-05-22

## 2020-05-22 RX ORDER — SODIUM CHLORIDE 0.9 % (FLUSH) 0.9 %
10 SYRINGE (ML) INJECTION
Status: CANCELLED | OUTPATIENT
Start: 2020-05-22

## 2020-05-22 RX ORDER — FAMOTIDINE 10 MG/ML
20 INJECTION INTRAVENOUS
Status: CANCELLED | OUTPATIENT
Start: 2020-07-07

## 2020-05-22 RX ORDER — SODIUM CHLORIDE 0.9 % (FLUSH) 0.9 %
10 SYRINGE (ML) INJECTION
Status: CANCELLED | OUTPATIENT
Start: 2020-06-30

## 2020-05-22 RX ORDER — DIPHENHYDRAMINE HYDROCHLORIDE 50 MG/ML
50 INJECTION INTRAMUSCULAR; INTRAVENOUS ONCE AS NEEDED
Status: CANCELLED | OUTPATIENT
Start: 2020-07-07

## 2020-05-22 RX ORDER — SODIUM CHLORIDE 0.9 % (FLUSH) 0.9 %
10 SYRINGE (ML) INJECTION
Status: CANCELLED | OUTPATIENT
Start: 2020-07-07

## 2020-05-22 RX ORDER — SODIUM CHLORIDE 0.9 % (FLUSH) 0.9 %
10 SYRINGE (ML) INJECTION
Status: CANCELLED | OUTPATIENT
Start: 2020-07-14

## 2020-05-22 RX ORDER — HEPARIN 100 UNIT/ML
500 SYRINGE INTRAVENOUS
Status: CANCELLED | OUTPATIENT
Start: 2020-07-07

## 2020-05-22 RX ORDER — DIPHENHYDRAMINE HYDROCHLORIDE 50 MG/ML
50 INJECTION INTRAMUSCULAR; INTRAVENOUS ONCE AS NEEDED
Status: CANCELLED | OUTPATIENT
Start: 2020-06-30

## 2020-05-22 RX ORDER — FAMOTIDINE 10 MG/ML
20 INJECTION INTRAVENOUS
Status: CANCELLED | OUTPATIENT
Start: 2020-06-30

## 2020-05-22 RX ORDER — FAMOTIDINE 10 MG/ML
20 INJECTION INTRAVENOUS
Status: CANCELLED | OUTPATIENT
Start: 2020-05-26

## 2020-05-22 RX ORDER — EPINEPHRINE 0.3 MG/.3ML
0.3 INJECTION SUBCUTANEOUS ONCE AS NEEDED
Status: CANCELLED | OUTPATIENT
Start: 2020-05-26

## 2020-05-22 RX ORDER — EPINEPHRINE 0.3 MG/.3ML
0.3 INJECTION SUBCUTANEOUS ONCE AS NEEDED
Status: CANCELLED | OUTPATIENT
Start: 2020-07-07

## 2020-05-22 RX ORDER — HEPARIN 100 UNIT/ML
500 SYRINGE INTRAVENOUS
Status: CANCELLED | OUTPATIENT
Start: 2020-05-26

## 2020-05-22 RX ORDER — EPINEPHRINE 0.3 MG/.3ML
0.3 INJECTION SUBCUTANEOUS ONCE AS NEEDED
Status: CANCELLED | OUTPATIENT
Start: 2020-07-14

## 2020-05-22 NOTE — PROGRESS NOTES
"Established Patient - Audio Only Telehealth Visit     The patient location is: home  The chief complaint leading to consultation is: chemo evaluation  Visit type: Virtual visit with audio only (telephone)     The reason for the audio only service rather than synchronous audio and video virtual visit was related to technical difficulties or patient preference/necessity.     Each patient to whom I provide medical services by telemedicine is:  (1) informed of the relationship between the physician and patient and the respective role of any other health care provider with respect to management of the patient; and (2) notified that they may decline to receive medical services by telemedicine and may withdraw from such care at any time. Patient verbally consented to receive this service via voice-only telephone call.           PATIENT: Adri Seay  MRN: [id number]  DATE: 5/22/2020    Diagnosis:   No diagnosis found.  Chief Complaint: Malignant neoplasm of cervix, unspecified    Oncologic History:      Oncologic History 1. Cervical cancer - stage IV      Oncologic Treatment Planned therapy: single-agent carboplatin +/- radiation      Pathology 6/19/19:  - uterine cervix, biopsy:  - poorly differentiated squamous carcinoma with extensive necrosis        Subjective:    History of Present Illness: Ms. Seay is a 84 y.o. female who presents for evaluation and management of cervical cancer. [facility]     Information from Dr. Reese's and Dr. 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 The patient had a CT scan consistent with widely metastatic disease including multiple sites of lymphadenopathy, a possible lung metastases, liver metastases, nodule in the anterior abdominal wall, and carcinomatosis. The patient is mostly asymptomatic but does have a vaginal " "discharge. Her performance status is 0. On exam, there is a 6 centimeter mass in the anterior abdominal wall superior to the umbilicus. On bimanual examination, tumor is a place the entire cervix apex of the vagina, and on rectovaginal examination, there is a large pelvic mass extending to the bilateral pelvic sidewalls.    We will get her CT reviewed at [facility] and if it is consistent with metastatic disease, we have recommended palliative chemotherapy with either carboplatinum as a single agent or carboplatinum and paclitaxel. Due to concern for bowel involvement on the CT scan, I have recommended not giving bevacizumab as part of this regimen. Patient will return home to [place] and begin chemotherapy with a medical oncologist there. I've also recommended a consultation with radiation oncology in [place] for consideration of palliative radiation to lower the chances of significant vaginal bleeding. We will see her back on an as-needed basis."      8/5/19 Visit: She is here for consideration of  cycle 2 of carbo. She states spotting has resolved since last visit. She received 1 unit of blood aproximately 2 weeks ago for HB 7.4. Her HBG last week was 8.6 and she denies any fatigue, LUNA, and feeling run down. Bowel habits are better since starting Miralax.      9/3/19 Visit: Today she is in clinic with her daughter, she is feeling good and has resumed some driving. She states skin around port itchy. Mild redness noted at incision line and lower neck, advised bactroban x 5 days but if worsens change to OTC lotrim. Labs reviewed and HGB 7.4, will set up for 1 unit PRBC for Thursday, but will proceed with treatment as planned for today.     10/1/19 visit: She states she is feeling good except for fatigue after her 3rd cycle of chemo. Denies any bleeding, belly pain, or difficulty with bowels. Labs reviewed and mild dehydration noted, Will increase post chemo fluids to 1 L today. Plan is to repeat scans " after cycle 4 .     10/29/19 visit:  Today she is in clinic with her daughter. States she is feeling fine, not has noticed some SOB with exertion, talking and reports patient seems more tired. Labs reviewed and HGB 7.6 will set up for blood transfusion today with chemo. She denies any bleeding and states she is tolerating chemo very well.     11/26/19 visit:  Today in clinic she is here with her grandaughter and daughter. She states she is feeling well, tolerating chemo without any signifciant SE except fatigue and diet good, no bleeding noted, no changes in bowels. Labs reviewed and thrombocytopenia noted (91). Will hold treatment this week and proceed with scans, previously schedule after cycle 6 but due to tx delay will proceed with imaging.     12/2/19 visit:  Today in clinic with family to discuss recent PET/CT done on 12/2/19. Previous CT imaging done at outside facility, discussed with Dr Nguyen for comparison and he stated pelvic mass smaller, abdominal mass smaller liver lesion likely benign stable but difficult to say if peritoneal implants stable to progressed.      12/20/19 visit:  Today she and her family are here to discuss recent result from [facility] on 12/16/19. Results show Mrs Seay has a BRCA 2 gene mutation. She does state that she had a sister die at age 29 from Breast cancer about 40-50 years ago. We discussed the accountability of BRCA 2 mutation with targeted PARP inhibitors as a treatment option as well as family implications and recommendation of further testing of children for BRCA 2 mutations. She and her family members [facility] with licensed Genetic counsler. Wanda Ma RN will help facilitate appointment.     1/3/20 visit:  PT has yet to receive lynparza, reduced dose due to impared renal fxn. Will f/u with [facility] to verify delivery. Progressive anemia noted, will set up 1 unit of PRBC for Monday and will monitor weekly  once pt starts PARP inhibitor.      1/14/20 visit:   Pt started lynparza, low dose 1 week ago and is tolerating very well. Denies any SE at this time. Labs reviewed with mild anemia noted but indication for transfusion. Will continue to monitor.     2/18/20 visit:  She continues to tolerate oral meds well. No c/o SE. Labs reveiwed and SrCr elevated. Will continue to monitor monthly. Scans due in April.     4/22/2020 visit:  Pt doing very well at home. No new c/o, denies any bleeding abd pain wt loss or changes in bowel habits. Labs reviewed and PET scan ordered .     5/13/2020  Discussed recent PET showing early progression. Discussed with Dr Tabares and recommended to resume low dose carbo and taxol.     Past medical, surgical, family, and social histories have been reviewed and updated below.    Past Medical History:   Past Medical History:   Diagnosis Date    Anemia     Cataract     Cervical cancer 05/2019    Hypertension        Past Surgical History:   Past Surgical History:   Procedure Laterality Date    CATARACT EXTRACTION, BILATERAL      WRIST SURGERY  1984       Family History:   Family History   Problem Relation Age of Onset    Hypertension Mother     Pneumonia Father     Breast cancer Sister     No Known Problems Brother     Leukemia Daughter     No Known Problems Son        Social History:  reports that she has never smoked. She has never used smokeless tobacco. She reports that she drank alcohol. She reports that she does not use drugs.    Allergies:  Review of patient's allergies indicates:  No Known Allergies    Medications:  Current Outpatient Medications   Medication Sig Dispense Refill    cloNIDine (CATAPRES) 0.3 MG tablet Take 1 tablet (0.3 mg total) by mouth 2 (two) times daily. 180 tablet 2    FENOFIBRATE ORAL Take by mouth 2 (two) times daily.      ferrous sulfate (FEOSOL) 325 mg (65 mg iron) Tab tablet TK 1 T PO BID  1    loratadine (CLARITIN) 10 mg tablet Take 10 mg by mouth once  "daily.      megestrol (MEGACE) 40 MG Tab Take 1 tablet (40 mg total) by mouth 2 (two) times daily. 60 tablet 6    NIFEdipine (PROCARDIA-XL) 30 MG (OSM) 24 hr tablet TAKE 1 TABLET BY MOUTH ONCE DAILY 90 tablet 5    olaparib 100 mg Tab Take 2 tablets (200mg) by mouth 2 (two) times daily 120 tablet 6    ondansetron (ZOFRAN-ODT) 8 MG TbDL Take 1 tablet (8 mg total) by mouth every 8 (eight) hours as needed (chemotherapy-induced nausea and vomiting). 40 tablet 5    simvastatin (ZOCOR) 20 MG tablet Take 20 mg by mouth every evening.      varicella-zoster gE-AS01B, PF, (SHINGRIX, PF,) 50 mcg/0.5 mL injection Administer two doses 2 months apart 0.5 mL 0     No current facility-administered medications for this visit.        Review of Systems   Constitutional: Positive for fatigue and unexpected weight change.   HENT: Negative for sore throat.    Eyes: Negative for visual disturbance.   Respiratory: Negative for cough and shortness of breath (LUNA).    Cardiovascular: Negative for chest pain and palpitations.   Gastrointestinal: Negative for constipation, diarrhea, nausea and vomiting.   Genitourinary: Negative for dysuria.   Musculoskeletal: Negative for back pain.   Skin: Negative for rash.   Neurological: Negative for light-headedness and headaches.   Hematological: Negative for adenopathy.   Psychiatric/Behavioral: The patient is not nervous/anxious.      ECOG Performance Status:   ECOG SCORE 0       Objective:      Vitals:   Vitals:    05/22/20 1025   BP: 106/73   BP Location: Left arm   Patient Position: Sitting   BP Method: Small (Automatic)   Pulse: 62   Resp: 17   Temp: 98.2 °F (36.8 °C)   TempSrc: Temporal   SpO2: 98%   Weight: 56.7 kg (125 lb)   Height: 5' 2" (1.575 m)     BMI: Body mass index is 22.86 kg/m².    Physical Exam   Constitutional: She is oriented to person, place, and time. She appears well-developed and well-nourished.   HENT:   Head: Normocephalic and atraumatic.   Eyes: Pupils are equal, round, " and reactive to light. EOM are normal.   Neck: Normal range of motion. Neck supple.   Cardiovascular: Denies any CP or palpations.   Pulmonary/Chest: Effort normal    Abdominal: Soft. No c/o of pain, swelling or bowel problems.   Musculoskeletal:   Neurological: She is alert and oriented to person, place, and time.   Skin:   Psychiatric: She has a normal mood and affect. Her behavior is normal. Judgment and thought content normal.   Nursing note and vitals reviewed.    Laboratory Data:    Lab Results   3/16/20 Reviewed, mild anemia noted, improved SrCr noted     Imagin/2/19 PET:      CT abdomen/pelvis (19):  1.  Large cervical mass that appears to invade the rectosigmoid and is inseparable from the bladder and urethra, compatible with the known primary.  2.  Large complex cystic mass probably arising from the left ovary that may represent metastatic disease versus another primary.  3.  Retroperitoneal and anterior diaphragmatic adenopathy suspect for metastatic disease.  4.  Supraumbilical anterior abdominal wall mass compatible with metastatic disease.        Assessment:       1. 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 Malignant neoplasm of cervix, unspecified site    3. Increased creatine kinase level    4. Neoplastic malignant related fatigue           Plan:     1. Cervical cancer  - biopsy (19) reveals cervical cancer. Imaging reveals stage IV disease  - she has been evaluated at [facility]. Per their recommendations, as well as family preference, we will proceed with single-agent carboplatin AUC5 q28 days. I will refer to radiation oncology for evaluation; we may give radiation therapy after a few cycles of chemotherapy.  - The risks and benefits of chemotherapy were discussed, written information was given, and informed consent was obtained.  - Pt is s/p 5 cycles of single agent carbo with thrombocytopenia. Discussed recent scan with suspected progression of peritoneal implants  noted on Scan  along with recent thrombocytopenia  And worsening fatigue discussed testing for IO therapy vs change in chemotherapy.   - will request IO testing for  PDL1/CPS if measurable on tissue block  - will also check NGS with [employer] [facility] for MMR, BRCA 1 and 2 as she reports a sister with Breast cancer who  at age 29 and a niece with pancreatic cancer, age 50.  - If IO is not a tx option, discussed weekly taxol 60-80 mg/m2 as alternative to carbo.   - pt would like to wait for NGS testing to be completed before she makes any treatment decisions.    2.BRCA2 +  mutation   -discussed results of [facility] showing BRCA 2 mutation, implications, accountability and additional family testing recommendations  - Discussed attempting to obtain PARP inhibitor approval, will proceed with oral lynparza 300 mg BID, discussed SE and risks vs chemotherapy options. She states she does not want additional chemo.   - pt started Lynparza in early January. PET scan shows early progression 2020  S/p 1 week of  weekly carbo/taxol with c/o of excessive fatigue, will reduce dose for next week  -labs reviewed  - return to clinic in 1 week.      Annette Donis, TIM

## 2020-05-28 ENCOUNTER — TELEPHONE (OUTPATIENT)
Dept: PHARMACY | Facility: CLINIC | Age: 85
End: 2020-05-28

## 2020-05-28 NOTE — TELEPHONE ENCOUNTER
Refill call regarding Lynparza from [facility]. Shipping out Lynparza on  for  arrival with patients consent. Copay of $0 @ 004. Address and  confirmed.

## 2020-06-01 ENCOUNTER — OFFICE VISIT (OUTPATIENT)
Dept: HEMATOLOGY/ONCOLOGY | Facility: CLINIC | Age: 85
End: 2020-06-01
Payer: MEDICARE

## 2020-06-01 ENCOUNTER — TELEPHONE (OUTPATIENT)
Dept: HEMATOLOGY/ONCOLOGY | Facility: CLINIC | Age: 85
End: 2020-06-01

## 2020-06-01 VITALS
OXYGEN SATURATION: 99 % | TEMPERATURE: 97 F | HEART RATE: 60 BPM | RESPIRATION RATE: 18 BRPM | SYSTOLIC BLOOD PRESSURE: 115 MMHG | HEIGHT: 62 IN | WEIGHT: 133.19 LBS | BODY MASS INDEX: 24.51 KG/M2 | DIASTOLIC BLOOD PRESSURE: 73 MMHG

## 2020-06-01 DIAGNOSIS — D63.0 ANEMIA IN NEOPLASTIC DISEASE: ICD-10-CM

## 2020-06-01 DIAGNOSIS — Z15.01 BRCA2 GENE MUTATION POSITIVE IN FEMALE: ICD-10-CM

## 2020-06-01 DIAGNOSIS — Z15.09 BRCA2 GENE MUTATION POSITIVE IN FEMALE: ICD-10-CM

## 2020-06-01 DIAGNOSIS — T45.1X5A CHEMOTHERAPY-INDUCED NAUSEA AND VOMITING: ICD-10-CM

## 2020-06-01 DIAGNOSIS — R11.2 CHEMOTHERAPY-INDUCED NAUSEA AND VOMITING: ICD-10-CM

## 2020-06-01 DIAGNOSIS — C53.9 MALIGNANT NEOPLASM OF CERVIX, UNSPECIFIED SITE: Primary | ICD-10-CM

## 2020-06-01 DIAGNOSIS — Z15.02 BRCA2 GENE MUTATION POSITIVE IN FEMALE: ICD-10-CM

## 2020-06-01 PROCEDURE — 99215 OFFICE O/P EST HI 40 MIN: CPT | Mod: S$GLB,,, | Performed by: INTERNAL MEDICINE

## 2020-06-01 PROCEDURE — 99215 PR OFFICE/OUTPT VISIT, EST, LEVL V, 40-54 MIN: ICD-10-PCS | Mod: S$GLB,,, | Performed by: INTERNAL MEDICINE

## 2020-06-01 NOTE — PROGRESS NOTES
Medical Oncology Progress Note  [facility]     PATIENT: Adri Seay  : 1935 85 y.o. female  MRN [id number]     PCP: Bria Mitchell NP  Consult Requested By:      Date of Service: 2020    Subjective:   Interim History:  Other    Adri Seay is here for follow-up on treatment; the patient is feeling well in general terms and she is doing her daily work without limitations    Oncology History:    History of Present Illness: Ms. Seay is a 84 y.o. female who presents for evaluation and management of cervical cancer. SHe was seen at [facility] on 19.      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 The patient had a CT scan consistent with widely metastatic disease including multiple sites of lymphadenopathy, a possible lung metastases, liver metastases, nodule in the anterior abdominal wall, and carcinomatosis. The patient is mostly asymptomatic but does have a vaginal discharge. Her performance status is 0. On exam, there is a 6 centimeter mass in the anterior abdominal wall superior to the umbilicus. On bimanual examination, tumor is a place the entire cervix apex of the vagina, and on rectovaginal examination, there is a large pelvic mass extending to the bilateral pelvic sidewalls.    We will get her CT reviewed at [facility] and if it is consistent with metastatic disease, we have recommended palliative chemotherapy with either carboplatinum as a single agent or carboplatinum and paclitaxel. Due to concern for bowel involvement on the CT scan, I have recommended not giving bevacizumab as part of this regimen. Patient will return home to [place] and begin chemotherapy with a medical oncologist there. I've also recommended a consultation with radiation oncology in [place] for consideration of palliative radiation to lower the chances of significant  "vaginal bleeding. We will see her back on an as-needed basis."       == 8/5/19 Carbo    ==PET/CT done on 12/2/19. Previous CT imaging done at outside facility, discussed with Dr Nguyen for comparison and he stated pelvic mass smaller, abdominal mass smaller liver lesion likely benign stable but difficult to say if peritoneal implants stable to progressed.    ==FoundationOne Liquid Bx on 12/16/19.  BRCA 2 gene mutation.  ==1/14/20 Pt started lynparza   ==5/13/2020 PET showing early progression.    ==5/2020 resume low dose carbo and taxol.        Malignant neoplasm of endocervix    6/27/2019 Initial Diagnosis     Malignant neoplasm of exocervix      7/8/2019 - 7/8/2019 Chemotherapy     Treatment Summary   Plan Name: OP CARBOPLATIN WEEKLY  Treatment Goal: Palliative  Status: Inactive  Start Date: [No treatment day found]  End Date: [No treatment day found]  Provider: Jaime Pinon MD  Chemotherapy: CARBOplatin (PARAPLATIN) in sodium chloride 0.9% 250 mL chemo infusion, , Intravenous, Clinic/HOD 1 time, 0 of 4 cycles      7/9/2019 - 11/25/2019 Chemotherapy     Treatment Summary   Plan Name: OP GYN CARBOPLATIN (AUC) Q4W  Treatment Goal: Palliative  Status: Inactive  Start Date: 7/9/2019  End Date: 10/29/2019  Provider: Jaime Pinon MD  Chemotherapy: CARBOplatin (PARAPLATIN) in sodium chloride 0.9% 250 mL chemo infusion,  (original dose 341.5 mg), Intravenous, Clinic/HOD 1 time, 5 of 6 cycles  Dose modification:   (original dose 341.5 mg, Cycle 1)      5/19/2020 - 6/16/2020 Chemotherapy     Treatment Summary   Plan Name: OP  PACLITAXEL CARBOPLATIN WEEKLY  Treatment Goal: Palliative  Status: Inactive  Start Date: 5/19/2020  End Date: 5/22/2020  Provider: Annette Donis NP  Chemotherapy: CARBOplatin (PARAPLATIN) 115 mg in sodium chloride 0.9% 250 mL chemo infusion, 115 mg (98.6 % of original dose 118.4 mg), Intravenous, Clinic/HOD 1 time, 0 of 1 cycle  Dose modification:   (original dose 118.4 mg, Cycle 1)  PACLitaxeL " (TAXOL) 80 mg/m2 = 126 mg in sodium chloride 0.9% 250 mL chemo infusion, 80 mg/m2 = 126 mg (100 % of original dose 80 mg/m2), Intravenous, Clinic/HOD 1 time, 0 of 1 cycle  Dose modification: 80 mg/m2 (original dose 80 mg/m2, Cycle 1)      5/19/2020 -  Chemotherapy     Treatment Summary   Plan Name:  PACLITAXEL CARBOPLATIN WEEKLY  Treatment Goal: Palliative  Status: Active  Start Date: 5/19/2020  End Date: 6/16/2020 (Planned)  Provider: Annette Donis NP  Chemotherapy: CARBOplatin (PARAPLATIN) 115 mg in sodium chloride 0.9% 250 mL chemo infusion, 115 mg (100 % of original dose 114.8 mg), Intravenous, Clinic/HOD 1 time, 1 of 1 cycle  Dose modification:   (original dose 114.8 mg, Cycle 1, Reason: MD Discretion), 100 mg (original dose 114.8 mg, Cycle 1)  PACLitaxeL (TAXOL) 80 mg/m2 = 126 mg in sodium chloride 0.9% 250 mL chemo infusion, 80 mg/m2 = 126 mg (100 % of original dose 80 mg/m2), Intravenous, Clinic/HOD 1 time, 1 of 1 cycle  Dose modification: 80 mg/m2 (original dose 80 mg/m2, Cycle 1), 50 mg/m2 (original dose 80 mg/m2, Cycle 1)         Past Medical History:   Past Medical History:   Diagnosis Date    Anemia     Cataract     Cervical cancer 05/2019    Hypertension        Past Surgical HIstory:   Past Surgical History:   Procedure Laterality Date    CATARACT EXTRACTION, BILATERAL      WRIST SURGERY  1984       Allergies:  Review of patient's allergies indicates:  No Known Allergies    Medications:  Current Outpatient Medications   Medication Sig Dispense Refill    cloNIDine (CATAPRES) 0.3 MG tablet Take 1 tablet (0.3 mg total) by mouth 2 (two) times daily. 180 tablet 2    FENOFIBRATE ORAL Take by mouth 2 (two) times daily.      ferrous sulfate (FEOSOL) 325 mg (65 mg iron) Tab tablet TK 1 T PO BID  1    loratadine (CLARITIN) 10 mg tablet Take 10 mg by mouth once daily.      megestrol (MEGACE) 40 MG Tab Take 1 tablet (40 mg total) by mouth 2 (two) times daily. 60 tablet 6    NIFEdipine (PROCARDIA-XL) 30  MG (OSM) 24 hr tablet TAKE 1 TABLET BY MOUTH ONCE DAILY 90 tablet 5    olaparib 100 mg Tab Take 2 tablets (200mg) by mouth 2 (two) times daily 120 tablet 6    ondansetron (ZOFRAN-ODT) 8 MG TbDL Take 1 tablet (8 mg total) by mouth every 8 (eight) hours as needed (chemotherapy-induced nausea and vomiting). 40 tablet 5    simvastatin (ZOCOR) 20 MG tablet Take 20 mg by mouth every evening.      varicella-zoster [id number], PF, (SHINGRIX, PF,) 50 mcg/0.5 mL injection Administer two doses 2 months apart 0.5 mL 0     No current facility-administered medications for this visit.        Family History:   Family History   Problem Relation Age of Onset    Hypertension Mother     Pneumonia Father     Breast cancer Sister     No Known Problems Brother     Leukemia Daughter     No Known Problems Son        Social History:  reports that she has never smoked. She has never used smokeless tobacco. She reports that she drank alcohol. She reports that she does not use drugs.    Review of Systemas  Constitutional: No change in weight, appetie, fatigue, fever, or night sweats  Eyes: No changes in vision  Ears, Nose, Mouth, Throat, and Face: No hearing problems, ear pain, rummy nose, or sore throat  Respiratory: No shortness of breath or cough  Cardiovascular: No chest pain, palpitations or dizziness  Gastrointestinal: No abdominal pain, hematochezia, melena  Genitourinary: No dysuria, hematuria, nocturia, menstrual problems, post menopausal  Integumentary/Breast: No rashes or itching  Hematologic/Lymphatic: No anemia or bleeding abnormalities  Musculoskeletal: No joint or muscle abnormalities  Neurological: No sensory or motor problems, no headaches  Behavioral/Psych: No depression, anxiety, cognitive problems, or stress  Endocrine: No diabetes or thyroid problems  Allergic/Immunologic: See allergy above    Physical Exam      Vitals:   Vitals:    06/01/20 1052   BP: 115/73   BP Location: Left arm   Patient Position: Sitting   BP  "Method: Large (Automatic)   Pulse: 60   Resp: 18   Temp: 97.2 °F (36.2 °C)   SpO2: 99%   Weight: 60.4 kg (133 lb 3.2 oz)   Height: 5' 2" (1.575 m)     BMI: Body mass index is 24.36 kg/m².  BSA Body surface area is 1.63 meters squared.  ECOG Performance Status:   ECOG SCORE         GENERAL APPEARANCE: Well developed, well nourished, in no acute distress.  SKIN: Inspection of the skin reveals no rashes, ulcerations or petechiae.  HEENT: The sclerae were anicteric and conjunctivae were pink and moist. Extraocular movements were intact and pupils were equal, round, and reactive to light with normal accommodation. External inspection of the ears and nose showed no scars, lesions, or masses. Lips, teeth, and gums showed normal mucosa. The oral mucosa, hard and soft palate, tongue and posterior pharynx were normal.  NECK: Supple and symmetric. There was no thyroid enlargement, and no tenderness, or masses were felt.  CRESPIRATORY: Normal AP diameter and normal contour without any kyphoscoliosis. LUNGS: Auscultation of the lungs revealed normal breath sounds without any other adventitious sounds or rubs.  CARDIOVASCULAR: There was a regular rate and rhythm without any murmurs, gallops, rubs. The carotid pulses were normal and 2+ bilaterally without bruits. Peripheral pulses were 2+ and symmetric.  GASTROINTESTINAL: Soft and nontender with normal bowel sounds. The liver span was approximately 5-6 cm in the right midclavicular line by percussion. The liver edge was nontender. The spleen was not palpable. There were no inguinal or umbilical hernias noted. No ascites was noted.  LYMPH NODES: No lymphadenopathy was appreciated in the neck, axillae or groin.  MUSCULOSKELETAL: Gait was normal. There was no tenderness or effusions noted. Muscle strength and tone were normal. EXTREMITIES: No cyanosis, clubbing or edema.  NEUROLOGIC: Alert and oriented x 3. Normal affect. Gait was normal. Normal deep tendon reflexes with no " pathological reflexes. Sensation to touch was normal.  PSYCHIATRIC: good mood, orientated to place, time and person     Labs and Imagings     Orders Only on 05/22/2020   Component Date Value Ref Range Status    Sodium 05/22/2020 138  135 - 145 mmol/L Final    Potassium 05/22/2020 4.1  3.6 - 5.2 mmol/L Final    Chloride 05/22/2020 102  100 - 108 mmol/L Final    CO2 05/22/2020 26  21 - 32 mmol/L Final    Anion Gap 05/22/2020 10.0  3.0 - 11.0 mmol/L Final    BUN, Bld 05/22/2020 33* 7 - 18 mg/dL Final    Creatinine 05/22/2020 1.40* 0.55 - 1.02 mg/dL Final    GFR ESTIMATION 05/22/2020 44* >60 Final               DESCRIPTION       GLOMERULAR FILTRATION RATE             NORMAL                     >60             KIDNEY  DISEASE           15-60             KIDNEY FAILURE             <15    BUN/Creatinine Ratio 05/22/2020 23.57* 7 - 18 Ratio Final    Glucose 05/22/2020 130* 70 - 110 mg/dL Final    Calcium 05/22/2020 8.5* 8.8 - 10.5 mg/dL Final    Total Bilirubin 05/22/2020 0.5  0.0 - 1.0 mg/dL Final    AST 05/22/2020 21  15 - 37 U/L Final    ALT 05/22/2020 21  12 - 78 U/L Final    Total Protein 05/22/2020 6.8  6.4 - 8.2 g/dL Final    Albumin 05/22/2020 3.9  3.4 - 5.0 g/dL Final    Globulin 05/22/2020 2.9  2.3 - 3.5 g/dL Final    Albumin/Globulin Ratio 05/22/2020 1.344  1.1 - 1.8 Ratio Final    Alkaline Phosphatase 05/22/2020 92  46 - 116 U/L Final   Orders Only on 05/22/2020   Component Date Value Ref Range Status    Macrocytes 05/22/2020 2+   Final    WBC 05/22/2020 5.8  4.6 - 10.2 10*3/uL Final    RBC 05/22/2020 2.47* 4.2 - 5.4 10*6/uL Final    Hemoglobin 05/22/2020 9.4* 12.0 - 16.0 g/dL Final    Hematocrit 05/22/2020 27.5* 37.0 - 47.0 % Final    MCV 05/22/2020 111.3* 80 - 97 fL Final    MCH 05/22/2020 38.1* 27.0 - 31.2 pg Final    MCHC 05/22/2020 34.2  31.8 - 35.4 g/dL Final    RDW RBC Auto-Rto 05/22/2020 14.4  12.5 - 18.0 % Final    Platelets 05/22/2020 140* 142 - 424 10*3/uL Final     Neutrophils/100 leukocytes 05/22/2020 82.0* 37 - 80 % Final    Lymphocytes/100 leukocytes 05/22/2020 13.0* 25 - 40 % Final    Monocytes/100 leukocytes 05/22/2020 3.3  1 - 15 % Final    Eosinophils/100 leukocytes 05/22/2020 0.9* 1 - 3 % Final    Basophils/100 leukocytes 05/22/2020 0.3  0 - 3 % Final    Manual nRBC per 100 Cells 05/22/2020 0.3  % Final    Neutrophils 05/22/2020 4.79  1.8 - 7.7 10*3/uL Final   Orders Only on 05/12/2020   Component Date Value Ref Range Status    Macrocytes 05/12/2020 2+   Final    WBC 05/12/2020 4.7  4.6 - 10.2 10*3/uL Final    RBC 05/12/2020 2.72* 4.2 - 5.4 10*6/uL Final    Hemoglobin 05/12/2020 10.1* 12.0 - 16.0 g/dL Final    Hematocrit 05/12/2020 30.3* 37.0 - 47.0 % Final    MCV 05/12/2020 111.4* 80 - 97 fL Final    MCH 05/12/2020 37.1* 27.0 - 31.2 pg Final    MCHC 05/12/2020 33.3  31.8 - 35.4 g/dL Final    RDW RBC Auto-Rto 05/12/2020 14.4  12.5 - 18.0 % Final    Platelets 05/12/2020 149  142 - 424 10*3/uL Final    Neutrophils/100 leukocytes 05/12/2020 56.9  37 - 80 % Final    Lymphocytes/100 leukocytes 05/12/2020 28.5  25 - 40 % Final    Monocytes/100 leukocytes 05/12/2020 12.1  1 - 15 % Final    Eosinophils/100 leukocytes 05/12/2020 1.5  1 - 3 % Final    Basophils/100 leukocytes 05/12/2020 0.8  0 - 3 % Final    Manual nRBC per 100 Cells 05/12/2020 0.4  % Final    Neutrophils 05/12/2020 2.68  1.8 - 7.7 10*3/uL Final   Orders Only on 05/12/2020   Component Date Value Ref Range Status    Sodium 05/12/2020 139  135 - 145 mmol/L Final    Potassium 05/12/2020 3.8  3.6 - 5.2 mmol/L Final    Chloride 05/12/2020 104  100 - 108 mmol/L Final    CO2 05/12/2020 27  21 - 32 mmol/L Final    BUN, Bld 05/12/2020 25* 7 - 18 mg/dL Final    Creatinine 05/12/2020 1.17* 0.55 - 1.02 mg/dL Final    GFR ESTIMATION 05/12/2020 54* >60 Final               DESCRIPTION       GLOMERULAR FILTRATION RATE             NORMAL                     >60             KIDNEY  DISEASE            15-60             KIDNEY FAILURE             <15    BUN/Creatinine Ratio 05/12/2020 21.36* 7 - 18 Ratio Final    Glucose 05/12/2020 107  70 - 110 mg/dL Final    Calcium 05/12/2020 9.4  8.8 - 10.5 mg/dL Final    Total Bilirubin 05/12/2020 0.6  0.0 - 1.0 mg/dL Final    AST 05/12/2020 22  15 - 37 U/L Final    ALT 05/12/2020 21  12 - 78 U/L Final    Total Protein 05/12/2020 7.4  6.4 - 8.2 g/dL Final    Albumin 05/12/2020 4.2  3.4 - 5.0 g/dL Final    Globulin 05/12/2020 3.2  2.3 - 3.5 g/dL Final    Albumin/Globulin Ratio 05/12/2020 1.312  1.1 - 1.8 Ratio Final    Alkaline Phosphatase 05/12/2020 100  46 - 116 U/L Final    Phosphorus 05/12/2020 3.3  2.6 - 4.7 mg/dL Final    Magnesium 05/12/2020 1.5* 1.8 - 2.4 mg/dL Final   Orders Only on 04/22/2020   Component Date Value Ref Range Status    Phosphorus 04/22/2020 3.9  2.6 - 4.7 mg/dL Final    Sodium 04/22/2020 137  135 - 145 mmol/L Final    Potassium 04/22/2020 3.9  3.6 - 5.2 mmol/L Final    Chloride 04/22/2020 104  100 - 108 mmol/L Final    CO2 04/22/2020 23  21 - 32 mmol/L Final    BUN, Bld 04/22/2020 21* 7 - 18 mg/dL Final    Creatinine 04/22/2020 1.49* 0.55 - 1.02 mg/dL Final    GFR ESTIMATION 04/22/2020 41* >60 Final               DESCRIPTION       GLOMERULAR FILTRATION RATE             NORMAL                     >60             KIDNEY  DISEASE           15-60             KIDNEY FAILURE             <15    BUN/Creatinine Ratio 04/22/2020 14.09  7 - 18 Ratio Final    Glucose 04/22/2020 110  70 - 110 mg/dL Final    Calcium 04/22/2020 9.6  8.8 - 10.5 mg/dL Final    Total Bilirubin 04/22/2020 0.6  0.0 - 1.0 mg/dL Final    AST 04/22/2020 18  15 - 37 U/L Final    ALT 04/22/2020 18  12 - 78 U/L Final    Total Protein 04/22/2020 7.7  6.4 - 8.2 g/dL Final    Albumin 04/22/2020 4.0  3.4 - 5.0 g/dL Final    Globulin 04/22/2020 3.7* 2.3 - 3.5 g/dL Final    Albumin/Globulin Ratio 04/22/2020 1.081* 1.1 - 1.8 Ratio Final    Alkaline Phosphatase  04/22/2020 98  46 - 116 U/L Final    Magnesium 04/22/2020 1.5* 1.8 - 2.4 mg/dL Final   Orders Only on 04/22/2020   Component Date Value Ref Range Status    WBC 04/22/2020 5.3  4.6 - 10.2 10*3/uL Final    RBC 04/22/2020 2.44* 4.2 - 5.4 10*6/uL Final    Hemoglobin 04/22/2020 9.3* 12.0 - 16.0 g/dL Final    Hematocrit 04/22/2020 27.3* 37.0 - 47.0 % Final    MCV 04/22/2020 111.9* 80 - 97 fL Final    MCH 04/22/2020 38.1* 27.0 - 31.2 pg Final    MCHC 04/22/2020 34.1  31.8 - 35.4 g/dL Final    RDW RBC Auto-Rto 04/22/2020 15.0  12.5 - 18.0 % Final    Platelets 04/22/2020 164  142 - 424 10*3/uL Final    Neutrophils/100 leukocytes 04/22/2020 60.5  37 - 80 % Final    Lymphocytes/100 leukocytes 04/22/2020 27.4  25 - 40 % Final    Monocytes/100 leukocytes 04/22/2020 9.0  1 - 15 % Final    Eosinophils/100 leukocytes 04/22/2020 1.9  1 - 3 % Final    Basophils/100 leukocytes 04/22/2020 0.8  0 - 3 % Final    Manual nRBC per 100 Cells 04/22/2020 0.6  % Final    Neutrophils 04/22/2020 3.18  1.8 - 7.7 10*3/uL Final    Macrocytes 04/22/2020 1+   Final   Orders Only on 03/16/2020   Component Date Value Ref Range Status    Phosphorus 03/16/2020 3.2  2.5 - 4.5 mg/dL Final    Comment: NOTE  Testing performed at:  [facility], [address] [place] [address]  [address] CLIA #:[id number]      Magnesium 03/16/2020 1.81  1.60 - 2.40 mg/dL Final    Comment: NOTE  Testing performed at:  [facility], [address] [place] [address]  [address] CLIA #:[id number]      Glucose 03/16/2020 103  82 - 115 mg/dL Final    BUN, Bld 03/16/2020 17.2  8 - 23 mg/dL Final    Creatinine 03/16/2020 1.17* 0.50 - 0.90 mg/dL Final    AST 03/16/2020 17  0 - 32 U/L Final    ALT (SGPT) 03/16/2020 11  0 - 33 U/L Final    Alkaline Phosphatase 03/16/2020 97  35 - 105 U/L Final    Calcium 03/16/2020 10.0  8.6 - 10.2 mg/dL Final    Protein, Total 03/16/2020 7.6  6.4 - 8.3 g/dL Final    Albumin 03/16/2020 4.6  3.5 - 5.2 g/dL Final     BILIRUBIN, TOTAL 03/16/2020 0.47  0.00 - 1.20 mg/dL Final    Sodium 03/16/2020 140  136 - 145 mmol/L Final    Potassium 03/16/2020 4.5  3.5 - 5.1 mmol/L Final    Chloride 03/16/2020 107  98 - 107 mmol/L Final    CO2 03/16/2020 21* 22 - 29 mmol/L Final    Globulin 03/16/2020 3.0  1.5 - 4.5 g/dL Final    Albumin/Globulin Ratio 03/16/2020 1.5  1.0 - 2.7 Final    BUN/Creatinine Ratio 03/16/2020 14.7  6 - 20 Final    GFR ESTIMATION 03/16/2020 44.28* >60.00 Final    Comment: GFR estimation is reported as mL/min/1.73m squared.  It is recommended  that GFR values for  be multiplied x 1.212.  The  higher the GFR, the better the kidney function.  A GFR of >60 is  considered normal (depending on age and whether the patient is a male  or female.      Anion Gap 03/16/2020 12.0  8.0 - 17.0 mmol/L Final    Comment: NOTE  Testing performed at:  [facility], [address] [place] [address] CLIA #:[id number]      WBC 03/16/2020 4.79  4.3 - 10.8 X 10 3/ul Final    RBC 03/16/2020 2.68* 4.2 - 5.4 X 10 6/ul Final    RDW-SD 03/16/2020 67.5* 37 - 54 fl Final    Hemoglobin 03/16/2020 9.7* 12 - 16 g/dL Final    Hematocrit 03/16/2020 30.3* 37 - 47 % Final    MCV 03/16/2020 113.1* 82 - 100 fl Final    MCH 03/16/2020 36.2* 27 - 32 pg Final    MCHC 03/16/2020 32.0  32 - 36 g/dL Final    Platelets 03/16/2020 163  135 - 400 X 10 3/ul Final    Neutrophils 03/16/2020 58.3  % Final    Lymphocytes 03/16/2020 29.2  19.3 - 53.1 % Final    Monocytes 03/16/2020 10.4  4.7 - 12.5 % Final    Eosinophils 03/16/2020 1.5  0.7 - 7.0 % Final    Basophils 03/16/2020 0.4  0.2 - 1.2 % Final    Neutrophils Absolute 03/16/2020 2.79  2.15 - 7.56 X 10 3/ul Final    ABSOLUTE LYMPHOCYTE 03/16/2020 1.40  0.86 - 4.75 X 10 3/ul Final    ABSOLUTE MONOCYTE 03/16/2020 0.50  0.22 - 1.08 X 10 3/ul Final    ABSOLUTE EOSINOPHIL 03/16/2020 0.07  0.04 - 0.54 X 10 3/ul Final    ABSOLUTE BASOPHIL 03/16/2020 0.02  0.00  - 0.22 X 10 3/ul Final    ABSOLUTE IMMATURE GRAN 03/16/2020 0.01  0 - 0.04 X 10 3/ul Final    Immature Granulocytes 03/16/2020 0.2  0 - 0.5 % Final    IG includes metamyelocytes, myelocytes, and promyelocytes    nRBC# 03/16/2020 0.4* 0 - 0.2 /100 WBC Final    ABS NRBC COUNT 03/16/2020 0.020* 0 - 0.012 x 10 3/ul Final    Comment: 2+ MACRO, 2+ ANISO, 1+ OVAL, 1+ TEAR DROP CELLS  Platelet morphology normal  NOTE  Testing performed at:  [facility], [address] [place] [address] CLIA #:[id number]     Orders Only on 03/02/2020   Component Date Value Ref Range Status    WBC 03/02/2020 5.2  4.6 - 10.2 10*3/uL Final    RBC 03/02/2020 2.56* 4.2 - 5.4 10*6/uL Final    Hemoglobin 03/02/2020 9.1* 12.0 - 16.0 g/dL Final    Hematocrit 03/02/2020 27.3* 37.0 - 47.0 % Final    MCV 03/02/2020 106.6* 80 - 97 fL Final    MCH 03/02/2020 35.5* 27.0 - 31.2 pg Final    MCHC 03/02/2020 33.3  31.8 - 35.4 g/dL Final    RDW RBC Auto-Rto 03/02/2020 16.3  12.5 - 18.0 % Final    Platelets 03/02/2020 171  142 - 424 10*3/uL Final    Neutrophils/100 leukocytes 03/02/2020 64.8  37 - 80 % Final    Lymphocytes/100 leukocytes 03/02/2020 24.2* 25 - 40 % Final    Monocytes/100 leukocytes 03/02/2020 8.5  1 - 15 % Final    Eosinophils/100 leukocytes 03/02/2020 1.5  1 - 3 % Final    Basophils/100 leukocytes 03/02/2020 0.8  0 - 3 % Final    Manual nRBC per 100 Cells 03/02/2020 0.4  % Final    Neutrophils 03/02/2020 3.37  1.8 - 7.7 10*3/uL Final    Macrocytes 03/02/2020 1+   Final   Orders Only on 02/17/2020   Component Date Value Ref Range Status    Phosphorus 02/17/2020 3.5  2.6 - 4.7 mg/dL Final    Sodium 02/17/2020 139  135 - 145 mmol/L Final    Potassium 02/17/2020 3.7  3.6 - 5.2 mmol/L Final    Chloride 02/17/2020 105  100 - 108 mmol/L Final    CO2 02/17/2020 22  21 - 32 mmol/L Final    BUN, Bld 02/17/2020 22* 7 - 18 mg/dL Final    Creatinine 02/17/2020 1.31* 0.55 - 1.02 mg/dL Final    GFR ESTIMATION  02/17/2020 47* >60 Final               DESCRIPTION       GLOMERULAR FILTRATION RATE             NORMAL                     >60             KIDNEY  DISEASE           15-60             KIDNEY FAILURE             <15    BUN/Creatinine Ratio 02/17/2020 16.79  7 - 18 Ratio Final    Glucose 02/17/2020 101  70 - 110 mg/dL Final    Calcium 02/17/2020 9.3  8.8 - 10.5 mg/dL Final    Total Bilirubin 02/17/2020 0.6  0.0 - 1.0 mg/dL Final    AST 02/17/2020 21  15 - 37 U/L Final    ALT 02/17/2020 22  12 - 78 U/L Final    Total Protein 02/17/2020 7.5  6.4 - 8.2 g/dL Final    Albumin 02/17/2020 4.3  3.4 - 5.0 g/dL Final    Globulin 02/17/2020 3.2  2.3 - 3.5 g/dL Final    Albumin/Globulin Ratio 02/17/2020 1.343  1.1 - 1.8 Ratio Final    Alkaline Phosphatase 02/17/2020 102  46 - 116 U/L Final    Magnesium 02/17/2020 1.7* 1.8 - 2.4 mg/dL Final   Orders Only on 02/17/2020   Component Date Value Ref Range Status    Macrocytes 02/17/2020 1+   Final    WBC 02/17/2020 5.2  4.6 - 10.2 10*3/uL Final    RBC 02/17/2020 2.59* 4.2 - 5.4 10*6/uL Final    Hemoglobin 02/17/2020 9.3* 12.0 - 16.0 g/dL Final    Hematocrit 02/17/2020 27.9* 37.0 - 47.0 % Final    MCV 02/17/2020 107.7* 80 - 97 fL Final    MCH 02/17/2020 35.9* 27.0 - 31.2 pg Final    MCHC 02/17/2020 33.3  31.8 - 35.4 g/dL Final    RDW RBC Auto-Rto 02/17/2020 16.9  12.5 - 18.0 % Final    Platelets 02/17/2020 173  142 - 424 10*3/uL Final    Neutrophils/100 leukocytes 02/17/2020 54.6  37 - 80 % Final    Lymphocytes/100 leukocytes 02/17/2020 32.5  25 - 40 % Final    Monocytes/100 leukocytes 02/17/2020 9.8  1 - 15 % Final    Eosinophils/100 leukocytes 02/17/2020 2.3  1 - 3 % Final    Basophils/100 leukocytes 02/17/2020 0.6  0 - 3 % Final    Manual nRBC per 100 Cells 02/17/2020 0.4  % Final    Neutrophils 02/17/2020 2.86  1.8 - 7.7 10*3/uL Final   There may be more visits with results that are not included.       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 female with PMH of The primary encounter diagnosis was Malignant neoplasm of cervix, unspecified site. 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 Malignant neoplasm of cervix, unspecified site    3. Increased creatine kinase level    4. Neoplastic malignant related fatigue       Cervical cancer stage IV disease  BRCA2 +  mutation   PARP inhibitor  Lynparza 300 mg BID PET scan shows early progression 4/2020  weekly carbo/taxol   Pancytopenia       Plan:   Overall PLANS:  The past plus carbo Taxol weekly    == lab CBC CMP every week  ==On Lynparza 100 mg BID; Keep current dose( this is a low dose).   == hold chemotherapy today and resume chemotherapy next week if CBC is acceptable  Will reduce chemo dose further next week  == return to clinic 1 week        Signature    Abundio Tabares M.D., Ph.D., [facility]  Hematology-Oncology    Signed 6/1/2020 11:24 AM

## 2020-06-01 NOTE — TELEPHONE ENCOUNTER
[place] called critical labs:    WC 1.9  H&H 7.2  22.4  PT 93  ANC 0.76    Informed Dr Tabares.  Report to hold chemo until further notice.

## 2020-06-08 ENCOUNTER — OFFICE VISIT (OUTPATIENT)
Dept: HEMATOLOGY/ONCOLOGY | Facility: CLINIC | Age: 85
End: 2020-06-08
Payer: MEDICARE

## 2020-06-08 ENCOUNTER — TELEPHONE (OUTPATIENT)
Dept: HEMATOLOGY/ONCOLOGY | Facility: CLINIC | Age: 85
End: 2020-06-08

## 2020-06-08 VITALS
HEART RATE: 48 BPM | OXYGEN SATURATION: 99 % | BODY MASS INDEX: 25.4 KG/M2 | TEMPERATURE: 98 F | DIASTOLIC BLOOD PRESSURE: 66 MMHG | WEIGHT: 138 LBS | RESPIRATION RATE: 17 BRPM | HEIGHT: 62 IN | SYSTOLIC BLOOD PRESSURE: 109 MMHG

## 2020-06-08 DIAGNOSIS — Z15.02 BRCA2 GENE MUTATION POSITIVE IN FEMALE: ICD-10-CM

## 2020-06-08 DIAGNOSIS — Z15.01 BRCA2 GENE MUTATION POSITIVE IN FEMALE: ICD-10-CM

## 2020-06-08 DIAGNOSIS — D69.6 THROMBOCYTOPENIA: ICD-10-CM

## 2020-06-08 DIAGNOSIS — C53.9 MALIGNANT NEOPLASM OF CERVIX, UNSPECIFIED SITE: Primary | ICD-10-CM

## 2020-06-08 DIAGNOSIS — Z15.09 BRCA2 GENE MUTATION POSITIVE IN FEMALE: ICD-10-CM

## 2020-06-08 LAB
ALBUMIN SERPL BCP-MCNC: 3.5 G/DL (ref 3.4–5)
ALBUMIN/GLOBULIN RATIO: 1.06 RATIO (ref 1.1–1.8)
ALP SERPL-CCNC: 82 U/L (ref 46–116)
ALT SERPL W P-5'-P-CCNC: 18 U/L (ref 12–78)
ANION GAP SERPL CALC-SCNC: 7 MMOL/L (ref 3–11)
AST SERPL-CCNC: 17 U/L (ref 15–37)
BANDS: 1 % (ref 0–5)
BILIRUB SERPL-MCNC: 0.5 MG/DL (ref 0–1)
BUN SERPL-MCNC: 18 MG/DL (ref 7–18)
BUN/CREAT SERPL: 13.95 RATIO (ref 7–18)
CALCIUM SERPL-MCNC: 8.7 MG/DL (ref 8.8–10.5)
CELLS COUNTED: 100
CHLORIDE SERPL-SCNC: 106 MMOL/L (ref 100–108)
CO2 SERPL-SCNC: 25 MMOL/L (ref 21–32)
CREAT SERPL-MCNC: 1.29 MG/DL (ref 0.55–1.02)
ERYTHROCYTE [DISTWIDTH] IN BLOOD BY AUTOMATED COUNT: 15.4 % (ref 12.5–18)
GFR ESTIMATION: 48
GLOBULIN: 3.3 G/DL (ref 2.3–3.5)
GLUCOSE SERPL-MCNC: 111 MG/DL (ref 70–110)
HCT VFR BLD AUTO: 21.6 % (ref 37–47)
HGB BLD-MCNC: 7.3 G/DL (ref 12–16)
LYMPHOCYTES NFR BLD: 30 % (ref 25–40)
MACROCYTES BLD QL SMEAR: ABNORMAL
MANUAL NRBC PER 100 CELLS: 1.3 %
MCH RBC QN AUTO: 38 PG (ref 27–31.2)
MCHC RBC AUTO-ENTMCNC: 33.8 G/DL (ref 31.8–35.4)
MCV RBC AUTO: 112.5 FL (ref 80–97)
MONOCYTES NFR BLD MANUAL: 3 % (ref 1–15)
NEUTROPHILS ABSOLUTE COUNT: 1.6 10*3/UL (ref 1.8–7.7)
NEUTROPHILS NFR BLD: 66 % (ref 37–80)
PLATELETS: 86 10*3/UL (ref 142–424)
POTASSIUM SERPL-SCNC: 4 MMOL/L (ref 3.6–5.2)
PROT SERPL-MCNC: 6.8 G/DL (ref 6.4–8.2)
RBC # BLD AUTO: 1.92 10*6/UL (ref 4.2–5.4)
SMALL PLATELETS BLD QL SMEAR: ABNORMAL
SODIUM BLD-SCNC: 138 MMOL/L (ref 135–145)
WBC # BLD: 2.4 10*3/UL (ref 4.6–10.2)

## 2020-06-08 PROCEDURE — 99215 OFFICE O/P EST HI 40 MIN: CPT | Mod: S$GLB,,, | Performed by: INTERNAL MEDICINE

## 2020-06-08 PROCEDURE — 99215 PR OFFICE/OUTPT VISIT, EST, LEVL V, 40-54 MIN: ICD-10-PCS | Mod: S$GLB,,, | Performed by: INTERNAL MEDICINE

## 2020-06-08 NOTE — TELEPHONE ENCOUNTER
Granddaughter call to verify mother appt and Chemo on tomorrow.  Informed that chemo is on hold on tomorrow due to lab.  Would like to switch appt to Friday with Annette Donis.   to assist.

## 2020-06-08 NOTE — PROGRESS NOTES
Medical Oncology Progress Note  [facility]     PATIENT: Adri Seay  : 1935 85 y.o. female  MRN [id number]     PCP: Bria Mitchell NP  Consult Requested By:      Date of Service: 2020    Subjective:   Interim History:  Malignant neoplasm of cervix, unspecified site    Adri Seay is here for follow-up on treatment;   Carbotaxol has been on hold because of leukopenia  The patient feel well and she had no complaint today    Oncology History:    History of Present Illness: Ms. Seay is a 84 y.o. female who presents for evaluation and management of cervical cancer. SHe was seen at [facility] on 19.      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 The patient had a CT scan consistent with widely metastatic disease including multiple sites of lymphadenopathy, a possible lung metastases, liver metastases, nodule in the anterior abdominal wall, and carcinomatosis. The patient is mostly asymptomatic but does have a vaginal discharge. Her performance status is 0. On exam, there is a 6 centimeter mass in the anterior abdominal wall superior to the umbilicus. On bimanual examination, tumor is a place the entire cervix apex of the vagina, and on rectovaginal examination, there is a large pelvic mass extending to the bilateral pelvic sidewalls.    We will get her CT reviewed at [facility] and if it is consistent with metastatic disease, we have recommended palliative chemotherapy with either carboplatinum as a single agent or carboplatinum and paclitaxel. Due to concern for bowel involvement on the CT scan, I have recommended not giving bevacizumab as part of this regimen. Patient will return home to [place] and begin chemotherapy with a medical oncologist there. I've also recommended a consultation with radiation oncology in [place] for consideration of  "palliative radiation to lower the chances of significant vaginal bleeding. We will see her back on an as-needed basis."       == 8/5/19 Carbo    ==PET/CT done on 12/2/19. Previous CT imaging done at outside facility, discussed with Dr Nguyen for comparison and he stated pelvic mass smaller, abdominal mass smaller liver lesion likely benign stable but difficult to say if peritoneal implants stable to progressed.    ==FoundationOne Liquid Bx on 12/16/19.  BRCA 2 gene mutation.  ==1/14/20 Pt started lynparza   ==5/13/2020 PET showing early progression.    ==5/2020 resume low dose carbo and taxol. On hold now        Malignant neoplasm of endocervix    6/27/2019 Initial Diagnosis     Malignant neoplasm of exocervix      7/8/2019 - 7/8/2019 Chemotherapy     Treatment Summary   Plan Name: OP CARBOPLATIN WEEKLY  Treatment Goal: Palliative  Status: Inactive  Start Date: [No treatment day found]  End Date: [No treatment day found]  Provider: Jaime Pinon MD  Chemotherapy: CARBOplatin (PARAPLATIN) in sodium chloride 0.9% 250 mL chemo infusion, , Intravenous, Clinic/HOD 1 time, 0 of 4 cycles      7/9/2019 - 11/25/2019 Chemotherapy     Treatment Summary   Plan Name: OP GYN CARBOPLATIN (AUC) Q4W  Treatment Goal: Palliative  Status: Inactive  Start Date: 7/9/2019  End Date: 10/29/2019  Provider: Jaime Pinon MD  Chemotherapy: CARBOplatin (PARAPLATIN) in sodium chloride 0.9% 250 mL chemo infusion,  (original dose 341.5 mg), Intravenous, Clinic/HOD 1 time, 5 of 6 cycles  Dose modification:   (original dose 341.5 mg, Cycle 1)      5/19/2020 - 6/16/2020 Chemotherapy     Treatment Summary   Plan Name: OP  PACLITAXEL CARBOPLATIN WEEKLY  Treatment Goal: Palliative  Status: Inactive  Start Date: 5/19/2020  End Date: 5/22/2020  Provider: Annette Donis NP  Chemotherapy: CARBOplatin (PARAPLATIN) 115 mg in sodium chloride 0.9% 250 mL chemo infusion, 115 mg (98.6 % of original dose 118.4 mg), Intravenous, Clinic/HOD 1 time, 0 of 1 " cycle  Dose modification:   (original dose 118.4 mg, Cycle 1)  PACLitaxeL (TAXOL) 80 mg/m2 = 126 mg in sodium chloride 0.9% 250 mL chemo infusion, 80 mg/m2 = 126 mg (100 % of original dose 80 mg/m2), Intravenous, Clinic/HOD 1 time, 0 of 1 cycle  Dose modification: 80 mg/m2 (original dose 80 mg/m2, Cycle 1)      5/19/2020 -  Chemotherapy     Treatment Summary   Plan Name:  PACLITAXEL CARBOPLATIN WEEKLY  Treatment Goal: Palliative  Status: Active  Start Date: 5/19/2020  End Date: 6/16/2020 (Planned)  Provider: Annette Donis NP  Chemotherapy: CARBOplatin (PARAPLATIN) 115 mg in sodium chloride 0.9% 250 mL chemo infusion, 115 mg (100 % of original dose 114.8 mg), Intravenous, Clinic/HOD 1 time, 1 of 1 cycle  Dose modification:   (original dose 114.8 mg, Cycle 1, Reason: MD Discretion), 100 mg (original dose 114.8 mg, Cycle 1)  PACLitaxeL (TAXOL) 80 mg/m2 = 126 mg in sodium chloride 0.9% 250 mL chemo infusion, 80 mg/m2 = 126 mg (100 % of original dose 80 mg/m2), Intravenous, Clinic/HOD 1 time, 1 of 1 cycle  Dose modification: 80 mg/m2 (original dose 80 mg/m2, Cycle 1), 50 mg/m2 (original dose 80 mg/m2, Cycle 1)         Past Medical History:   Past Medical History:   Diagnosis Date    Anemia     Cataract     Cervical cancer 05/2019    Hypertension        Past Surgical HIstory:   Past Surgical History:   Procedure Laterality Date    CATARACT EXTRACTION, BILATERAL      WRIST SURGERY  1984       Allergies:  Review of patient's allergies indicates:  No Known Allergies    Medications:  Current Outpatient Medications   Medication Sig Dispense Refill    cloNIDine (CATAPRES) 0.3 MG tablet Take 1 tablet (0.3 mg total) by mouth 2 (two) times daily. 180 tablet 2    FENOFIBRATE ORAL Take by mouth 2 (two) times daily.      ferrous sulfate (FEOSOL) 325 mg (65 mg iron) Tab tablet TK 1 T PO BID  1    loratadine (CLARITIN) 10 mg tablet Take 10 mg by mouth once daily.      megestrol (MEGACE) 40 MG Tab Take 1 tablet (40 mg total)  by mouth 2 (two) times daily. 60 tablet 6    NIFEdipine (PROCARDIA-XL) 30 MG (OSM) 24 hr tablet TAKE 1 TABLET BY MOUTH ONCE DAILY 90 tablet 5    olaparib 100 mg Tab Take 2 tablets (200mg) by mouth 2 (two) times daily 120 tablet 6    ondansetron (ZOFRAN-ODT) 8 MG TbDL Take 1 tablet (8 mg total) by mouth every 8 (eight) hours as needed (chemotherapy-induced nausea and vomiting). 40 tablet 5    simvastatin (ZOCOR) 20 MG tablet Take 20 mg by mouth every evening.      varicella-zoster [id number], PF, (SHINGRIX, PF,) 50 mcg/0.5 mL injection Administer two doses 2 months apart 0.5 mL 0     No current facility-administered medications for this visit.        Family History:   Family History   Problem Relation Age of Onset    Hypertension Mother     Pneumonia Father     Breast cancer Sister     No Known Problems Brother     Leukemia Daughter     No Known Problems Son        Social History:  reports that she has never smoked. She has never used smokeless tobacco. She reports that she drank alcohol. She reports that she does not use drugs.    Review of Systemas  Constitutional: No change in weight, appetie, fatigue, fever, or night sweats  Eyes: No changes in vision  Ears, Nose, Mouth, Throat, and Face: No hearing problems, ear pain, rummy nose, or sore throat  Respiratory: No shortness of breath or cough  Cardiovascular: No chest pain, palpitations or dizziness  Gastrointestinal: No abdominal pain, hematochezia, melena  Genitourinary: No dysuria, hematuria, nocturia, menstrual problems, post menopausal  Integumentary/Breast: No rashes or itching  Hematologic/Lymphatic: No anemia or bleeding abnormalities  Musculoskeletal: No joint or muscle abnormalities  Neurological: No sensory or motor problems, no headaches  Behavioral/Psych: No depression, anxiety, cognitive problems, or stress  Endocrine: No diabetes or thyroid problems  Allergic/Immunologic: See allergy above    Physical Exam      Vitals:   Vitals:    06/08/20  "1013   BP: 109/66   BP Location: Left arm   Patient Position: Sitting   BP Method: Small (Automatic)   Pulse: (!) 48   Resp: 17   Temp: 97.8 °F (36.6 °C)   TempSrc: Temporal   SpO2: 99%   Weight: 62.6 kg (138 lb)   Height: 5' 2" (1.575 m)     BMI: Body mass index is 25.24 kg/m².  BSA Body surface area is 1.65 meters squared.  ECOG Performance Status:   ECOG SCORE    1 - Restricted in strenuous activity-ambulatory and able to carry out work of a light nature       GENERAL APPEARANCE: Well developed, well nourished, in no acute distress.  SKIN: Inspection of the skin reveals no rashes, ulcerations or petechiae.  HEENT: The sclerae were anicteric and conjunctivae were pink and moist. Extraocular movements were intact and pupils were equal, round, and reactive to light with normal accommodation. External inspection of the ears and nose showed no scars, lesions, or masses. Lips, teeth, and gums showed normal mucosa. The oral mucosa, hard and soft palate, tongue and posterior pharynx were normal.  NECK: Supple and symmetric. There was no thyroid enlargement, and no tenderness, or masses were felt.  CRESPIRATORY: Normal AP diameter and normal contour without any kyphoscoliosis. LUNGS: Auscultation of the lungs revealed normal breath sounds without any other adventitious sounds or rubs.  CARDIOVASCULAR: There was a regular rate and rhythm without any murmurs, gallops, rubs. The carotid pulses were normal and 2+ bilaterally without bruits. Peripheral pulses were 2+ and symmetric.  GASTROINTESTINAL: Soft and nontender with normal bowel sounds. The liver span was approximately 5-6 cm in the right midclavicular line by percussion. The liver edge was nontender. The spleen was not palpable. There were no inguinal or umbilical hernias noted. No ascites was noted.  LYMPH NODES: No lymphadenopathy was appreciated in the neck, axillae or groin.  MUSCULOSKELETAL: Gait was normal. There was no tenderness or effusions noted. Muscle " strength and tone were normal. EXTREMITIES: No cyanosis, clubbing or edema.  NEUROLOGIC: Alert and oriented x 3. Normal affect. Gait was normal. Normal deep tendon reflexes with no pathological reflexes. Sensation to touch was normal.  PSYCHIATRIC: good mood, orientated to place, time and person     Labs and Imagings     Orders Only on 06/08/2020   Component Date Value Ref Range Status    WBC 06/08/2020 2.4* 4.6 - 10.2 10*3/uL Final    Critical value: Called to:KEYANA Davis 1045 on 06/08/20 by [id number].Result read-back successful. Y    RBC 06/08/2020 1.92* 4.2 - 5.4 10*6/uL Final    Hemoglobin 06/08/2020 7.3* 12.0 - 16.0 g/dL Final    Hematocrit 06/08/2020 21.6* 37.0 - 47.0 % Final    MCV 06/08/2020 112.5* 80 - 97 fL Final    MCH 06/08/2020 38.0* 27.0 - 31.2 pg Final    MCHC 06/08/2020 33.8  31.8 - 35.4 g/dL Final    RDW RBC Auto-Rto 06/08/2020 15.4  12.5 - 18.0 % Final    Platelets 06/08/2020 86* 142 - 424 10*3/uL Final    Manual nRBC per 100 Cells 06/08/2020 1.3  % Final    Neutrophils 06/08/2020 66.0  37 - 80 % Final    Neutrophils Absolute 06/08/2020 1.6* 1.8 - 7.7 10*3/uL Final    BANDS 06/08/2020 1.0  0 - 5 % Final    Lymphocytes 06/08/2020 30.0  25 - 40 % Final    Monocytes 06/08/2020 3.0  1 - 15 % Final    Cells Counted 06/08/2020 100   Final    Platelet Estimate 06/08/2020 Decreased   Final    Macrocytes 06/08/2020 3+   Final   Orders Only on 06/08/2020   Component Date Value Ref Range Status    Sodium 06/08/2020 138  135 - 145 mmol/L Final    Potassium 06/08/2020 4.0  3.6 - 5.2 mmol/L Final    Chloride 06/08/2020 106  100 - 108 mmol/L Final    CO2 06/08/2020 25  21 - 32 mmol/L Final    Anion Gap 06/08/2020 7.0  3.0 - 11.0 mmol/L Final    BUN, Bld 06/08/2020 18  7 - 18 mg/dL Final    Creatinine 06/08/2020 1.29* 0.55 - 1.02 mg/dL Final    GFR ESTIMATION 06/08/2020 48* >60 Final               DESCRIPTION       GLOMERULAR FILTRATION RATE             NORMAL                     >60              KIDNEY  DISEASE           15-60             KIDNEY FAILURE             <15    BUN/Creatinine Ratio 06/08/2020 13.95  7 - 18 Ratio Final    Glucose 06/08/2020 111* 70 - 110 mg/dL Final    Calcium 06/08/2020 8.7* 8.8 - 10.5 mg/dL Final    Total Bilirubin 06/08/2020 0.5  0.0 - 1.0 mg/dL Final    AST 06/08/2020 17  15 - 37 U/L Final    ALT 06/08/2020 18  12 - 78 U/L Final    Total Protein 06/08/2020 6.8  6.4 - 8.2 g/dL Final    Albumin 06/08/2020 3.5  3.4 - 5.0 g/dL Final    Globulin 06/08/2020 3.3  2.3 - 3.5 g/dL Final    Albumin/Globulin Ratio 06/08/2020 1.060* 1.1 - 1.8 Ratio Final    Alkaline Phosphatase 06/08/2020 82  46 - 116 U/L Final   Ancillary Orders on 05/29/2020   Component Date Value Ref Range Status    WBC 06/01/2020 1.9* 4.6 - 10.2 10*3/uL Final    Critical value: Called to:KEYANA Davis 1128 on 06/01/20 by [id number].Result read-back successful. Y    RBC 06/01/2020 1.94* 4.2 - 5.4 10*6/uL Final    Hemoglobin 06/01/2020 7.2* 12.0 - 16.0 g/dL Final    Hematocrit 06/01/2020 22.4* 37.0 - 47.0 % Final    MCV 06/01/2020 115.5* 80 - 97 fL Final    MCH 06/01/2020 37.1* 27.0 - 31.2 pg Final    MCHC 06/01/2020 32.1  31.8 - 35.4 g/dL Final    RDW RBC Auto-Rto 06/01/2020 14.9  12.5 - 18.0 % Final    Platelets 06/01/2020 93* 142 - 424 10*3/uL Final    Manual nRBC per 100 Cells 06/01/2020 1.6  % Final    Sodium 06/01/2020 140  135 - 145 mmol/L Final    Potassium 06/01/2020 4.0  3.6 - 5.2 mmol/L Final    Chloride 06/01/2020 107  100 - 108 mmol/L Final    CO2 06/01/2020 23  21 - 32 mmol/L Final    Anion Gap 06/01/2020 10.0  3.0 - 11.0 mmol/L Final    BUN, Bld 06/01/2020 19* 7 - 18 mg/dL Final    Creatinine 06/01/2020 1.31* 0.55 - 1.02 mg/dL Final    GFR ESTIMATION 06/01/2020 47* >60 Final               DESCRIPTION       GLOMERULAR FILTRATION RATE             NORMAL                     >60             KIDNEY  DISEASE           15-60             KIDNEY FAILURE             <15     BUN/Creatinine Ratio 06/01/2020 14.50  7 - 18 Ratio Final    Glucose 06/01/2020 155* 70 - 110 mg/dL Final    Calcium 06/01/2020 8.2* 8.8 - 10.5 mg/dL Final    Total Bilirubin 06/01/2020 0.4  0.0 - 1.0 mg/dL Final    AST 06/01/2020 17  15 - 37 U/L Final    ALT 06/01/2020 17  12 - 78 U/L Final    Total Protein 06/01/2020 5.9* 6.4 - 8.2 g/dL Final    Albumin 06/01/2020 3.3* 3.4 - 5.0 g/dL Final    Globulin 06/01/2020 2.6  2.3 - 3.5 g/dL Final    Albumin/Globulin Ratio 06/01/2020 1.269  1.1 - 1.8 Ratio Final    Alkaline Phosphatase 06/01/2020 72  46 - 116 U/L Final    Neutrophils 06/01/2020 40.0  37 - 80 % Final    Neutrophils Absolute 06/01/2020 0.8* 1.8 - 7.7 10*3/uL Final    BANDS 06/01/2020 3.0  0 - 5 % Final    Lymphocytes 06/01/2020 49.0* 25 - 40 % Final    Monocytes 06/01/2020 7.0  1 - 15 % Final    ATYPICAL LYMPHOCYTES 06/01/2020 1.0* 0 - 0 % Final    Cells Counted 06/01/2020 100   Final    Platelet Estimate 06/01/2020 Decreased   Final    Macrocytes 06/01/2020 1+   Final   Orders Only on 05/22/2020   Component Date Value Ref Range Status    Sodium 05/22/2020 138  135 - 145 mmol/L Final    Potassium 05/22/2020 4.1  3.6 - 5.2 mmol/L Final    Chloride 05/22/2020 102  100 - 108 mmol/L Final    CO2 05/22/2020 26  21 - 32 mmol/L Final    Anion Gap 05/22/2020 10.0  3.0 - 11.0 mmol/L Final    BUN, Bld 05/22/2020 33* 7 - 18 mg/dL Final    Creatinine 05/22/2020 1.40* 0.55 - 1.02 mg/dL Final    GFR ESTIMATION 05/22/2020 44* >60 Final               DESCRIPTION       GLOMERULAR FILTRATION RATE             NORMAL                     >60             KIDNEY  DISEASE           15-60             KIDNEY FAILURE             <15    BUN/Creatinine Ratio 05/22/2020 23.57* 7 - 18 Ratio Final    Glucose 05/22/2020 130* 70 - 110 mg/dL Final    Calcium 05/22/2020 8.5* 8.8 - 10.5 mg/dL Final    Total Bilirubin 05/22/2020 0.5  0.0 - 1.0 mg/dL Final    AST 05/22/2020 21  15 - 37 U/L Final    ALT 05/22/2020  21  12 - 78 U/L Final    Total Protein 05/22/2020 6.8  6.4 - 8.2 g/dL Final    Albumin 05/22/2020 3.9  3.4 - 5.0 g/dL Final    Globulin 05/22/2020 2.9  2.3 - 3.5 g/dL Final    Albumin/Globulin Ratio 05/22/2020 1.344  1.1 - 1.8 Ratio Final    Alkaline Phosphatase 05/22/2020 92  46 - 116 U/L Final   Orders Only on 05/22/2020   Component Date Value Ref Range Status    Macrocytes 05/22/2020 2+   Final    WBC 05/22/2020 5.8  4.6 - 10.2 10*3/uL Final    RBC 05/22/2020 2.47* 4.2 - 5.4 10*6/uL Final    Hemoglobin 05/22/2020 9.4* 12.0 - 16.0 g/dL Final    Hematocrit 05/22/2020 27.5* 37.0 - 47.0 % Final    MCV 05/22/2020 111.3* 80 - 97 fL Final    MCH 05/22/2020 38.1* 27.0 - 31.2 pg Final    MCHC 05/22/2020 34.2  31.8 - 35.4 g/dL Final    RDW RBC Auto-Rto 05/22/2020 14.4  12.5 - 18.0 % Final    Platelets 05/22/2020 140* 142 - 424 10*3/uL Final    Neutrophils/100 leukocytes 05/22/2020 82.0* 37 - 80 % Final    Lymphocytes/100 leukocytes 05/22/2020 13.0* 25 - 40 % Final    Monocytes/100 leukocytes 05/22/2020 3.3  1 - 15 % Final    Eosinophils/100 leukocytes 05/22/2020 0.9* 1 - 3 % Final    Basophils/100 leukocytes 05/22/2020 0.3  0 - 3 % Final    Manual nRBC per 100 Cells 05/22/2020 0.3  % Final    Neutrophils 05/22/2020 4.79  1.8 - 7.7 10*3/uL Final   Orders Only on 05/12/2020   Component Date Value Ref Range Status    Macrocytes 05/12/2020 2+   Final    WBC 05/12/2020 4.7  4.6 - 10.2 10*3/uL Final    RBC 05/12/2020 2.72* 4.2 - 5.4 10*6/uL Final    Hemoglobin 05/12/2020 10.1* 12.0 - 16.0 g/dL Final    Hematocrit 05/12/2020 30.3* 37.0 - 47.0 % Final    MCV 05/12/2020 111.4* 80 - 97 fL Final    MCH 05/12/2020 37.1* 27.0 - 31.2 pg Final    MCHC 05/12/2020 33.3  31.8 - 35.4 g/dL Final    RDW RBC Auto-Rto 05/12/2020 14.4  12.5 - 18.0 % Final    Platelets 05/12/2020 149  142 - 424 10*3/uL Final    Neutrophils/100 leukocytes 05/12/2020 56.9  37 - 80 % Final    Lymphocytes/100 leukocytes 05/12/2020  28.5  25 - 40 % Final    Monocytes/100 leukocytes 05/12/2020 12.1  1 - 15 % Final    Eosinophils/100 leukocytes 05/12/2020 1.5  1 - 3 % Final    Basophils/100 leukocytes 05/12/2020 0.8  0 - 3 % Final    Manual nRBC per 100 Cells 05/12/2020 0.4  % Final    Neutrophils 05/12/2020 2.68  1.8 - 7.7 10*3/uL Final   Orders Only on 05/12/2020   Component Date Value Ref Range Status    Sodium 05/12/2020 139  135 - 145 mmol/L Final    Potassium 05/12/2020 3.8  3.6 - 5.2 mmol/L Final    Chloride 05/12/2020 104  100 - 108 mmol/L Final    CO2 05/12/2020 27  21 - 32 mmol/L Final    BUN, Bld 05/12/2020 25* 7 - 18 mg/dL Final    Creatinine 05/12/2020 1.17* 0.55 - 1.02 mg/dL Final    GFR ESTIMATION 05/12/2020 54* >60 Final               DESCRIPTION       GLOMERULAR FILTRATION RATE             NORMAL                     >60             KIDNEY  DISEASE           15-60             KIDNEY FAILURE             <15    BUN/Creatinine Ratio 05/12/2020 21.36* 7 - 18 Ratio Final    Glucose 05/12/2020 107  70 - 110 mg/dL Final    Calcium 05/12/2020 9.4  8.8 - 10.5 mg/dL Final    Total Bilirubin 05/12/2020 0.6  0.0 - 1.0 mg/dL Final    AST 05/12/2020 22  15 - 37 U/L Final    ALT 05/12/2020 21  12 - 78 U/L Final    Total Protein 05/12/2020 7.4  6.4 - 8.2 g/dL Final    Albumin 05/12/2020 4.2  3.4 - 5.0 g/dL Final    Globulin 05/12/2020 3.2  2.3 - 3.5 g/dL Final    Albumin/Globulin Ratio 05/12/2020 1.312  1.1 - 1.8 Ratio Final    Alkaline Phosphatase 05/12/2020 100  46 - 116 U/L Final    Phosphorus 05/12/2020 3.3  2.6 - 4.7 mg/dL Final    Magnesium 05/12/2020 1.5* 1.8 - 2.4 mg/dL Final   Orders Only on 04/22/2020   Component Date Value Ref Range Status    Phosphorus 04/22/2020 3.9  2.6 - 4.7 mg/dL Final    Sodium 04/22/2020 137  135 - 145 mmol/L Final    Potassium 04/22/2020 3.9  3.6 - 5.2 mmol/L Final    Chloride 04/22/2020 104  100 - 108 mmol/L Final    CO2 04/22/2020 23  21 - 32 mmol/L Final    BUN, Bld 04/22/2020  21* 7 - 18 mg/dL Final    Creatinine 04/22/2020 1.49* 0.55 - 1.02 mg/dL Final    GFR ESTIMATION 04/22/2020 41* >60 Final               DESCRIPTION       GLOMERULAR FILTRATION RATE             NORMAL                     >60             KIDNEY  DISEASE           15-60             KIDNEY FAILURE             <15    BUN/Creatinine Ratio 04/22/2020 14.09  7 - 18 Ratio Final    Glucose 04/22/2020 110  70 - 110 mg/dL Final    Calcium 04/22/2020 9.6  8.8 - 10.5 mg/dL Final    Total Bilirubin 04/22/2020 0.6  0.0 - 1.0 mg/dL Final    AST 04/22/2020 18  15 - 37 U/L Final    ALT 04/22/2020 18  12 - 78 U/L Final    Total Protein 04/22/2020 7.7  6.4 - 8.2 g/dL Final    Albumin 04/22/2020 4.0  3.4 - 5.0 g/dL Final    Globulin 04/22/2020 3.7* 2.3 - 3.5 g/dL Final    Albumin/Globulin Ratio 04/22/2020 1.081* 1.1 - 1.8 Ratio Final    Alkaline Phosphatase 04/22/2020 98  46 - 116 U/L Final    Magnesium 04/22/2020 1.5* 1.8 - 2.4 mg/dL Final   Orders Only on 04/22/2020   Component Date Value Ref Range Status    WBC 04/22/2020 5.3  4.6 - 10.2 10*3/uL Final    RBC 04/22/2020 2.44* 4.2 - 5.4 10*6/uL Final    Hemoglobin 04/22/2020 9.3* 12.0 - 16.0 g/dL Final    Hematocrit 04/22/2020 27.3* 37.0 - 47.0 % Final    MCV 04/22/2020 111.9* 80 - 97 fL Final    MCH 04/22/2020 38.1* 27.0 - 31.2 pg Final    MCHC 04/22/2020 34.1  31.8 - 35.4 g/dL Final    RDW RBC Auto-Rto 04/22/2020 15.0  12.5 - 18.0 % Final    Platelets 04/22/2020 164  142 - 424 10*3/uL Final    Neutrophils/100 leukocytes 04/22/2020 60.5  37 - 80 % Final    Lymphocytes/100 leukocytes 04/22/2020 27.4  25 - 40 % Final    Monocytes/100 leukocytes 04/22/2020 9.0  1 - 15 % Final    Eosinophils/100 leukocytes 04/22/2020 1.9  1 - 3 % Final    Basophils/100 leukocytes 04/22/2020 0.8  0 - 3 % Final    Manual nRBC per 100 Cells 04/22/2020 0.6  % Final    Neutrophils 04/22/2020 3.18  1.8 - 7.7 10*3/uL Final    Macrocytes 04/22/2020 1+   Final   Orders Only on  03/16/2020   Component Date Value Ref Range Status    Phosphorus 03/16/2020 3.2  2.5 - 4.5 mg/dL Final    Comment: NOTE  Testing performed at:  [facility], [address] [place]  [address] CLIA #:[id number]      Magnesium 03/16/2020 1.81  1.60 - 2.40 mg/dL Final    Comment: NOTE  Testing performed at:  [facility], [address] [place]  [address] CLIA #:[id number]      Glucose 03/16/2020 103  82 - 115 mg/dL Final    BUN, Bld 03/16/2020 17.2  8 - 23 mg/dL Final    Creatinine 03/16/2020 1.17* 0.50 - 0.90 mg/dL Final    AST 03/16/2020 17  0 - 32 U/L Final    ALT (SGPT) 03/16/2020 11  0 - 33 U/L Final    Alkaline Phosphatase 03/16/2020 97  35 - 105 U/L Final    Calcium 03/16/2020 10.0  8.6 - 10.2 mg/dL Final    Protein, Total 03/16/2020 7.6  6.4 - 8.3 g/dL Final    Albumin 03/16/2020 4.6  3.5 - 5.2 g/dL Final    BILIRUBIN, TOTAL 03/16/2020 0.47  0.00 - 1.20 mg/dL Final    Sodium 03/16/2020 140  136 - 145 mmol/L Final    Potassium 03/16/2020 4.5  3.5 - 5.1 mmol/L Final    Chloride 03/16/2020 107  98 - 107 mmol/L Final    CO2 03/16/2020 21* 22 - 29 mmol/L Final    Globulin 03/16/2020 3.0  1.5 - 4.5 g/dL Final    Albumin/Globulin Ratio 03/16/2020 1.5  1.0 - 2.7 Final    BUN/Creatinine Ratio 03/16/2020 14.7  6 - 20 Final    GFR ESTIMATION 03/16/2020 44.28* >60.00 Final    Comment: GFR estimation is reported as mL/min/1.73m squared.  It is recommended  that GFR values for  be multiplied x 1.212.  The  higher the GFR, the better the kidney function.  A GFR of >60 is  considered normal (depending on age and whether the patient is a male  or female.      Anion Gap 03/16/2020 12.0  8.0 - 17.0 mmol/L Final    Comment: NOTE  Testing performed at:  [facility], [address] [place] [address]  [address] CLIA #:[id number]      WBC 03/16/2020 4.79  4.3 - 10.8 X 10 3/ul Final    RBC 03/16/2020 2.68* 4.2 - 5.4 X 10 6/ul Final    RDW-SD 03/16/2020  67.5* 37 - 54 fl Final    Hemoglobin 03/16/2020 9.7* 12 - 16 g/dL Final    Hematocrit 03/16/2020 30.3* 37 - 47 % Final    MCV 03/16/2020 113.1* 82 - 100 fl Final    MCH 03/16/2020 36.2* 27 - 32 pg Final    MCHC 03/16/2020 32.0  32 - 36 g/dL Final    Platelets 03/16/2020 163  135 - 400 X 10 3/ul Final    Neutrophils 03/16/2020 58.3  % Final    Lymphocytes 03/16/2020 29.2  19.3 - 53.1 % Final    Monocytes 03/16/2020 10.4  4.7 - 12.5 % Final    Eosinophils 03/16/2020 1.5  0.7 - 7.0 % Final    Basophils 03/16/2020 0.4  0.2 - 1.2 % Final    Neutrophils Absolute 03/16/2020 2.79  2.15 - 7.56 X 10 3/ul Final    ABSOLUTE LYMPHOCYTE 03/16/2020 1.40  0.86 - 4.75 X 10 3/ul Final    ABSOLUTE MONOCYTE 03/16/2020 0.50  0.22 - 1.08 X 10 3/ul Final    ABSOLUTE EOSINOPHIL 03/16/2020 0.07  0.04 - 0.54 X 10 3/ul Final    ABSOLUTE BASOPHIL 03/16/2020 0.02  0.00 - 0.22 X 10 3/ul Final    ABSOLUTE IMMATURE GRAN 03/16/2020 0.01  0 - 0.04 X 10 3/ul Final    Immature Granulocytes 03/16/2020 0.2  0 - 0.5 % Final    IG includes metamyelocytes, myelocytes, and promyelocytes    nRBC# 03/16/2020 0.4* 0 - 0.2 /100 WBC Final    ABS NRBC COUNT 03/16/2020 0.020* 0 - 0.012 x 10 3/ul Final    Comment: 2+ MACRO, 2+ ANISO, 1+ OVAL, 1+ TEAR DROP CELLS  Platelet morphology normal  NOTE  Testing performed at:  [facility], [address] [place] [address]  [address] CLIA #:[id number]     There may be more visits with results that are not included.       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 female with PMH of The primary encounter diagnosis was Malignant neoplasm of cervix, unspecified site. 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 Malignant neoplasm of cervix, unspecified site    3. Increased creatine kinase level    4. Neoplastic malignant related fatigue       Cervical cancer stage IV disease  BRCA2 +  mutation   PARP inhibitor  Lynparza 300 mg BID PET scan shows early progression 4/2020  weekly carbo/taxol   Pancytopenia       Plan:   Overall PLANS:  Lynparza  plus carbo Taxol weekly    == lab CBC CMP every week  ==On Lynparza 100 mg BID; Keep current dose( this is a low dose).   == hold chemotherapy today and resume chemotherapy next week if CBC is acceptable  Will reduce chemo dose further next week  Will reduce carbo to 50 mg IV weekly, Taxol 60 mg IV weekly, all fixed doses  == return to clinic 1 week      Signature    Abundio Tabares M.D., Ph.D., [facility]  Hematology-Oncology    Signed 6/8/2020 11:24 AM

## 2020-06-18 ENCOUNTER — TELEPHONE (OUTPATIENT)
Dept: HEMATOLOGY/ONCOLOGY | Facility: CLINIC | Age: 85
End: 2020-06-18

## 2020-06-18 LAB
ALBUMIN SERPL BCP-MCNC: 3.8 G/DL (ref 3.4–5)
ALBUMIN/GLOBULIN RATIO: 1.31 RATIO (ref 1.1–1.8)
ALP SERPL-CCNC: 109 U/L (ref 46–116)
ALT SERPL W P-5'-P-CCNC: 21 U/L (ref 12–78)
ANION GAP SERPL CALC-SCNC: 11 MMOL/L (ref 3–11)
AST SERPL-CCNC: 17 U/L (ref 15–37)
BILIRUB SERPL-MCNC: 0.6 MG/DL (ref 0–1)
BUN SERPL-MCNC: 30 MG/DL (ref 7–18)
BUN/CREAT SERPL: 22.38 RATIO (ref 7–18)
CALCIUM SERPL-MCNC: 8.7 MG/DL (ref 8.8–10.5)
CELLS COUNTED: 50
CHLORIDE SERPL-SCNC: 108 MMOL/L (ref 100–108)
CO2 SERPL-SCNC: 22 MMOL/L (ref 21–32)
CREAT SERPL-MCNC: 1.34 MG/DL (ref 0.55–1.02)
EOSINOPHIL NFR BLD: 2 % (ref 1–3)
ERYTHROCYTE [DISTWIDTH] IN BLOOD BY AUTOMATED COUNT: 15.7 % (ref 12.5–18)
GFR ESTIMATION: 46
GLOBULIN: 2.9 G/DL (ref 2.3–3.5)
GLUCOSE SERPL-MCNC: 95 MG/DL (ref 70–110)
HCT VFR BLD AUTO: 18.2 % (ref 37–47)
HGB BLD-MCNC: 6.1 G/DL (ref 12–16)
LYMPHOCYTES NFR BLD: 48 % (ref 25–40)
MACROCYTES BLD QL SMEAR: ABNORMAL
MANUAL NRBC PER 100 CELLS: 0 %
MCH RBC QN AUTO: 37.9 PG (ref 27–31.2)
MCHC RBC AUTO-ENTMCNC: 33.5 G/DL (ref 31.8–35.4)
MCV RBC AUTO: 113 FL (ref 80–97)
MONOCYTES NFR BLD MANUAL: 8 % (ref 1–15)
NEUTROPHILS ABSOLUTE COUNT: 1.3 10*3/UL (ref 1.8–7.7)
NEUTROPHILS NFR BLD: 42 % (ref 37–80)
PLATELETS: 40 10*3/UL (ref 142–424)
POTASSIUM SERPL-SCNC: 4.8 MMOL/L (ref 3.6–5.2)
PROT SERPL-MCNC: 6.7 G/DL (ref 6.4–8.2)
RBC # BLD AUTO: 1.61 10*6/UL (ref 4.2–5.4)
SMALL PLATELETS BLD QL SMEAR: ABNORMAL
SODIUM BLD-SCNC: 141 MMOL/L (ref 135–145)
WBC # BLD: 3.1 10*3/UL (ref 4.6–10.2)

## 2020-06-19 ENCOUNTER — OFFICE VISIT (OUTPATIENT)
Dept: HEMATOLOGY/ONCOLOGY | Facility: CLINIC | Age: 85
End: 2020-06-19
Payer: MEDICARE

## 2020-06-19 VITALS
BODY MASS INDEX: 24.63 KG/M2 | HEIGHT: 62 IN | WEIGHT: 133.81 LBS | RESPIRATION RATE: 18 BRPM | DIASTOLIC BLOOD PRESSURE: 74 MMHG | OXYGEN SATURATION: 95 % | SYSTOLIC BLOOD PRESSURE: 122 MMHG | HEART RATE: 64 BPM | TEMPERATURE: 98 F

## 2020-06-19 DIAGNOSIS — D63.0 ANEMIA IN NEOPLASTIC DISEASE: ICD-10-CM

## 2020-06-19 DIAGNOSIS — Z15.01 BRCA2 GENE MUTATION POSITIVE IN FEMALE: ICD-10-CM

## 2020-06-19 DIAGNOSIS — D69.6 THROMBOCYTOPENIA: Primary | ICD-10-CM

## 2020-06-19 DIAGNOSIS — Z15.02 BRCA2 GENE MUTATION POSITIVE IN FEMALE: ICD-10-CM

## 2020-06-19 DIAGNOSIS — C53.0 MALIGNANT NEOPLASM OF ENDOCERVIX: ICD-10-CM

## 2020-06-19 DIAGNOSIS — Z15.09 BRCA2 GENE MUTATION POSITIVE IN FEMALE: ICD-10-CM

## 2020-06-19 PROCEDURE — 99214 OFFICE O/P EST MOD 30 MIN: CPT | Mod: S$GLB,,, | Performed by: NURSE PRACTITIONER

## 2020-06-19 PROCEDURE — 99214 PR OFFICE/OUTPT VISIT, EST, LEVL IV, 30-39 MIN: ICD-10-PCS | Mod: S$GLB,,, | Performed by: NURSE PRACTITIONER

## 2020-06-23 NOTE — PROGRESS NOTES
Medical Oncology Progress Note  [facility]     PATIENT: Adri Seay  : 1935 85 y.o. female  MRN [id number]     PCP: Bria Mitchell NP  Consult Requested By:      Date of Service: 2020    Subjective:   Interim History:  BRCA2 gene mutation positive in female    Adri Seay is here for follow-up on treatment;   Carbotaxol has been on hold because of leukopenia  The patient feel well and she had no complaint today    Oncology History:    History of Present Illness: Ms. Seay is a 84 y.o. female who presents for evaluation and management of cervical cancer. SHe was seen at [facility] on 19.      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 The patient had a CT scan consistent with widely metastatic disease including multiple sites of lymphadenopathy, a possible lung metastases, liver metastases, nodule in the anterior abdominal wall, and carcinomatosis. The patient is mostly asymptomatic but does have a vaginal discharge. Her performance status is 0. On exam, there is a 6 centimeter mass in the anterior abdominal wall superior to the umbilicus. On bimanual examination, tumor is a place the entire cervix apex of the vagina, and on rectovaginal examination, there is a large pelvic mass extending to the bilateral pelvic sidewalls.    We will get her CT reviewed at [facility] and if it is consistent with metastatic disease, we have recommended palliative chemotherapy with either carboplatinum as a single agent or carboplatinum and paclitaxel. Due to concern for bowel involvement on the CT scan, I have recommended not giving bevacizumab as part of this regimen. Patient will return home to [place] and begin chemotherapy with a medical oncologist there. I've also recommended a consultation with radiation oncology in [place] for consideration of palliative  "radiation to lower the chances of significant vaginal bleeding. We will see her back on an as-needed basis."       == 8/5/19 Carbo    ==PET/CT done on 12/2/19. Previous CT imaging done at outside facility, discussed with Dr Nguyen for comparison and he stated pelvic mass smaller, abdominal mass smaller liver lesion likely benign stable but difficult to say if peritoneal implants stable to progressed.    ==FoundationOne Liquid Bx on 12/16/19.  BRCA 2 gene mutation.  ==1/14/20 Pt started lynparza   ==5/13/2020 PET showing early progression.    ==5/2020 resume low dose carbo and taxol. On hold now due to pancytopenia     Oncology History   Malignant neoplasm of endocervix   6/27/2019 Initial Diagnosis    Malignant neoplasm of exocervix     7/8/2019 - 7/8/2019 Chemotherapy    Treatment Summary   Plan Name: OP CARBOPLATIN WEEKLY  Treatment Goal: Palliative  Status: Inactive  Start Date: [No treatment day found]  End Date: [No treatment day found]  Provider: Jaime Pinon MD  Chemotherapy: CARBOplatin (PARAPLATIN) in sodium chloride 0.9% 250 mL chemo infusion, , Intravenous, Clinic/HOD 1 time, 0 of 4 cycles     7/9/2019 - 11/25/2019 Chemotherapy    Treatment Summary   Plan Name: OP GYN CARBOPLATIN (AUC) Q4W  Treatment Goal: Palliative  Status: Inactive  Start Date: 7/9/2019  End Date: 10/29/2019  Provider: Jaime Pinon MD  Chemotherapy: CARBOplatin (PARAPLATIN) in sodium chloride 0.9% 250 mL chemo infusion,  (original dose 341.5 mg), Intravenous, Clinic/HOD 1 time, 5 of 6 cycles  Dose modification:   (original dose 341.5 mg, Cycle 1)     5/19/2020 - 6/16/2020 Chemotherapy    Treatment Summary   Plan Name: OP  PACLITAXEL CARBOPLATIN WEEKLY  Treatment Goal: Palliative  Status: Inactive  Start Date: 5/19/2020  End Date: 5/22/2020  Provider: Annette Donis NP  Chemotherapy: CARBOplatin (PARAPLATIN) 115 mg in sodium chloride 0.9% 250 mL chemo infusion, 115 mg (98.6 % of original dose 118.4 mg), Intravenous, Clinic/HOD 1 " time, 0 of 1 cycle  Dose modification:   (original dose 118.4 mg, Cycle 1)  PACLitaxeL (TAXOL) 80 mg/m2 = 126 mg in sodium chloride 0.9% 250 mL chemo infusion, 80 mg/m2 = 126 mg (100 % of original dose 80 mg/m2), Intravenous, Clinic/HOD 1 time, 0 of 1 cycle  Dose modification: 80 mg/m2 (original dose 80 mg/m2, Cycle 1)     5/19/2020 -  Chemotherapy    Treatment Summary   Plan Name:  PACLITAXEL CARBOPLATIN WEEKLY  Treatment Goal: Palliative  Status: Active  Start Date: 5/19/2020  End Date: 7/7/2020 (Planned)  Provider: Annette Donis NP  Chemotherapy: CARBOplatin (PARAPLATIN) 115 mg in sodium chloride 0.9% 250 mL chemo infusion, 115 mg (100 % of original dose 114.8 mg), Intravenous, Clinic/HOD 1 time, 1 of 1 cycle  Dose modification:   (original dose 114.8 mg, Cycle 1, Reason: MD Discretion), 100 mg (original dose 114.8 mg, Cycle 1)  PACLitaxeL (TAXOL) 80 mg/m2 = 126 mg in sodium chloride 0.9% 250 mL chemo infusion, 80 mg/m2 = 126 mg (100 % of original dose 80 mg/m2), Intravenous, Clinic/HOD 1 time, 1 of 1 cycle  Dose modification: 80 mg/m2 (original dose 80 mg/m2, Cycle 1), 50 mg/m2 (original dose 80 mg/m2, Cycle 1)         Past Medical History:   Past Medical History:   Diagnosis Date    Anemia     Cataract     Cervical cancer 05/2019    Hypertension        Past Surgical HIstory:   Past Surgical History:   Procedure Laterality Date    CATARACT EXTRACTION, BILATERAL      WRIST SURGERY  1984       Allergies:  Review of patient's allergies indicates:  No Known Allergies    Medications:  Current Outpatient Medications   Medication Sig Dispense Refill    cloNIDine (CATAPRES) 0.3 MG tablet Take 1 tablet (0.3 mg total) by mouth 2 (two) times daily. 180 tablet 2    FENOFIBRATE ORAL Take by mouth 2 (two) times daily.      ferrous sulfate (FEOSOL) 325 mg (65 mg iron) Tab tablet TK 1 T PO BID  1    loratadine (CLARITIN) 10 mg tablet Take 10 mg by mouth once daily.      megestrol (MEGACE) 40 MG Tab Take 1 tablet (40  mg total) by mouth 2 (two) times daily. 60 tablet 6    NIFEdipine (PROCARDIA-XL) 30 MG (OSM) 24 hr tablet TAKE 1 TABLET BY MOUTH ONCE DAILY 90 tablet 5    olaparib 100 mg Tab Take 2 tablets (200mg) by mouth 2 (two) times daily 120 tablet 6    ondansetron (ZOFRAN-ODT) 8 MG TbDL Take 1 tablet (8 mg total) by mouth every 8 (eight) hours as needed (chemotherapy-induced nausea and vomiting). 40 tablet 5    simvastatin (ZOCOR) 20 MG tablet Take 20 mg by mouth every evening.      varicella-zoster [id number], PF, (SHINGRIX, PF,) 50 mcg/0.5 mL injection Administer two doses 2 months apart 0.5 mL 0     No current facility-administered medications for this visit.        Family History:   Family History   Problem Relation Age of Onset    Hypertension Mother     Pneumonia Father     Breast cancer Sister     No Known Problems Brother     Leukemia Daughter     No Known Problems Son        Social History:  reports that she has never smoked. She has never used smokeless tobacco. She reports previous alcohol use. She reports that she does not use drugs.    Review of Systemas  Constitutional: No change in weight, appetie, fatigue, fever, or night sweats  Eyes: No changes in vision  Ears, Nose, Mouth, Throat, and Face: No hearing problems, ear pain, rummy nose, or sore throat  Respiratory: No shortness of breath or cough  Cardiovascular: No chest pain, palpitations or dizziness  Gastrointestinal: No abdominal pain, hematochezia, melena  Genitourinary: No dysuria, hematuria, nocturia, menstrual problems, post menopausal  Integumentary/Breast: No rashes or itching  Hematologic/Lymphatic: No anemia or bleeding abnormalities  Musculoskeletal: No joint or muscle abnormalities  Neurological: No sensory or motor problems, no headaches  Behavioral/Psych: No depression, anxiety, cognitive problems, or stress  Endocrine: No diabetes or thyroid problems  Allergic/Immunologic: See allergy above    Physical Exam      Vitals:   Vitals:     "06/19/20 0814   BP: 122/74   BP Location: Left arm   Patient Position: Sitting   BP Method: Large (Automatic)   Pulse: 64   Resp: 18   Temp: 98.3 °F (36.8 °C)   TempSrc: Temporal   SpO2: 95%   Weight: 60.7 kg (133 lb 12.8 oz)   Height: 5' 2" (1.575 m)     BMI: Body mass index is 24.47 kg/m².  BSA Body surface area is 1.63 meters squared.  ECOG Performance Status:   ECOG SCORE         GENERAL APPEARANCE: Well developed, well nourished, in no acute distress.  SKIN: Inspection of the skin reveals no rashes, ulcerations or petechiae.  HEENT: The sclerae were anicteric and conjunctivae were pink and moist. Extraocular movements were intact and pupils were equal, round, and reactive to light with normal accommodation. External inspection of the ears and nose showed no scars, lesions, or masses. Lips, teeth, and gums showed normal mucosa. The oral mucosa, hard and soft palate, tongue and posterior pharynx were normal.  NECK: Supple and symmetric. There was no thyroid enlargement, and no tenderness, or masses were felt.  CRESPIRATORY: Normal AP diameter and normal contour without any kyphoscoliosis. LUNGS: Auscultation of the lungs revealed normal breath sounds without any other adventitious sounds or rubs.  CARDIOVASCULAR: There was a regular rate and rhythm without any murmurs, gallops, rubs. The carotid pulses were normal and 2+ bilaterally without bruits. Peripheral pulses were 2+ and symmetric.  GASTROINTESTINAL: Soft and nontender with normal bowel sounds. The liver span was approximately 5-6 cm in the right midclavicular line by percussion. The liver edge was nontender. The spleen was not palpable. There were no inguinal or umbilical hernias noted. No ascites was noted.  LYMPH NODES: No lymphadenopathy was appreciated in the neck, axillae or groin.  MUSCULOSKELETAL: Gait was normal. There was no tenderness or effusions noted. Muscle strength and tone were normal. EXTREMITIES: No cyanosis, clubbing or " edema.  NEUROLOGIC: Alert and oriented x 3. Normal affect. Gait was normal. Normal deep tendon reflexes with no pathological reflexes. Sensation to touch was normal.  PSYCHIATRIC: good mood, orientated to place, time and person     Labs and Imagings     Orders Only on 06/18/2020   Component Date Value Ref Range Status    Sodium 06/18/2020 141  135 - 145 mmol/L Final    Potassium 06/18/2020 4.8  3.6 - 5.2 mmol/L Final    Chloride 06/18/2020 108  100 - 108 mmol/L Final    CO2 06/18/2020 22  21 - 32 mmol/L Final    Anion Gap 06/18/2020 11.0  3.0 - 11.0 mmol/L Final    BUN, Bld 06/18/2020 30* 7 - 18 mg/dL Final    Creatinine 06/18/2020 1.34* 0.55 - 1.02 mg/dL Final    GFR ESTIMATION 06/18/2020 46* >60 Final               DESCRIPTION       GLOMERULAR FILTRATION RATE             NORMAL                     >60             KIDNEY  DISEASE           15-60             KIDNEY FAILURE             <15    BUN/Creatinine Ratio 06/18/2020 22.38* 7 - 18 Ratio Final    Glucose 06/18/2020 95  70 - 110 mg/dL Final    Calcium 06/18/2020 8.7* 8.8 - 10.5 mg/dL Final    Total Bilirubin 06/18/2020 0.6  0.0 - 1.0 mg/dL Final    AST 06/18/2020 17  15 - 37 U/L Final    ALT 06/18/2020 21  12 - 78 U/L Final    Total Protein 06/18/2020 6.7  6.4 - 8.2 g/dL Final    Albumin 06/18/2020 3.8  3.4 - 5.0 g/dL Final    Globulin 06/18/2020 2.9  2.3 - 3.5 g/dL Final    Albumin/Globulin Ratio 06/18/2020 1.310  1.1 - 1.8 Ratio Final    Alkaline Phosphatase 06/18/2020 109  46 - 116 U/L Final   Orders Only on 06/18/2020   Component Date Value Ref Range Status    WBC 06/18/2020 3.1* 4.6 - 10.2 10*3/uL Final    RBC 06/18/2020 1.61* 4.2 - 5.4 10*6/uL Final    Hemoglobin 06/18/2020 6.1* 12.0 - 16.0 g/dL Final    Critical value: Called to: Aliya Bora, ([facility])at 1444 on 06/18/20 by [id number].Result read-back successful. YES    Hematocrit 06/18/2020 18.2* 37.0 - 47.0 % Final    Critical value: Called to: Clarisa Mckeon  [facility])at 1444 on 06/18/20 by [id number].Result read-back successful. YES    MCV 06/18/2020 113.0* 80 - 97 fL Final    MCH 06/18/2020 37.9* 27.0 - 31.2 pg Final    MCHC 06/18/2020 33.5  31.8 - 35.4 g/dL Final    RDW RBC Auto-Rto 06/18/2020 15.7  12.5 - 18.0 % Final    Platelets 06/18/2020 40* 142 - 424 10*3/uL Final    Critical value: Called to: Aliya Sahni, ([facility])at 1444 on 06/18/20 by [id number].Result read-back successful. YES    Manual nRBC per 100 Cells 06/18/2020 0.0  % Final    Neutrophils 06/18/2020 42.0  37 - 80 % Final    Neutrophils Absolute 06/18/2020 1.3* 1.8 - 7.7 10*3/uL Final    Lymphocytes 06/18/2020 48.0* 25 - 40 % Final    Monocytes 06/18/2020 8.0  1 - 15 % Final    Eosinophils 06/18/2020 2.0  1 - 3 % Final    Cells Counted 06/18/2020 50   Final    Platelet Estimate 06/18/2020 Decreased   Final    Macrocytes 06/18/2020 1+   Final   Orders Only on 06/08/2020   Component Date Value Ref Range Status    WBC 06/08/2020 2.4* 4.6 - 10.2 10*3/uL Final    Critical value: Called to:KEYANA Susan 1045 on 06/08/20 by [id number].Result read-back successful. Y    RBC 06/08/2020 1.92* 4.2 - 5.4 10*6/uL Final    Hemoglobin 06/08/2020 7.3* 12.0 - 16.0 g/dL Final    Hematocrit 06/08/2020 21.6* 37.0 - 47.0 % Final    MCV 06/08/2020 112.5* 80 - 97 fL Final    MCH 06/08/2020 38.0* 27.0 - 31.2 pg Final    MCHC 06/08/2020 33.8  31.8 - 35.4 g/dL Final    RDW RBC Auto-Rto 06/08/2020 15.4  12.5 - 18.0 % Final    Platelets 06/08/2020 86* 142 - 424 10*3/uL Final    Manual nRBC per 100 Cells 06/08/2020 1.3  % Final    Neutrophils 06/08/2020 66.0  37 - 80 % Final    Neutrophils Absolute 06/08/2020 1.6* 1.8 - 7.7 10*3/uL Final    BANDS 06/08/2020 1.0  0 - 5 % Final    Lymphocytes 06/08/2020 30.0  25 - 40 % Final    Monocytes 06/08/2020 3.0  1 - 15 % Final    Cells Counted 06/08/2020 100   Final    Platelet Estimate 06/08/2020 Decreased   Final    Macrocytes 06/08/2020 3+   Final    Orders Only on 06/08/2020   Component Date Value Ref Range Status    Sodium 06/08/2020 138  135 - 145 mmol/L Final    Potassium 06/08/2020 4.0  3.6 - 5.2 mmol/L Final    Chloride 06/08/2020 106  100 - 108 mmol/L Final    CO2 06/08/2020 25  21 - 32 mmol/L Final    Anion Gap 06/08/2020 7.0  3.0 - 11.0 mmol/L Final    BUN, Bld 06/08/2020 18  7 - 18 mg/dL Final    Creatinine 06/08/2020 1.29* 0.55 - 1.02 mg/dL Final    GFR ESTIMATION 06/08/2020 48* >60 Final               DESCRIPTION       GLOMERULAR FILTRATION RATE             NORMAL                     >60             KIDNEY  DISEASE           15-60             KIDNEY FAILURE             <15    BUN/Creatinine Ratio 06/08/2020 13.95  7 - 18 Ratio Final    Glucose 06/08/2020 111* 70 - 110 mg/dL Final    Calcium 06/08/2020 8.7* 8.8 - 10.5 mg/dL Final    Total Bilirubin 06/08/2020 0.5  0.0 - 1.0 mg/dL Final    AST 06/08/2020 17  15 - 37 U/L Final    ALT 06/08/2020 18  12 - 78 U/L Final    Total Protein 06/08/2020 6.8  6.4 - 8.2 g/dL Final    Albumin 06/08/2020 3.5  3.4 - 5.0 g/dL Final    Globulin 06/08/2020 3.3  2.3 - 3.5 g/dL Final    Albumin/Globulin Ratio 06/08/2020 1.060* 1.1 - 1.8 Ratio Final    Alkaline Phosphatase 06/08/2020 82  46 - 116 U/L Final   Ancillary Orders on 05/29/2020   Component Date Value Ref Range Status    WBC 06/01/2020 1.9* 4.6 - 10.2 10*3/uL Final    Critical value: Called to:KEYANA DOSHIdenys 1128 on 06/01/20 by [id number].Result read-back successful. Y    RBC 06/01/2020 1.94* 4.2 - 5.4 10*6/uL Final    Hemoglobin 06/01/2020 7.2* 12.0 - 16.0 g/dL Final    Hematocrit 06/01/2020 22.4* 37.0 - 47.0 % Final    MCV 06/01/2020 115.5* 80 - 97 fL Final    MCH 06/01/2020 37.1* 27.0 - 31.2 pg Final    MCHC 06/01/2020 32.1  31.8 - 35.4 g/dL Final    RDW RBC Auto-Rto 06/01/2020 14.9  12.5 - 18.0 % Final    Platelets 06/01/2020 93* 142 - 424 10*3/uL Final    Manual nRBC per 100 Cells 06/01/2020 1.6  % Final    Sodium 06/01/2020 140   135 - 145 mmol/L Final    Potassium 06/01/2020 4.0  3.6 - 5.2 mmol/L Final    Chloride 06/01/2020 107  100 - 108 mmol/L Final    CO2 06/01/2020 23  21 - 32 mmol/L Final    Anion Gap 06/01/2020 10.0  3.0 - 11.0 mmol/L Final    BUN, Bld 06/01/2020 19* 7 - 18 mg/dL Final    Creatinine 06/01/2020 1.31* 0.55 - 1.02 mg/dL Final    GFR ESTIMATION 06/01/2020 47* >60 Final               DESCRIPTION       GLOMERULAR FILTRATION RATE             NORMAL                     >60             KIDNEY  DISEASE           15-60             KIDNEY FAILURE             <15    BUN/Creatinine Ratio 06/01/2020 14.50  7 - 18 Ratio Final    Glucose 06/01/2020 155* 70 - 110 mg/dL Final    Calcium 06/01/2020 8.2* 8.8 - 10.5 mg/dL Final    Total Bilirubin 06/01/2020 0.4  0.0 - 1.0 mg/dL Final    AST 06/01/2020 17  15 - 37 U/L Final    ALT 06/01/2020 17  12 - 78 U/L Final    Total Protein 06/01/2020 5.9* 6.4 - 8.2 g/dL Final    Albumin 06/01/2020 3.3* 3.4 - 5.0 g/dL Final    Globulin 06/01/2020 2.6  2.3 - 3.5 g/dL Final    Albumin/Globulin Ratio 06/01/2020 1.269  1.1 - 1.8 Ratio Final    Alkaline Phosphatase 06/01/2020 72  46 - 116 U/L Final    Neutrophils 06/01/2020 40.0  37 - 80 % Final    Neutrophils Absolute 06/01/2020 0.8* 1.8 - 7.7 10*3/uL Final    BANDS 06/01/2020 3.0  0 - 5 % Final    Lymphocytes 06/01/2020 49.0* 25 - 40 % Final    Monocytes 06/01/2020 7.0  1 - 15 % Final    ATYPICAL LYMPHOCYTES 06/01/2020 1.0* 0 - 0 % Final    Cells Counted 06/01/2020 100   Final    Platelet Estimate 06/01/2020 Decreased   Final    Macrocytes 06/01/2020 1+   Final   Orders Only on 05/22/2020   Component Date Value Ref Range Status    Sodium 05/22/2020 138  135 - 145 mmol/L Final    Potassium 05/22/2020 4.1  3.6 - 5.2 mmol/L Final    Chloride 05/22/2020 102  100 - 108 mmol/L Final    CO2 05/22/2020 26  21 - 32 mmol/L Final    Anion Gap 05/22/2020 10.0  3.0 - 11.0 mmol/L Final    BUN, Bld 05/22/2020 33* 7 - 18 mg/dL Final     Creatinine 05/22/2020 1.40* 0.55 - 1.02 mg/dL Final    GFR ESTIMATION 05/22/2020 44* >60 Final               DESCRIPTION       GLOMERULAR FILTRATION RATE             NORMAL                     >60             KIDNEY  DISEASE           15-60             KIDNEY FAILURE             <15    BUN/Creatinine Ratio 05/22/2020 23.57* 7 - 18 Ratio Final    Glucose 05/22/2020 130* 70 - 110 mg/dL Final    Calcium 05/22/2020 8.5* 8.8 - 10.5 mg/dL Final    Total Bilirubin 05/22/2020 0.5  0.0 - 1.0 mg/dL Final    AST 05/22/2020 21  15 - 37 U/L Final    ALT 05/22/2020 21  12 - 78 U/L Final    Total Protein 05/22/2020 6.8  6.4 - 8.2 g/dL Final    Albumin 05/22/2020 3.9  3.4 - 5.0 g/dL Final    Globulin 05/22/2020 2.9  2.3 - 3.5 g/dL Final    Albumin/Globulin Ratio 05/22/2020 1.344  1.1 - 1.8 Ratio Final    Alkaline Phosphatase 05/22/2020 92  46 - 116 U/L Final   Orders Only on 05/22/2020   Component Date Value Ref Range Status    Macrocytes 05/22/2020 2+   Final    WBC 05/22/2020 5.8  4.6 - 10.2 10*3/uL Final    RBC 05/22/2020 2.47* 4.2 - 5.4 10*6/uL Final    Hemoglobin 05/22/2020 9.4* 12.0 - 16.0 g/dL Final    Hematocrit 05/22/2020 27.5* 37.0 - 47.0 % Final    MCV 05/22/2020 111.3* 80 - 97 fL Final    MCH 05/22/2020 38.1* 27.0 - 31.2 pg Final    MCHC 05/22/2020 34.2  31.8 - 35.4 g/dL Final    RDW RBC Auto-Rto 05/22/2020 14.4  12.5 - 18.0 % Final    Platelets 05/22/2020 140* 142 - 424 10*3/uL Final    Neutrophils/100 leukocytes 05/22/2020 82.0* 37 - 80 % Final    Lymphocytes/100 leukocytes 05/22/2020 13.0* 25 - 40 % Final    Monocytes/100 leukocytes 05/22/2020 3.3  1 - 15 % Final    Eosinophils/100 leukocytes 05/22/2020 0.9* 1 - 3 % Final    Basophils/100 leukocytes 05/22/2020 0.3  0 - 3 % Final    Manual nRBC per 100 Cells 05/22/2020 0.3  % Final    Neutrophils 05/22/2020 4.79  1.8 - 7.7 10*3/uL Final   Orders Only on 05/12/2020   Component Date Value Ref Range Status    Macrocytes 05/12/2020 2+    Final    WBC 05/12/2020 4.7  4.6 - 10.2 10*3/uL Final    RBC 05/12/2020 2.72* 4.2 - 5.4 10*6/uL Final    Hemoglobin 05/12/2020 10.1* 12.0 - 16.0 g/dL Final    Hematocrit 05/12/2020 30.3* 37.0 - 47.0 % Final    MCV 05/12/2020 111.4* 80 - 97 fL Final    MCH 05/12/2020 37.1* 27.0 - 31.2 pg Final    MCHC 05/12/2020 33.3  31.8 - 35.4 g/dL Final    RDW RBC Auto-Rto 05/12/2020 14.4  12.5 - 18.0 % Final    Platelets 05/12/2020 149  142 - 424 10*3/uL Final    Neutrophils/100 leukocytes 05/12/2020 56.9  37 - 80 % Final    Lymphocytes/100 leukocytes 05/12/2020 28.5  25 - 40 % Final    Monocytes/100 leukocytes 05/12/2020 12.1  1 - 15 % Final    Eosinophils/100 leukocytes 05/12/2020 1.5  1 - 3 % Final    Basophils/100 leukocytes 05/12/2020 0.8  0 - 3 % Final    Manual nRBC per 100 Cells 05/12/2020 0.4  % Final    Neutrophils 05/12/2020 2.68  1.8 - 7.7 10*3/uL Final   Orders Only on 05/12/2020   Component Date Value Ref Range Status    Sodium 05/12/2020 139  135 - 145 mmol/L Final    Potassium 05/12/2020 3.8  3.6 - 5.2 mmol/L Final    Chloride 05/12/2020 104  100 - 108 mmol/L Final    CO2 05/12/2020 27  21 - 32 mmol/L Final    BUN, Bld 05/12/2020 25* 7 - 18 mg/dL Final    Creatinine 05/12/2020 1.17* 0.55 - 1.02 mg/dL Final    GFR ESTIMATION 05/12/2020 54* >60 Final               DESCRIPTION       GLOMERULAR FILTRATION RATE             NORMAL                     >60             KIDNEY  DISEASE           15-60             KIDNEY FAILURE             <15    BUN/Creatinine Ratio 05/12/2020 21.36* 7 - 18 Ratio Final    Glucose 05/12/2020 107  70 - 110 mg/dL Final    Calcium 05/12/2020 9.4  8.8 - 10.5 mg/dL Final    Total Bilirubin 05/12/2020 0.6  0.0 - 1.0 mg/dL Final    AST 05/12/2020 22  15 - 37 U/L Final    ALT 05/12/2020 21  12 - 78 U/L Final    Total Protein 05/12/2020 7.4  6.4 - 8.2 g/dL Final    Albumin 05/12/2020 4.2  3.4 - 5.0 g/dL Final    Globulin 05/12/2020 3.2  2.3 - 3.5 g/dL Final     Albumin/Globulin Ratio 05/12/2020 1.312  1.1 - 1.8 Ratio Final    Alkaline Phosphatase 05/12/2020 100  46 - 116 U/L Final    Phosphorus 05/12/2020 3.3  2.6 - 4.7 mg/dL Final    Magnesium 05/12/2020 1.5* 1.8 - 2.4 mg/dL Final   Orders Only on 04/22/2020   Component Date Value Ref Range Status    Phosphorus 04/22/2020 3.9  2.6 - 4.7 mg/dL Final    Sodium 04/22/2020 137  135 - 145 mmol/L Final    Potassium 04/22/2020 3.9  3.6 - 5.2 mmol/L Final    Chloride 04/22/2020 104  100 - 108 mmol/L Final    CO2 04/22/2020 23  21 - 32 mmol/L Final    BUN, Bld 04/22/2020 21* 7 - 18 mg/dL Final    Creatinine 04/22/2020 1.49* 0.55 - 1.02 mg/dL Final    GFR ESTIMATION 04/22/2020 41* >60 Final               DESCRIPTION       GLOMERULAR FILTRATION RATE             NORMAL                     >60             KIDNEY  DISEASE           15-60             KIDNEY FAILURE             <15    BUN/Creatinine Ratio 04/22/2020 14.09  7 - 18 Ratio Final    Glucose 04/22/2020 110  70 - 110 mg/dL Final    Calcium 04/22/2020 9.6  8.8 - 10.5 mg/dL Final    Total Bilirubin 04/22/2020 0.6  0.0 - 1.0 mg/dL Final    AST 04/22/2020 18  15 - 37 U/L Final    ALT 04/22/2020 18  12 - 78 U/L Final    Total Protein 04/22/2020 7.7  6.4 - 8.2 g/dL Final    Albumin 04/22/2020 4.0  3.4 - 5.0 g/dL Final    Globulin 04/22/2020 3.7* 2.3 - 3.5 g/dL Final    Albumin/Globulin Ratio 04/22/2020 1.081* 1.1 - 1.8 Ratio Final    Alkaline Phosphatase 04/22/2020 98  46 - 116 U/L Final    Magnesium 04/22/2020 1.5* 1.8 - 2.4 mg/dL Final   There may be more visits with results that are not included.       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 female with PMH of The primary encounter diagnosis was Thrombocytopenia. Diagnoses of Anemia in neoplastic disease, Malignant neoplasm of endocervix, and BRCA2 gene mutation positive in female were also pertinent to this visit., with Thrombocytopenia [D69.6]     ==============================================  *  Assessment:       1. 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 Malignant neoplasm of cervix, unspecified site    3. Increased creatine kinase level    4. Neoplastic malignant related fatigue       Cervical cancer stage IV disease  BRCA2 +  mutation   PARP inhibitor  Lynparza 300 mg BID PET scan shows early progression 4/2020  weekly carbo/taxol added  Pancytopenia       Plan:   Overall PLANS:  Lynparza  plus carbo Taxol weekly on hold due to pancytopenia     == lab CBC CMP every week  ==On Lynparza 100 mg BID; Keep current dose( this is a low dose). Held   == hold chemotherapy today and resume chemotherapy next week if CBC is acceptable  Will reduce chemo dose further next week  Will reduce carbo to 50 mg IV weekly, Taxol 60 mg IV weekly, all fixed doses  == return to clinic 1 week  == 2 units PRBC transfused today    Annette Donis NP

## 2020-06-25 ENCOUNTER — TELEPHONE (OUTPATIENT)
Dept: PHARMACY | Facility: CLINIC | Age: 85
End: 2020-06-25

## 2020-06-26 NOTE — TELEPHONE ENCOUNTER
Call Attempt #1.  M for follow up of Lynparza.  At time of refill call on 6/25, patient requested call back after office visit on 6/30.  Will follow up.

## 2020-06-29 LAB
ANISOCYTOSIS: ABNORMAL
BANDS: 2 % (ref 0–5)
CELLS COUNTED: 100
EOSINOPHIL NFR BLD: 1 % (ref 1–3)
LYMPHOCYTES NFR BLD: 38 % (ref 25–40)
MICROCYTES BLD QL SMEAR: ABNORMAL
MONOCYTES NFR BLD MANUAL: 12 % (ref 1–15)
NEUTROPHILS ABSOLUTE COUNT: 1.6 10*3/UL (ref 1.8–7.7)
NEUTROPHILS NFR BLD: 47 % (ref 37–80)
SMALL PLATELETS BLD QL SMEAR: ADEQUATE

## 2020-06-30 ENCOUNTER — OFFICE VISIT (OUTPATIENT)
Dept: HEMATOLOGY/ONCOLOGY | Facility: CLINIC | Age: 85
End: 2020-06-30
Payer: MEDICARE

## 2020-06-30 VITALS
HEART RATE: 55 BPM | HEIGHT: 62 IN | SYSTOLIC BLOOD PRESSURE: 123 MMHG | TEMPERATURE: 97 F | DIASTOLIC BLOOD PRESSURE: 58 MMHG | BODY MASS INDEX: 24.29 KG/M2 | OXYGEN SATURATION: 99 % | WEIGHT: 132 LBS | RESPIRATION RATE: 17 BRPM

## 2020-06-30 DIAGNOSIS — D61.818 PANCYTOPENIA: ICD-10-CM

## 2020-06-30 DIAGNOSIS — D69.6 THROMBOCYTOPENIA: ICD-10-CM

## 2020-06-30 DIAGNOSIS — C53.0 MALIGNANT NEOPLASM OF ENDOCERVIX: Primary | ICD-10-CM

## 2020-06-30 DIAGNOSIS — Z51.11 ENCOUNTER FOR ANTINEOPLASTIC CHEMOTHERAPY: ICD-10-CM

## 2020-06-30 PROCEDURE — 99214 OFFICE O/P EST MOD 30 MIN: CPT | Mod: S$GLB,,, | Performed by: NURSE PRACTITIONER

## 2020-06-30 PROCEDURE — 99214 PR OFFICE/OUTPT VISIT, EST, LEVL IV, 30-39 MIN: ICD-10-PCS | Mod: S$GLB,,, | Performed by: NURSE PRACTITIONER

## 2020-06-30 NOTE — PROGRESS NOTES
Medical Oncology Progress Note  [facility]     PATIENT: Adri Seay  : 1935 85 y.o. female  MRN [id number]     PCP: Bria Mitchell NP  Consult Requested By:      Date of Service: 2020    Subjective:   Interim History:  Malignant neoplasm of cervix, unspecified site    Adri Seay is here for follow-up on treatment;   Carbotaxol has been on hold because of leukopenia  The patient feel well and she had no complaint today    Oncology History:    History of Present Illness: Ms. Seay is a 84 y.o. female who presents for evaluation and management of cervical cancer. SHe was seen at [facility] on 19.      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 The patient had a CT scan consistent with widely metastatic disease including multiple sites of lymphadenopathy, a possible lung metastases, liver metastases, nodule in the anterior abdominal wall, and carcinomatosis. The patient is mostly asymptomatic but does have a vaginal discharge. Her performance status is 0. On exam, there is a 6 centimeter mass in the anterior abdominal wall superior to the umbilicus. On bimanual examination, tumor is a place the entire cervix apex of the vagina, and on rectovaginal examination, there is a large pelvic mass extending to the bilateral pelvic sidewalls.    We will get her CT reviewed at [facility] and if it is consistent with metastatic disease, we have recommended palliative chemotherapy with either carboplatinum as a single agent or carboplatinum and paclitaxel. Due to concern for bowel involvement on the CT scan, I have recommended not giving bevacizumab as part of this regimen. Patient will return home to [place] and begin chemotherapy with a medical oncologist there. I've also recommended a consultation with radiation oncology in [place] for consideration of  "palliative radiation to lower the chances of significant vaginal bleeding. We will see her back on an as-needed basis."       == 8/5/19 Carbo    ==PET/CT done on 12/2/19. Previous CT imaging done at outside facility, discussed with Dr Nguyen for comparison and he stated pelvic mass smaller, abdominal mass smaller liver lesion likely benign stable but difficult to say if peritoneal implants stable to progressed.    ==FoundationOne Liquid Bx on 12/16/19.  BRCA 2 gene mutation.  ==1/14/20 Pt started lynparza   ==5/13/2020 PET showing early progression.    ==5/2020 resume low dose carbo and taxol. On hold now due to pancytopenia     Oncology History   Malignant neoplasm of endocervix   6/27/2019 Initial Diagnosis    Malignant neoplasm of exocervix     7/8/2019 - 7/8/2019 Chemotherapy    Treatment Summary   Plan Name: OP CARBOPLATIN WEEKLY  Treatment Goal: Palliative  Status: Inactive  Start Date: [No treatment day found]  End Date: [No treatment day found]  Provider: Jaime Pinon MD  Chemotherapy: CARBOplatin (PARAPLATIN) in sodium chloride 0.9% 250 mL chemo infusion, , Intravenous, Clinic/HOD 1 time, 0 of 4 cycles     7/9/2019 - 11/25/2019 Chemotherapy    Treatment Summary   Plan Name: OP GYN CARBOPLATIN (AUC) Q4W  Treatment Goal: Palliative  Status: Inactive  Start Date: 7/9/2019  End Date: 10/29/2019  Provider: Jaime Pinon MD  Chemotherapy: CARBOplatin (PARAPLATIN) in sodium chloride 0.9% 250 mL chemo infusion,  (original dose 341.5 mg), Intravenous, Clinic/HOD 1 time, 5 of 6 cycles  Dose modification:   (original dose 341.5 mg, Cycle 1)     5/19/2020 - 6/16/2020 Chemotherapy    Treatment Summary   Plan Name: OP  PACLITAXEL CARBOPLATIN WEEKLY  Treatment Goal: Palliative  Status: Inactive  Start Date: 5/19/2020  End Date: 5/22/2020  Provider: Annette Donis NP  Chemotherapy: CARBOplatin (PARAPLATIN) 115 mg in sodium chloride 0.9% 250 mL chemo infusion, 115 mg (98.6 % of original dose 118.4 mg), Intravenous, " Clinic/HOD 1 time, 0 of 1 cycle  Dose modification:   (original dose 118.4 mg, Cycle 1)  PACLitaxeL (TAXOL) 80 mg/m2 = 126 mg in sodium chloride 0.9% 250 mL chemo infusion, 80 mg/m2 = 126 mg (100 % of original dose 80 mg/m2), Intravenous, Clinic/HOD 1 time, 0 of 1 cycle  Dose modification: 80 mg/m2 (original dose 80 mg/m2, Cycle 1)     5/19/2020 -  Chemotherapy    Treatment Summary   Plan Name:  PACLITAXEL CARBOPLATIN WEEKLY  Treatment Goal: Palliative  Status: Active  Start Date: 5/19/2020  End Date: 7/7/2020 (Planned)  Provider: Annette Donis NP  Chemotherapy: CARBOplatin (PARAPLATIN) 115 mg in sodium chloride 0.9% 250 mL chemo infusion, 115 mg (100 % of original dose 114.8 mg), Intravenous, Clinic/HOD 1 time, 1 of 1 cycle  Dose modification:   (original dose 114.8 mg, Cycle 1, Reason: MD Discretion), 100 mg (original dose 114.8 mg, Cycle 1)  PACLitaxeL (TAXOL) 80 mg/m2 = 126 mg in sodium chloride 0.9% 250 mL chemo infusion, 80 mg/m2 = 126 mg (100 % of original dose 80 mg/m2), Intravenous, Clinic/HOD 1 time, 1 of 1 cycle  Dose modification: 80 mg/m2 (original dose 80 mg/m2, Cycle 1), 50 mg/m2 (original dose 80 mg/m2, Cycle 1)         Past Medical History:   Past Medical History:   Diagnosis Date    Anemia     Cataract     Cervical cancer 05/2019    Hypertension        Past Surgical HIstory:   Past Surgical History:   Procedure Laterality Date    CATARACT EXTRACTION, BILATERAL      WRIST SURGERY  1984       Allergies:  Review of patient's allergies indicates:   Allergen Reactions    Strawberry Rash       Medications:  Current Outpatient Medications   Medication Sig Dispense Refill    cloNIDine (CATAPRES) 0.3 MG tablet Take 1 tablet (0.3 mg total) by mouth 2 (two) times daily. 180 tablet 2    FENOFIBRATE ORAL Take by mouth 2 (two) times daily.      ferrous sulfate (FEOSOL) 325 mg (65 mg iron) Tab tablet TK 1 T PO BID  1    loratadine (CLARITIN) 10 mg tablet Take 10 mg by mouth once daily.      megestrol  (MEGACE) 40 MG Tab Take 1 tablet (40 mg total) by mouth 2 (two) times daily. 60 tablet 6    NIFEdipine (PROCARDIA-XL) 30 MG (OSM) 24 hr tablet TAKE 1 TABLET BY MOUTH ONCE DAILY 90 tablet 5    ondansetron (ZOFRAN-ODT) 8 MG TbDL Take 1 tablet (8 mg total) by mouth every 8 (eight) hours as needed (chemotherapy-induced nausea and vomiting). 40 tablet 5    simvastatin (ZOCOR) 20 MG tablet Take 20 mg by mouth every evening.      varicella-zoster [id number], PF, (SHINGRIX, PF,) 50 mcg/0.5 mL injection Administer two doses 2 months apart 0.5 mL 0     No current facility-administered medications for this visit.        Family History:   Family History   Problem Relation Age of Onset    Hypertension Mother     Pneumonia Father     Breast cancer Sister     No Known Problems Brother     Leukemia Daughter     No Known Problems Son        Social History:  reports that she has never smoked. She has never used smokeless tobacco. She reports previous alcohol use. She reports that she does not use drugs.    Review of Systemas  Constitutional: No change in weight, appetie, fatigue, fever, or night sweats  Eyes: No changes in vision  Ears, Nose, Mouth, Throat, and Face: No hearing problems, ear pain, rummy nose, or sore throat  Respiratory: No shortness of breath or cough  Cardiovascular: No chest pain, palpitations or dizziness  Gastrointestinal: No abdominal pain, hematochezia, melena  Genitourinary: No dysuria, hematuria, nocturia, menstrual problems, post menopausal  Integumentary/Breast: No rashes or itching  Hematologic/Lymphatic: No anemia or bleeding abnormalities  Musculoskeletal: No joint or muscle abnormalities  Neurological: No sensory or motor problems, no headaches  Behavioral/Psych: No depression, anxiety, cognitive problems, or stress  Endocrine: No diabetes or thyroid problems  Allergic/Immunologic: See allergy above    Physical Exam      Vitals:   Vitals:    06/30/20 0855   BP: (!) 123/58   BP Location: Left  "arm   Patient Position: Sitting   BP Method: Small (Automatic)   Pulse: (!) 55   Resp: 17   Temp: 97.4 °F (36.3 °C)   TempSrc: Temporal   SpO2: 99%   Weight: 59.9 kg (132 lb)   Height: 5' 2" (1.575 m)     BMI: Body mass index is 24.14 kg/m².  BSA Body surface area is 1.62 meters squared.  ECOG Performance Status:   ECOG SCORE         GENERAL APPEARANCE: Well developed, well nourished, in no acute distress.  SKIN: Inspection of the skin reveals no rashes, ulcerations or petechiae.  HEENT: The sclerae were anicteric and conjunctivae were pink and moist. Extraocular movements were intact and pupils were equal, round, and reactive to light with normal accommodation. External inspection of the ears and nose showed no scars, lesions, or masses. Lips, teeth, and gums showed normal mucosa. The oral mucosa, hard and soft palate, tongue and posterior pharynx were normal.  NECK: Supple and symmetric. There was no thyroid enlargement, and no tenderness, or masses were felt.  CRESPIRATORY: Normal AP diameter and normal contour without any kyphoscoliosis. LUNGS: Auscultation of the lungs revealed normal breath sounds without any other adventitious sounds or rubs.  CARDIOVASCULAR: There was a regular rate and rhythm without any murmurs, gallops, rubs. The carotid pulses were normal and 2+ bilaterally without bruits. Peripheral pulses were 2+ and symmetric.  GASTROINTESTINAL: Soft and nontender with normal bowel sounds. The liver span was approximately 5-6 cm in the right midclavicular line by percussion. The liver edge was nontender. The spleen was not palpable. There were no inguinal or umbilical hernias noted. No ascites was noted.  LYMPH NODES: No lymphadenopathy was appreciated in the neck, axillae or groin.  MUSCULOSKELETAL: Gait was normal. There was no tenderness or effusions noted. Muscle strength and tone were normal. EXTREMITIES: No cyanosis, clubbing or edema.  NEUROLOGIC: Alert and oriented x 3. Normal affect. Gait was " normal. Normal deep tendon reflexes with no pathological reflexes. Sensation to touch was normal.  PSYCHIATRIC: good mood, orientated to place, time and person     Labs and Imagings     Orders Only on 06/29/2020   Component Date Value Ref Range Status    Neutrophils 06/29/2020 47.0  37 - 80 % Final    Neutrophils Absolute 06/29/2020 1.6* 1.8 - 7.7 10*3/uL Final    BANDS 06/29/2020 2.0  0 - 5 % Final    Lymphocytes 06/29/2020 38.0  25 - 40 % Final    Monocytes 06/29/2020 12.0  1 - 15 % Final    Eosinophils 06/29/2020 1.0  1 - 3 % Final    Cells Counted 06/29/2020 100   Final    Platelet Estimate 06/29/2020 Adequate   Final    Anisocytosis 06/29/2020 2+   Final    Microcytes 06/29/2020 1+   Final   Ancillary Orders on 06/26/2020   Component Date Value Ref Range Status    WBC 06/29/2020 3.2* 4.6 - 10.2 10*3/uL Final    RBC 06/29/2020 2.69* 4.2 - 5.4 10*6/uL Final    Hemoglobin 06/29/2020 9.3* 12.0 - 16.0 g/dL Final    Hematocrit 06/29/2020 28.6* 37.0 - 47.0 % Final    MCV 06/29/2020 106.3* 80 - 97 fL Final    MCH 06/29/2020 34.6* 27.0 - 31.2 pg Final    MCHC 06/29/2020 32.5  31.8 - 35.4 g/dL Final    RDW RBC Auto-Rto 06/29/2020 20.8* 12.5 - 18.0 % Final    Platelets 06/29/2020 222  142 - 424 10*3/uL Final    Manual nRBC per 100 Cells 06/29/2020 0.0  % Final    Sodium 06/29/2020 141  135 - 145 mmol/L Final    Potassium 06/29/2020 4.2  3.6 - 5.2 mmol/L Final    Chloride 06/29/2020 107  100 - 108 mmol/L Final    CO2 06/29/2020 24  21 - 32 mmol/L Final    Anion Gap 06/29/2020 10.0  3.0 - 11.0 mmol/L Final    BUN, Bld 06/29/2020 25* 7 - 18 mg/dL Final    Creatinine 06/29/2020 1.21* 0.55 - 1.02 mg/dL Final    GFR ESTIMATION 06/29/2020 52* >60 Final               DESCRIPTION       GLOMERULAR FILTRATION RATE             NORMAL                     >60             KIDNEY  DISEASE           15-60             KIDNEY FAILURE             <15    BUN/Creatinine Ratio 06/29/2020 20.66* 7 - 18 Ratio Final     Glucose 06/29/2020 107  70 - 110 mg/dL Final    Calcium 06/29/2020 9.5  8.8 - 10.5 mg/dL Final    Total Bilirubin 06/29/2020 0.3  0.0 - 1.0 mg/dL Final    AST 06/29/2020 19  15 - 37 U/L Final    ALT 06/29/2020 25  12 - 78 U/L Final    Total Protein 06/29/2020 7.1  6.4 - 8.2 g/dL Final    Albumin 06/29/2020 4.0  3.4 - 5.0 g/dL Final    Globulin 06/29/2020 3.1  2.3 - 3.5 g/dL Final    Albumin/Globulin Ratio 06/29/2020 1.290  1.1 - 1.8 Ratio Final    Alkaline Phosphatase 06/29/2020 110  46 - 116 U/L Final    Magnesium 06/29/2020 1.7* 1.8 - 2.4 mg/dL Final    Phosphorus 06/29/2020 3.6  2.6 - 4.7 mg/dL Final   Orders Only on 06/18/2020   Component Date Value Ref Range Status    Sodium 06/18/2020 141  135 - 145 mmol/L Final    Potassium 06/18/2020 4.8  3.6 - 5.2 mmol/L Final    Chloride 06/18/2020 108  100 - 108 mmol/L Final    CO2 06/18/2020 22  21 - 32 mmol/L Final    Anion Gap 06/18/2020 11.0  3.0 - 11.0 mmol/L Final    BUN, Bld 06/18/2020 30* 7 - 18 mg/dL Final    Creatinine 06/18/2020 1.34* 0.55 - 1.02 mg/dL Final    GFR ESTIMATION 06/18/2020 46* >60 Final               DESCRIPTION       GLOMERULAR FILTRATION RATE             NORMAL                     >60             KIDNEY  DISEASE           15-60             KIDNEY FAILURE             <15    BUN/Creatinine Ratio 06/18/2020 22.38* 7 - 18 Ratio Final    Glucose 06/18/2020 95  70 - 110 mg/dL Final    Calcium 06/18/2020 8.7* 8.8 - 10.5 mg/dL Final    Total Bilirubin 06/18/2020 0.6  0.0 - 1.0 mg/dL Final    AST 06/18/2020 17  15 - 37 U/L Final    ALT 06/18/2020 21  12 - 78 U/L Final    Total Protein 06/18/2020 6.7  6.4 - 8.2 g/dL Final    Albumin 06/18/2020 3.8  3.4 - 5.0 g/dL Final    Globulin 06/18/2020 2.9  2.3 - 3.5 g/dL Final    Albumin/Globulin Ratio 06/18/2020 1.310  1.1 - 1.8 Ratio Final    Alkaline Phosphatase 06/18/2020 109  46 - 116 U/L Final   Orders Only on 06/18/2020   Component Date Value Ref Range Status    WBC  06/18/2020 3.1* 4.6 - 10.2 10*3/uL Final    RBC 06/18/2020 1.61* 4.2 - 5.4 10*6/uL Final    Hemoglobin 06/18/2020 6.1* 12.0 - 16.0 g/dL Final    Critical value: Called to: Aliya Sahni ([facility])at 1444 on 06/18/20 by [id number].Result read-back successful. YES    Hematocrit 06/18/2020 18.2* 37.0 - 47.0 % Final    Critical value: Called to: Aliya Sahni ([facility])at 1444 on 06/18/20 by [id number].Result read-back successful. YES    MCV 06/18/2020 113.0* 80 - 97 fL Final    MCH 06/18/2020 37.9* 27.0 - 31.2 pg Final    MCHC 06/18/2020 33.5  31.8 - 35.4 g/dL Final    RDW RBC Auto-Rto 06/18/2020 15.7  12.5 - 18.0 % Final    Platelets 06/18/2020 40* 142 - 424 10*3/uL Final    Critical value: Called to: Aliya Sahni ([facility])at 1444 on 06/18/20 by [id number].Result read-back successful. YES    Manual nRBC per 100 Cells 06/18/2020 0.0  % Final    Neutrophils 06/18/2020 42.0  37 - 80 % Final    Neutrophils Absolute 06/18/2020 1.3* 1.8 - 7.7 10*3/uL Final    Lymphocytes 06/18/2020 48.0* 25 - 40 % Final    Monocytes 06/18/2020 8.0  1 - 15 % Final    Eosinophils 06/18/2020 2.0  1 - 3 % Final    Cells Counted 06/18/2020 50   Final    Platelet Estimate 06/18/2020 Decreased   Final    Macrocytes 06/18/2020 1+   Final   Orders Only on 06/08/2020   Component Date Value Ref Range Status    WBC 06/08/2020 2.4* 4.6 - 10.2 10*3/uL Final    Critical value: Called to:KEYANA Davis 1045 on 06/08/20 by [id number].Result read-back successful. Y    RBC 06/08/2020 1.92* 4.2 - 5.4 10*6/uL Final    Hemoglobin 06/08/2020 7.3* 12.0 - 16.0 g/dL Final    Hematocrit 06/08/2020 21.6* 37.0 - 47.0 % Final    MCV 06/08/2020 112.5* 80 - 97 fL Final    MCH 06/08/2020 38.0* 27.0 - 31.2 pg Final    MCHC 06/08/2020 33.8  31.8 - 35.4 g/dL Final    RDW RBC Auto-Rto 06/08/2020 15.4  12.5 - 18.0 % Final    Platelets 06/08/2020 86* 142 - 424 10*3/uL Final    Manual nRBC per 100 Cells 06/08/2020 1.3  % Final     Neutrophils 06/08/2020 66.0  37 - 80 % Final    Neutrophils Absolute 06/08/2020 1.6* 1.8 - 7.7 10*3/uL Final    BANDS 06/08/2020 1.0  0 - 5 % Final    Lymphocytes 06/08/2020 30.0  25 - 40 % Final    Monocytes 06/08/2020 3.0  1 - 15 % Final    Cells Counted 06/08/2020 100   Final    Platelet Estimate 06/08/2020 Decreased   Final    Macrocytes 06/08/2020 3+   Final   Orders Only on 06/08/2020   Component Date Value Ref Range Status    Sodium 06/08/2020 138  135 - 145 mmol/L Final    Potassium 06/08/2020 4.0  3.6 - 5.2 mmol/L Final    Chloride 06/08/2020 106  100 - 108 mmol/L Final    CO2 06/08/2020 25  21 - 32 mmol/L Final    Anion Gap 06/08/2020 7.0  3.0 - 11.0 mmol/L Final    BUN, Bld 06/08/2020 18  7 - 18 mg/dL Final    Creatinine 06/08/2020 1.29* 0.55 - 1.02 mg/dL Final    GFR ESTIMATION 06/08/2020 48* >60 Final               DESCRIPTION       GLOMERULAR FILTRATION RATE             NORMAL                     >60             KIDNEY  DISEASE           15-60             KIDNEY FAILURE             <15    BUN/Creatinine Ratio 06/08/2020 13.95  7 - 18 Ratio Final    Glucose 06/08/2020 111* 70 - 110 mg/dL Final    Calcium 06/08/2020 8.7* 8.8 - 10.5 mg/dL Final    Total Bilirubin 06/08/2020 0.5  0.0 - 1.0 mg/dL Final    AST 06/08/2020 17  15 - 37 U/L Final    ALT 06/08/2020 18  12 - 78 U/L Final    Total Protein 06/08/2020 6.8  6.4 - 8.2 g/dL Final    Albumin 06/08/2020 3.5  3.4 - 5.0 g/dL Final    Globulin 06/08/2020 3.3  2.3 - 3.5 g/dL Final    Albumin/Globulin Ratio 06/08/2020 1.060* 1.1 - 1.8 Ratio Final    Alkaline Phosphatase 06/08/2020 82  46 - 116 U/L Final   Ancillary Orders on 05/29/2020   Component Date Value Ref Range Status    WBC 06/01/2020 1.9* 4.6 - 10.2 10*3/uL Final    Critical value: Called to:KEYANA Susan 1128 on 06/01/20 by [id number].Result read-back successful. Y    RBC 06/01/2020 1.94* 4.2 - 5.4 10*6/uL Final    Hemoglobin 06/01/2020 7.2* 12.0 - 16.0 g/dL Final     Hematocrit 06/01/2020 22.4* 37.0 - 47.0 % Final    MCV 06/01/2020 115.5* 80 - 97 fL Final    MCH 06/01/2020 37.1* 27.0 - 31.2 pg Final    MCHC 06/01/2020 32.1  31.8 - 35.4 g/dL Final    RDW RBC Auto-Rto 06/01/2020 14.9  12.5 - 18.0 % Final    Platelets 06/01/2020 93* 142 - 424 10*3/uL Final    Manual nRBC per 100 Cells 06/01/2020 1.6  % Final    Sodium 06/01/2020 140  135 - 145 mmol/L Final    Potassium 06/01/2020 4.0  3.6 - 5.2 mmol/L Final    Chloride 06/01/2020 107  100 - 108 mmol/L Final    CO2 06/01/2020 23  21 - 32 mmol/L Final    Anion Gap 06/01/2020 10.0  3.0 - 11.0 mmol/L Final    BUN, Bld 06/01/2020 19* 7 - 18 mg/dL Final    Creatinine 06/01/2020 1.31* 0.55 - 1.02 mg/dL Final    GFR ESTIMATION 06/01/2020 47* >60 Final               DESCRIPTION       GLOMERULAR FILTRATION RATE             NORMAL                     >60             KIDNEY  DISEASE           15-60             KIDNEY FAILURE             <15    BUN/Creatinine Ratio 06/01/2020 14.50  7 - 18 Ratio Final    Glucose 06/01/2020 155* 70 - 110 mg/dL Final    Calcium 06/01/2020 8.2* 8.8 - 10.5 mg/dL Final    Total Bilirubin 06/01/2020 0.4  0.0 - 1.0 mg/dL Final    AST 06/01/2020 17  15 - 37 U/L Final    ALT 06/01/2020 17  12 - 78 U/L Final    Total Protein 06/01/2020 5.9* 6.4 - 8.2 g/dL Final    Albumin 06/01/2020 3.3* 3.4 - 5.0 g/dL Final    Globulin 06/01/2020 2.6  2.3 - 3.5 g/dL Final    Albumin/Globulin Ratio 06/01/2020 1.269  1.1 - 1.8 Ratio Final    Alkaline Phosphatase 06/01/2020 72  46 - 116 U/L Final    Neutrophils 06/01/2020 40.0  37 - 80 % Final    Neutrophils Absolute 06/01/2020 0.8* 1.8 - 7.7 10*3/uL Final    BANDS 06/01/2020 3.0  0 - 5 % Final    Lymphocytes 06/01/2020 49.0* 25 - 40 % Final    Monocytes 06/01/2020 7.0  1 - 15 % Final    ATYPICAL LYMPHOCYTES 06/01/2020 1.0* 0 - 0 % Final    Cells Counted 06/01/2020 100   Final    Platelet Estimate 06/01/2020 Decreased   Final    Macrocytes 06/01/2020 1+    Final   Orders Only on 05/22/2020   Component Date Value Ref Range Status    Sodium 05/22/2020 138  135 - 145 mmol/L Final    Potassium 05/22/2020 4.1  3.6 - 5.2 mmol/L Final    Chloride 05/22/2020 102  100 - 108 mmol/L Final    CO2 05/22/2020 26  21 - 32 mmol/L Final    Anion Gap 05/22/2020 10.0  3.0 - 11.0 mmol/L Final    BUN, Bld 05/22/2020 33* 7 - 18 mg/dL Final    Creatinine 05/22/2020 1.40* 0.55 - 1.02 mg/dL Final    GFR ESTIMATION 05/22/2020 44* >60 Final               DESCRIPTION       GLOMERULAR FILTRATION RATE             NORMAL                     >60             KIDNEY  DISEASE           15-60             KIDNEY FAILURE             <15    BUN/Creatinine Ratio 05/22/2020 23.57* 7 - 18 Ratio Final    Glucose 05/22/2020 130* 70 - 110 mg/dL Final    Calcium 05/22/2020 8.5* 8.8 - 10.5 mg/dL Final    Total Bilirubin 05/22/2020 0.5  0.0 - 1.0 mg/dL Final    AST 05/22/2020 21  15 - 37 U/L Final    ALT 05/22/2020 21  12 - 78 U/L Final    Total Protein 05/22/2020 6.8  6.4 - 8.2 g/dL Final    Albumin 05/22/2020 3.9  3.4 - 5.0 g/dL Final    Globulin 05/22/2020 2.9  2.3 - 3.5 g/dL Final    Albumin/Globulin Ratio 05/22/2020 1.344  1.1 - 1.8 Ratio Final    Alkaline Phosphatase 05/22/2020 92  46 - 116 U/L Final   Orders Only on 05/22/2020   Component Date Value Ref Range Status    Macrocytes 05/22/2020 2+   Final    WBC 05/22/2020 5.8  4.6 - 10.2 10*3/uL Final    RBC 05/22/2020 2.47* 4.2 - 5.4 10*6/uL Final    Hemoglobin 05/22/2020 9.4* 12.0 - 16.0 g/dL Final    Hematocrit 05/22/2020 27.5* 37.0 - 47.0 % Final    MCV 05/22/2020 111.3* 80 - 97 fL Final    MCH 05/22/2020 38.1* 27.0 - 31.2 pg Final    MCHC 05/22/2020 34.2  31.8 - 35.4 g/dL Final    RDW RBC Auto-Rto 05/22/2020 14.4  12.5 - 18.0 % Final    Platelets 05/22/2020 140* 142 - 424 10*3/uL Final    Neutrophils/100 leukocytes 05/22/2020 82.0* 37 - 80 % Final    Lymphocytes/100 leukocytes 05/22/2020 13.0* 25 - 40 % Final     Monocytes/100 leukocytes 05/22/2020 3.3  1 - 15 % Final    Eosinophils/100 leukocytes 05/22/2020 0.9* 1 - 3 % Final    Basophils/100 leukocytes 05/22/2020 0.3  0 - 3 % Final    Manual nRBC per 100 Cells 05/22/2020 0.3  % Final    Neutrophils 05/22/2020 4.79  1.8 - 7.7 10*3/uL Final   Orders Only on 05/12/2020   Component Date Value Ref Range Status    Macrocytes 05/12/2020 2+   Final    WBC 05/12/2020 4.7  4.6 - 10.2 10*3/uL Final    RBC 05/12/2020 2.72* 4.2 - 5.4 10*6/uL Final    Hemoglobin 05/12/2020 10.1* 12.0 - 16.0 g/dL Final    Hematocrit 05/12/2020 30.3* 37.0 - 47.0 % Final    MCV 05/12/2020 111.4* 80 - 97 fL Final    MCH 05/12/2020 37.1* 27.0 - 31.2 pg Final    MCHC 05/12/2020 33.3  31.8 - 35.4 g/dL Final    RDW RBC Auto-Rto 05/12/2020 14.4  12.5 - 18.0 % Final    Platelets 05/12/2020 149  142 - 424 10*3/uL Final    Neutrophils/100 leukocytes 05/12/2020 56.9  37 - 80 % Final    Lymphocytes/100 leukocytes 05/12/2020 28.5  25 - 40 % Final    Monocytes/100 leukocytes 05/12/2020 12.1  1 - 15 % Final    Eosinophils/100 leukocytes 05/12/2020 1.5  1 - 3 % Final    Basophils/100 leukocytes 05/12/2020 0.8  0 - 3 % Final    Manual nRBC per 100 Cells 05/12/2020 0.4  % Final    Neutrophils 05/12/2020 2.68  1.8 - 7.7 10*3/uL Final   There may be more visits with results that are not included.       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 female with PMH of The primary encounter diagnosis was Malignant neoplasm of endocervix.  Diagnoses of Thrombocytopenia, Encounter for antineoplastic chemotherapy, and Pancytopenia were also pertinent to this visit., with Malignant neoplasm of endocervix [C53.0]     ==============================================  *  Assessment:       1. 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 Malignant neoplasm of cervix, unspecified site    3. Increased creatine kinase level    4. Neoplastic malignant related fatigue       Cervical cancer stage IV disease  BRCA2 +  mutation   PARP inhibitor  Lynparza 300 mg BID PET scan shows early progression 4/2020, dc'd  Mid June due to excessive pancytopenia   weekly carbo/taxol added       Plan:   Overall PLANS:  carbo Taxol weekly   Lynparza  dc'd pancytopenia     == lab CBC CMP every week  == Will reduce carbo to 50 mg IV weekly, Taxol 60 mg IV weekly, all fixed doses  == return to clinic 1 week    Annette Donis NP

## 2020-06-30 NOTE — TELEPHONE ENCOUNTER
"Call Attempt 1: Unable to reach patient for Lynparza clinical follow up. Daughter answered the phone stating the patient has no yet returned from her appointment, but will have her give us a call back when she gets home.   NOTE: Based on her office visit notes from today, it appears Lynparza therapy was discontinued due to "excessive pancytopenia" and weekly carboplatin/Taxol was added. Need to confirm therapy discontinuation with patient and complete discontinuation survey as appropriate.   "

## 2020-07-02 NOTE — TELEPHONE ENCOUNTER
"Contacted patient to confirm the status of her Lynparza therapy and complete clinical follow up as appropriate. Patient confirmed that MD has discontinued her Lynparza therapy and switched her to IV chemo due to persistent abnormal blood counts - "excessive pancytopenia". She does have remaining tablets on hand. Discussed proper disposal - medication is NOT to be disposed of in the regular trash due to the hazardous nature of the medication. Discussed that sometimes [facility] offices are able to take back their medication to dispose of, but to discuss with provider to confirm. Patient will bring medication with her to MD office visit on Tuesday to ask. If they are unable to take back the medication, provided her with facilities within her area that are able to take back the medication for disposal. Patient has no questions or concerns at this time. She is aware to contact [facility] if she needs anything. Will close out all activities and referral for Lynparza from [facility] at this time.   "

## 2020-07-06 ENCOUNTER — CLINICAL SUPPORT (OUTPATIENT)
Dept: HEMATOLOGY/ONCOLOGY | Facility: CLINIC | Age: 85
End: 2020-07-06
Payer: MEDICARE

## 2020-07-06 LAB
ALBUMIN SERPL BCP-MCNC: 4 G/DL (ref 3.4–5)
ALBUMIN/GLOBULIN RATIO: 1.29 RATIO (ref 1.1–1.8)
ALP SERPL-CCNC: 98 U/L (ref 46–116)
ALT SERPL W P-5'-P-CCNC: 25 U/L (ref 12–78)
ANION GAP SERPL CALC-SCNC: 12 MMOL/L (ref 3–11)
ANISOCYTOSIS: NORMAL
AST SERPL-CCNC: 21 U/L (ref 15–37)
BASOPHILS NFR SNV MANUAL: 0.6 % (ref 0–3)
BILIRUB SERPL-MCNC: 0.4 MG/DL (ref 0–1)
BUN SERPL-MCNC: 20 MG/DL (ref 7–18)
BUN/CREAT SERPL: 16.8 RATIO (ref 7–18)
CALCIUM SERPL-MCNC: 9.2 MG/DL (ref 8.8–10.5)
CHLORIDE SERPL-SCNC: 105 MMOL/L (ref 100–108)
CO2 SERPL-SCNC: 23 MMOL/L (ref 21–32)
CREAT SERPL-MCNC: 1.19 MG/DL (ref 0.55–1.02)
EOSINOPHIL NFR SNV MANUAL: 0.6 % (ref 1–3)
ERYTHROCYTE [DISTWIDTH] IN BLOOD BY AUTOMATED COUNT: 19.7 % (ref 12.5–18)
GFR ESTIMATION: 53
GLOBULIN: 3.1 G/DL (ref 2.3–3.5)
GLUCOSE SERPL-MCNC: 99 MG/DL (ref 70–110)
HCT VFR BLD AUTO: 29.8 % (ref 37–47)
HGB BLD-MCNC: 9.5 G/DL (ref 12–16)
LYMPHOCYTES NFR SNV MANUAL: 33.3 % (ref 25–40)
MACROCYTES BLD QL SMEAR: NORMAL
MANUAL NRBC PER 100 CELLS: 0 %
MCH RBC QN AUTO: 34.2 PG (ref 27–31.2)
MCHC RBC AUTO-ENTMCNC: 31.9 G/DL (ref 31.8–35.4)
MCV RBC AUTO: 107.2 FL (ref 80–97)
MONOCYTES/100 LEUKOCYTES: 9.8 % (ref 1–15)
NEUTROPHILS NFR BLD: 2.63 10*3/UL (ref 1.8–7.7)
NEUTROPHILS NFR SNV MANUAL: 55.1 % (ref 37–80)
PLATELETS: 215 10*3/UL (ref 142–424)
POTASSIUM SERPL-SCNC: 4.3 MMOL/L (ref 3.6–5.2)
PROT SERPL-MCNC: 7.1 G/DL (ref 6.4–8.2)
RBC # BLD AUTO: 2.78 10*6/UL (ref 4.2–5.4)
SODIUM BLD-SCNC: 140 MMOL/L (ref 135–145)
WBC # BLD: 4.8 10*3/UL (ref 4.6–10.2)

## 2020-07-07 ENCOUNTER — OFFICE VISIT (OUTPATIENT)
Dept: HEMATOLOGY/ONCOLOGY | Facility: CLINIC | Age: 85
End: 2020-07-07
Payer: MEDICARE

## 2020-07-07 VITALS
TEMPERATURE: 97 F | HEART RATE: 55 BPM | WEIGHT: 131 LBS | SYSTOLIC BLOOD PRESSURE: 115 MMHG | OXYGEN SATURATION: 99 % | HEIGHT: 62 IN | DIASTOLIC BLOOD PRESSURE: 63 MMHG | RESPIRATION RATE: 17 BRPM | BODY MASS INDEX: 24.11 KG/M2

## 2020-07-07 DIAGNOSIS — Z15.09 BRCA2 GENE MUTATION POSITIVE IN FEMALE: ICD-10-CM

## 2020-07-07 DIAGNOSIS — C53.0 MALIGNANT NEOPLASM OF ENDOCERVIX: Primary | ICD-10-CM

## 2020-07-07 DIAGNOSIS — D61.818 PANCYTOPENIA: ICD-10-CM

## 2020-07-07 DIAGNOSIS — Z15.01 BRCA2 GENE MUTATION POSITIVE IN FEMALE: ICD-10-CM

## 2020-07-07 DIAGNOSIS — R74.8 INCREASED CREATINE KINASE LEVEL: ICD-10-CM

## 2020-07-07 DIAGNOSIS — Z15.02 BRCA2 GENE MUTATION POSITIVE IN FEMALE: ICD-10-CM

## 2020-07-07 DIAGNOSIS — Z51.11 ENCOUNTER FOR ANTINEOPLASTIC CHEMOTHERAPY: ICD-10-CM

## 2020-07-07 PROCEDURE — 99214 OFFICE O/P EST MOD 30 MIN: CPT | Mod: S$GLB,,, | Performed by: NURSE PRACTITIONER

## 2020-07-07 PROCEDURE — 99214 PR OFFICE/OUTPT VISIT, EST, LEVL IV, 30-39 MIN: ICD-10-PCS | Mod: S$GLB,,, | Performed by: NURSE PRACTITIONER

## 2020-07-07 NOTE — PROGRESS NOTES
Medical Oncology Progress Note  [facility]     PATIENT: Adri Seay  : 1935 85 y.o. female  MRN [id number]     PCP: Bria Mitchell NP  Consult Requested By:      Date of Service: 2020    Subjective:   Interim History:  Malignant neoplasm of endocervix    Adri Seay is here for follow-up on treatment;   Carbotaxol has been on hold because of leukopenia  The patient feel well and she had no complaint today    Oncology History:    History of Present Illness: Ms. Seay is a 84 y.o. female who presents for evaluation and management of cervical cancer. SHe was seen at [facility] on 19.      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 The patient had a CT scan consistent with widely metastatic disease including multiple sites of lymphadenopathy, a possible lung metastases, liver metastases, nodule in the anterior abdominal wall, and carcinomatosis. The patient is mostly asymptomatic but does have a vaginal discharge. Her performance status is 0. On exam, there is a 6 centimeter mass in the anterior abdominal wall superior to the umbilicus. On bimanual examination, tumor is a place the entire cervix apex of the vagina, and on rectovaginal examination, there is a large pelvic mass extending to the bilateral pelvic sidewalls.    We will get her CT reviewed at [facility] and if it is consistent with metastatic disease, we have recommended palliative chemotherapy with either carboplatinum as a single agent or carboplatinum and paclitaxel. Due to concern for bowel involvement on the CT scan, I have recommended not giving bevacizumab as part of this regimen. Patient will return home to [place] and begin chemotherapy with a medical oncologist there. I've also recommended a consultation with radiation oncology in [place] for consideration of palliative radiation to  "lower the chances of significant vaginal bleeding. We will see her back on an as-needed basis."       == 8/5/19 Carbo    ==PET/CT done on 12/2/19. Previous CT imaging done at outside facility, discussed with Dr Nguyen for comparison and he stated pelvic mass smaller, abdominal mass smaller liver lesion likely benign stable but difficult to say if peritoneal implants stable to progressed.    ==FoundationOne Liquid Bx on 12/16/19.  BRCA 2 gene mutation.  ==1/14/20 Pt started lynparza   ==5/13/2020 PET showing early progression.    ==5/2020 resume low dose carbo and taxol. On hold now due to pancytopenia     Oncology History   Malignant neoplasm of endocervix   6/27/2019 Initial Diagnosis    Malignant neoplasm of exocervix     7/8/2019 - 7/8/2019 Chemotherapy    Treatment Summary   Plan Name: OP CARBOPLATIN WEEKLY  Treatment Goal: Palliative  Status: Inactive  Start Date: [No treatment day found]  End Date: [No treatment day found]  Provider: Jaime Pinon MD  Chemotherapy: CARBOplatin (PARAPLATIN) in sodium chloride 0.9% 250 mL chemo infusion, , Intravenous, Clinic/HOD 1 time, 0 of 4 cycles     7/9/2019 - 11/25/2019 Chemotherapy    Treatment Summary   Plan Name: OP GYN CARBOPLATIN (AUC) Q4W  Treatment Goal: Palliative  Status: Inactive  Start Date: 7/9/2019  End Date: 10/29/2019  Provider: Jaime Pinon MD  Chemotherapy: CARBOplatin (PARAPLATIN) in sodium chloride 0.9% 250 mL chemo infusion,  (original dose 341.5 mg), Intravenous, Clinic/HOD 1 time, 5 of 6 cycles  Dose modification:   (original dose 341.5 mg, Cycle 1)     5/19/2020 - 6/16/2020 Chemotherapy    Treatment Summary   Plan Name: OP  PACLITAXEL CARBOPLATIN WEEKLY  Treatment Goal: Palliative  Status: Inactive  Start Date: 5/19/2020  End Date: 5/22/2020  Provider: Annette Donis NP  Chemotherapy: CARBOplatin (PARAPLATIN) 115 mg in sodium chloride 0.9% 250 mL chemo infusion, 115 mg (98.6 % of original dose 118.4 mg), Intravenous, Clinic/HOD 1 time, 0 of 1 " cycle  Dose modification:   (original dose 118.4 mg, Cycle 1)  PACLitaxeL (TAXOL) 80 mg/m2 = 126 mg in sodium chloride 0.9% 250 mL chemo infusion, 80 mg/m2 = 126 mg (100 % of original dose 80 mg/m2), Intravenous, Clinic/HOD 1 time, 0 of 1 cycle  Dose modification: 80 mg/m2 (original dose 80 mg/m2, Cycle 1)     5/19/2020 -  Chemotherapy    Treatment Summary   Plan Name:  PACLITAXEL CARBOPLATIN WEEKLY  Treatment Goal: Palliative  Status: Active  Start Date: 5/19/2020  End Date: 8/4/2020 (Planned)  Provider: Annette Donis NP  Chemotherapy: CARBOplatin (PARAPLATIN) 115 mg in sodium chloride 0.9% 250 mL chemo infusion, 115 mg (100 % of original dose 114.8 mg), Intravenous, Clinic/HOD 1 time, 1 of 1 cycle  Dose modification:   (original dose 114.8 mg, Cycle 1, Reason: MD Discretion), 100 mg (original dose 114.8 mg, Cycle 1)  PACLitaxeL (TAXOL) 80 mg/m2 = 126 mg in sodium chloride 0.9% 250 mL chemo infusion, 80 mg/m2 = 126 mg (100 % of original dose 80 mg/m2), Intravenous, Clinic/HOD 1 time, 1 of 1 cycle  Dose modification: 80 mg/m2 (original dose 80 mg/m2, Cycle 1), 50 mg/m2 (original dose 80 mg/m2, Cycle 1)         Past Medical History:   Past Medical History:   Diagnosis Date    Anemia     Cataract     Cervical cancer 05/2019    Hypertension        Past Surgical HIstory:   Past Surgical History:   Procedure Laterality Date    CATARACT EXTRACTION, BILATERAL      WRIST SURGERY  1984       Allergies:  Review of patient's allergies indicates:   Allergen Reactions    Strawberry Rash       Medications:  Current Outpatient Medications   Medication Sig Dispense Refill    cloNIDine (CATAPRES) 0.3 MG tablet Take 1 tablet (0.3 mg total) by mouth 2 (two) times daily. 180 tablet 2    FENOFIBRATE ORAL Take by mouth 2 (two) times daily.      ferrous sulfate (FEOSOL) 325 mg (65 mg iron) Tab tablet TK 1 T PO BID  1    loratadine (CLARITIN) 10 mg tablet Take 10 mg by mouth once daily.      NIFEdipine (PROCARDIA-XL) 30 MG (OSM)  24 hr tablet TAKE 1 TABLET BY MOUTH ONCE DAILY 90 tablet 5    ondansetron (ZOFRAN-ODT) 8 MG TbDL Take 1 tablet (8 mg total) by mouth every 8 (eight) hours as needed (chemotherapy-induced nausea and vomiting). 40 tablet 5    simvastatin (ZOCOR) 20 MG tablet Take 20 mg by mouth every evening.      varicella-zoster [id number], PF, (SHINGRIX, PF,) 50 mcg/0.5 mL injection Administer two doses 2 months apart 0.5 mL 0    megestrol (MEGACE) 40 MG Tab Take 1 tablet (40 mg total) by mouth 2 (two) times daily. 60 tablet 6     No current facility-administered medications for this visit.        Family History:   Family History   Problem Relation Age of Onset    Hypertension Mother     Pneumonia Father     Breast cancer Sister     No Known Problems Brother     Leukemia Daughter     No Known Problems Son        Social History:  reports that she has never smoked. She has never used smokeless tobacco. She reports previous alcohol use. She reports that she does not use drugs.    Review of Systemas  Constitutional: No change in weight, appetie, fatigue, fever, or night sweats  Eyes: No changes in vision  Ears, Nose, Mouth, Throat, and Face: No hearing problems, ear pain, rummy nose, or sore throat  Respiratory: No shortness of breath or cough  Cardiovascular: No chest pain, palpitations or dizziness  Gastrointestinal: No abdominal pain, hematochezia, melena  Genitourinary: No dysuria, hematuria, nocturia, menstrual problems, post menopausal  Integumentary/Breast: No rashes or itching  Hematologic/Lymphatic: No anemia or bleeding abnormalities  Musculoskeletal: No joint or muscle abnormalities  Neurological: No sensory or motor problems, no headaches  Behavioral/Psych: No depression, anxiety, cognitive problems, or stress  Endocrine: No diabetes or thyroid problems  Allergic/Immunologic: See allergy above    Physical Exam      Vitals:   Vitals:    07/07/20 0905   BP: 115/63   BP Location: Left arm   Patient Position: Sitting  "  BP Method: Small (Automatic)   Pulse: (!) 55   Resp: 17   Temp: 96.8 °F (36 °C)   TempSrc: Temporal   SpO2: 99%   Weight: 59.4 kg (131 lb)   Height: 5' 2" (1.575 m)     BMI: Body mass index is 23.96 kg/m².  BSA Body surface area is 1.61 meters squared.  ECOG Performance Status:   ECOG SCORE         GENERAL APPEARANCE: Well developed, well nourished, in no acute distress.  SKIN: Inspection of the skin reveals no rashes, ulcerations or petechiae.  HEENT: The sclerae were anicteric and conjunctivae were pink and moist. Extraocular movements were intact and pupils were equal, round, and reactive to light with normal accommodation. External inspection of the ears and nose showed no scars, lesions, or masses. Lips, teeth, and gums showed normal mucosa. The oral mucosa, hard and soft palate, tongue and posterior pharynx were normal.  NECK: Supple and symmetric. There was no thyroid enlargement, and no tenderness, or masses were felt.  CRESPIRATORY: Normal AP diameter and normal contour without any kyphoscoliosis. LUNGS: Auscultation of the lungs revealed normal breath sounds without any other adventitious sounds or rubs.  CARDIOVASCULAR: There was a regular rate and rhythm without any murmurs, gallops, rubs. The carotid pulses were normal and 2+ bilaterally without bruits. Peripheral pulses were 2+ and symmetric.  GASTROINTESTINAL: Soft and nontender with normal bowel sounds. The liver span was approximately 5-6 cm in the right midclavicular line by percussion. The liver edge was nontender. The spleen was not palpable. There were no inguinal or umbilical hernias noted. No ascites was noted.  LYMPH NODES: No lymphadenopathy was appreciated in the neck, axillae or groin.  MUSCULOSKELETAL: Gait was normal. There was no tenderness or effusions noted. Muscle strength and tone were normal. EXTREMITIES: No cyanosis, clubbing or edema.  NEUROLOGIC: Alert and oriented x 3. Normal affect. Gait was normal. Normal deep tendon " reflexes with no pathological reflexes. Sensation to touch was normal.  PSYCHIATRIC: good mood, orientated to place, time and person     Labs and Imagings     Orders Only on 07/06/2020   Component Date Value Ref Range Status    Sodium 07/06/2020 140  135 - 145 mmol/L Final    Potassium 07/06/2020 4.3  3.6 - 5.2 mmol/L Final    Chloride 07/06/2020 105  100 - 108 mmol/L Final    CO2 07/06/2020 23  21 - 32 mmol/L Final    Anion Gap 07/06/2020 12.0* 3.0 - 11.0 mmol/L Final    BUN, Bld 07/06/2020 20* 7 - 18 mg/dL Final    Creatinine 07/06/2020 1.19* 0.55 - 1.02 mg/dL Final    GFR ESTIMATION 07/06/2020 53* >60 Final               DESCRIPTION       GLOMERULAR FILTRATION RATE             NORMAL                     >60             KIDNEY  DISEASE           15-60             KIDNEY FAILURE             <15    BUN/Creatinine Ratio 07/06/2020 16.80  7 - 18 Ratio Final    Glucose 07/06/2020 99  70 - 110 mg/dL Final    Calcium 07/06/2020 9.2  8.8 - 10.5 mg/dL Final    Total Bilirubin 07/06/2020 0.4  0.0 - 1.0 mg/dL Final    AST 07/06/2020 21  15 - 37 U/L Final    ALT 07/06/2020 25  12 - 78 U/L Final    Total Protein 07/06/2020 7.1  6.4 - 8.2 g/dL Final    Albumin 07/06/2020 4.0  3.4 - 5.0 g/dL Final    Globulin 07/06/2020 3.1  2.3 - 3.5 g/dL Final    Albumin/Globulin Ratio 07/06/2020 1.290  1.1 - 1.8 Ratio Final    Alkaline Phosphatase 07/06/2020 98  46 - 116 U/L Final   Orders Only on 07/06/2020   Component Date Value Ref Range Status    WBC 07/06/2020 4.8  4.6 - 10.2 10*3/uL Final    RBC 07/06/2020 2.78* 4.2 - 5.4 10*6/uL Final    Hemoglobin 07/06/2020 9.5* 12.0 - 16.0 g/dL Final    Hematocrit 07/06/2020 29.8* 37.0 - 47.0 % Final    MCV 07/06/2020 107.2* 80 - 97 fL Final    MCH 07/06/2020 34.2* 27.0 - 31.2 pg Final    MCHC 07/06/2020 31.9  31.8 - 35.4 g/dL Final    RDW RBC Auto-Rto 07/06/2020 19.7* 12.5 - 18.0 % Final    Platelets 07/06/2020 215  142 - 424 10*3/uL Final    Neutrophils/100 leukocytes  07/06/2020 55.1  37 - 80 % Final    Lymphocytes/100 leukocytes 07/06/2020 33.3  25 - 40 % Final    Monocytes/100 leukocytes 07/06/2020 9.8  1 - 15 % Final    Eosinophils/100 leukocytes 07/06/2020 0.6* 1 - 3 % Final    Basophils/100 leukocytes 07/06/2020 0.6  0 - 3 % Final    Manual nRBC per 100 Cells 07/06/2020 0.0  % Final    Neutrophils 07/06/2020 2.63  1.8 - 7.7 10*3/uL Final    Anisocytosis 07/06/2020 1+   Final    Macrocytes 07/06/2020 1+   Final   Orders Only on 06/29/2020   Component Date Value Ref Range Status    Neutrophils 06/29/2020 47.0  37 - 80 % Final    Neutrophils Absolute 06/29/2020 1.6* 1.8 - 7.7 10*3/uL Final    BANDS 06/29/2020 2.0  0 - 5 % Final    Lymphocytes 06/29/2020 38.0  25 - 40 % Final    Monocytes 06/29/2020 12.0  1 - 15 % Final    Eosinophils 06/29/2020 1.0  1 - 3 % Final    Cells Counted 06/29/2020 100   Final    Platelet Estimate 06/29/2020 Adequate   Final    Anisocytosis 06/29/2020 2+   Final    Microcytes 06/29/2020 1+   Final   Ancillary Orders on 06/26/2020   Component Date Value Ref Range Status    WBC 06/29/2020 3.2* 4.6 - 10.2 10*3/uL Final    RBC 06/29/2020 2.69* 4.2 - 5.4 10*6/uL Final    Hemoglobin 06/29/2020 9.3* 12.0 - 16.0 g/dL Final    Hematocrit 06/29/2020 28.6* 37.0 - 47.0 % Final    MCV 06/29/2020 106.3* 80 - 97 fL Final    MCH 06/29/2020 34.6* 27.0 - 31.2 pg Final    MCHC 06/29/2020 32.5  31.8 - 35.4 g/dL Final    RDW RBC Auto-Rto 06/29/2020 20.8* 12.5 - 18.0 % Final    Platelets 06/29/2020 222  142 - 424 10*3/uL Final    Manual nRBC per 100 Cells 06/29/2020 0.0  % Final    Sodium 06/29/2020 141  135 - 145 mmol/L Final    Potassium 06/29/2020 4.2  3.6 - 5.2 mmol/L Final    Chloride 06/29/2020 107  100 - 108 mmol/L Final    CO2 06/29/2020 24  21 - 32 mmol/L Final    Anion Gap 06/29/2020 10.0  3.0 - 11.0 mmol/L Final    BUN, Bld 06/29/2020 25* 7 - 18 mg/dL Final    Creatinine 06/29/2020 1.21* 0.55 - 1.02 mg/dL Final    GFR  ESTIMATION 06/29/2020 52* >60 Final               DESCRIPTION       GLOMERULAR FILTRATION RATE             NORMAL                     >60             KIDNEY  DISEASE           15-60             KIDNEY FAILURE             <15    BUN/Creatinine Ratio 06/29/2020 20.66* 7 - 18 Ratio Final    Glucose 06/29/2020 107  70 - 110 mg/dL Final    Calcium 06/29/2020 9.5  8.8 - 10.5 mg/dL Final    Total Bilirubin 06/29/2020 0.3  0.0 - 1.0 mg/dL Final    AST 06/29/2020 19  15 - 37 U/L Final    ALT 06/29/2020 25  12 - 78 U/L Final    Total Protein 06/29/2020 7.1  6.4 - 8.2 g/dL Final    Albumin 06/29/2020 4.0  3.4 - 5.0 g/dL Final    Globulin 06/29/2020 3.1  2.3 - 3.5 g/dL Final    Albumin/Globulin Ratio 06/29/2020 1.290  1.1 - 1.8 Ratio Final    Alkaline Phosphatase 06/29/2020 110  46 - 116 U/L Final    Magnesium 06/29/2020 1.7* 1.8 - 2.4 mg/dL Final    Phosphorus 06/29/2020 3.6  2.6 - 4.7 mg/dL Final   Orders Only on 06/18/2020   Component Date Value Ref Range Status    Sodium 06/18/2020 141  135 - 145 mmol/L Final    Potassium 06/18/2020 4.8  3.6 - 5.2 mmol/L Final    Chloride 06/18/2020 108  100 - 108 mmol/L Final    CO2 06/18/2020 22  21 - 32 mmol/L Final    Anion Gap 06/18/2020 11.0  3.0 - 11.0 mmol/L Final    BUN, Bld 06/18/2020 30* 7 - 18 mg/dL Final    Creatinine 06/18/2020 1.34* 0.55 - 1.02 mg/dL Final    GFR ESTIMATION 06/18/2020 46* >60 Final               DESCRIPTION       GLOMERULAR FILTRATION RATE             NORMAL                     >60             KIDNEY  DISEASE           15-60             KIDNEY FAILURE             <15    BUN/Creatinine Ratio 06/18/2020 22.38* 7 - 18 Ratio Final    Glucose 06/18/2020 95  70 - 110 mg/dL Final    Calcium 06/18/2020 8.7* 8.8 - 10.5 mg/dL Final    Total Bilirubin 06/18/2020 0.6  0.0 - 1.0 mg/dL Final    AST 06/18/2020 17  15 - 37 U/L Final    ALT 06/18/2020 21  12 - 78 U/L Final    Total Protein 06/18/2020 6.7  6.4 - 8.2 g/dL Final    Albumin 06/18/2020  3.8  3.4 - 5.0 g/dL Final    Globulin 06/18/2020 2.9  2.3 - 3.5 g/dL Final    Albumin/Globulin Ratio 06/18/2020 1.310  1.1 - 1.8 Ratio Final    Alkaline Phosphatase 06/18/2020 109  46 - 116 U/L Final   Orders Only on 06/18/2020   Component Date Value Ref Range Status    WBC 06/18/2020 3.1* 4.6 - 10.2 10*3/uL Final    RBC 06/18/2020 1.61* 4.2 - 5.4 10*6/uL Final    Hemoglobin 06/18/2020 6.1* 12.0 - 16.0 g/dL Final    Critical value: Called to: Aliya Sahni ([facility])at 1444 on 06/18/20 by [email or web address].Result read-back successful. YES    Hematocrit 06/18/2020 18.2* 37.0 - 47.0 % Final    Critical value: Called to: Aliya Sahni ([facility])at 1444 on 06/18/20 by [id number].Result read-back successful. YES    MCV 06/18/2020 113.0* 80 - 97 fL Final    MCH 06/18/2020 37.9* 27.0 - 31.2 pg Final    MCHC 06/18/2020 33.5  31.8 - 35.4 g/dL Final    RDW RBC Auto-Rto 06/18/2020 15.7  12.5 - 18.0 % Final    Platelets 06/18/2020 40* 142 - 424 10*3/uL Final    Critical value: Called to: Aliya Sahni ([facility])at 1444 on 06/18/20 by [id number].Result read-back successful. YES    Manual nRBC per 100 Cells 06/18/2020 0.0  % Final    Neutrophils 06/18/2020 42.0  37 - 80 % Final    Neutrophils Absolute 06/18/2020 1.3* 1.8 - 7.7 10*3/uL Final    Lymphocytes 06/18/2020 48.0* 25 - 40 % Final    Monocytes 06/18/2020 8.0  1 - 15 % Final    Eosinophils 06/18/2020 2.0  1 - 3 % Final    Cells Counted 06/18/2020 50   Final    Platelet Estimate 06/18/2020 Decreased   Final    Macrocytes 06/18/2020 1+   Final   Orders Only on 06/08/2020   Component Date Value Ref Range Status    WBC 06/08/2020 2.4* 4.6 - 10.2 10*3/uL Final    Critical value: Called to:KEYANA Susan 1045 on 06/08/20 by [id number].Result read-back successful. Y    RBC 06/08/2020 1.92* 4.2 - 5.4 10*6/uL Final    Hemoglobin 06/08/2020 7.3* 12.0 - 16.0 g/dL Final    Hematocrit 06/08/2020 21.6* 37.0 - 47.0 % Final    MCV 06/08/2020 112.5* 80  - 97 fL Final    MCH 06/08/2020 38.0* 27.0 - 31.2 pg Final    MCHC 06/08/2020 33.8  31.8 - 35.4 g/dL Final    RDW RBC Auto-Rto 06/08/2020 15.4  12.5 - 18.0 % Final    Platelets 06/08/2020 86* 142 - 424 10*3/uL Final    Manual nRBC per 100 Cells 06/08/2020 1.3  % Final    Neutrophils 06/08/2020 66.0  37 - 80 % Final    Neutrophils Absolute 06/08/2020 1.6* 1.8 - 7.7 10*3/uL Final    BANDS 06/08/2020 1.0  0 - 5 % Final    Lymphocytes 06/08/2020 30.0  25 - 40 % Final    Monocytes 06/08/2020 3.0  1 - 15 % Final    Cells Counted 06/08/2020 100   Final    Platelet Estimate 06/08/2020 Decreased   Final    Macrocytes 06/08/2020 3+   Final   Orders Only on 06/08/2020   Component Date Value Ref Range Status    Sodium 06/08/2020 138  135 - 145 mmol/L Final    Potassium 06/08/2020 4.0  3.6 - 5.2 mmol/L Final    Chloride 06/08/2020 106  100 - 108 mmol/L Final    CO2 06/08/2020 25  21 - 32 mmol/L Final    Anion Gap 06/08/2020 7.0  3.0 - 11.0 mmol/L Final    BUN, Bld 06/08/2020 18  7 - 18 mg/dL Final    Creatinine 06/08/2020 1.29* 0.55 - 1.02 mg/dL Final    GFR ESTIMATION 06/08/2020 48* >60 Final               DESCRIPTION       GLOMERULAR FILTRATION RATE             NORMAL                     >60             KIDNEY  DISEASE           15-60             KIDNEY FAILURE             <15    BUN/Creatinine Ratio 06/08/2020 13.95  7 - 18 Ratio Final    Glucose 06/08/2020 111* 70 - 110 mg/dL Final    Calcium 06/08/2020 8.7* 8.8 - 10.5 mg/dL Final    Total Bilirubin 06/08/2020 0.5  0.0 - 1.0 mg/dL Final    AST 06/08/2020 17  15 - 37 U/L Final    ALT 06/08/2020 18  12 - 78 U/L Final    Total Protein 06/08/2020 6.8  6.4 - 8.2 g/dL Final    Albumin 06/08/2020 3.5  3.4 - 5.0 g/dL Final    Globulin 06/08/2020 3.3  2.3 - 3.5 g/dL Final    Albumin/Globulin Ratio 06/08/2020 1.060* 1.1 - 1.8 Ratio Final    Alkaline Phosphatase 06/08/2020 82  46 - 116 U/L Final   Ancillary Orders on 05/29/2020   Component Date Value Ref  Range Status    WBC 06/01/2020 1.9* 4.6 - 10.2 10*3/uL Final    Critical value: Called to:KEYANA Davis 1128 on 06/01/20 by [id number].Result read-back successful. Y    RBC 06/01/2020 1.94* 4.2 - 5.4 10*6/uL Final    Hemoglobin 06/01/2020 7.2* 12.0 - 16.0 g/dL Final    Hematocrit 06/01/2020 22.4* 37.0 - 47.0 % Final    MCV 06/01/2020 115.5* 80 - 97 fL Final    MCH 06/01/2020 37.1* 27.0 - 31.2 pg Final    MCHC 06/01/2020 32.1  31.8 - 35.4 g/dL Final    RDW RBC Auto-Rto 06/01/2020 14.9  12.5 - 18.0 % Final    Platelets 06/01/2020 93* 142 - 424 10*3/uL Final    Manual nRBC per 100 Cells 06/01/2020 1.6  % Final    Sodium 06/01/2020 140  135 - 145 mmol/L Final    Potassium 06/01/2020 4.0  3.6 - 5.2 mmol/L Final    Chloride 06/01/2020 107  100 - 108 mmol/L Final    CO2 06/01/2020 23  21 - 32 mmol/L Final    Anion Gap 06/01/2020 10.0  3.0 - 11.0 mmol/L Final    BUN, Bld 06/01/2020 19* 7 - 18 mg/dL Final    Creatinine 06/01/2020 1.31* 0.55 - 1.02 mg/dL Final    GFR ESTIMATION 06/01/2020 47* >60 Final               DESCRIPTION       GLOMERULAR FILTRATION RATE             NORMAL                     >60             KIDNEY  DISEASE           15-60             KIDNEY FAILURE             <15    BUN/Creatinine Ratio 06/01/2020 14.50  7 - 18 Ratio Final    Glucose 06/01/2020 155* 70 - 110 mg/dL Final    Calcium 06/01/2020 8.2* 8.8 - 10.5 mg/dL Final    Total Bilirubin 06/01/2020 0.4  0.0 - 1.0 mg/dL Final    AST 06/01/2020 17  15 - 37 U/L Final    ALT 06/01/2020 17  12 - 78 U/L Final    Total Protein 06/01/2020 5.9* 6.4 - 8.2 g/dL Final    Albumin 06/01/2020 3.3* 3.4 - 5.0 g/dL Final    Globulin 06/01/2020 2.6  2.3 - 3.5 g/dL Final    Albumin/Globulin Ratio 06/01/2020 1.269  1.1 - 1.8 Ratio Final    Alkaline Phosphatase 06/01/2020 72  46 - 116 U/L Final    Neutrophils 06/01/2020 40.0  37 - 80 % Final    Neutrophils Absolute 06/01/2020 0.8* 1.8 - 7.7 10*3/uL Final    BANDS 06/01/2020 3.0  0 - 5 %  Final    Lymphocytes 06/01/2020 49.0* 25 - 40 % Final    Monocytes 06/01/2020 7.0  1 - 15 % Final    ATYPICAL LYMPHOCYTES 06/01/2020 1.0* 0 - 0 % Final    Cells Counted 06/01/2020 100   Final    Platelet Estimate 06/01/2020 Decreased   Final    Macrocytes 06/01/2020 1+   Final   Orders Only on 05/22/2020   Component Date Value Ref Range Status    Sodium 05/22/2020 138  135 - 145 mmol/L Final    Potassium 05/22/2020 4.1  3.6 - 5.2 mmol/L Final    Chloride 05/22/2020 102  100 - 108 mmol/L Final    CO2 05/22/2020 26  21 - 32 mmol/L Final    Anion Gap 05/22/2020 10.0  3.0 - 11.0 mmol/L Final    BUN, Bld 05/22/2020 33* 7 - 18 mg/dL Final    Creatinine 05/22/2020 1.40* 0.55 - 1.02 mg/dL Final    GFR ESTIMATION 05/22/2020 44* >60 Final               DESCRIPTION       GLOMERULAR FILTRATION RATE             NORMAL                     >60             KIDNEY  DISEASE           15-60             KIDNEY FAILURE             <15    BUN/Creatinine Ratio 05/22/2020 23.57* 7 - 18 Ratio Final    Glucose 05/22/2020 130* 70 - 110 mg/dL Final    Calcium 05/22/2020 8.5* 8.8 - 10.5 mg/dL Final    Total Bilirubin 05/22/2020 0.5  0.0 - 1.0 mg/dL Final    AST 05/22/2020 21  15 - 37 U/L Final    ALT 05/22/2020 21  12 - 78 U/L Final    Total Protein 05/22/2020 6.8  6.4 - 8.2 g/dL Final    Albumin 05/22/2020 3.9  3.4 - 5.0 g/dL Final    Globulin 05/22/2020 2.9  2.3 - 3.5 g/dL Final    Albumin/Globulin Ratio 05/22/2020 1.344  1.1 - 1.8 Ratio Final    Alkaline Phosphatase 05/22/2020 92  46 - 116 U/L Final   There may be more visits with results that are not included.       Imaging:     Assessment:     Principle Problem: No primary diagnosis found.   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 female with PMH of The primary encounter diagnosis was Malignant neoplasm of endocervix. 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 Malignant neoplasm of cervix, unspecified site    3. Increased creatine kinase level    4. Neoplastic malignant related fatigue       Cervical cancer stage IV disease  BRCA2 +  mutation   PARP inhibitor  Lynparza 300 mg BID PET scan shows early progression 4/2020, dc'd  Mid June due to excessive pancytopenia   weekly carbo/taxol added  Pancytopenia and Sr Cr improving with lynparza held       Plan:   Overall PLANS:  carbo Taxol weekly   Lynparza  dc'd pancytopenia     == lab CBC CMP every week  == Will reduce carbo to 50 mg IV weekly, Taxol 60 mg IV weekly, all fixed doses, she is tolerating weel  == return to clinic  2 week    Annette Donis NP

## 2020-07-13 LAB
ALBUMIN SERPL BCP-MCNC: 3.8 G/DL (ref 3.4–5)
ALBUMIN/GLOBULIN RATIO: 1.05 RATIO (ref 1.1–1.8)
ALP SERPL-CCNC: 88 U/L (ref 46–116)
ALT SERPL W P-5'-P-CCNC: 21 U/L (ref 12–78)
ANION GAP SERPL CALC-SCNC: 11 MMOL/L (ref 3–11)
ANISOCYTOSIS: NORMAL
AST SERPL-CCNC: 19 U/L (ref 15–37)
BASOPHILS NFR SNV MANUAL: 0.8 % (ref 0–3)
BILIRUB SERPL-MCNC: 0.4 MG/DL (ref 0–1)
BUN SERPL-MCNC: 26 MG/DL (ref 7–18)
BUN/CREAT SERPL: 20.47 RATIO (ref 7–18)
CALCIUM SERPL-MCNC: 9.2 MG/DL (ref 8.8–10.5)
CHLORIDE SERPL-SCNC: 109 MMOL/L (ref 100–108)
CO2 SERPL-SCNC: 22 MMOL/L (ref 21–32)
CREAT SERPL-MCNC: 1.27 MG/DL (ref 0.55–1.02)
EOSINOPHIL NFR SNV MANUAL: 0.4 % (ref 1–3)
ERYTHROCYTE [DISTWIDTH] IN BLOOD BY AUTOMATED COUNT: 19.7 % (ref 12.5–18)
GFR ESTIMATION: 49
GLOBULIN: 3.6 G/DL (ref 2.3–3.5)
GLUCOSE SERPL-MCNC: 103 MG/DL (ref 70–110)
HCT VFR BLD AUTO: 27.8 % (ref 37–47)
HGB BLD-MCNC: 9.1 G/DL (ref 12–16)
LYMPHOCYTES NFR SNV MANUAL: 33.1 % (ref 25–40)
MACROCYTES BLD QL SMEAR: NORMAL
MAGNESIUM SERPL-MCNC: 1.7 MG/DL (ref 1.8–2.4)
MANUAL NRBC PER 100 CELLS: 0 %
MCH RBC QN AUTO: 34.2 PG (ref 27–31.2)
MCHC RBC AUTO-ENTMCNC: 32.7 G/DL (ref 31.8–35.4)
MCV RBC AUTO: 104.5 FL (ref 80–97)
MONOCYTES/100 LEUKOCYTES: 9.2 % (ref 1–15)
NEUTROPHILS NFR BLD: 2.94 10*3/UL (ref 1.8–7.7)
NEUTROPHILS NFR SNV MANUAL: 56.1 % (ref 37–80)
PHOSPHATE FLD-MCNC: 3.4 MG/DL (ref 2.6–4.7)
PLATELETS: 171 10*3/UL (ref 142–424)
POTASSIUM SERPL-SCNC: 4.2 MMOL/L (ref 3.6–5.2)
PROT SERPL-MCNC: 7.4 G/DL (ref 6.4–8.2)
RBC # BLD AUTO: 2.66 10*6/UL (ref 4.2–5.4)
SODIUM BLD-SCNC: 142 MMOL/L (ref 135–145)
WBC # BLD: 5.2 10*3/UL (ref 4.6–10.2)

## 2020-07-20 LAB
ALBUMIN SERPL BCP-MCNC: 3.7 G/DL (ref 3.4–5)
ALBUMIN/GLOBULIN RATIO: 1.27 RATIO (ref 1.1–1.8)
ALP SERPL-CCNC: 80 U/L (ref 46–116)
ALT SERPL W P-5'-P-CCNC: 21 U/L (ref 12–78)
ANION GAP SERPL CALC-SCNC: 11 MMOL/L (ref 3–11)
ANISOCYTOSIS: NORMAL
AST SERPL-CCNC: 17 U/L (ref 15–37)
BASOPHILS NFR SNV MANUAL: 0.7 % (ref 0–3)
BILIRUB SERPL-MCNC: 0.5 MG/DL (ref 0–1)
BUN SERPL-MCNC: 16 MG/DL (ref 7–18)
BUN/CREAT SERPL: 13.22 RATIO (ref 7–18)
CALCIUM SERPL-MCNC: 9.2 MG/DL (ref 8.8–10.5)
CHLORIDE SERPL-SCNC: 110 MMOL/L (ref 100–108)
CO2 SERPL-SCNC: 23 MMOL/L (ref 21–32)
CREAT SERPL-MCNC: 1.21 MG/DL (ref 0.55–1.02)
EOSINOPHIL NFR SNV MANUAL: 1.1 % (ref 1–3)
ERYTHROCYTE [DISTWIDTH] IN BLOOD BY AUTOMATED COUNT: 20.3 % (ref 12.5–18)
GFR ESTIMATION: 52
GLOBULIN: 2.9 G/DL (ref 2.3–3.5)
GLUCOSE SERPL-MCNC: 101 MG/DL (ref 70–110)
HCT VFR BLD AUTO: 25.9 % (ref 37–47)
HGB BLD-MCNC: 8.3 G/DL (ref 12–16)
LYMPHOCYTES NFR SNV MANUAL: 34.4 % (ref 25–40)
MACROCYTES BLD QL SMEAR: NORMAL
MANUAL NRBC PER 100 CELLS: 0 %
MCH RBC QN AUTO: 34.9 PG (ref 27–31.2)
MCHC RBC AUTO-ENTMCNC: 32 G/DL (ref 31.8–35.4)
MCV RBC AUTO: 108.8 FL (ref 80–97)
MONOCYTES/100 LEUKOCYTES: 8.4 % (ref 1–15)
NEUTROPHILS NFR BLD: 2.5 10*3/UL (ref 1.8–7.7)
NEUTROPHILS NFR SNV MANUAL: 55 % (ref 37–80)
PLATELETS: 165 10*3/UL (ref 142–424)
POTASSIUM SERPL-SCNC: 4.4 MMOL/L (ref 3.6–5.2)
PROT SERPL-MCNC: 6.6 G/DL (ref 6.4–8.2)
RBC # BLD AUTO: 2.38 10*6/UL (ref 4.2–5.4)
SODIUM BLD-SCNC: 144 MMOL/L (ref 135–145)
WBC # BLD: 4.5 10*3/UL (ref 4.6–10.2)

## 2020-07-21 ENCOUNTER — OFFICE VISIT (OUTPATIENT)
Dept: HEMATOLOGY/ONCOLOGY | Facility: CLINIC | Age: 85
End: 2020-07-21
Payer: MEDICARE

## 2020-07-21 DIAGNOSIS — C53.0 MALIGNANT NEOPLASM OF ENDOCERVIX: Primary | ICD-10-CM

## 2020-07-21 DIAGNOSIS — Z51.11 ENCOUNTER FOR ANTINEOPLASTIC CHEMOTHERAPY: ICD-10-CM

## 2020-07-21 DIAGNOSIS — Z15.01 BRCA2 GENE MUTATION POSITIVE IN FEMALE: ICD-10-CM

## 2020-07-21 DIAGNOSIS — Z15.09 BRCA2 GENE MUTATION POSITIVE IN FEMALE: ICD-10-CM

## 2020-07-21 DIAGNOSIS — Z15.02 BRCA2 GENE MUTATION POSITIVE IN FEMALE: ICD-10-CM

## 2020-07-21 DIAGNOSIS — D61.818 PANCYTOPENIA: ICD-10-CM

## 2020-07-21 PROCEDURE — 99214 PR OFFICE/OUTPT VISIT, EST, LEVL IV, 30-39 MIN: ICD-10-PCS | Mod: 95,,, | Performed by: NURSE PRACTITIONER

## 2020-07-21 PROCEDURE — 99214 OFFICE O/P EST MOD 30 MIN: CPT | Mod: 95,,, | Performed by: NURSE PRACTITIONER

## 2020-07-21 RX ORDER — EPINEPHRINE 0.3 MG/.3ML
0.3 INJECTION SUBCUTANEOUS ONCE AS NEEDED
Status: CANCELLED | OUTPATIENT
Start: 2020-07-21

## 2020-07-21 RX ORDER — FAMOTIDINE 10 MG/ML
20 INJECTION INTRAVENOUS
Status: CANCELLED | OUTPATIENT
Start: 2020-07-21

## 2020-07-21 RX ORDER — DIPHENHYDRAMINE HYDROCHLORIDE 50 MG/ML
50 INJECTION INTRAMUSCULAR; INTRAVENOUS ONCE AS NEEDED
Status: CANCELLED | OUTPATIENT
Start: 2020-07-21

## 2020-07-21 RX ORDER — HEPARIN 100 UNIT/ML
500 SYRINGE INTRAVENOUS
Status: CANCELLED | OUTPATIENT
Start: 2020-07-21

## 2020-07-21 RX ORDER — SODIUM CHLORIDE 0.9 % (FLUSH) 0.9 %
10 SYRINGE (ML) INJECTION
Status: CANCELLED | OUTPATIENT
Start: 2020-07-21

## 2020-07-21 NOTE — PROGRESS NOTES
Medical Oncology Progress Note  [facility]     PATIENT: Adir Seay  : 1935 85 y.o. female  MRN [id number]     PCP: Bria Mitchell NP  Consult Requested By:      Date of Service: 2020    Subjective:   Interim History:  No chief complaint on file.    Adri Seay is here for follow-up on treatment;   Carbotaxol has been on hold because of leukopenia  The patient feel well and she had no complaint today    Oncology History:    History of Present Illness: Ms. Seay is a 84 y.o. female who presents for evaluation and management of cervical cancer. SHe was seen at [facility] on 19.      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 The patient had a CT scan consistent with widely metastatic disease including multiple sites of lymphadenopathy, a possible lung metastases, liver metastases, nodule in the anterior abdominal wall, and carcinomatosis. The patient is mostly asymptomatic but does have a vaginal discharge. Her performance status is 0. On exam, there is a 6 centimeter mass in the anterior abdominal wall superior to the umbilicus. On bimanual examination, tumor is a place the entire cervix apex of the vagina, and on rectovaginal examination, there is a large pelvic mass extending to the bilateral pelvic sidewalls.    We will get her CT reviewed at [facility] and if it is consistent with metastatic disease, we have recommended palliative chemotherapy with either carboplatinum as a single agent or carboplatinum and paclitaxel. Due to concern for bowel involvement on the CT scan, I have recommended not giving bevacizumab as part of this regimen. Patient will return home to [place] and begin chemotherapy with a medical oncologist there. I've also recommended a consultation with radiation oncology in [place] for consideration of palliative radiation to  "lower the chances of significant vaginal bleeding. We will see her back on an as-needed basis."       == 8/5/19 Carbo    ==PET/CT done on 12/2/19. Previous CT imaging done at outside facility, discussed with Dr Nguyen for comparison and he stated pelvic mass smaller, abdominal mass smaller liver lesion likely benign stable but difficult to say if peritoneal implants stable to progressed.    ==FoundationOne Liquid Bx on 12/16/19.  BRCA 2 gene mutation.  ==1/14/20 Pt started lynparza   ==5/13/2020 PET showing early progression.    ==5/2020 resume low dose carbo and taxol. On hold now due to pancytopenia     Oncology History   Malignant neoplasm of endocervix   6/27/2019 Initial Diagnosis    Malignant neoplasm of exocervix     7/8/2019 - 7/8/2019 Chemotherapy    Treatment Summary   Plan Name: OP CARBOPLATIN WEEKLY  Treatment Goal: Palliative  Status: Inactive  Start Date: [No treatment day found]  End Date: [No treatment day found]  Provider: Jaime Pinon MD  Chemotherapy: CARBOplatin (PARAPLATIN) in sodium chloride 0.9% 250 mL chemo infusion, , Intravenous, Clinic/HOD 1 time, 0 of 4 cycles     7/9/2019 - 11/25/2019 Chemotherapy    Treatment Summary   Plan Name: OP GYN CARBOPLATIN (AUC) Q4W  Treatment Goal: Palliative  Status: Inactive  Start Date: 7/9/2019  End Date: 10/29/2019  Provider: Jaime Pinon MD  Chemotherapy: CARBOplatin (PARAPLATIN) in sodium chloride 0.9% 250 mL chemo infusion,  (original dose 341.5 mg), Intravenous, Clinic/HOD 1 time, 5 of 6 cycles  Dose modification:   (original dose 341.5 mg, Cycle 1)     5/19/2020 - 6/16/2020 Chemotherapy    Treatment Summary   Plan Name: OP  PACLITAXEL CARBOPLATIN WEEKLY  Treatment Goal: Palliative  Status: Inactive  Start Date: 5/19/2020  End Date: 5/22/2020  Provider: Annette Donis NP  Chemotherapy: CARBOplatin (PARAPLATIN) 115 mg in sodium chloride 0.9% 250 mL chemo infusion, 115 mg (98.6 % of original dose 118.4 mg), Intravenous, Clinic/HOD 1 time, 0 of 1 " cycle  Dose modification:   (original dose 118.4 mg, Cycle 1)  PACLitaxeL (TAXOL) 80 mg/m2 = 126 mg in sodium chloride 0.9% 250 mL chemo infusion, 80 mg/m2 = 126 mg (100 % of original dose 80 mg/m2), Intravenous, Clinic/HOD 1 time, 0 of 1 cycle  Dose modification: 80 mg/m2 (original dose 80 mg/m2, Cycle 1)     5/19/2020 -  Chemotherapy    Treatment Summary   Plan Name:  PACLITAXEL CARBOPLATIN WEEKLY  Treatment Goal: Palliative  Status: Active  Start Date: 5/19/2020  End Date: 8/4/2020 (Planned)  Provider: Annette Donis NP  Chemotherapy: CARBOplatin (PARAPLATIN) 115 mg in sodium chloride 0.9% 250 mL chemo infusion, 115 mg (100 % of original dose 114.8 mg), Intravenous, Clinic/HOD 1 time, 1 of 1 cycle  Dose modification:   (original dose 114.8 mg, Cycle 1, Reason: MD Discretion), 100 mg (original dose 114.8 mg, Cycle 1)  PACLitaxeL (TAXOL) 80 mg/m2 = 126 mg in sodium chloride 0.9% 250 mL chemo infusion, 80 mg/m2 = 126 mg (100 % of original dose 80 mg/m2), Intravenous, Clinic/HOD 1 time, 1 of 1 cycle  Dose modification: 80 mg/m2 (original dose 80 mg/m2, Cycle 1), 50 mg/m2 (original dose 80 mg/m2, Cycle 1)         Past Medical History:   Past Medical History:   Diagnosis Date    Anemia     Cataract     Cervical cancer 05/2019    Hypertension        Past Surgical HIstory:   Past Surgical History:   Procedure Laterality Date    CATARACT EXTRACTION, BILATERAL      WRIST SURGERY  1984       Allergies:  Review of patient's allergies indicates:   Allergen Reactions    Strawberry Rash       Medications:  Current Outpatient Medications   Medication Sig Dispense Refill    cloNIDine (CATAPRES) 0.3 MG tablet Take 1 tablet (0.3 mg total) by mouth 2 (two) times daily. 180 tablet 2    FENOFIBRATE ORAL Take by mouth 2 (two) times daily.      ferrous sulfate (FEOSOL) 325 mg (65 mg iron) Tab tablet TK 1 T PO BID  1    loratadine (CLARITIN) 10 mg tablet Take 10 mg by mouth once daily.      megestrol (MEGACE) 40 MG Tab Take 1  tablet (40 mg total) by mouth 2 (two) times daily. 60 tablet 6    NIFEdipine (PROCARDIA-XL) 30 MG (OSM) 24 hr tablet TAKE 1 TABLET BY MOUTH ONCE DAILY 90 tablet 5    ondansetron (ZOFRAN-ODT) 8 MG TbDL Take 1 tablet (8 mg total) by mouth every 8 (eight) hours as needed (chemotherapy-induced nausea and vomiting). 40 tablet 5    simvastatin (ZOCOR) 20 MG tablet Take 20 mg by mouth every evening.      varicella-zoster [id number], PF, (SHINGRIX, PF,) 50 mcg/0.5 mL injection Administer two doses 2 months apart 0.5 mL 0     No current facility-administered medications for this visit.        Family History:   Family History   Problem Relation Age of Onset    Hypertension Mother     Pneumonia Father     Breast cancer Sister     No Known Problems Brother     Leukemia Daughter     No Known Problems Son        Social History:  reports that she has never smoked. She has never used smokeless tobacco. She reports previous alcohol use. She reports that she does not use drugs.    Review of Systemas  Constitutional: No change in weight, appetie, fatigue, fever, or night sweats  Eyes: No changes in vision  Ears, Nose, Mouth, Throat, and Face: No hearing problems, ear pain, rummy nose, or sore throat  Respiratory: No shortness of breath or cough  Cardiovascular: No chest pain, palpitations or dizziness  Gastrointestinal: No abdominal pain, hematochezia, melena  Genitourinary: No dysuria, hematuria, nocturia, menstrual problems, post menopausal  Integumentary/Breast: No rashes or itching  Hematologic/Lymphatic: No anemia or bleeding abnormalities  Musculoskeletal: No joint or muscle abnormalities  Neurological: No sensory or motor problems, no headaches  Behavioral/Psych: No depression, anxiety, cognitive problems, or stress  Endocrine: No diabetes or thyroid problems  Allergic/Immunologic: See allergy above    Physical Exam      Vitals:   There were no vitals filed for this visit.  BMI: There is no height or weight on file to  calculate BMI.  BSA There is no height or weight on file to calculate BSA.  ECOG Performance Status:   ECOG SCORE         GENERAL APPEARANCE: Well developed, well nourished, in no acute distress.  SKIN: Inspection of the skin reveals no rashes, ulcerations or petechiae.  HEENT: The sclerae were anicteric and conjunctivae were pink and moist. Extraocular movements were intact and pupils were equal, round, and reactive to light with normal accommodation. External inspection of the ears and nose showed no scars, lesions, or masses. Lips, teeth, and gums showed normal mucosa. The oral mucosa, hard and soft palate, tongue and posterior pharynx were normal.  NECK: Supple and symmetric. There was no thyroid enlargement, and no tenderness, or masses were felt.  CRESPIRATORY: Normal AP diameter and normal contour without any kyphoscoliosis. LUNGS: Auscultation of the lungs revealed normal breath sounds without any other adventitious sounds or rubs.  CARDIOVASCULAR: There was a regular rate and rhythm without any murmurs, gallops, rubs. The carotid pulses were normal and 2+ bilaterally without bruits. Peripheral pulses were 2+ and symmetric.  GASTROINTESTINAL: Soft and nontender with normal bowel sounds. The liver span was approximately 5-6 cm in the right midclavicular line by percussion. The liver edge was nontender. The spleen was not palpable. There were no inguinal or umbilical hernias noted. No ascites was noted.  LYMPH NODES: No lymphadenopathy was appreciated in the neck, axillae or groin.  MUSCULOSKELETAL: Gait was normal. There was no tenderness or effusions noted. Muscle strength and tone were normal. EXTREMITIES: No cyanosis, clubbing or edema.  NEUROLOGIC: Alert and oriented x 3. Normal affect. Gait was normal. Normal deep tendon reflexes with no pathological reflexes. Sensation to touch was normal.  PSYCHIATRIC: good mood, orientated to place, time and person     Labs and Imagings     Orders Only on 07/20/2020    Component Date Value Ref Range Status    Sodium 07/20/2020 144  135 - 145 mmol/L Final    Potassium 07/20/2020 4.4  3.6 - 5.2 mmol/L Final    Chloride 07/20/2020 110* 100 - 108 mmol/L Final    CO2 07/20/2020 23  21 - 32 mmol/L Final    Anion Gap 07/20/2020 11.0  3.0 - 11.0 mmol/L Final    BUN, Bld 07/20/2020 16  7 - 18 mg/dL Final    Creatinine 07/20/2020 1.21* 0.55 - 1.02 mg/dL Final    GFR ESTIMATION 07/20/2020 52* >60 Final               DESCRIPTION       GLOMERULAR FILTRATION RATE             NORMAL                     >60             KIDNEY  DISEASE           15-60             KIDNEY FAILURE             <15    BUN/Creatinine Ratio 07/20/2020 13.22  7 - 18 Ratio Final    Glucose 07/20/2020 101  70 - 110 mg/dL Final    Calcium 07/20/2020 9.2  8.8 - 10.5 mg/dL Final    Total Bilirubin 07/20/2020 0.5  0.0 - 1.0 mg/dL Final    AST 07/20/2020 17  15 - 37 U/L Final    ALT 07/20/2020 21  12 - 78 U/L Final    Total Protein 07/20/2020 6.6  6.4 - 8.2 g/dL Final    Albumin 07/20/2020 3.7  3.4 - 5.0 g/dL Final    Globulin 07/20/2020 2.9  2.3 - 3.5 g/dL Final    Albumin/Globulin Ratio 07/20/2020 1.275  1.1 - 1.8 Ratio Final    Alkaline Phosphatase 07/20/2020 80  46 - 116 U/L Final   Orders Only on 07/20/2020   Component Date Value Ref Range Status    Anisocytosis 07/20/2020 1+   Final    Macrocytes 07/20/2020 1+   Final    WBC 07/20/2020 4.5* 4.6 - 10.2 10*3/uL Final    RBC 07/20/2020 2.38* 4.2 - 5.4 10*6/uL Final    Hemoglobin 07/20/2020 8.3* 12.0 - 16.0 g/dL Final    Hematocrit 07/20/2020 25.9* 37.0 - 47.0 % Final    MCV 07/20/2020 108.8* 80 - 97 fL Final    MCH 07/20/2020 34.9* 27.0 - 31.2 pg Final    MCHC 07/20/2020 32.0  31.8 - 35.4 g/dL Final    RDW RBC Auto-Rto 07/20/2020 20.3* 12.5 - 18.0 % Final    Platelets 07/20/2020 165  142 - 424 10*3/uL Final    Neutrophils/100 leukocytes 07/20/2020 55.0  37 - 80 % Final    Lymphocytes/100 leukocytes 07/20/2020 34.4  25 - 40 % Final     Monocytes/100 leukocytes 07/20/2020 8.4  1 - 15 % Final    Eosinophils/100 leukocytes 07/20/2020 1.1  1 - 3 % Final    Basophils/100 leukocytes 07/20/2020 0.7  0 - 3 % Final    Manual nRBC per 100 Cells 07/20/2020 0.0  % Final    Neutrophils 07/20/2020 2.50  1.8 - 7.7 10*3/uL Final   Orders Only on 07/13/2020   Component Date Value Ref Range Status    Anisocytosis 07/13/2020 1+   Final    Macrocytes 07/13/2020 1+   Final    WBC 07/13/2020 5.2  4.6 - 10.2 10*3/uL Final    RBC 07/13/2020 2.66* 4.2 - 5.4 10*6/uL Final    Hemoglobin 07/13/2020 9.1* 12.0 - 16.0 g/dL Final    Hematocrit 07/13/2020 27.8* 37.0 - 47.0 % Final    MCV 07/13/2020 104.5* 80 - 97 fL Final    MCH 07/13/2020 34.2* 27.0 - 31.2 pg Final    MCHC 07/13/2020 32.7  31.8 - 35.4 g/dL Final    RDW RBC Auto-Rto 07/13/2020 19.7* 12.5 - 18.0 % Final    Platelets 07/13/2020 171  142 - 424 10*3/uL Final    Neutrophils/100 leukocytes 07/13/2020 56.1  37 - 80 % Final    Lymphocytes/100 leukocytes 07/13/2020 33.1  25 - 40 % Final    Monocytes/100 leukocytes 07/13/2020 9.2  1 - 15 % Final    Eosinophils/100 leukocytes 07/13/2020 0.4* 1 - 3 % Final    Basophils/100 leukocytes 07/13/2020 0.8  0 - 3 % Final    Manual nRBC per 100 Cells 07/13/2020 0.0  % Final    Neutrophils 07/13/2020 2.94  1.8 - 7.7 10*3/uL Final   Orders Only on 07/13/2020   Component Date Value Ref Range Status    Phosphorus 07/13/2020 3.4  2.6 - 4.7 mg/dL Final    Magnesium 07/13/2020 1.7* 1.8 - 2.4 mg/dL Final    Sodium 07/13/2020 142  135 - 145 mmol/L Final    Potassium 07/13/2020 4.2  3.6 - 5.2 mmol/L Final    Chloride 07/13/2020 109* 100 - 108 mmol/L Final    CO2 07/13/2020 22  21 - 32 mmol/L Final    Anion Gap 07/13/2020 11.0  3.0 - 11.0 mmol/L Final    BUN, Bld 07/13/2020 26* 7 - 18 mg/dL Final    Creatinine 07/13/2020 1.27* 0.55 - 1.02 mg/dL Final    GFR ESTIMATION 07/13/2020 49* >60 Final               DESCRIPTION       GLOMERULAR FILTRATION RATE              NORMAL                     >60             KIDNEY  DISEASE           15-60             KIDNEY FAILURE             <15    BUN/Creatinine Ratio 07/13/2020 20.47* 7 - 18 Ratio Final    Glucose 07/13/2020 103  70 - 110 mg/dL Final    Calcium 07/13/2020 9.2  8.8 - 10.5 mg/dL Final    Total Bilirubin 07/13/2020 0.4  0.0 - 1.0 mg/dL Final    AST 07/13/2020 19  15 - 37 U/L Final    ALT 07/13/2020 21  12 - 78 U/L Final    Total Protein 07/13/2020 7.4  6.4 - 8.2 g/dL Final    Albumin 07/13/2020 3.8  3.4 - 5.0 g/dL Final    Globulin 07/13/2020 3.6* 2.3 - 3.5 g/dL Final    Albumin/Globulin Ratio 07/13/2020 1.055* 1.1 - 1.8 Ratio Final    Alkaline Phosphatase 07/13/2020 88  46 - 116 U/L Final   Orders Only on 07/06/2020   Component Date Value Ref Range Status    Sodium 07/06/2020 140  135 - 145 mmol/L Final    Potassium 07/06/2020 4.3  3.6 - 5.2 mmol/L Final    Chloride 07/06/2020 105  100 - 108 mmol/L Final    CO2 07/06/2020 23  21 - 32 mmol/L Final    Anion Gap 07/06/2020 12.0* 3.0 - 11.0 mmol/L Final    BUN, Bld 07/06/2020 20* 7 - 18 mg/dL Final    Creatinine 07/06/2020 1.19* 0.55 - 1.02 mg/dL Final    GFR ESTIMATION 07/06/2020 53* >60 Final               DESCRIPTION       GLOMERULAR FILTRATION RATE             NORMAL                     >60             KIDNEY  DISEASE           15-60             KIDNEY FAILURE             <15    BUN/Creatinine Ratio 07/06/2020 16.80  7 - 18 Ratio Final    Glucose 07/06/2020 99  70 - 110 mg/dL Final    Calcium 07/06/2020 9.2  8.8 - 10.5 mg/dL Final    Total Bilirubin 07/06/2020 0.4  0.0 - 1.0 mg/dL Final    AST 07/06/2020 21  15 - 37 U/L Final    ALT 07/06/2020 25  12 - 78 U/L Final    Total Protein 07/06/2020 7.1  6.4 - 8.2 g/dL Final    Albumin 07/06/2020 4.0  3.4 - 5.0 g/dL Final    Globulin 07/06/2020 3.1  2.3 - 3.5 g/dL Final    Albumin/Globulin Ratio 07/06/2020 1.290  1.1 - 1.8 Ratio Final    Alkaline Phosphatase 07/06/2020 98  46 - 116 U/L Final   Orders  Only on 07/06/2020   Component Date Value Ref Range Status    WBC 07/06/2020 4.8  4.6 - 10.2 10*3/uL Final    RBC 07/06/2020 2.78* 4.2 - 5.4 10*6/uL Final    Hemoglobin 07/06/2020 9.5* 12.0 - 16.0 g/dL Final    Hematocrit 07/06/2020 29.8* 37.0 - 47.0 % Final    MCV 07/06/2020 107.2* 80 - 97 fL Final    MCH 07/06/2020 34.2* 27.0 - 31.2 pg Final    MCHC 07/06/2020 31.9  31.8 - 35.4 g/dL Final    RDW RBC Auto-Rto 07/06/2020 19.7* 12.5 - 18.0 % Final    Platelets 07/06/2020 215  142 - 424 10*3/uL Final    Neutrophils/100 leukocytes 07/06/2020 55.1  37 - 80 % Final    Lymphocytes/100 leukocytes 07/06/2020 33.3  25 - 40 % Final    Monocytes/100 leukocytes 07/06/2020 9.8  1 - 15 % Final    Eosinophils/100 leukocytes 07/06/2020 0.6* 1 - 3 % Final    Basophils/100 leukocytes 07/06/2020 0.6  0 - 3 % Final    Manual nRBC per 100 Cells 07/06/2020 0.0  % Final    Neutrophils 07/06/2020 2.63  1.8 - 7.7 10*3/uL Final    Anisocytosis 07/06/2020 1+   Final    Macrocytes 07/06/2020 1+   Final   Orders Only on 06/29/2020   Component Date Value Ref Range Status    Neutrophils 06/29/2020 47.0  37 - 80 % Final    Neutrophils Absolute 06/29/2020 1.6* 1.8 - 7.7 10*3/uL Final    BANDS 06/29/2020 2.0  0 - 5 % Final    Lymphocytes 06/29/2020 38.0  25 - 40 % Final    Monocytes 06/29/2020 12.0  1 - 15 % Final    Eosinophils 06/29/2020 1.0  1 - 3 % Final    Cells Counted 06/29/2020 100   Final    Platelet Estimate 06/29/2020 Adequate   Final    Anisocytosis 06/29/2020 2+   Final    Microcytes 06/29/2020 1+   Final   Ancillary Orders on 06/26/2020   Component Date Value Ref Range Status    WBC 06/29/2020 3.2* 4.6 - 10.2 10*3/uL Final    RBC 06/29/2020 2.69* 4.2 - 5.4 10*6/uL Final    Hemoglobin 06/29/2020 9.3* 12.0 - 16.0 g/dL Final    Hematocrit 06/29/2020 28.6* 37.0 - 47.0 % Final    MCV 06/29/2020 106.3* 80 - 97 fL Final    MCH 06/29/2020 34.6* 27.0 - 31.2 pg Final    MCHC 06/29/2020 32.5  31.8 - 35.4 g/dL  Final    RDW RBC Auto-Rto 06/29/2020 20.8* 12.5 - 18.0 % Final    Platelets 06/29/2020 222  142 - 424 10*3/uL Final    Manual nRBC per 100 Cells 06/29/2020 0.0  % Final    Sodium 06/29/2020 141  135 - 145 mmol/L Final    Potassium 06/29/2020 4.2  3.6 - 5.2 mmol/L Final    Chloride 06/29/2020 107  100 - 108 mmol/L Final    CO2 06/29/2020 24  21 - 32 mmol/L Final    Anion Gap 06/29/2020 10.0  3.0 - 11.0 mmol/L Final    BUN, Bld 06/29/2020 25* 7 - 18 mg/dL Final    Creatinine 06/29/2020 1.21* 0.55 - 1.02 mg/dL Final    GFR ESTIMATION 06/29/2020 52* >60 Final               DESCRIPTION       GLOMERULAR FILTRATION RATE             NORMAL                     >60             KIDNEY  DISEASE           15-60             KIDNEY FAILURE             <15    BUN/Creatinine Ratio 06/29/2020 20.66* 7 - 18 Ratio Final    Glucose 06/29/2020 107  70 - 110 mg/dL Final    Calcium 06/29/2020 9.5  8.8 - 10.5 mg/dL Final    Total Bilirubin 06/29/2020 0.3  0.0 - 1.0 mg/dL Final    AST 06/29/2020 19  15 - 37 U/L Final    ALT 06/29/2020 25  12 - 78 U/L Final    Total Protein 06/29/2020 7.1  6.4 - 8.2 g/dL Final    Albumin 06/29/2020 4.0  3.4 - 5.0 g/dL Final    Globulin 06/29/2020 3.1  2.3 - 3.5 g/dL Final    Albumin/Globulin Ratio 06/29/2020 1.290  1.1 - 1.8 Ratio Final    Alkaline Phosphatase 06/29/2020 110  46 - 116 U/L Final    Magnesium 06/29/2020 1.7* 1.8 - 2.4 mg/dL Final    Phosphorus 06/29/2020 3.6  2.6 - 4.7 mg/dL Final   Orders Only on 06/18/2020   Component Date Value Ref Range Status    Sodium 06/18/2020 141  135 - 145 mmol/L Final    Potassium 06/18/2020 4.8  3.6 - 5.2 mmol/L Final    Chloride 06/18/2020 108  100 - 108 mmol/L Final    CO2 06/18/2020 22  21 - 32 mmol/L Final    Anion Gap 06/18/2020 11.0  3.0 - 11.0 mmol/L Final    BUN, Bld 06/18/2020 30* 7 - 18 mg/dL Final    Creatinine 06/18/2020 1.34* 0.55 - 1.02 mg/dL Final    GFR ESTIMATION 06/18/2020 46* >60 Final               DESCRIPTION        GLOMERULAR FILTRATION RATE             NORMAL                     >60             KIDNEY  DISEASE           15-60             KIDNEY FAILURE             <15    BUN/Creatinine Ratio 06/18/2020 22.38* 7 - 18 Ratio Final    Glucose 06/18/2020 95  70 - 110 mg/dL Final    Calcium 06/18/2020 8.7* 8.8 - 10.5 mg/dL Final    Total Bilirubin 06/18/2020 0.6  0.0 - 1.0 mg/dL Final    AST 06/18/2020 17  15 - 37 U/L Final    ALT 06/18/2020 21  12 - 78 U/L Final    Total Protein 06/18/2020 6.7  6.4 - 8.2 g/dL Final    Albumin 06/18/2020 3.8  3.4 - 5.0 g/dL Final    Globulin 06/18/2020 2.9  2.3 - 3.5 g/dL Final    Albumin/Globulin Ratio 06/18/2020 1.310  1.1 - 1.8 Ratio Final    Alkaline Phosphatase 06/18/2020 109  46 - 116 U/L Final   Orders Only on 06/18/2020   Component Date Value Ref Range Status    WBC 06/18/2020 3.1* 4.6 - 10.2 10*3/uL Final    RBC 06/18/2020 1.61* 4.2 - 5.4 10*6/uL Final    Hemoglobin 06/18/2020 6.1* 12.0 - 16.0 g/dL Final    Critical value: Called to: Aliya Sahni ([facility])at 1444 on 06/18/20 by [id number].Result read-back successful. YES    Hematocrit 06/18/2020 18.2* 37.0 - 47.0 % Final    Critical value: Called to: Aliya Sahni ([facility])at 1444 on 06/18/20 by [id number].Result read-back successful. YES    MCV 06/18/2020 113.0* 80 - 97 fL Final    MCH 06/18/2020 37.9* 27.0 - 31.2 pg Final    MCHC 06/18/2020 33.5  31.8 - 35.4 g/dL Final    RDW RBC Auto-Rto 06/18/2020 15.7  12.5 - 18.0 % Final    Platelets 06/18/2020 40* 142 - 424 10*3/uL Final    Critical value: Called to: Aliya Sahni ([facility])at 1444 on 06/18/20 by [id number].Result read-back successful. YES    Manual nRBC per 100 Cells 06/18/2020 0.0  % Final    Neutrophils 06/18/2020 42.0  37 - 80 % Final    Neutrophils Absolute 06/18/2020 1.3* 1.8 - 7.7 10*3/uL Final    Lymphocytes 06/18/2020 48.0* 25 - 40 % Final    Monocytes 06/18/2020 8.0  1 - 15 % Final    Eosinophils 06/18/2020 2.0  1 - 3 %  Final    Cells Counted 06/18/2020 50   Final    Platelet Estimate 06/18/2020 Decreased   Final    Macrocytes 06/18/2020 1+   Final   There may be more visits with results that are not included.       Imaging:     Assessment:     Principle Problem: No primary diagnosis found.   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 female with PMH of The primary encounter diagnosis was Malignant neoplasm of endocervix. 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 Malignant neoplasm of cervix, unspecified site    3. Increased creatine kinase level    4. Neoplastic malignant related fatigue       Cervical cancer stage IV disease  BRCA2 +  mutation   PARP inhibitor  Lynparza 300 mg BID PET scan shows early progression 4/2020, dc'd  Mid June due to excessive pancytopenia   weekly carbo/taxol added  Pancytopenia and Sr Cr improving with lynparza held       Plan:   Overall PLANS:  carbo Taxol weekly   Lynparza  dc'd pancytopenia     == lab CBC CMP every week  == Will reduce carbo to 50 mg IV weekly, Taxol 60 mg IV weekly, all fixed doses, she is tolerating well  == return to clinic  1 week add type and screen due to progressing anemia noted today, pt asymptomatic but if HGB < 8.0 will set up next week for  transfusion.    Annette Donis, NP

## 2020-07-27 LAB
ANISOCYTOSIS: NORMAL
MACROCYTES BLD QL SMEAR: NORMAL

## 2020-07-28 ENCOUNTER — OFFICE VISIT (OUTPATIENT)
Dept: HEMATOLOGY/ONCOLOGY | Facility: CLINIC | Age: 85
End: 2020-07-28
Payer: MEDICARE

## 2020-07-28 DIAGNOSIS — Z51.11 ENCOUNTER FOR ANTINEOPLASTIC CHEMOTHERAPY: ICD-10-CM

## 2020-07-28 DIAGNOSIS — Z15.01 BRCA2 GENE MUTATION POSITIVE IN FEMALE: ICD-10-CM

## 2020-07-28 DIAGNOSIS — Z15.02 BRCA2 GENE MUTATION POSITIVE IN FEMALE: ICD-10-CM

## 2020-07-28 DIAGNOSIS — C53.0 MALIGNANT NEOPLASM OF ENDOCERVIX: Primary | ICD-10-CM

## 2020-07-28 DIAGNOSIS — Z15.09 BRCA2 GENE MUTATION POSITIVE IN FEMALE: ICD-10-CM

## 2020-07-28 PROCEDURE — 99214 PR OFFICE/OUTPT VISIT, EST, LEVL IV, 30-39 MIN: ICD-10-PCS | Mod: ,,, | Performed by: NURSE PRACTITIONER

## 2020-07-28 PROCEDURE — 99214 OFFICE O/P EST MOD 30 MIN: CPT | Mod: ,,, | Performed by: NURSE PRACTITIONER

## 2020-07-28 RX ORDER — FAMOTIDINE 10 MG/ML
20 INJECTION INTRAVENOUS
Status: CANCELLED | OUTPATIENT
Start: 2020-07-28

## 2020-07-28 RX ORDER — EPINEPHRINE 0.3 MG/.3ML
0.3 INJECTION SUBCUTANEOUS ONCE AS NEEDED
Status: CANCELLED | OUTPATIENT
Start: 2020-07-28

## 2020-07-28 RX ORDER — DIPHENHYDRAMINE HYDROCHLORIDE 50 MG/ML
50 INJECTION INTRAMUSCULAR; INTRAVENOUS ONCE AS NEEDED
Status: CANCELLED | OUTPATIENT
Start: 2020-07-28

## 2020-07-28 RX ORDER — SODIUM CHLORIDE 0.9 % (FLUSH) 0.9 %
10 SYRINGE (ML) INJECTION
Status: CANCELLED | OUTPATIENT
Start: 2020-07-28

## 2020-07-28 RX ORDER — HEPARIN 100 UNIT/ML
500 SYRINGE INTRAVENOUS
Status: CANCELLED | OUTPATIENT
Start: 2020-07-28

## 2020-07-28 NOTE — PROGRESS NOTES
Medical Oncology Progress Note  [facility]     PATIENT: Adri Seay  : 1935 85 y.o. female  MRN [id number]     PCP: Bria Mitchell NP  Consult Requested By:      Date of Service: 2020    Subjective:   Interim History:  No chief complaint on file.    Adri Seay is here for follow-up on treatment;   Carbotaxol has been on hold because of leukopenia  The patient feel well and she had no complaint today    Oncology History:    History of Present Illness: Ms. Seay is a 84 y.o. female who presents for evaluation and management of cervical cancer. SHe was seen at [facility] on 19.      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 The patient had a CT scan consistent with widely metastatic disease including multiple sites of lymphadenopathy, a possible lung metastases, liver metastases, nodule in the anterior abdominal wall, and carcinomatosis. The patient is mostly asymptomatic but does have a vaginal discharge. Her performance status is 0. On exam, there is a 6 centimeter mass in the anterior abdominal wall superior to the umbilicus. On bimanual examination, tumor is a place the entire cervix apex of the vagina, and on rectovaginal examination, there is a large pelvic mass extending to the bilateral pelvic sidewalls.    We will get her CT reviewed at [facility] and if it is consistent with metastatic disease, we have recommended palliative chemotherapy with either carboplatinum as a single agent or carboplatinum and paclitaxel. Due to concern for bowel involvement on the CT scan, I have recommended not giving bevacizumab as part of this regimen. Patient will return home to [place] and begin chemotherapy with a medical oncologist there. I've also recommended a consultation with radiation oncology in [place] for consideration of palliative radiation to  "lower the chances of significant vaginal bleeding. We will see her back on an as-needed basis."       == 8/5/19 Carbo    ==PET/CT done on 12/2/19. Previous CT imaging done at outside facility, discussed with Dr Nguyen for comparison and he stated pelvic mass smaller, abdominal mass smaller liver lesion likely benign stable but difficult to say if peritoneal implants stable to progressed.    ==FoundationOne Liquid Bx on 12/16/19.  BRCA 2 gene mutation.  ==1/14/20 Pt started lynparza   ==5/13/2020 PET showing early progression.    ==5/2020 resume low dose carbo and taxol. On hold now due to pancytopenia     Oncology History   Malignant neoplasm of endocervix   6/27/2019 Initial Diagnosis    Malignant neoplasm of exocervix     7/8/2019 - 7/8/2019 Chemotherapy    Treatment Summary   Plan Name: OP CARBOPLATIN WEEKLY  Treatment Goal: Palliative  Status: Inactive  Start Date: [No treatment day found]  End Date: [No treatment day found]  Provider: Jaime Pinon MD  Chemotherapy: CARBOplatin (PARAPLATIN) in sodium chloride 0.9% 250 mL chemo infusion, , Intravenous, Clinic/HOD 1 time, 0 of 4 cycles     7/9/2019 - 11/25/2019 Chemotherapy    Treatment Summary   Plan Name: OP GYN CARBOPLATIN (AUC) Q4W  Treatment Goal: Palliative  Status: Inactive  Start Date: 7/9/2019  End Date: 10/29/2019  Provider: Jaime Pinon MD  Chemotherapy: CARBOplatin (PARAPLATIN) in sodium chloride 0.9% 250 mL chemo infusion,  (original dose 341.5 mg), Intravenous, Clinic/HOD 1 time, 5 of 6 cycles  Dose modification:   (original dose 341.5 mg, Cycle 1)     5/19/2020 - 6/16/2020 Chemotherapy    Treatment Summary   Plan Name: OP  PACLITAXEL CARBOPLATIN WEEKLY  Treatment Goal: Palliative  Status: Inactive  Start Date: 5/19/2020  End Date: 5/22/2020  Provider: Annette Donis NP  Chemotherapy: CARBOplatin (PARAPLATIN) 115 mg in sodium chloride 0.9% 250 mL chemo infusion, 115 mg (98.6 % of original dose 118.4 mg), Intravenous, Clinic/HOD 1 time, 0 of 1 " cycle  Dose modification:   (original dose 118.4 mg, Cycle 1)  PACLitaxeL (TAXOL) 80 mg/m2 = 126 mg in sodium chloride 0.9% 250 mL chemo infusion, 80 mg/m2 = 126 mg (100 % of original dose 80 mg/m2), Intravenous, Clinic/HOD 1 time, 0 of 1 cycle  Dose modification: 80 mg/m2 (original dose 80 mg/m2, Cycle 1)     5/19/2020 -  Chemotherapy    Treatment Summary   Plan Name:  PACLITAXEL CARBOPLATIN WEEKLY  Treatment Goal: Palliative  Status: Active  Start Date: 5/19/2020  End Date: 8/4/2020 (Planned)  Provider: Annette Donis NP  Chemotherapy: CARBOplatin (PARAPLATIN) 115 mg in sodium chloride 0.9% 250 mL chemo infusion, 115 mg (100 % of original dose 114.8 mg), Intravenous, Clinic/HOD 1 time, 1 of 1 cycle  Dose modification:   (original dose 114.8 mg, Cycle 1, Reason: MD Discretion), 100 mg (original dose 114.8 mg, Cycle 1)  PACLitaxeL (TAXOL) 80 mg/m2 = 126 mg in sodium chloride 0.9% 250 mL chemo infusion, 80 mg/m2 = 126 mg (100 % of original dose 80 mg/m2), Intravenous, Clinic/HOD 1 time, 1 of 1 cycle  Dose modification: 80 mg/m2 (original dose 80 mg/m2, Cycle 1), 50 mg/m2 (original dose 80 mg/m2, Cycle 1)         Past Medical History:   Past Medical History:   Diagnosis Date    Anemia     Cataract     Cervical cancer 05/2019    Hypertension        Past Surgical HIstory:   Past Surgical History:   Procedure Laterality Date    CATARACT EXTRACTION, BILATERAL      WRIST SURGERY  1984       Allergies:  Review of patient's allergies indicates:   Allergen Reactions    Strawberry Rash       Medications:  Current Outpatient Medications   Medication Sig Dispense Refill    cloNIDine (CATAPRES) 0.3 MG tablet Take 1 tablet (0.3 mg total) by mouth 2 (two) times daily. 180 tablet 2    FENOFIBRATE ORAL Take by mouth 2 (two) times daily.      ferrous sulfate (FEOSOL) 325 mg (65 mg iron) Tab tablet TK 1 T PO BID  1    loratadine (CLARITIN) 10 mg tablet Take 10 mg by mouth once daily.      megestrol (MEGACE) 40 MG Tab Take 1  tablet (40 mg total) by mouth 2 (two) times daily. 60 tablet 6    NIFEdipine (PROCARDIA-XL) 30 MG (OSM) 24 hr tablet TAKE 1 TABLET BY MOUTH ONCE DAILY 90 tablet 5    ondansetron (ZOFRAN-ODT) 8 MG TbDL Take 1 tablet (8 mg total) by mouth every 8 (eight) hours as needed (chemotherapy-induced nausea and vomiting). 40 tablet 5    simvastatin (ZOCOR) 20 MG tablet Take 20 mg by mouth every evening.      varicella-zoster [id number], PF, (SHINGRIX, PF,) 50 mcg/0.5 mL injection Administer two doses 2 months apart 0.5 mL 0     No current facility-administered medications for this visit.        Family History:   Family History   Problem Relation Age of Onset    Hypertension Mother     Pneumonia Father     Breast cancer Sister     No Known Problems Brother     Leukemia Daughter     No Known Problems Son        Social History:  reports that she has never smoked. She has never used smokeless tobacco. She reports previous alcohol use. She reports that she does not use drugs.    Review of Systemas  Constitutional: No change in weight, appetie, fatigue, fever, or night sweats  Eyes: No changes in vision  Ears, Nose, Mouth, Throat, and Face: No hearing problems, ear pain, rummy nose, or sore throat  Respiratory: No shortness of breath or cough  Cardiovascular: No chest pain, palpitations or dizziness  Gastrointestinal: No abdominal pain, hematochezia, melena  Genitourinary: No dysuria, hematuria, nocturia, menstrual problems, post menopausal  Integumentary/Breast: No rashes or itching  Hematologic/Lymphatic: No anemia or bleeding abnormalities  Musculoskeletal: No joint or muscle abnormalities  Neurological: No sensory or motor problems, no headaches  Behavioral/Psych: No depression, anxiety, cognitive problems, or stress  Endocrine: No diabetes or thyroid problems  Allergic/Immunologic: See allergy above    Physical Exam      Vitals:   There were no vitals filed for this visit.  BMI: There is no height or weight on file to  calculate BMI.  BSA There is no height or weight on file to calculate BSA.  ECOG Performance Status:   ECOG SCORE         GENERAL APPEARANCE: Well developed, well nourished, in no acute distress.  SKIN: Inspection of the skin reveals no rashes, ulcerations or petechiae.  HEENT: The sclerae were anicteric and conjunctivae were pink and moist. Extraocular movements were intact and pupils were equal, round, and reactive to light with normal accommodation. External inspection of the ears and nose showed no scars, lesions, or masses. Lips, teeth, and gums showed normal mucosa. The oral mucosa, hard and soft palate, tongue and posterior pharynx were normal.  NECK: Supple and symmetric. There was no thyroid enlargement, and no tenderness, or masses were felt.  CRESPIRATORY: Normal AP diameter and normal contour without any kyphoscoliosis. LUNGS: Auscultation of the lungs revealed normal breath sounds without any other adventitious sounds or rubs.  CARDIOVASCULAR: There was a regular rate and rhythm without any murmurs, gallops, rubs. The carotid pulses were normal and 2+ bilaterally without bruits. Peripheral pulses were 2+ and symmetric.  GASTROINTESTINAL: Soft and nontender with normal bowel sounds. The liver span was approximately 5-6 cm in the right midclavicular line by percussion. The liver edge was nontender. The spleen was not palpable. There were no inguinal or umbilical hernias noted. No ascites was noted.  LYMPH NODES: No lymphadenopathy was appreciated in the neck, axillae or groin.  MUSCULOSKELETAL: Gait was normal. There was no tenderness or effusions noted. Muscle strength and tone were normal. EXTREMITIES: No cyanosis, clubbing or edema.  NEUROLOGIC: Alert and oriented x 3. Normal affect. Gait was normal. Normal deep tendon reflexes with no pathological reflexes. Sensation to touch was normal.  PSYCHIATRIC: good mood, orientated to place, time and person     Labs and Imagings     Orders Only on 07/27/2020    Component Date Value Ref Range Status    Anisocytosis 07/27/2020 2+   Final    Macrocytes 07/27/2020 1+   Final   Ancillary Orders on 07/24/2020   Component Date Value Ref Range Status    WBC 07/27/2020 4.0* 4.6 - 10.2 10*3/uL Final    RBC 07/27/2020 2.34* 4.2 - 5.4 10*6/uL Final    Hemoglobin 07/27/2020 8.2* 12.0 - 16.0 g/dL Final    Hematocrit 07/27/2020 26.0* 37.0 - 47.0 % Final    MCV 07/27/2020 111.1* 80 - 97 fL Final    MCH 07/27/2020 35.0* 27.0 - 31.2 pg Final    MCHC 07/27/2020 31.5* 31.8 - 35.4 g/dL Final    RDW RBC Auto-Rto 07/27/2020 20.0* 12.5 - 18.0 % Final    Platelets 07/27/2020 124* 142 - 424 10*3/uL Final    Neutrophils/100 leukocytes 07/27/2020 48.3  37 - 80 % Final    Lymphocytes/100 leukocytes 07/27/2020 38.0  25 - 40 % Final    Monocytes/100 leukocytes 07/27/2020 10.9  1 - 15 % Final    Eosinophils/100 leukocytes 07/27/2020 1.3  1 - 3 % Final    Basophils/100 leukocytes 07/27/2020 1.0  0 - 3 % Final    Manual nRBC per 100 Cells 07/27/2020 0.0  % Final    Neutrophils 07/27/2020 1.91  1.8 - 7.7 10*3/uL Final    Sodium 07/27/2020 141  135 - 145 mmol/L Final    Potassium 07/27/2020 4.3  3.6 - 5.2 mmol/L Final    Chloride 07/27/2020 108  100 - 108 mmol/L Final    CO2 07/27/2020 23  21 - 32 mmol/L Final    Anion Gap 07/27/2020 10.0  3.0 - 11.0 mmol/L Final    BUN, Bld 07/27/2020 17  7 - 18 mg/dL Final    Creatinine 07/27/2020 1.09* 0.55 - 1.02 mg/dL Final    GFR ESTIMATION 07/27/2020 58* >60 Final               DESCRIPTION       GLOMERULAR FILTRATION RATE             NORMAL                     >60             KIDNEY  DISEASE           15-60             KIDNEY FAILURE             <15    BUN/Creatinine Ratio 07/27/2020 15.59  7 - 18 Ratio Final    Glucose 07/27/2020 100  70 - 110 mg/dL Final    Calcium 07/27/2020 9.2  8.8 - 10.5 mg/dL Final    Total Bilirubin 07/27/2020 0.6  0.0 - 1.0 mg/dL Final    AST 07/27/2020 17  15 - 37 U/L Final    ALT 07/27/2020 26  12 - 78 U/L  Final    Total Protein 07/27/2020 6.6  6.4 - 8.2 g/dL Final    Albumin 07/27/2020 3.7  3.4 - 5.0 g/dL Final    Globulin 07/27/2020 2.9  2.3 - 3.5 g/dL Final    Albumin/Globulin Ratio 07/27/2020 1.275  1.1 - 1.8 Ratio Final    Alkaline Phosphatase 07/27/2020 79  46 - 116 U/L Final    Magnesium 07/27/2020 1.6* 1.8 - 2.4 mg/dL Final    Phosphorus 07/27/2020 3.6  2.6 - 4.7 mg/dL Final   Orders Only on 07/20/2020   Component Date Value Ref Range Status    Sodium 07/20/2020 144  135 - 145 mmol/L Final    Potassium 07/20/2020 4.4  3.6 - 5.2 mmol/L Final    Chloride 07/20/2020 110* 100 - 108 mmol/L Final    CO2 07/20/2020 23  21 - 32 mmol/L Final    Anion Gap 07/20/2020 11.0  3.0 - 11.0 mmol/L Final    BUN, Bld 07/20/2020 16  7 - 18 mg/dL Final    Creatinine 07/20/2020 1.21* 0.55 - 1.02 mg/dL Final    GFR ESTIMATION 07/20/2020 52* >60 Final               DESCRIPTION       GLOMERULAR FILTRATION RATE             NORMAL                     >60             KIDNEY  DISEASE           15-60             KIDNEY FAILURE             <15    BUN/Creatinine Ratio 07/20/2020 13.22  7 - 18 Ratio Final    Glucose 07/20/2020 101  70 - 110 mg/dL Final    Calcium 07/20/2020 9.2  8.8 - 10.5 mg/dL Final    Total Bilirubin 07/20/2020 0.5  0.0 - 1.0 mg/dL Final    AST 07/20/2020 17  15 - 37 U/L Final    ALT 07/20/2020 21  12 - 78 U/L Final    Total Protein 07/20/2020 6.6  6.4 - 8.2 g/dL Final    Albumin 07/20/2020 3.7  3.4 - 5.0 g/dL Final    Globulin 07/20/2020 2.9  2.3 - 3.5 g/dL Final    Albumin/Globulin Ratio 07/20/2020 1.275  1.1 - 1.8 Ratio Final    Alkaline Phosphatase 07/20/2020 80  46 - 116 U/L Final   Orders Only on 07/20/2020   Component Date Value Ref Range Status    Anisocytosis 07/20/2020 1+   Final    Macrocytes 07/20/2020 1+   Final    WBC 07/20/2020 4.5* 4.6 - 10.2 10*3/uL Final    RBC 07/20/2020 2.38* 4.2 - 5.4 10*6/uL Final    Hemoglobin 07/20/2020 8.3* 12.0 - 16.0 g/dL Final    Hematocrit  07/20/2020 25.9* 37.0 - 47.0 % Final    MCV 07/20/2020 108.8* 80 - 97 fL Final    MCH 07/20/2020 34.9* 27.0 - 31.2 pg Final    MCHC 07/20/2020 32.0  31.8 - 35.4 g/dL Final    RDW RBC Auto-Rto 07/20/2020 20.3* 12.5 - 18.0 % Final    Platelets 07/20/2020 165  142 - 424 10*3/uL Final    Neutrophils/100 leukocytes 07/20/2020 55.0  37 - 80 % Final    Lymphocytes/100 leukocytes 07/20/2020 34.4  25 - 40 % Final    Monocytes/100 leukocytes 07/20/2020 8.4  1 - 15 % Final    Eosinophils/100 leukocytes 07/20/2020 1.1  1 - 3 % Final    Basophils/100 leukocytes 07/20/2020 0.7  0 - 3 % Final    Manual nRBC per 100 Cells 07/20/2020 0.0  % Final    Neutrophils 07/20/2020 2.50  1.8 - 7.7 10*3/uL Final   Orders Only on 07/13/2020   Component Date Value Ref Range Status    Anisocytosis 07/13/2020 1+   Final    Macrocytes 07/13/2020 1+   Final    WBC 07/13/2020 5.2  4.6 - 10.2 10*3/uL Final    RBC 07/13/2020 2.66* 4.2 - 5.4 10*6/uL Final    Hemoglobin 07/13/2020 9.1* 12.0 - 16.0 g/dL Final    Hematocrit 07/13/2020 27.8* 37.0 - 47.0 % Final    MCV 07/13/2020 104.5* 80 - 97 fL Final    MCH 07/13/2020 34.2* 27.0 - 31.2 pg Final    MCHC 07/13/2020 32.7  31.8 - 35.4 g/dL Final    RDW RBC Auto-Rto 07/13/2020 19.7* 12.5 - 18.0 % Final    Platelets 07/13/2020 171  142 - 424 10*3/uL Final    Neutrophils/100 leukocytes 07/13/2020 56.1  37 - 80 % Final    Lymphocytes/100 leukocytes 07/13/2020 33.1  25 - 40 % Final    Monocytes/100 leukocytes 07/13/2020 9.2  1 - 15 % Final    Eosinophils/100 leukocytes 07/13/2020 0.4* 1 - 3 % Final    Basophils/100 leukocytes 07/13/2020 0.8  0 - 3 % Final    Manual nRBC per 100 Cells 07/13/2020 0.0  % Final    Neutrophils 07/13/2020 2.94  1.8 - 7.7 10*3/uL Final   Orders Only on 07/13/2020   Component Date Value Ref Range Status    Phosphorus 07/13/2020 3.4  2.6 - 4.7 mg/dL Final    Magnesium 07/13/2020 1.7* 1.8 - 2.4 mg/dL Final    Sodium 07/13/2020 142  135 - 145 mmol/L Final     Potassium 07/13/2020 4.2  3.6 - 5.2 mmol/L Final    Chloride 07/13/2020 109* 100 - 108 mmol/L Final    CO2 07/13/2020 22  21 - 32 mmol/L Final    Anion Gap 07/13/2020 11.0  3.0 - 11.0 mmol/L Final    BUN, Bld 07/13/2020 26* 7 - 18 mg/dL Final    Creatinine 07/13/2020 1.27* 0.55 - 1.02 mg/dL Final    GFR ESTIMATION 07/13/2020 49* >60 Final               DESCRIPTION       GLOMERULAR FILTRATION RATE             NORMAL                     >60             KIDNEY  DISEASE           15-60             KIDNEY FAILURE             <15    BUN/Creatinine Ratio 07/13/2020 20.47* 7 - 18 Ratio Final    Glucose 07/13/2020 103  70 - 110 mg/dL Final    Calcium 07/13/2020 9.2  8.8 - 10.5 mg/dL Final    Total Bilirubin 07/13/2020 0.4  0.0 - 1.0 mg/dL Final    AST 07/13/2020 19  15 - 37 U/L Final    ALT 07/13/2020 21  12 - 78 U/L Final    Total Protein 07/13/2020 7.4  6.4 - 8.2 g/dL Final    Albumin 07/13/2020 3.8  3.4 - 5.0 g/dL Final    Globulin 07/13/2020 3.6* 2.3 - 3.5 g/dL Final    Albumin/Globulin Ratio 07/13/2020 1.055* 1.1 - 1.8 Ratio Final    Alkaline Phosphatase 07/13/2020 88  46 - 116 U/L Final   Orders Only on 07/06/2020   Component Date Value Ref Range Status    Sodium 07/06/2020 140  135 - 145 mmol/L Final    Potassium 07/06/2020 4.3  3.6 - 5.2 mmol/L Final    Chloride 07/06/2020 105  100 - 108 mmol/L Final    CO2 07/06/2020 23  21 - 32 mmol/L Final    Anion Gap 07/06/2020 12.0* 3.0 - 11.0 mmol/L Final    BUN, Bld 07/06/2020 20* 7 - 18 mg/dL Final    Creatinine 07/06/2020 1.19* 0.55 - 1.02 mg/dL Final    GFR ESTIMATION 07/06/2020 53* >60 Final               DESCRIPTION       GLOMERULAR FILTRATION RATE             NORMAL                     >60             KIDNEY  DISEASE           15-60             KIDNEY FAILURE             <15    BUN/Creatinine Ratio 07/06/2020 16.80  7 - 18 Ratio Final    Glucose 07/06/2020 99  70 - 110 mg/dL Final    Calcium 07/06/2020 9.2  8.8 - 10.5 mg/dL Final    Total  Bilirubin 07/06/2020 0.4  0.0 - 1.0 mg/dL Final    AST 07/06/2020 21  15 - 37 U/L Final    ALT 07/06/2020 25  12 - 78 U/L Final    Total Protein 07/06/2020 7.1  6.4 - 8.2 g/dL Final    Albumin 07/06/2020 4.0  3.4 - 5.0 g/dL Final    Globulin 07/06/2020 3.1  2.3 - 3.5 g/dL Final    Albumin/Globulin Ratio 07/06/2020 1.290  1.1 - 1.8 Ratio Final    Alkaline Phosphatase 07/06/2020 98  46 - 116 U/L Final   Orders Only on 07/06/2020   Component Date Value Ref Range Status    WBC 07/06/2020 4.8  4.6 - 10.2 10*3/uL Final    RBC 07/06/2020 2.78* 4.2 - 5.4 10*6/uL Final    Hemoglobin 07/06/2020 9.5* 12.0 - 16.0 g/dL Final    Hematocrit 07/06/2020 29.8* 37.0 - 47.0 % Final    MCV 07/06/2020 107.2* 80 - 97 fL Final    MCH 07/06/2020 34.2* 27.0 - 31.2 pg Final    MCHC 07/06/2020 31.9  31.8 - 35.4 g/dL Final    RDW RBC Auto-Rto 07/06/2020 19.7* 12.5 - 18.0 % Final    Platelets 07/06/2020 215  142 - 424 10*3/uL Final    Neutrophils/100 leukocytes 07/06/2020 55.1  37 - 80 % Final    Lymphocytes/100 leukocytes 07/06/2020 33.3  25 - 40 % Final    Monocytes/100 leukocytes 07/06/2020 9.8  1 - 15 % Final    Eosinophils/100 leukocytes 07/06/2020 0.6* 1 - 3 % Final    Basophils/100 leukocytes 07/06/2020 0.6  0 - 3 % Final    Manual nRBC per 100 Cells 07/06/2020 0.0  % Final    Neutrophils 07/06/2020 2.63  1.8 - 7.7 10*3/uL Final    Anisocytosis 07/06/2020 1+   Final    Macrocytes 07/06/2020 1+   Final   Orders Only on 06/29/2020   Component Date Value Ref Range Status    Neutrophils 06/29/2020 47.0  37 - 80 % Final    Neutrophils Absolute 06/29/2020 1.6* 1.8 - 7.7 10*3/uL Final    BANDS 06/29/2020 2.0  0 - 5 % Final    Lymphocytes 06/29/2020 38.0  25 - 40 % Final    Monocytes 06/29/2020 12.0  1 - 15 % Final    Eosinophils 06/29/2020 1.0  1 - 3 % Final    Cells Counted 06/29/2020 100   Final    Platelet Estimate 06/29/2020 Adequate   Final    Anisocytosis 06/29/2020 2+   Final    Microcytes 06/29/2020 1+    Final   Ancillary Orders on 06/26/2020   Component Date Value Ref Range Status    WBC 06/29/2020 3.2* 4.6 - 10.2 10*3/uL Final    RBC 06/29/2020 2.69* 4.2 - 5.4 10*6/uL Final    Hemoglobin 06/29/2020 9.3* 12.0 - 16.0 g/dL Final    Hematocrit 06/29/2020 28.6* 37.0 - 47.0 % Final    MCV 06/29/2020 106.3* 80 - 97 fL Final    MCH 06/29/2020 34.6* 27.0 - 31.2 pg Final    MCHC 06/29/2020 32.5  31.8 - 35.4 g/dL Final    RDW RBC Auto-Rto 06/29/2020 20.8* 12.5 - 18.0 % Final    Platelets 06/29/2020 222  142 - 424 10*3/uL Final    Manual nRBC per 100 Cells 06/29/2020 0.0  % Final    Sodium 06/29/2020 141  135 - 145 mmol/L Final    Potassium 06/29/2020 4.2  3.6 - 5.2 mmol/L Final    Chloride 06/29/2020 107  100 - 108 mmol/L Final    CO2 06/29/2020 24  21 - 32 mmol/L Final    Anion Gap 06/29/2020 10.0  3.0 - 11.0 mmol/L Final    BUN, Bld 06/29/2020 25* 7 - 18 mg/dL Final    Creatinine 06/29/2020 1.21* 0.55 - 1.02 mg/dL Final    GFR ESTIMATION 06/29/2020 52* >60 Final               DESCRIPTION       GLOMERULAR FILTRATION RATE             NORMAL                     >60             KIDNEY  DISEASE           15-60             KIDNEY FAILURE             <15    BUN/Creatinine Ratio 06/29/2020 20.66* 7 - 18 Ratio Final    Glucose 06/29/2020 107  70 - 110 mg/dL Final    Calcium 06/29/2020 9.5  8.8 - 10.5 mg/dL Final    Total Bilirubin 06/29/2020 0.3  0.0 - 1.0 mg/dL Final    AST 06/29/2020 19  15 - 37 U/L Final    ALT 06/29/2020 25  12 - 78 U/L Final    Total Protein 06/29/2020 7.1  6.4 - 8.2 g/dL Final    Albumin 06/29/2020 4.0  3.4 - 5.0 g/dL Final    Globulin 06/29/2020 3.1  2.3 - 3.5 g/dL Final    Albumin/Globulin Ratio 06/29/2020 1.290  1.1 - 1.8 Ratio Final    Alkaline Phosphatase 06/29/2020 110  46 - 116 U/L Final    Magnesium 06/29/2020 1.7* 1.8 - 2.4 mg/dL Final    Phosphorus 06/29/2020 3.6  2.6 - 4.7 mg/dL Final   There may be more visits with results that are not included.       Imaging:      Assessment:     Principle Problem: No primary diagnosis found.   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 female with PMH of The primary encounter diagnosis was Malignant neoplasm of endocervix. 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 Malignant neoplasm of cervix, unspecified site    3. Increased creatine kinase level    4. Neoplastic malignant related fatigue       Cervical cancer stage IV disease  BRCA2 +  mutation   PARP inhibitor  Lynparza 300 mg BID PET scan shows early progression 4/2020, dc'd  Mid June due to excessive pancytopenia   weekly carbo/taxol added  Pancytopenia and Sr Cr improving with lynparza held       Plan:   Overall PLANS:  carbo Taxol weekly   Lynparza  dc'd pancytopenia     == lab CBC CMP every week  == Will reduce carbo to 50 mg IV weekly, Taxol 60 mg IV weekly, all fixed doses, she is tolerating well, improved kidney function noted  == return to clinic  1 week add type and screen due to progressing anemia noted today, pt asymptomatic but if HGB < 8.0 will set up next week for transfusion.  ==Pet/Ct ordered for re-staging after 6 cycles of C/T.     Annette Donis NP

## 2020-07-30 ENCOUNTER — DOCUMENTATION ONLY (OUTPATIENT)
Dept: HEMATOLOGY/ONCOLOGY | Facility: CLINIC | Age: 85
End: 2020-07-30

## 2020-07-30 NOTE — PROGRESS NOTES
Sindy from [place] PET said that Ms. Seay stated that she would not be able to do her PET scan due to her brother in-law passing. Her next appt. Is (virtual) on 08/04/2020.

## 2020-08-03 LAB
ALBUMIN SERPL BCP-MCNC: 3.7 G/DL (ref 3.4–5)
ALBUMIN/GLOBULIN RATIO: 1.23 RATIO (ref 1.1–1.8)
ALP SERPL-CCNC: 82 U/L (ref 46–116)
ALT SERPL W P-5'-P-CCNC: 24 U/L (ref 12–78)
ANION GAP SERPL CALC-SCNC: 10 MMOL/L (ref 3–11)
ANISOCYTOSIS: NORMAL
AST SERPL-CCNC: 19 U/L (ref 15–37)
BASOPHILS NFR SNV MANUAL: 0.5 % (ref 0–3)
BILIRUB SERPL-MCNC: 0.4 MG/DL (ref 0–1)
BUN SERPL-MCNC: 21 MG/DL (ref 7–18)
BUN/CREAT SERPL: 19.44 RATIO (ref 7–18)
CALCIUM SERPL-MCNC: 9 MG/DL (ref 8.8–10.5)
CHLORIDE SERPL-SCNC: 108 MMOL/L (ref 100–108)
CO2 SERPL-SCNC: 25 MMOL/L (ref 21–32)
CREAT SERPL-MCNC: 1.08 MG/DL (ref 0.55–1.02)
EOSINOPHIL NFR SNV MANUAL: 0.8 % (ref 1–3)
ERYTHROCYTE [DISTWIDTH] IN BLOOD BY AUTOMATED COUNT: 20.4 % (ref 12.5–18)
GFR ESTIMATION: 59
GLOBULIN: 3 G/DL (ref 2.3–3.5)
GLUCOSE SERPL-MCNC: 108 MG/DL (ref 70–110)
HCT VFR BLD AUTO: 24.8 % (ref 37–47)
HGB BLD-MCNC: 8 G/DL (ref 12–16)
HYPOCHROMIA BLD QL SMEAR: NORMAL
LYMPHOCYTES NFR SNV MANUAL: 29.7 % (ref 25–40)
MACROCYTES BLD QL SMEAR: NORMAL
MAGNESIUM SERPL-MCNC: 1.5 MG/DL (ref 1.8–2.4)
MANUAL NRBC PER 100 CELLS: 0 %
MCH RBC QN AUTO: 35.7 PG (ref 27–31.2)
MCHC RBC AUTO-ENTMCNC: 32.3 G/DL (ref 31.8–35.4)
MCV RBC AUTO: 110.7 FL (ref 80–97)
MONOCYTES/100 LEUKOCYTES: 7.1 % (ref 1–15)
NEUTROPHILS NFR BLD: 2.43 10*3/UL (ref 1.8–7.7)
NEUTROPHILS NFR SNV MANUAL: 61.1 % (ref 37–80)
PHOSPHATE FLD-MCNC: 3.4 MG/DL (ref 2.6–4.7)
PLATELETS: 102 10*3/UL (ref 142–424)
POTASSIUM SERPL-SCNC: 4.5 MMOL/L (ref 3.6–5.2)
PROT SERPL-MCNC: 6.7 G/DL (ref 6.4–8.2)
RBC # BLD AUTO: 2.24 10*6/UL (ref 4.2–5.4)
SODIUM BLD-SCNC: 143 MMOL/L (ref 135–145)
WBC # BLD: 4 10*3/UL (ref 4.6–10.2)

## 2020-08-04 ENCOUNTER — OFFICE VISIT (OUTPATIENT)
Dept: HEMATOLOGY/ONCOLOGY | Facility: CLINIC | Age: 85
End: 2020-08-04
Payer: MEDICARE

## 2020-08-04 DIAGNOSIS — Z15.09 BRCA2 GENE MUTATION POSITIVE IN FEMALE: ICD-10-CM

## 2020-08-04 DIAGNOSIS — Z15.02 BRCA2 GENE MUTATION POSITIVE IN FEMALE: ICD-10-CM

## 2020-08-04 DIAGNOSIS — Z15.01 BRCA2 GENE MUTATION POSITIVE IN FEMALE: ICD-10-CM

## 2020-08-04 DIAGNOSIS — C53.0 MALIGNANT NEOPLASM OF ENDOCERVIX: ICD-10-CM

## 2020-08-04 DIAGNOSIS — E83.42 HYPOMAGNESEMIA: Primary | ICD-10-CM

## 2020-08-04 DIAGNOSIS — Z51.11 ENCOUNTER FOR ANTINEOPLASTIC CHEMOTHERAPY: ICD-10-CM

## 2020-08-04 PROCEDURE — 99214 PR OFFICE/OUTPT VISIT, EST, LEVL IV, 30-39 MIN: ICD-10-PCS | Mod: 95,,, | Performed by: NURSE PRACTITIONER

## 2020-08-04 PROCEDURE — 99214 OFFICE O/P EST MOD 30 MIN: CPT | Mod: 95,,, | Performed by: NURSE PRACTITIONER

## 2020-08-04 RX ORDER — LANOLIN ALCOHOL/MO/W.PET/CERES
400 CREAM (GRAM) TOPICAL DAILY
Qty: 30 TABLET | Refills: 6 | Status: SHIPPED | OUTPATIENT
Start: 2020-08-04 | End: 2021-08-04

## 2020-08-04 RX ORDER — SODIUM CHLORIDE 0.9 % (FLUSH) 0.9 %
10 SYRINGE (ML) INJECTION
Status: CANCELLED | OUTPATIENT
Start: 2020-08-04

## 2020-08-04 RX ORDER — EPINEPHRINE 0.3 MG/.3ML
0.3 INJECTION SUBCUTANEOUS ONCE AS NEEDED
Status: CANCELLED | OUTPATIENT
Start: 2020-08-04

## 2020-08-04 RX ORDER — FAMOTIDINE 10 MG/ML
20 INJECTION INTRAVENOUS
Status: CANCELLED | OUTPATIENT
Start: 2020-08-04

## 2020-08-04 RX ORDER — HEPARIN 100 UNIT/ML
500 SYRINGE INTRAVENOUS
Status: CANCELLED | OUTPATIENT
Start: 2020-08-04

## 2020-08-04 RX ORDER — DIPHENHYDRAMINE HYDROCHLORIDE 50 MG/ML
50 INJECTION INTRAMUSCULAR; INTRAVENOUS ONCE AS NEEDED
Status: CANCELLED | OUTPATIENT
Start: 2020-08-04

## 2020-08-04 NOTE — PROGRESS NOTES
Medical Oncology Progress Note  [facility]     PATIENT: Adri Seay  : 1935 85 y.o. female  MRN [id number]     PCP: Bria Mitchell NP  Consult Requested By:      Date of Service: 2020    Subjective:   Interim History:  No chief complaint on file.    Adri Seay is here for follow-up on treatment;   Carbotaxol has been on hold because of leukopenia  The patient feel well and she had no complaint today    Oncology History:    History of Present Illness: Ms. Seay is a 84 y.o. female who presents for evaluation and management of cervical cancer. SHe was seen at [facility] on 19.      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 The patient had a CT scan consistent with widely metastatic disease including multiple sites of lymphadenopathy, a possible lung metastases, liver metastases, nodule in the anterior abdominal wall, and carcinomatosis. The patient is mostly asymptomatic but does have a vaginal discharge. Her performance status is 0. On exam, there is a 6 centimeter mass in the anterior abdominal wall superior to the umbilicus. On bimanual examination, tumor is a place the entire cervix apex of the vagina, and on rectovaginal examination, there is a large pelvic mass extending to the bilateral pelvic sidewalls.    We will get her CT reviewed at [facility] and if it is consistent with metastatic disease, we have recommended palliative chemotherapy with either carboplatinum as a single agent or carboplatinum and paclitaxel. Due to concern for bowel involvement on the CT scan, I have recommended not giving bevacizumab as part of this regimen. Patient will return home to [place] and begin chemotherapy with a medical oncologist there. I've also recommended a consultation with radiation oncology in [place] for consideration of palliative radiation to  "lower the chances of significant vaginal bleeding. We will see her back on an as-needed basis."       == 8/5/19 Carbo    ==PET/CT done on 12/2/19. Previous CT imaging done at outside facility, discussed with Dr Nguyen for comparison and he stated pelvic mass smaller, abdominal mass smaller liver lesion likely benign stable but difficult to say if peritoneal implants stable to progressed.    ==FoundationOne Liquid Bx on 12/16/19.  BRCA 2 gene mutation.  ==1/14/20 Pt started lynparza   ==5/13/2020 PET showing early progression.    ==5/2020 resume low dose carbo and taxol. On hold now due to pancytopenia     Oncology History   Malignant neoplasm of endocervix   6/27/2019 Initial Diagnosis    Malignant neoplasm of exocervix     7/8/2019 - 7/8/2019 Chemotherapy    Treatment Summary   Plan Name: OP CARBOPLATIN WEEKLY  Treatment Goal: Palliative  Status: Inactive  Start Date: [No treatment day found]  End Date: [No treatment day found]  Provider: Jaiem Pinon MD  Chemotherapy: CARBOplatin (PARAPLATIN) in sodium chloride 0.9% 250 mL chemo infusion, , Intravenous, Clinic/HOD 1 time, 0 of 4 cycles     7/9/2019 - 11/25/2019 Chemotherapy    Treatment Summary   Plan Name: OP GYN CARBOPLATIN (AUC) Q4W  Treatment Goal: Palliative  Status: Inactive  Start Date: 7/9/2019  End Date: 10/29/2019  Provider: Jaime Pinon MD  Chemotherapy: CARBOplatin (PARAPLATIN) in sodium chloride 0.9% 250 mL chemo infusion,  (original dose 341.5 mg), Intravenous, Clinic/HOD 1 time, 5 of 6 cycles  Dose modification:   (original dose 341.5 mg, Cycle 1)     5/19/2020 - 6/16/2020 Chemotherapy    Treatment Summary   Plan Name: OP  PACLITAXEL CARBOPLATIN WEEKLY  Treatment Goal: Palliative  Status: Inactive  Start Date: 5/19/2020  End Date: 5/22/2020  Provider: Annette Donis NP  Chemotherapy: CARBOplatin (PARAPLATIN) 115 mg in sodium chloride 0.9% 250 mL chemo infusion, 115 mg (98.6 % of original dose 118.4 mg), Intravenous, Clinic/HOD 1 time, 0 of 1 " cycle  Dose modification:   (original dose 118.4 mg, Cycle 1)  PACLitaxeL (TAXOL) 80 mg/m2 = 126 mg in sodium chloride 0.9% 250 mL chemo infusion, 80 mg/m2 = 126 mg (100 % of original dose 80 mg/m2), Intravenous, Clinic/HOD 1 time, 0 of 1 cycle  Dose modification: 80 mg/m2 (original dose 80 mg/m2, Cycle 1)     5/19/2020 -  Chemotherapy    Treatment Summary   Plan Name:  PACLITAXEL CARBOPLATIN WEEKLY  Treatment Goal: Palliative  Status: Active  Start Date: 5/19/2020  End Date: 8/4/2020 (Planned)  Provider: Annette Donis NP  Chemotherapy: CARBOplatin (PARAPLATIN) 115 mg in sodium chloride 0.9% 250 mL chemo infusion, 115 mg (100 % of original dose 114.8 mg), Intravenous, Clinic/HOD 1 time, 1 of 1 cycle  Dose modification:   (original dose 114.8 mg, Cycle 1, Reason: MD Discretion), 100 mg (original dose 114.8 mg, Cycle 1)  PACLitaxeL (TAXOL) 80 mg/m2 = 126 mg in sodium chloride 0.9% 250 mL chemo infusion, 80 mg/m2 = 126 mg (100 % of original dose 80 mg/m2), Intravenous, Clinic/HOD 1 time, 1 of 1 cycle  Dose modification: 80 mg/m2 (original dose 80 mg/m2, Cycle 1), 50 mg/m2 (original dose 80 mg/m2, Cycle 1)         Past Medical History:   Past Medical History:   Diagnosis Date    Anemia     Cataract     Cervical cancer 05/2019    Hypertension        Past Surgical HIstory:   Past Surgical History:   Procedure Laterality Date    CATARACT EXTRACTION, BILATERAL      WRIST SURGERY  1984       Allergies:  Review of patient's allergies indicates:   Allergen Reactions    Strawberry Rash       Medications:  Current Outpatient Medications   Medication Sig Dispense Refill    cloNIDine (CATAPRES) 0.3 MG tablet Take 1 tablet (0.3 mg total) by mouth 2 (two) times daily. 180 tablet 2    FENOFIBRATE ORAL Take by mouth 2 (two) times daily.      ferrous sulfate (FEOSOL) 325 mg (65 mg iron) Tab tablet TAKE 1 TABLET BY MOUTH TWICE DAILY 180 tablet 1    loratadine (CLARITIN) 10 mg tablet Take 10 mg by mouth once daily.       megestroL (MEGACE) 40 MG Tab TAKE 1 TABLET(40 MG) BY MOUTH TWICE DAILY 60 tablet 6    NIFEdipine (PROCARDIA-XL) 30 MG (OSM) 24 hr tablet TAKE 1 TABLET BY MOUTH ONCE DAILY 90 tablet 5    ondansetron (ZOFRAN-ODT) 8 MG TbDL Take 1 tablet (8 mg total) by mouth every 8 (eight) hours as needed (chemotherapy-induced nausea and vomiting). 40 tablet 5    simvastatin (ZOCOR) 20 MG tablet Take 20 mg by mouth every evening.      varicella-zoster [id number], PF, (SHINGRIX, PF,) 50 mcg/0.5 mL injection Administer two doses 2 months apart 0.5 mL 0     No current facility-administered medications for this visit.        Family History:   Family History   Problem Relation Age of Onset    Hypertension Mother     Pneumonia Father     Breast cancer Sister     No Known Problems Brother     Leukemia Daughter     No Known Problems Son        Social History:  reports that she has never smoked. She has never used smokeless tobacco. She reports previous alcohol use. She reports that she does not use drugs.    Review of Systemas  Constitutional: No change in weight, appetie, fatigue, fever, or night sweats  Eyes: No changes in vision  Ears, Nose, Mouth, Throat, and Face: No hearing problems, ear pain, rummy nose, or sore throat  Respiratory: No shortness of breath or cough  Cardiovascular: No chest pain, palpitations or dizziness  Gastrointestinal: No abdominal pain, hematochezia, melena  Genitourinary: No dysuria, hematuria, nocturia, menstrual problems, post menopausal  Integumentary/Breast: No rashes or itching  Hematologic/Lymphatic: No anemia or bleeding abnormalities  Musculoskeletal: No joint or muscle abnormalities  Neurological: No sensory or motor problems, no headaches  Behavioral/Psych: No depression, anxiety, cognitive problems, or stress  Endocrine: No diabetes or thyroid problems  Allergic/Immunologic: See allergy above    Physical Exam      Vitals:   There were no vitals filed for this visit.  BMI: There is no height or  weight on file to calculate BMI.  BSA There is no height or weight on file to calculate BSA.  ECOG Performance Status:   ECOG SCORE         GENERAL APPEARANCE: Well developed, well nourished, in no acute distress.  SKIN: Inspection of the skin reveals no rashes, ulcerations or petechiae.  HEENT: The sclerae were anicteric and conjunctivae were pink and moist. Extraocular movements were intact and pupils were equal, round, and reactive to light with normal accommodation. External inspection of the ears and nose showed no scars, lesions, or masses. Lips, teeth, and gums showed normal mucosa. The oral mucosa, hard and soft palate, tongue and posterior pharynx were normal.  NECK: Supple and symmetric. There was no thyroid enlargement, and no tenderness, or masses were felt.  CRESPIRATORY: Normal AP diameter and normal contour without any kyphoscoliosis. LUNGS: Auscultation of the lungs revealed normal breath sounds without any other adventitious sounds or rubs.  CARDIOVASCULAR: There was a regular rate and rhythm without any murmurs, gallops, rubs. The carotid pulses were normal and 2+ bilaterally without bruits. Peripheral pulses were 2+ and symmetric.  GASTROINTESTINAL: Soft and nontender with normal bowel sounds. The liver span was approximately 5-6 cm in the right midclavicular line by percussion. The liver edge was nontender. The spleen was not palpable. There were no inguinal or umbilical hernias noted. No ascites was noted.  LYMPH NODES: No lymphadenopathy was appreciated in the neck, axillae or groin.  MUSCULOSKELETAL: Gait was normal. There was no tenderness or effusions noted. Muscle strength and tone were normal. EXTREMITIES: No cyanosis, clubbing or edema.  NEUROLOGIC: Alert and oriented x 3. Normal affect. Gait was normal. Normal deep tendon reflexes with no pathological reflexes. Sensation to touch was normal.  PSYCHIATRIC: good mood, orientated to place, time and person     Labs and Imagings     Orders  Only on 08/03/2020   Component Date Value Ref Range Status    Magnesium 08/03/2020 1.5* 1.8 - 2.4 mg/dL Final    Phosphorus 08/03/2020 3.4  2.6 - 4.7 mg/dL Final    Sodium 08/03/2020 143  135 - 145 mmol/L Final    Potassium 08/03/2020 4.5  3.6 - 5.2 mmol/L Final    Chloride 08/03/2020 108  100 - 108 mmol/L Final    CO2 08/03/2020 25  21 - 32 mmol/L Final    Anion Gap 08/03/2020 10.0  3.0 - 11.0 mmol/L Final    BUN, Bld 08/03/2020 21* 7 - 18 mg/dL Final    Creatinine 08/03/2020 1.08* 0.55 - 1.02 mg/dL Final    GFR ESTIMATION 08/03/2020 59* >60 Final               DESCRIPTION       GLOMERULAR FILTRATION RATE             NORMAL                     >60             KIDNEY  DISEASE           15-60             KIDNEY FAILURE             <15    BUN/Creatinine Ratio 08/03/2020 19.44* 7 - 18 Ratio Final    Glucose 08/03/2020 108  70 - 110 mg/dL Final    Calcium 08/03/2020 9.0  8.8 - 10.5 mg/dL Final    Total Bilirubin 08/03/2020 0.4  0.0 - 1.0 mg/dL Final    AST 08/03/2020 19  15 - 37 U/L Final    ALT 08/03/2020 24  12 - 78 U/L Final    Total Protein 08/03/2020 6.7  6.4 - 8.2 g/dL Final    Albumin 08/03/2020 3.7  3.4 - 5.0 g/dL Final    Globulin 08/03/2020 3.0  2.3 - 3.5 g/dL Final    Albumin/Globulin Ratio 08/03/2020 1.233  1.1 - 1.8 Ratio Final    Alkaline Phosphatase 08/03/2020 82  46 - 116 U/L Final   Orders Only on 08/03/2020   Component Date Value Ref Range Status    WBC 08/03/2020 4.0* 4.6 - 10.2 10*3/uL Final    RBC 08/03/2020 2.24* 4.2 - 5.4 10*6/uL Final    Hemoglobin 08/03/2020 8.0* 12.0 - 16.0 g/dL Final    Hematocrit 08/03/2020 24.8* 37.0 - 47.0 % Final    MCV 08/03/2020 110.7* 80 - 97 fL Final    MCH 08/03/2020 35.7* 27.0 - 31.2 pg Final    MCHC 08/03/2020 32.3  31.8 - 35.4 g/dL Final    RDW RBC Auto-Rto 08/03/2020 20.4* 12.5 - 18.0 % Final    Platelets 08/03/2020 102* 142 - 424 10*3/uL Final    Neutrophils/100 leukocytes 08/03/2020 61.1  37 - 80 % Final    Lymphocytes/100  leukocytes 08/03/2020 29.7  25 - 40 % Final    Monocytes/100 leukocytes 08/03/2020 7.1  1 - 15 % Final    Eosinophils/100 leukocytes 08/03/2020 0.8* 1 - 3 % Final    Basophils/100 leukocytes 08/03/2020 0.5  0 - 3 % Final    Manual nRBC per 100 Cells 08/03/2020 0.0  % Final    Neutrophils 08/03/2020 2.43  1.8 - 7.7 10*3/uL Final    Anisocytosis 08/03/2020 2+   Final    Macrocytes 08/03/2020 1+   Final    Hypochromia 08/03/2020 1+   Final   Orders Only on 07/27/2020   Component Date Value Ref Range Status    Anisocytosis 07/27/2020 2+   Final    Macrocytes 07/27/2020 1+   Final   Ancillary Orders on 07/24/2020   Component Date Value Ref Range Status    WBC 07/27/2020 4.0* 4.6 - 10.2 10*3/uL Final    RBC 07/27/2020 2.34* 4.2 - 5.4 10*6/uL Final    Hemoglobin 07/27/2020 8.2* 12.0 - 16.0 g/dL Final    Hematocrit 07/27/2020 26.0* 37.0 - 47.0 % Final    MCV 07/27/2020 111.1* 80 - 97 fL Final    MCH 07/27/2020 35.0* 27.0 - 31.2 pg Final    MCHC 07/27/2020 31.5* 31.8 - 35.4 g/dL Final    RDW RBC Auto-Rto 07/27/2020 20.0* 12.5 - 18.0 % Final    Platelets 07/27/2020 124* 142 - 424 10*3/uL Final    Neutrophils/100 leukocytes 07/27/2020 48.3  37 - 80 % Final    Lymphocytes/100 leukocytes 07/27/2020 38.0  25 - 40 % Final    Monocytes/100 leukocytes 07/27/2020 10.9  1 - 15 % Final    Eosinophils/100 leukocytes 07/27/2020 1.3  1 - 3 % Final    Basophils/100 leukocytes 07/27/2020 1.0  0 - 3 % Final    Manual nRBC per 100 Cells 07/27/2020 0.0  % Final    Neutrophils 07/27/2020 1.91  1.8 - 7.7 10*3/uL Final    Sodium 07/27/2020 141  135 - 145 mmol/L Final    Potassium 07/27/2020 4.3  3.6 - 5.2 mmol/L Final    Chloride 07/27/2020 108  100 - 108 mmol/L Final    CO2 07/27/2020 23  21 - 32 mmol/L Final    Anion Gap 07/27/2020 10.0  3.0 - 11.0 mmol/L Final    BUN, Bld 07/27/2020 17  7 - 18 mg/dL Final    Creatinine 07/27/2020 1.09* 0.55 - 1.02 mg/dL Final    GFR ESTIMATION 07/27/2020 58* >60 Final                DESCRIPTION       GLOMERULAR FILTRATION RATE             NORMAL                     >60             KIDNEY  DISEASE           15-60             KIDNEY FAILURE             <15    BUN/Creatinine Ratio 07/27/2020 15.59  7 - 18 Ratio Final    Glucose 07/27/2020 100  70 - 110 mg/dL Final    Calcium 07/27/2020 9.2  8.8 - 10.5 mg/dL Final    Total Bilirubin 07/27/2020 0.6  0.0 - 1.0 mg/dL Final    AST 07/27/2020 17  15 - 37 U/L Final    ALT 07/27/2020 26  12 - 78 U/L Final    Total Protein 07/27/2020 6.6  6.4 - 8.2 g/dL Final    Albumin 07/27/2020 3.7  3.4 - 5.0 g/dL Final    Globulin 07/27/2020 2.9  2.3 - 3.5 g/dL Final    Albumin/Globulin Ratio 07/27/2020 1.275  1.1 - 1.8 Ratio Final    Alkaline Phosphatase 07/27/2020 79  46 - 116 U/L Final    Magnesium 07/27/2020 1.6* 1.8 - 2.4 mg/dL Final    Phosphorus 07/27/2020 3.6  2.6 - 4.7 mg/dL Final   Orders Only on 07/20/2020   Component Date Value Ref Range Status    Sodium 07/20/2020 144  135 - 145 mmol/L Final    Potassium 07/20/2020 4.4  3.6 - 5.2 mmol/L Final    Chloride 07/20/2020 110* 100 - 108 mmol/L Final    CO2 07/20/2020 23  21 - 32 mmol/L Final    Anion Gap 07/20/2020 11.0  3.0 - 11.0 mmol/L Final    BUN, Bld 07/20/2020 16  7 - 18 mg/dL Final    Creatinine 07/20/2020 1.21* 0.55 - 1.02 mg/dL Final    GFR ESTIMATION 07/20/2020 52* >60 Final               DESCRIPTION       GLOMERULAR FILTRATION RATE             NORMAL                     >60             KIDNEY  DISEASE           15-60             KIDNEY FAILURE             <15    BUN/Creatinine Ratio 07/20/2020 13.22  7 - 18 Ratio Final    Glucose 07/20/2020 101  70 - 110 mg/dL Final    Calcium 07/20/2020 9.2  8.8 - 10.5 mg/dL Final    Total Bilirubin 07/20/2020 0.5  0.0 - 1.0 mg/dL Final    AST 07/20/2020 17  15 - 37 U/L Final    ALT 07/20/2020 21  12 - 78 U/L Final    Total Protein 07/20/2020 6.6  6.4 - 8.2 g/dL Final    Albumin 07/20/2020 3.7  3.4 - 5.0 g/dL Final    Globulin  07/20/2020 2.9  2.3 - 3.5 g/dL Final    Albumin/Globulin Ratio 07/20/2020 1.275  1.1 - 1.8 Ratio Final    Alkaline Phosphatase 07/20/2020 80  46 - 116 U/L Final   Orders Only on 07/20/2020   Component Date Value Ref Range Status    Anisocytosis 07/20/2020 1+   Final    Macrocytes 07/20/2020 1+   Final    WBC 07/20/2020 4.5* 4.6 - 10.2 10*3/uL Final    RBC 07/20/2020 2.38* 4.2 - 5.4 10*6/uL Final    Hemoglobin 07/20/2020 8.3* 12.0 - 16.0 g/dL Final    Hematocrit 07/20/2020 25.9* 37.0 - 47.0 % Final    MCV 07/20/2020 108.8* 80 - 97 fL Final    MCH 07/20/2020 34.9* 27.0 - 31.2 pg Final    MCHC 07/20/2020 32.0  31.8 - 35.4 g/dL Final    RDW RBC Auto-Rto 07/20/2020 20.3* 12.5 - 18.0 % Final    Platelets 07/20/2020 165  142 - 424 10*3/uL Final    Neutrophils/100 leukocytes 07/20/2020 55.0  37 - 80 % Final    Lymphocytes/100 leukocytes 07/20/2020 34.4  25 - 40 % Final    Monocytes/100 leukocytes 07/20/2020 8.4  1 - 15 % Final    Eosinophils/100 leukocytes 07/20/2020 1.1  1 - 3 % Final    Basophils/100 leukocytes 07/20/2020 0.7  0 - 3 % Final    Manual nRBC per 100 Cells 07/20/2020 0.0  % Final    Neutrophils 07/20/2020 2.50  1.8 - 7.7 10*3/uL Final   Orders Only on 07/13/2020   Component Date Value Ref Range Status    Anisocytosis 07/13/2020 1+   Final    Macrocytes 07/13/2020 1+   Final    WBC 07/13/2020 5.2  4.6 - 10.2 10*3/uL Final    RBC 07/13/2020 2.66* 4.2 - 5.4 10*6/uL Final    Hemoglobin 07/13/2020 9.1* 12.0 - 16.0 g/dL Final    Hematocrit 07/13/2020 27.8* 37.0 - 47.0 % Final    MCV 07/13/2020 104.5* 80 - 97 fL Final    MCH 07/13/2020 34.2* 27.0 - 31.2 pg Final    MCHC 07/13/2020 32.7  31.8 - 35.4 g/dL Final    RDW RBC Auto-Rto 07/13/2020 19.7* 12.5 - 18.0 % Final    Platelets 07/13/2020 171  142 - 424 10*3/uL Final    Neutrophils/100 leukocytes 07/13/2020 56.1  37 - 80 % Final    Lymphocytes/100 leukocytes 07/13/2020 33.1  25 - 40 % Final    Monocytes/100 leukocytes 07/13/2020 9.2   1 - 15 % Final    Eosinophils/100 leukocytes 07/13/2020 0.4* 1 - 3 % Final    Basophils/100 leukocytes 07/13/2020 0.8  0 - 3 % Final    Manual nRBC per 100 Cells 07/13/2020 0.0  % Final    Neutrophils 07/13/2020 2.94  1.8 - 7.7 10*3/uL Final   Orders Only on 07/13/2020   Component Date Value Ref Range Status    Phosphorus 07/13/2020 3.4  2.6 - 4.7 mg/dL Final    Magnesium 07/13/2020 1.7* 1.8 - 2.4 mg/dL Final    Sodium 07/13/2020 142  135 - 145 mmol/L Final    Potassium 07/13/2020 4.2  3.6 - 5.2 mmol/L Final    Chloride 07/13/2020 109* 100 - 108 mmol/L Final    CO2 07/13/2020 22  21 - 32 mmol/L Final    Anion Gap 07/13/2020 11.0  3.0 - 11.0 mmol/L Final    BUN, Bld 07/13/2020 26* 7 - 18 mg/dL Final    Creatinine 07/13/2020 1.27* 0.55 - 1.02 mg/dL Final    GFR ESTIMATION 07/13/2020 49* >60 Final               DESCRIPTION       GLOMERULAR FILTRATION RATE             NORMAL                     >60             KIDNEY  DISEASE           15-60             KIDNEY FAILURE             <15    BUN/Creatinine Ratio 07/13/2020 20.47* 7 - 18 Ratio Final    Glucose 07/13/2020 103  70 - 110 mg/dL Final    Calcium 07/13/2020 9.2  8.8 - 10.5 mg/dL Final    Total Bilirubin 07/13/2020 0.4  0.0 - 1.0 mg/dL Final    AST 07/13/2020 19  15 - 37 U/L Final    ALT 07/13/2020 21  12 - 78 U/L Final    Total Protein 07/13/2020 7.4  6.4 - 8.2 g/dL Final    Albumin 07/13/2020 3.8  3.4 - 5.0 g/dL Final    Globulin 07/13/2020 3.6* 2.3 - 3.5 g/dL Final    Albumin/Globulin Ratio 07/13/2020 1.055* 1.1 - 1.8 Ratio Final    Alkaline Phosphatase 07/13/2020 88  46 - 116 U/L Final   Orders Only on 07/06/2020   Component Date Value Ref Range Status    Sodium 07/06/2020 140  135 - 145 mmol/L Final    Potassium 07/06/2020 4.3  3.6 - 5.2 mmol/L Final    Chloride 07/06/2020 105  100 - 108 mmol/L Final    CO2 07/06/2020 23  21 - 32 mmol/L Final    Anion Gap 07/06/2020 12.0* 3.0 - 11.0 mmol/L Final    BUN, Bld 07/06/2020 20* 7 - 18  mg/dL Final    Creatinine 07/06/2020 1.19* 0.55 - 1.02 mg/dL Final    GFR ESTIMATION 07/06/2020 53* >60 Final               DESCRIPTION       GLOMERULAR FILTRATION RATE             NORMAL                     >60             KIDNEY  DISEASE           15-60             KIDNEY FAILURE             <15    BUN/Creatinine Ratio 07/06/2020 16.80  7 - 18 Ratio Final    Glucose 07/06/2020 99  70 - 110 mg/dL Final    Calcium 07/06/2020 9.2  8.8 - 10.5 mg/dL Final    Total Bilirubin 07/06/2020 0.4  0.0 - 1.0 mg/dL Final    AST 07/06/2020 21  15 - 37 U/L Final    ALT 07/06/2020 25  12 - 78 U/L Final    Total Protein 07/06/2020 7.1  6.4 - 8.2 g/dL Final    Albumin 07/06/2020 4.0  3.4 - 5.0 g/dL Final    Globulin 07/06/2020 3.1  2.3 - 3.5 g/dL Final    Albumin/Globulin Ratio 07/06/2020 1.290  1.1 - 1.8 Ratio Final    Alkaline Phosphatase 07/06/2020 98  46 - 116 U/L Final   Orders Only on 07/06/2020   Component Date Value Ref Range Status    WBC 07/06/2020 4.8  4.6 - 10.2 10*3/uL Final    RBC 07/06/2020 2.78* 4.2 - 5.4 10*6/uL Final    Hemoglobin 07/06/2020 9.5* 12.0 - 16.0 g/dL Final    Hematocrit 07/06/2020 29.8* 37.0 - 47.0 % Final    MCV 07/06/2020 107.2* 80 - 97 fL Final    MCH 07/06/2020 34.2* 27.0 - 31.2 pg Final    MCHC 07/06/2020 31.9  31.8 - 35.4 g/dL Final    RDW RBC Auto-Rto 07/06/2020 19.7* 12.5 - 18.0 % Final    Platelets 07/06/2020 215  142 - 424 10*3/uL Final    Neutrophils/100 leukocytes 07/06/2020 55.1  37 - 80 % Final    Lymphocytes/100 leukocytes 07/06/2020 33.3  25 - 40 % Final    Monocytes/100 leukocytes 07/06/2020 9.8  1 - 15 % Final    Eosinophils/100 leukocytes 07/06/2020 0.6* 1 - 3 % Final    Basophils/100 leukocytes 07/06/2020 0.6  0 - 3 % Final    Manual nRBC per 100 Cells 07/06/2020 0.0  % Final    Neutrophils 07/06/2020 2.63  1.8 - 7.7 10*3/uL Final    Anisocytosis 07/06/2020 1+   Final    Macrocytes 07/06/2020 1+   Final   There may be more visits with results that are  not included.       Imaging:     Assessment:     Principle Problem: No primary diagnosis found.   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 female with PMH of The primary encounter diagnosis was Hypomagnesemia. 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 Malignant neoplasm of cervix, unspecified site    3. Increased creatine kinase level    4. Neoplastic malignant related fatigue       Cervical cancer stage IV disease  BRCA2 +  mutation   PARP inhibitor  Lynparza 300 mg BID PET scan shows early progression 4/2020, dc'd  Mid June due to excessive pancytopenia   weekly carbo/taxol added  Pancytopenia and Sr Cr improving with lynparza held       Plan:   Overall PLANS:  carbo Taxol weekly   Lynparza  dc'd pancytopenia     == lab CBC CMP every week  == Will reduce carbo to 50 mg IV weekly, Taxol 60 mg IV weekly, all fixed doses, she is tolerating well, improved kidney function noted  == return to clinic  1 week add type and screen due to progressing anemia noted today, pt asymptomatic but if HGB < 8.0 will set up next week for transfusion.  ==Pet/Ct ordered for re-staging after 6 cycles of C/T.     Annette Donis NP     The patient location is: home  The chief complaint leading to consultation is: chemo  [facility]    Visit type: audiovisual    Face to Face time with patient: 25 min   15 minutes of total time spent on the encounter, which includes face to face time and non-face to face time preparing to see the patient (eg, review of tests), Obtaining and/or reviewing separately obtained history, Documenting clinical information in the electronic or other health record, Independently interpreting results (not separately reported) and communicating results to the patient/family/caregiver, or Care coordination (not separately reported).         Each patient to whom he or she provides medical services by telemedicine is:  (1) informed of the relationship between the physician and patient and the respective role of any other health care provider with respect to management of the patient; and (2) notified that he or she may decline to receive medical services by telemedicine and may withdraw from such care at any time.    Notes:

## 2020-08-07 DIAGNOSIS — T45.1X5A CHEMOTHERAPY-INDUCED NAUSEA AND VOMITING: ICD-10-CM

## 2020-08-07 DIAGNOSIS — R11.2 CHEMOTHERAPY-INDUCED NAUSEA AND VOMITING: ICD-10-CM

## 2020-08-07 DIAGNOSIS — Z51.11 ENCOUNTER FOR ANTINEOPLASTIC CHEMOTHERAPY: Primary | ICD-10-CM

## 2020-08-10 LAB
ALBUMIN SERPL BCP-MCNC: 3.6 G/DL (ref 3.4–5)
ALBUMIN/GLOBULIN RATIO: 1.24 RATIO (ref 1.1–1.8)
ALP SERPL-CCNC: 76 U/L (ref 46–116)
ALT SERPL W P-5'-P-CCNC: 19 U/L (ref 12–78)
ANION GAP SERPL CALC-SCNC: 13 MMOL/L (ref 3–11)
ANISOCYTOSIS: ABNORMAL
AST SERPL-CCNC: 15 U/L (ref 15–37)
BANDS: 3 % (ref 0–5)
BILIRUB SERPL-MCNC: 0.5 MG/DL (ref 0–1)
BUN SERPL-MCNC: 18 MG/DL (ref 7–18)
BUN/CREAT SERPL: 17.64 RATIO (ref 7–18)
CALCIUM SERPL-MCNC: 8.9 MG/DL (ref 8.8–10.5)
CELLS COUNTED: 100
CHLORIDE SERPL-SCNC: 108 MMOL/L (ref 100–108)
CO2 SERPL-SCNC: 22 MMOL/L (ref 21–32)
CREAT SERPL-MCNC: 1.02 MG/DL (ref 0.55–1.02)
ERYTHROCYTE [DISTWIDTH] IN BLOOD BY AUTOMATED COUNT: 20.8 % (ref 12.5–18)
GFR ESTIMATION: > 60
GLOBULIN: 2.9 G/DL (ref 2.3–3.5)
GLUCOSE SERPL-MCNC: 94 MG/DL (ref 70–110)
HCT VFR BLD AUTO: 22.7 % (ref 37–47)
HGB BLD-MCNC: 7.2 G/DL (ref 12–16)
LYMPHOCYTES NFR BLD: 43 % (ref 25–40)
MACROCYTES BLD QL SMEAR: ABNORMAL
MANUAL NRBC PER 100 CELLS: 0 %
MCH RBC QN AUTO: 35.3 PG (ref 27–31.2)
MCHC RBC AUTO-ENTMCNC: 31.7 G/DL (ref 31.8–35.4)
MCV RBC AUTO: 111.3 FL (ref 80–97)
MONOCYTES NFR BLD MANUAL: 3 % (ref 1–15)
NEUTROPHILS ABSOLUTE COUNT: 1.8 10*3/UL (ref 1.8–7.7)
NEUTROPHILS NFR BLD: 51 % (ref 37–80)
PLATELETS: 94 10*3/UL (ref 142–424)
POTASSIUM SERPL-SCNC: 4.4 MMOL/L (ref 3.6–5.2)
PROT SERPL-MCNC: 6.5 G/DL (ref 6.4–8.2)
RBC # BLD AUTO: 2.04 10*6/UL (ref 4.2–5.4)
SMALL PLATELETS BLD QL SMEAR: ADEQUATE
SODIUM BLD-SCNC: 143 MMOL/L (ref 135–145)
WBC # BLD: 3.3 10*3/UL (ref 4.6–10.2)

## 2020-08-11 ENCOUNTER — OFFICE VISIT (OUTPATIENT)
Dept: HEMATOLOGY/ONCOLOGY | Facility: CLINIC | Age: 85
End: 2020-08-11
Payer: MEDICARE

## 2020-08-11 DIAGNOSIS — D61.818 PANCYTOPENIA: ICD-10-CM

## 2020-08-11 DIAGNOSIS — Z15.09 BRCA2 GENE MUTATION POSITIVE IN FEMALE: ICD-10-CM

## 2020-08-11 DIAGNOSIS — C53.0 MALIGNANT NEOPLASM OF ENDOCERVIX: Primary | ICD-10-CM

## 2020-08-11 DIAGNOSIS — Z15.02 BRCA2 GENE MUTATION POSITIVE IN FEMALE: ICD-10-CM

## 2020-08-11 DIAGNOSIS — Z51.11 ENCOUNTER FOR ANTINEOPLASTIC CHEMOTHERAPY: ICD-10-CM

## 2020-08-11 DIAGNOSIS — Z15.01 BRCA2 GENE MUTATION POSITIVE IN FEMALE: ICD-10-CM

## 2020-08-11 PROCEDURE — 99214 OFFICE O/P EST MOD 30 MIN: CPT | Mod: 95,,, | Performed by: NURSE PRACTITIONER

## 2020-08-11 PROCEDURE — 99214 PR OFFICE/OUTPT VISIT, EST, LEVL IV, 30-39 MIN: ICD-10-PCS | Mod: 95,,, | Performed by: NURSE PRACTITIONER

## 2020-08-11 NOTE — PROGRESS NOTES
Medical Oncology Progress Note  [facility]     PATIENT: Adri Seay  : 1935 85 y.o. female  MRN [id number]     PCP: Bria Mitchell NP  Consult Requested By:      Date of Service: 2020    Subjective:   Interim History:  No chief complaint on file.    Adri Seay is here for follow-up on treatment;   Carbotaxol has been on hold because of leukopenia  The patient feel well and she had no complaint today    Oncology History:    History of Present Illness: Ms. Seay is a 84 y.o. female who presents for evaluation and management of cervical cancer. SHe was seen at [facility] on 19.      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 The patient had a CT scan consistent with widely metastatic disease including multiple sites of lymphadenopathy, a possible lung metastases, liver metastases, nodule in the anterior abdominal wall, and carcinomatosis. The patient is mostly asymptomatic but does have a vaginal discharge. Her performance status is 0. On exam, there is a 6 centimeter mass in the anterior abdominal wall superior to the umbilicus. On bimanual examination, tumor is a place the entire cervix apex of the vagina, and on rectovaginal examination, there is a large pelvic mass extending to the bilateral pelvic sidewalls.    We will get her CT reviewed at [facility] and if it is consistent with metastatic disease, we have recommended palliative chemotherapy with either carboplatinum as a single agent or carboplatinum and paclitaxel. Due to concern for bowel involvement on the CT scan, I have recommended not giving bevacizumab as part of this regimen. Patient will return home to [place] and begin chemotherapy with a medical oncologist there. I've also recommended a consultation with radiation oncology in [place] for consideration of palliative radiation to  "lower the chances of significant vaginal bleeding. We will see her back on an as-needed basis."       == 8/5/19 Carbo    ==PET/CT done on 12/2/19. Previous CT imaging done at outside facility, discussed with Dr Nguyen for comparison and he stated pelvic mass smaller, abdominal mass smaller liver lesion likely benign stable but difficult to say if peritoneal implants stable to progressed.    ==FoundationOne Liquid Bx on 12/16/19.  BRCA 2 gene mutation.  ==1/14/20 Pt started lynparza   ==5/13/2020 PET showing early progression.    ==5/2020 resume low dose carbo and taxol. On hold now due to pancytopenia     Oncology History   Malignant neoplasm of endocervix   6/27/2019 Initial Diagnosis    Malignant neoplasm of exocervix     7/8/2019 - 7/8/2019 Chemotherapy    Treatment Summary   Plan Name: OP CARBOPLATIN WEEKLY  Treatment Goal: Palliative  Status: Inactive  Start Date: [No treatment day found]  End Date: [No treatment day found]  Provider: Jaime Pinon MD  Chemotherapy: CARBOplatin (PARAPLATIN) in sodium chloride 0.9% 250 mL chemo infusion, , Intravenous, Clinic/HOD 1 time, 0 of 4 cycles     7/9/2019 - 11/25/2019 Chemotherapy    Treatment Summary   Plan Name: OP GYN CARBOPLATIN (AUC) Q4W  Treatment Goal: Palliative  Status: Inactive  Start Date: 7/9/2019  End Date: 10/29/2019  Provider: Jaime Pinon MD  Chemotherapy: CARBOplatin (PARAPLATIN) in sodium chloride 0.9% 250 mL chemo infusion,  (original dose 341.5 mg), Intravenous, Clinic/HOD 1 time, 5 of 6 cycles  Dose modification:   (original dose 341.5 mg, Cycle 1)     5/19/2020 - 6/16/2020 Chemotherapy    Treatment Summary   Plan Name: OP  PACLITAXEL CARBOPLATIN WEEKLY  Treatment Goal: Palliative  Status: Inactive  Start Date: 5/19/2020  End Date: 5/22/2020  Provider: Annette Donis NP  Chemotherapy: CARBOplatin (PARAPLATIN) 115 mg in sodium chloride 0.9% 250 mL chemo infusion, 115 mg (98.6 % of original dose 118.4 mg), Intravenous, Clinic/HOD 1 time, 0 of 1 " cycle  Dose modification:   (original dose 118.4 mg, Cycle 1)  PACLitaxeL (TAXOL) 80 mg/m2 = 126 mg in sodium chloride 0.9% 250 mL chemo infusion, 80 mg/m2 = 126 mg (100 % of original dose 80 mg/m2), Intravenous, Clinic/HOD 1 time, 0 of 1 cycle  Dose modification: 80 mg/m2 (original dose 80 mg/m2, Cycle 1)     5/19/2020 -  Chemotherapy    Treatment Summary   Plan Name:  PACLITAXEL CARBOPLATIN WEEKLY  Treatment Goal: Palliative  Status: Active  Start Date: 5/19/2020  End Date: 8/4/2020  Provider: Annette Donis NP  Chemotherapy: CARBOplatin (PARAPLATIN) 115 mg in sodium chloride 0.9% 250 mL chemo infusion, 115 mg (100 % of original dose 114.8 mg), Intravenous, Clinic/HOD 1 time, 1 of 1 cycle  Dose modification:   (original dose 114.8 mg, Cycle 1, Reason: MD Discretion), 100 mg (original dose 114.8 mg, Cycle 1)  PACLitaxeL (TAXOL) 80 mg/m2 = 126 mg in sodium chloride 0.9% 250 mL chemo infusion, 80 mg/m2 = 126 mg (100 % of original dose 80 mg/m2), Intravenous, Clinic/HOD 1 time, 1 of 1 cycle  Dose modification: 80 mg/m2 (original dose 80 mg/m2, Cycle 1), 50 mg/m2 (original dose 80 mg/m2, Cycle 1)         Past Medical History:   Past Medical History:   Diagnosis Date    Anemia     Cataract     Cervical cancer 05/2019    Hypertension        Past Surgical HIstory:   Past Surgical History:   Procedure Laterality Date    CATARACT EXTRACTION, BILATERAL      WRIST SURGERY  1984       Allergies:  Review of patient's allergies indicates:   Allergen Reactions    Strawberry Rash       Medications:  Current Outpatient Medications   Medication Sig Dispense Refill    cloNIDine (CATAPRES) 0.3 MG tablet Take 1 tablet (0.3 mg total) by mouth 2 (two) times daily. 180 tablet 2    FENOFIBRATE ORAL Take by mouth 2 (two) times daily.      ferrous sulfate (FEOSOL) 325 mg (65 mg iron) Tab tablet TAKE 1 TABLET BY MOUTH TWICE DAILY 180 tablet 1    loratadine (CLARITIN) 10 mg tablet Take 10 mg by mouth once daily.      magnesium oxide  (MAGOX) 400 mg (241.3 mg magnesium) tablet Take 1 tablet (400 mg total) by mouth once daily. 30 tablet 6    megestroL (MEGACE) 40 MG Tab TAKE 1 TABLET(40 MG) BY MOUTH TWICE DAILY 60 tablet 6    NIFEdipine (PROCARDIA-XL) 30 MG (OSM) 24 hr tablet TAKE 1 TABLET BY MOUTH ONCE DAILY 90 tablet 5    ondansetron (ZOFRAN-ODT) 8 MG TbDL Take 1 tablet (8 mg total) by mouth every 8 (eight) hours as needed (chemotherapy-induced nausea and vomiting). 40 tablet 5    simvastatin (ZOCOR) 20 MG tablet Take 20 mg by mouth every evening.      varicella-zoster gE-AS01B, PF, (SHINGRIX, PF,) 50 mcg/0.5 mL injection Administer two doses 2 months apart 0.5 mL 0     No current facility-administered medications for this visit.        Family History:   Family History   Problem Relation Age of Onset    Hypertension Mother     Pneumonia Father     Breast cancer Sister     No Known Problems Brother     Leukemia Daughter     No Known Problems Son        Social History:  reports that she has never smoked. She has never used smokeless tobacco. She reports previous alcohol use. She reports that she does not use drugs.    Review of Systemas  Constitutional: No change in weight, appetie, fatigue, fever, or night sweats  Eyes: No changes in vision  Ears, Nose, Mouth, Throat, and Face: No hearing problems, ear pain, rummy nose, or sore throat  Respiratory: No shortness of breath or cough  Cardiovascular: No chest pain, palpitations or dizziness  Gastrointestinal: No abdominal pain, hematochezia, melena  Genitourinary: No dysuria, hematuria, nocturia, menstrual problems, post menopausal  Integumentary/Breast: No rashes or itching  Hematologic/Lymphatic: No anemia or bleeding abnormalities  Musculoskeletal: No joint or muscle abnormalities  Neurological: No sensory or motor problems, no headaches  Behavioral/Psych: No depression, anxiety, cognitive problems, or stress  Endocrine: No diabetes or thyroid problems  Allergic/Immunologic: See allergy  above    Physical Exam      Vitals:   There were no vitals filed for this visit.  BMI: There is no height or weight on file to calculate BMI.  BSA There is no height or weight on file to calculate BSA.  ECOG Performance Status:   ECOG SCORE         GENERAL APPEARANCE: Well developed, well nourished, in no acute distress.  SKIN: Inspection of the skin reveals no rashes, ulcerations or petechiae.  HEENT: The sclerae were anicteric and conjunctivae were pink and moist. Extraocular movements were intact and pupils were equal, round, and reactive to light with normal accommodation. External inspection of the ears and nose showed no scars, lesions, or masses. Lips, teeth, and gums showed normal mucosa. The oral mucosa, hard and soft palate, tongue and posterior pharynx were normal.  NECK: Supple and symmetric. There was no thyroid enlargement, and no tenderness, or masses were felt.  CRESPIRATORY: Normal AP diameter and normal contour without any kyphoscoliosis. LUNGS: Auscultation of the lungs revealed normal breath sounds without any other adventitious sounds or rubs.  CARDIOVASCULAR: There was a regular rate and rhythm without any murmurs, gallops, rubs. The carotid pulses were normal and 2+ bilaterally without bruits. Peripheral pulses were 2+ and symmetric.  GASTROINTESTINAL: Soft and nontender with normal bowel sounds. The liver span was approximately 5-6 cm in the right midclavicular line by percussion. The liver edge was nontender. The spleen was not palpable. There were no inguinal or umbilical hernias noted. No ascites was noted.  LYMPH NODES: No lymphadenopathy was appreciated in the neck, axillae or groin.  MUSCULOSKELETAL: Gait was normal. There was no tenderness or effusions noted. Muscle strength and tone were normal. EXTREMITIES: No cyanosis, clubbing or edema.  NEUROLOGIC: Alert and oriented x 3. Normal affect. Gait was normal. Normal deep tendon reflexes with no pathological reflexes. Sensation to touch  was normal.  PSYCHIATRIC: good mood, orientated to place, time and person     Labs and Imagings     Lab Results   Component Value Date    WBC 3.3 (L) 08/10/2020    HGB 7.2 (L) 08/10/2020    HCT 22.7 (L) 08/10/2020    LABPLAT 94 (L) 08/10/2020       CMP  Sodium   Date Value Ref Range Status   08/10/2020 143 135 - 145 mmol/L Final   03/16/2020 140 136 - 145 mmol/L Final     Potassium   Date Value Ref Range Status   08/10/2020 4.4 3.6 - 5.2 mmol/L Final   03/16/2020 4.5 3.5 - 5.1 mmol/L Final     Chloride   Date Value Ref Range Status   08/10/2020 108 100 - 108 mmol/L Final   03/16/2020 107 98 - 107 mmol/L Final     CO2   Date Value Ref Range Status   08/10/2020 22 21 - 32 mmol/L Final   03/16/2020 21 (L) 22 - 29 mmol/L Final     Glucose   Date Value Ref Range Status   08/10/2020 94 70 - 110 mg/dL Final     BUN, Bld   Date Value Ref Range Status   08/10/2020 18 7 - 18 mg/dL Final   03/16/2020 17.2 8 - 23 mg/dL Final     Creatinine   Date Value Ref Range Status   08/10/2020 1.02 0.55 - 1.02 mg/dL Final   03/16/2020 1.17 (H) 0.50 - 0.90 mg/dL Final     Calcium   Date Value Ref Range Status   08/10/2020 8.9 8.8 - 10.5 mg/dL Final   03/16/2020 10.0 8.6 - 10.2 mg/dL Final     Total Protein   Date Value Ref Range Status   08/10/2020 6.5 6.4 - 8.2 g/dL Final     Albumin   Date Value Ref Range Status   08/10/2020 3.6 3.4 - 5.0 g/dL Final   03/16/2020 4.6 3.5 - 5.2 g/dL Final     Total Bilirubin   Date Value Ref Range Status   08/10/2020 0.5 0.0 - 1.0 mg/dL Final     Alkaline Phosphatase   Date Value Ref Range Status   08/10/2020 76 46 - 116 U/L Final     AST   Date Value Ref Range Status   08/10/2020 15 15 - 37 U/L Final   03/16/2020 17 0 - 32 U/L Final     ALT   Date Value Ref Range Status   08/10/2020 19 12 - 78 U/L Final     Anion Gap   Date Value Ref Range Status   08/10/2020 13.0 (H) 3.0 - 11.0 mmol/L Final   03/16/2020 12.0 8.0 - 17.0 mmol/L Final     Comment:     NOTE  Testing performed at:  [facility], [address] [place] [address]  [place]  [address] CLIA #:[id number]           Imaging:     Assessment:     Principle Problem: Malignant neoplasm of endocervix [C53.0]   Co-morbidity/Active Problems:   Patient Active Problem List   Diagnosis    Malignant neoplasm of endocervix    Macrocytic anemia    Cervical cancer    Skin infection    Acute rhinitis    Thrombocytopenia    BRCA2 gene mutation positive in female    Mild protein-calorie malnutrition    Increased creatine kinase level    Essential hypertension    Neoplastic malignant related fatigue    Pancytopenia    Encounter for antineoplastic chemotherapy        Adri Seay is a 85 y.o. female with PMH of The primary encounter diagnosis was Malignant neoplasm of endocervix. Diagnoses of Encounter for antineoplastic chemotherapy, Pancytopenia, and BRCA2 gene mutation positive in female were also pertinent to this visit., with Malignant neoplasm of endocervix [C53.0]     ==============================================  *  Assessment:       1. 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 Malignant neoplasm of cervix, unspecified site    3. Increased creatine kinase level    4. Neoplastic malignant related fatigue       Cervical cancer stage IV disease  BRCA2 +  mutation   PARP inhibitor  Lynparza 300 mg BID PET scan shows early progression 4/2020, dc'd  Mid June due to excessive pancytopenia   weekly carbo/taxol added  Pancytopenia and Sr Cr improving with lynparza held       Plan:   Overall PLANS:  carbo Taxol weekly   Lynparza  dc'd pancytopenia     == lab CBC CMP every week  == Will reduce carbo to 50 mg IV weekly, Taxol 60 mg IV weekly, all fixed doses, she is tolerating well, improved kidney function noted  == setup for 1 unit PRBC today   ==Pet/Ct ordered for re-staging after 6 cycles of C/T scheduled for  8/17/2020    Annette Donis NP     The patient location is: home  The chief complaint leading to consultation is: chemo clearnace    Visit type:  audiovisual    Face to Face time with patient: 25 min   15 minutes of total time spent on the encounter, which includes face to face time and non-face to face time preparing to see the patient (eg, review of tests), Obtaining and/or reviewing separately obtained history, Documenting clinical information in the electronic or other health record, Independently interpreting results (not separately reported) and communicating results to the patient/family/caregiver, or Care coordination (not separately reported).         Each patient to whom he or she provides medical services by telemedicine is:  (1) informed of the relationship between the physician and patient and the respective role of any other health care provider with respect to management of the patient; and (2) notified that he or she may decline to receive medical services by telemedicine and may withdraw from such care at any time.    Notes:

## 2020-08-17 LAB
ANISOCYTOSIS: ABNORMAL
BANDS: 5 % (ref 0–5)
CELLS COUNTED: 100
LYMPHOCYTES NFR BLD: 37 % (ref 25–40)
MICROCYTES BLD QL SMEAR: ABNORMAL
MONOCYTES NFR BLD MANUAL: 12 % (ref 1–15)
NEUTROPHILS ABSOLUTE COUNT: 1.6 10*3/UL (ref 1.8–7.7)
NEUTROPHILS NFR BLD: 46 % (ref 37–80)
SMALL PLATELETS BLD QL SMEAR: ADEQUATE

## 2020-08-18 ENCOUNTER — OFFICE VISIT (OUTPATIENT)
Dept: HEMATOLOGY/ONCOLOGY | Facility: CLINIC | Age: 85
End: 2020-08-18
Payer: MEDICARE

## 2020-08-18 DIAGNOSIS — C53.0 MALIGNANT NEOPLASM OF ENDOCERVIX: Primary | ICD-10-CM

## 2020-08-18 PROCEDURE — 99214 OFFICE O/P EST MOD 30 MIN: CPT | Mod: 95,,, | Performed by: NURSE PRACTITIONER

## 2020-08-18 PROCEDURE — 99214 PR OFFICE/OUTPT VISIT, EST, LEVL IV, 30-39 MIN: ICD-10-PCS | Mod: 95,,, | Performed by: NURSE PRACTITIONER

## 2020-08-18 RX ORDER — SODIUM CHLORIDE 0.9 % (FLUSH) 0.9 %
10 SYRINGE (ML) INJECTION
Status: CANCELLED | OUTPATIENT
Start: 2020-10-29

## 2020-08-18 RX ORDER — SODIUM CHLORIDE 0.9 % (FLUSH) 0.9 %
10 SYRINGE (ML) INJECTION
Status: CANCELLED | OUTPATIENT
Start: 2020-08-25

## 2020-08-18 RX ORDER — DIPHENHYDRAMINE HYDROCHLORIDE 50 MG/ML
50 INJECTION INTRAMUSCULAR; INTRAVENOUS ONCE AS NEEDED
Status: CANCELLED | OUTPATIENT
Start: 2020-10-22

## 2020-08-18 RX ORDER — SODIUM CHLORIDE 0.9 % (FLUSH) 0.9 %
10 SYRINGE (ML) INJECTION
Status: CANCELLED | OUTPATIENT
Start: 2020-09-17

## 2020-08-18 RX ORDER — EPINEPHRINE 0.3 MG/.3ML
0.3 INJECTION SUBCUTANEOUS ONCE AS NEEDED
Status: CANCELLED | OUTPATIENT
Start: 2020-10-07

## 2020-08-18 RX ORDER — HEPARIN 100 UNIT/ML
500 SYRINGE INTRAVENOUS
Status: CANCELLED | OUTPATIENT
Start: 2020-08-18

## 2020-08-18 RX ORDER — HEPARIN 100 UNIT/ML
500 SYRINGE INTRAVENOUS
Status: CANCELLED | OUTPATIENT
Start: 2020-08-25

## 2020-08-18 RX ORDER — FAMOTIDINE 10 MG/ML
20 INJECTION INTRAVENOUS
Status: CANCELLED | OUTPATIENT
Start: 2020-10-15

## 2020-08-18 RX ORDER — EPINEPHRINE 0.3 MG/.3ML
0.3 INJECTION SUBCUTANEOUS ONCE AS NEEDED
Status: CANCELLED | OUTPATIENT
Start: 2020-08-18

## 2020-08-18 RX ORDER — FAMOTIDINE 10 MG/ML
20 INJECTION INTRAVENOUS
Status: CANCELLED | OUTPATIENT
Start: 2020-09-17

## 2020-08-18 RX ORDER — DIPHENHYDRAMINE HYDROCHLORIDE 50 MG/ML
50 INJECTION INTRAMUSCULAR; INTRAVENOUS ONCE AS NEEDED
Status: CANCELLED | OUTPATIENT
Start: 2020-09-24

## 2020-08-18 RX ORDER — HEPARIN 100 UNIT/ML
500 SYRINGE INTRAVENOUS
Status: CANCELLED | OUTPATIENT
Start: 2020-10-29

## 2020-08-18 RX ORDER — FAMOTIDINE 10 MG/ML
20 INJECTION INTRAVENOUS
Status: CANCELLED | OUTPATIENT
Start: 2020-08-18

## 2020-08-18 RX ORDER — SODIUM CHLORIDE 0.9 % (FLUSH) 0.9 %
10 SYRINGE (ML) INJECTION
Status: CANCELLED | OUTPATIENT
Start: 2020-10-07

## 2020-08-18 RX ORDER — FAMOTIDINE 10 MG/ML
20 INJECTION INTRAVENOUS
Status: CANCELLED | OUTPATIENT
Start: 2020-10-22

## 2020-08-18 RX ORDER — EPINEPHRINE 0.3 MG/.3ML
0.3 INJECTION SUBCUTANEOUS ONCE AS NEEDED
Status: CANCELLED | OUTPATIENT
Start: 2020-08-25

## 2020-08-18 RX ORDER — FAMOTIDINE 10 MG/ML
20 INJECTION INTRAVENOUS
Status: CANCELLED | OUTPATIENT
Start: 2020-10-07

## 2020-08-18 RX ORDER — EPINEPHRINE 0.3 MG/.3ML
0.3 INJECTION SUBCUTANEOUS ONCE AS NEEDED
Status: CANCELLED | OUTPATIENT
Start: 2020-09-17

## 2020-08-18 RX ORDER — EPINEPHRINE 0.3 MG/.3ML
0.3 INJECTION SUBCUTANEOUS ONCE AS NEEDED
Status: CANCELLED | OUTPATIENT
Start: 2020-10-15

## 2020-08-18 RX ORDER — HEPARIN 100 UNIT/ML
500 SYRINGE INTRAVENOUS
Status: CANCELLED | OUTPATIENT
Start: 2020-10-07

## 2020-08-18 RX ORDER — DIPHENHYDRAMINE HYDROCHLORIDE 50 MG/ML
50 INJECTION INTRAMUSCULAR; INTRAVENOUS ONCE AS NEEDED
Status: CANCELLED | OUTPATIENT
Start: 2020-10-29

## 2020-08-18 RX ORDER — DIPHENHYDRAMINE HYDROCHLORIDE 50 MG/ML
50 INJECTION INTRAMUSCULAR; INTRAVENOUS ONCE AS NEEDED
Status: CANCELLED | OUTPATIENT
Start: 2020-08-25

## 2020-08-18 RX ORDER — SODIUM CHLORIDE 0.9 % (FLUSH) 0.9 %
10 SYRINGE (ML) INJECTION
Status: CANCELLED | OUTPATIENT
Start: 2020-10-22

## 2020-08-18 RX ORDER — SODIUM CHLORIDE 0.9 % (FLUSH) 0.9 %
10 SYRINGE (ML) INJECTION
Status: CANCELLED | OUTPATIENT
Start: 2020-09-24

## 2020-08-18 RX ORDER — HEPARIN 100 UNIT/ML
500 SYRINGE INTRAVENOUS
Status: CANCELLED | OUTPATIENT
Start: 2020-09-24

## 2020-08-18 RX ORDER — DIPHENHYDRAMINE HYDROCHLORIDE 50 MG/ML
50 INJECTION INTRAMUSCULAR; INTRAVENOUS ONCE AS NEEDED
Status: CANCELLED | OUTPATIENT
Start: 2020-08-18

## 2020-08-18 RX ORDER — HEPARIN 100 UNIT/ML
500 SYRINGE INTRAVENOUS
Status: CANCELLED | OUTPATIENT
Start: 2020-09-17

## 2020-08-18 RX ORDER — FAMOTIDINE 10 MG/ML
20 INJECTION INTRAVENOUS
Status: CANCELLED | OUTPATIENT
Start: 2020-10-29

## 2020-08-18 RX ORDER — EPINEPHRINE 0.3 MG/.3ML
0.3 INJECTION SUBCUTANEOUS ONCE AS NEEDED
Status: CANCELLED | OUTPATIENT
Start: 2020-09-24

## 2020-08-18 RX ORDER — HEPARIN 100 UNIT/ML
500 SYRINGE INTRAVENOUS
Status: CANCELLED | OUTPATIENT
Start: 2020-10-15

## 2020-08-18 RX ORDER — FAMOTIDINE 10 MG/ML
20 INJECTION INTRAVENOUS
Status: CANCELLED | OUTPATIENT
Start: 2020-08-25

## 2020-08-18 RX ORDER — EPINEPHRINE 0.3 MG/.3ML
0.3 INJECTION SUBCUTANEOUS ONCE AS NEEDED
Status: CANCELLED | OUTPATIENT
Start: 2020-10-29

## 2020-08-18 RX ORDER — SODIUM CHLORIDE 0.9 % (FLUSH) 0.9 %
10 SYRINGE (ML) INJECTION
Status: CANCELLED | OUTPATIENT
Start: 2020-08-18

## 2020-08-18 RX ORDER — DIPHENHYDRAMINE HYDROCHLORIDE 50 MG/ML
50 INJECTION INTRAMUSCULAR; INTRAVENOUS ONCE AS NEEDED
Status: CANCELLED | OUTPATIENT
Start: 2020-10-07

## 2020-08-18 RX ORDER — EPINEPHRINE 0.3 MG/.3ML
0.3 INJECTION SUBCUTANEOUS ONCE AS NEEDED
Status: CANCELLED | OUTPATIENT
Start: 2020-10-22

## 2020-08-18 RX ORDER — DIPHENHYDRAMINE HYDROCHLORIDE 50 MG/ML
50 INJECTION INTRAMUSCULAR; INTRAVENOUS ONCE AS NEEDED
Status: CANCELLED | OUTPATIENT
Start: 2020-10-15

## 2020-08-18 RX ORDER — SODIUM CHLORIDE 0.9 % (FLUSH) 0.9 %
10 SYRINGE (ML) INJECTION
Status: CANCELLED | OUTPATIENT
Start: 2020-10-15

## 2020-08-18 RX ORDER — FAMOTIDINE 10 MG/ML
20 INJECTION INTRAVENOUS
Status: CANCELLED | OUTPATIENT
Start: 2020-09-24

## 2020-08-18 RX ORDER — HEPARIN 100 UNIT/ML
500 SYRINGE INTRAVENOUS
Status: CANCELLED | OUTPATIENT
Start: 2020-10-22

## 2020-08-18 RX ORDER — DIPHENHYDRAMINE HYDROCHLORIDE 50 MG/ML
50 INJECTION INTRAMUSCULAR; INTRAVENOUS ONCE AS NEEDED
Status: CANCELLED | OUTPATIENT
Start: 2020-09-17

## 2020-08-18 NOTE — PROGRESS NOTES
Medical Oncology Progress Note  [facility]     PATIENT: Adri Seay  : 1935 85 y.o. female  MRN [id number]     PCP: Bria Mitchell NP  Consult Requested By:      Date of Service: 2020    Subjective:   Interim History:  No chief complaint on file.    Adri Seay is here for follow-up on treatment;   Carbotaxol has been on hold because of leukopenia  The patient feel well and she had no complaint today    Oncology History:    History of Present Illness: Ms. Seay is a 84 y.o. female who presents for evaluation and management of cervical cancer. SHe was seen at [facility] on 19.      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 The patient had a CT scan consistent with widely metastatic disease including multiple sites of lymphadenopathy, a possible lung metastases, liver metastases, nodule in the anterior abdominal wall, and carcinomatosis. The patient is mostly asymptomatic but does have a vaginal discharge. Her performance status is 0. On exam, there is a 6 centimeter mass in the anterior abdominal wall superior to the umbilicus. On bimanual examination, tumor is a place the entire cervix apex of the vagina, and on rectovaginal examination, there is a large pelvic mass extending to the bilateral pelvic sidewalls.    We will get her CT reviewed at [facility] and if it is consistent with metastatic disease, we have recommended palliative chemotherapy with either carboplatinum as a single agent or carboplatinum and paclitaxel. Due to concern for bowel involvement on the CT scan, I have recommended not giving bevacizumab as part of this regimen. Patient will return home to [place] and begin chemotherapy with a medical oncologist there. I've also recommended a consultation with radiation oncology in [place] for consideration of palliative radiation to  "lower the chances of significant vaginal bleeding. We will see her back on an as-needed basis."       == 8/5/19 Carbo    ==PET/CT done on 12/2/19. Previous CT imaging done at outside facility, discussed with Dr Nguyen for comparison and he stated pelvic mass smaller, abdominal mass smaller liver lesion likely benign stable but difficult to say if peritoneal implants stable to progressed.    ==FoundationOne Liquid Bx on 12/16/19.  BRCA 2 gene mutation.  ==1/14/20 Pt started lynparza   ==5/13/2020 PET showing early progression.    ==5/2020 resume low dose carbo and taxol. On hold now due to pancytopenia     Oncology History   Malignant neoplasm of endocervix   6/27/2019 Initial Diagnosis    Malignant neoplasm of exocervix     7/8/2019 - 7/8/2019 Chemotherapy    Treatment Summary   Plan Name: OP CARBOPLATIN WEEKLY  Treatment Goal: Palliative  Status: Inactive  Start Date: [No treatment day found]  End Date: [No treatment day found]  Provider: Jaime Pinon MD  Chemotherapy: CARBOplatin (PARAPLATIN) in sodium chloride 0.9% 250 mL chemo infusion, , Intravenous, Clinic/HOD 1 time, 0 of 4 cycles     7/9/2019 - 11/25/2019 Chemotherapy    Treatment Summary   Plan Name: OP GYN CARBOPLATIN (AUC) Q4W  Treatment Goal: Palliative  Status: Inactive  Start Date: 7/9/2019  End Date: 10/29/2019  Provider: Jaime Pinon MD  Chemotherapy: CARBOplatin (PARAPLATIN) in sodium chloride 0.9% 250 mL chemo infusion,  (original dose 341.5 mg), Intravenous, Clinic/HOD 1 time, 5 of 6 cycles  Dose modification:   (original dose 341.5 mg, Cycle 1)     5/19/2020 - 6/16/2020 Chemotherapy    Treatment Summary   Plan Name: OP  PACLITAXEL CARBOPLATIN WEEKLY  Treatment Goal: Palliative  Status: Inactive  Start Date: 5/19/2020  End Date: 5/22/2020  Provider: Annette Donis NP  Chemotherapy: CARBOplatin (PARAPLATIN) 115 mg in sodium chloride 0.9% 250 mL chemo infusion, 115 mg (98.6 % of original dose 118.4 mg), Intravenous, Clinic/HOD 1 time, 0 of 1 " cycle  Dose modification:   (original dose 118.4 mg, Cycle 1)  PACLitaxeL (TAXOL) 80 mg/m2 = 126 mg in sodium chloride 0.9% 250 mL chemo infusion, 80 mg/m2 = 126 mg (100 % of original dose 80 mg/m2), Intravenous, Clinic/HOD 1 time, 0 of 1 cycle  Dose modification: 80 mg/m2 (original dose 80 mg/m2, Cycle 1)     5/19/2020 -  Chemotherapy    Treatment Summary   Plan Name:  PACLITAXEL CARBOPLATIN WEEKLY  Treatment Goal: Palliative  Status: Active  Start Date: 5/19/2020  End Date: 8/4/2020  Provider: Annette Donis NP  Chemotherapy: CARBOplatin (PARAPLATIN) 115 mg in sodium chloride 0.9% 250 mL chemo infusion, 115 mg (100 % of original dose 114.8 mg), Intravenous, Clinic/HOD 1 time, 1 of 1 cycle  Dose modification:   (original dose 114.8 mg, Cycle 1, Reason: MD Discretion), 100 mg (original dose 114.8 mg, Cycle 1)  PACLitaxeL (TAXOL) 80 mg/m2 = 126 mg in sodium chloride 0.9% 250 mL chemo infusion, 80 mg/m2 = 126 mg (100 % of original dose 80 mg/m2), Intravenous, Clinic/HOD 1 time, 1 of 1 cycle  Dose modification: 80 mg/m2 (original dose 80 mg/m2, Cycle 1), 50 mg/m2 (original dose 80 mg/m2, Cycle 1)         Past Medical History:   Past Medical History:   Diagnosis Date    Anemia     Cataract     Cervical cancer 05/2019    Hypertension        Past Surgical HIstory:   Past Surgical History:   Procedure Laterality Date    CATARACT EXTRACTION, BILATERAL      WRIST SURGERY  1984       Allergies:  Review of patient's allergies indicates:   Allergen Reactions    Strawberry Rash       Medications:  Current Outpatient Medications   Medication Sig Dispense Refill    cloNIDine (CATAPRES) 0.3 MG tablet Take 1 tablet (0.3 mg total) by mouth 2 (two) times daily. 180 tablet 2    FENOFIBRATE ORAL Take by mouth 2 (two) times daily.      ferrous sulfate (FEOSOL) 325 mg (65 mg iron) Tab tablet TAKE 1 TABLET BY MOUTH TWICE DAILY 180 tablet 1    loratadine (CLARITIN) 10 mg tablet Take 10 mg by mouth once daily.      magnesium oxide  (MAGOX) 400 mg (241.3 mg magnesium) tablet Take 1 tablet (400 mg total) by mouth once daily. 30 tablet 6    megestroL (MEGACE) 40 MG Tab TAKE 1 TABLET(40 MG) BY MOUTH TWICE DAILY 60 tablet 6    NIFEdipine (PROCARDIA-XL) 30 MG (OSM) 24 hr tablet TAKE 1 TABLET BY MOUTH ONCE DAILY 90 tablet 5    ondansetron (ZOFRAN-ODT) 8 MG TbDL Take 1 tablet (8 mg total) by mouth every 8 (eight) hours as needed (chemotherapy-induced nausea and vomiting). 40 tablet 5    simvastatin (ZOCOR) 20 MG tablet Take 20 mg by mouth every evening.      varicella-zoster gE-AS01B, PF, (SHINGRIX, PF,) 50 mcg/0.5 mL injection Administer two doses 2 months apart 0.5 mL 0     No current facility-administered medications for this visit.        Family History:   Family History   Problem Relation Age of Onset    Hypertension Mother     Pneumonia Father     Breast cancer Sister     No Known Problems Brother     Leukemia Daughter     No Known Problems Son        Social History:  reports that she has never smoked. She has never used smokeless tobacco. She reports previous alcohol use. She reports that she does not use drugs.    Review of Systemas  Constitutional: No change in weight, appetie, fatigue, fever, or night sweats  Eyes: No changes in vision  Ears, Nose, Mouth, Throat, and Face: No hearing problems, ear pain, rummy nose, or sore throat  Respiratory: No shortness of breath or cough  Cardiovascular: No chest pain, palpitations or dizziness  Gastrointestinal: No abdominal pain, hematochezia, melena  Genitourinary: No dysuria, hematuria, nocturia, menstrual problems, post menopausal  Integumentary/Breast: No rashes or itching  Hematologic/Lymphatic: No anemia or bleeding abnormalities  Musculoskeletal: No joint or muscle abnormalities  Neurological: No sensory or motor problems, no headaches  Behavioral/Psych: No depression, anxiety, cognitive problems, or stress  Endocrine: No diabetes or thyroid problems  Allergic/Immunologic: See allergy  above    Physical Exam      Vitals:   There were no vitals filed for this visit.  BMI: There is no height or weight on file to calculate BMI.  BSA There is no height or weight on file to calculate BSA.  ECOG Performance Status:   ECOG SCORE         GENERAL APPEARANCE: Well developed, well nourished, in no acute distress.  SKIN: Inspection of the skin reveals no rashes, ulcerations or petechiae.  HEENT: The sclerae were anicteric and conjunctivae were pink and moist. Extraocular movements were intact and pupils were equal, round, and reactive to light with normal accommodation. External inspection of the ears and nose showed no scars, lesions, or masses. Lips, teeth, and gums showed normal mucosa. The oral mucosa, hard and soft palate, tongue and posterior pharynx were normal.  NECK: Supple and symmetric. There was no thyroid enlargement, and no tenderness, or masses were felt.  CRESPIRATORY: Normal AP diameter and normal contour without any kyphoscoliosis. LUNGS: Auscultation of the lungs revealed normal breath sounds without any other adventitious sounds or rubs.  CARDIOVASCULAR: There was a regular rate and rhythm without any murmurs, gallops, rubs. The carotid pulses were normal and 2+ bilaterally without bruits. Peripheral pulses were 2+ and symmetric.  GASTROINTESTINAL: Soft and nontender with normal bowel sounds. The liver span was approximately 5-6 cm in the right midclavicular line by percussion. The liver edge was nontender. The spleen was not palpable. There were no inguinal or umbilical hernias noted. No ascites was noted.  LYMPH NODES: No lymphadenopathy was appreciated in the neck, axillae or groin.  MUSCULOSKELETAL: Gait was normal. There was no tenderness or effusions noted. Muscle strength and tone were normal. EXTREMITIES: No cyanosis, clubbing or edema.  NEUROLOGIC: Alert and oriented x 3. Normal affect. Gait was normal. Normal deep tendon reflexes with no pathological reflexes. Sensation to touch  was normal.  PSYCHIATRIC: good mood, orientated to place, time and person     Labs and Imagings     Lab Results   Component Value Date    WBC 3.2 (L) 08/17/2020    HGB 9.1 (L) 08/17/2020    HCT 28.8 (L) 08/17/2020    LABPLAT 154 08/17/2020       CMP  Sodium   Date Value Ref Range Status   08/17/2020 142 135 - 145 mmol/L Final   03/16/2020 140 136 - 145 mmol/L Final     Potassium   Date Value Ref Range Status   08/17/2020 4.4 3.6 - 5.2 mmol/L Final   03/16/2020 4.5 3.5 - 5.1 mmol/L Final     Chloride   Date Value Ref Range Status   08/17/2020 108 100 - 108 mmol/L Final   03/16/2020 107 98 - 107 mmol/L Final     CO2   Date Value Ref Range Status   08/17/2020 21 21 - 32 mmol/L Final   03/16/2020 21 (L) 22 - 29 mmol/L Final     Glucose   Date Value Ref Range Status   08/17/2020 96 70 - 110 mg/dL Final     BUN, Bld   Date Value Ref Range Status   08/17/2020 20 (H) 7 - 18 mg/dL Final   03/16/2020 17.2 8 - 23 mg/dL Final     Creatinine   Date Value Ref Range Status   08/17/2020 1.30 (H) 0.55 - 1.02 mg/dL Final   03/16/2020 1.17 (H) 0.50 - 0.90 mg/dL Final     Calcium   Date Value Ref Range Status   08/17/2020 9.3 8.8 - 10.5 mg/dL Final   03/16/2020 10.0 8.6 - 10.2 mg/dL Final     Total Protein   Date Value Ref Range Status   08/17/2020 7.0 6.4 - 8.2 g/dL Final     Albumin   Date Value Ref Range Status   08/17/2020 3.9 3.4 - 5.0 g/dL Final   03/16/2020 4.6 3.5 - 5.2 g/dL Final     Total Bilirubin   Date Value Ref Range Status   08/17/2020 0.5 0.0 - 1.0 mg/dL Final     Alkaline Phosphatase   Date Value Ref Range Status   08/17/2020 91 46 - 116 U/L Final     AST   Date Value Ref Range Status   08/17/2020 20 15 - 37 U/L Final   03/16/2020 17 0 - 32 U/L Final     ALT   Date Value Ref Range Status   08/17/2020 24 12 - 78 U/L Final     Anion Gap   Date Value Ref Range Status   08/17/2020 13.0 (H) 3.0 - 11.0 mmol/L Final   03/16/2020 12.0 8.0 - 17.0 mmol/L Final     Comment:     NOTE  Testing performed at:  [facility], [address]  [place]  [address] CLIA #:[id number]           Imaging:         Assessment:     Principle Problem: No primary diagnosis found.   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 female with PMH of There were no encounter diagnoses., with No primary diagnosis found.     ==============================================  *  Assessment:       1. 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 Malignant neoplasm of cervix, unspecified site    3. Increased creatine kinase level    4. Neoplastic malignant related fatigue       Cervical cancer stage IV disease  BRCA2 +  mutation   PARP inhibitor  Lynparza 300 mg BID PET scan shows early progression 4/2020, dc'd  Mid June due to excessive pancytopenia   weekly carbo/taxol added  Pancytopenia and Sr Cr improving with lynparza held       Plan:   Overall PLANS:  carbo Taxol weekly   Lynparza  dc'd pancytopenia     == lab CBC CMP every week  == Will reduce carbo to 50 mg IV weekly, Taxol 60 mg IV weekly, all fixed doses, she is tolerating well, improved kidney function noted  ==Pet/Ct  8/17/2020 shows stable disease  ==continue with combined weekly carbo/taxol, low dose as planned for today, will add 1 L NS due to increased SrCr  == RTC in 2 weeks     Annette Donis NP     The patient location is: home  The chief complaint leading to consultation is: chemo clearnace    Visit type: audiovisual    Face to Face time with patient: 25 min   15 minutes of total time spent on the encounter, which includes face to face time and  non-face to face time preparing to see the patient (eg, review of tests), Obtaining and/or reviewing separately obtained history, Documenting clinical information in the electronic or other health record, Independently interpreting results (not separately reported) and communicating results to the patient/family/caregiver, or Care coordination (not separately reported).         Each patient to whom he or she provides medical services by telemedicine is:  (1) informed of the relationship between the physician and patient and the respective role of any other health care provider with respect to management of the patient; and (2) notified that he or she may decline to receive medical services by telemedicine and may withdraw from such care at any time.

## 2020-08-21 DIAGNOSIS — I10 ESSENTIAL HYPERTENSION: ICD-10-CM

## 2020-08-24 RX ORDER — CLONIDINE HYDROCHLORIDE 0.3 MG/1
TABLET ORAL
Qty: 180 TABLET | Refills: 2 | Status: SHIPPED | OUTPATIENT
Start: 2020-08-24 | End: 2020-11-23 | Stop reason: SDUPTHER

## 2020-08-28 ENCOUNTER — TELEPHONE (OUTPATIENT)
Dept: HEMATOLOGY/ONCOLOGY | Facility: CLINIC | Age: 85
End: 2020-08-28
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
## 2020-08-31 ENCOUNTER — TELEPHONE (OUTPATIENT)
Dept: HEMATOLOGY/ONCOLOGY | Facility: CLINIC | Age: 85
End: 2020-08-31

## 2020-08-31 ENCOUNTER — DOCUMENTATION ONLY (OUTPATIENT)
Dept: HEMATOLOGY/ONCOLOGY | Facility: CLINIC | Age: 85
End: 2020-08-31

## 2020-08-31 DIAGNOSIS — C53.9 MALIGNANT NEOPLASM OF CERVIX, UNSPECIFIED SITE: Primary | ICD-10-CM
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
## 2020-09-02 ENCOUNTER — LAB VISIT (OUTPATIENT)
Dept: LAB | Facility: HOSPITAL | Age: 85
End: 2020-09-02
Attending: NURSE PRACTITIONER
Payer: MEDICARE

## 2020-09-02 DIAGNOSIS — C53.9 MALIGNANT NEOPLASM OF CERVIX, UNSPECIFIED SITE: ICD-10-CM

## 2020-09-02 DIAGNOSIS — E83.42 HYPOMAGNESEMIA: Primary | ICD-10-CM

## 2020-09-02 LAB
ALBUMIN SERPL BCP-MCNC: 3.8 G/DL (ref 3.5–5.2)
ALP SERPL-CCNC: 75 U/L (ref 55–135)
ALT SERPL W/O P-5'-P-CCNC: 13 U/L (ref 10–44)
ANION GAP SERPL CALC-SCNC: 11 MMOL/L (ref 8–16)
AST SERPL-CCNC: 19 U/L (ref 10–40)
BASOPHILS # BLD AUTO: 0.02 K/UL (ref 0–0.2)
BASOPHILS NFR BLD: 0.5 % (ref 0–1.9)
BILIRUB SERPL-MCNC: 0.4 MG/DL (ref 0.1–1)
BUN SERPL-MCNC: 31 MG/DL (ref 8–23)
CALCIUM SERPL-MCNC: 9.3 MG/DL (ref 8.7–10.5)
CHLORIDE SERPL-SCNC: 110 MMOL/L (ref 95–110)
CO2 SERPL-SCNC: 18 MMOL/L (ref 23–29)
CREAT SERPL-MCNC: 1.1 MG/DL (ref 0.5–1.4)
DIFFERENTIAL METHOD: ABNORMAL
EOSINOPHIL # BLD AUTO: 0 K/UL (ref 0–0.5)
EOSINOPHIL NFR BLD: 0.7 % (ref 0–8)
ERYTHROCYTE [DISTWIDTH] IN BLOOD BY AUTOMATED COUNT: 19.7 % (ref 11.5–14.5)
EST. GFR  (AFRICAN AMERICAN): 54 ML/MIN/1.73 M^2
EST. GFR  (NON AFRICAN AMERICAN): 47 ML/MIN/1.73 M^2
GLUCOSE SERPL-MCNC: 109 MG/DL (ref 70–110)
HCT VFR BLD AUTO: 26.2 % (ref 37–48.5)
HGB BLD-MCNC: 7.9 G/DL (ref 12–16)
IMM GRANULOCYTES # BLD AUTO: 0.01 K/UL (ref 0–0.04)
IMM GRANULOCYTES NFR BLD AUTO: 0.2 % (ref 0–0.5)
LYMPHOCYTES # BLD AUTO: 1.6 K/UL (ref 1–4.8)
LYMPHOCYTES NFR BLD: 39.9 % (ref 18–48)
MAGNESIUM SERPL-MCNC: 1.5 MG/DL (ref 1.6–2.6)
MCH RBC QN AUTO: 34.8 PG (ref 27–31)
MCHC RBC AUTO-ENTMCNC: 30.2 G/DL (ref 32–36)
MCV RBC AUTO: 115 FL (ref 82–98)
MONOCYTES # BLD AUTO: 0.6 K/UL (ref 0.3–1)
MONOCYTES NFR BLD: 13.6 % (ref 4–15)
NEUTROPHILS # BLD AUTO: 1.8 K/UL (ref 1.8–7.7)
NEUTROPHILS NFR BLD: 45.1 % (ref 38–73)
NRBC BLD-RTO: 0 /100 WBC
PHOSPHATE SERPL-MCNC: 3.6 MG/DL (ref 2.7–4.5)
PLATELET # BLD AUTO: 201 K/UL (ref 150–350)
PMV BLD AUTO: 10.3 FL (ref 9.2–12.9)
POTASSIUM SERPL-SCNC: 4.1 MMOL/L (ref 3.5–5.1)
PROT SERPL-MCNC: 6.8 G/DL (ref 6–8.4)
RBC # BLD AUTO: 2.27 M/UL (ref 4–5.4)
SODIUM SERPL-SCNC: 139 MMOL/L (ref 136–145)
WBC # BLD AUTO: 4.04 K/UL (ref 3.9–12.7)

## 2020-09-02 PROCEDURE — 80053 COMPREHEN METABOLIC PANEL: CPT

## 2020-09-02 PROCEDURE — 83735 ASSAY OF MAGNESIUM: CPT

## 2020-09-02 PROCEDURE — 85025 COMPLETE CBC W/AUTO DIFF WBC: CPT

## 2020-09-02 PROCEDURE — 36415 COLL VENOUS BLD VENIPUNCTURE: CPT | Mod: PO

## 2020-09-02 PROCEDURE — 84100 ASSAY OF PHOSPHORUS: CPT

## 2020-09-03 ENCOUNTER — TELEPHONE (OUTPATIENT)
Dept: HEMATOLOGY/ONCOLOGY | Facility: CLINIC | Age: 85
End: 2020-09-03

## 2020-09-03 ENCOUNTER — OFFICE VISIT (OUTPATIENT)
Dept: HEMATOLOGY/ONCOLOGY | Facility: CLINIC | Age: 85
End: 2020-09-03
Payer: MEDICARE

## 2020-09-03 ENCOUNTER — INFUSION (OUTPATIENT)
Dept: INFUSION THERAPY | Facility: HOSPITAL | Age: 85
End: 2020-09-03
Attending: NURSE PRACTITIONER
Payer: MEDICARE

## 2020-09-03 VITALS
SYSTOLIC BLOOD PRESSURE: 133 MMHG | WEIGHT: 141.13 LBS | BODY MASS INDEX: 25.97 KG/M2 | HEIGHT: 62 IN | TEMPERATURE: 98 F | HEART RATE: 65 BPM | RESPIRATION RATE: 16 BRPM | DIASTOLIC BLOOD PRESSURE: 69 MMHG | OXYGEN SATURATION: 99 %

## 2020-09-03 VITALS
DIASTOLIC BLOOD PRESSURE: 74 MMHG | BODY MASS INDEX: 25.95 KG/M2 | RESPIRATION RATE: 18 BRPM | TEMPERATURE: 98 F | HEIGHT: 62 IN | HEART RATE: 64 BPM | WEIGHT: 141 LBS | OXYGEN SATURATION: 98 % | SYSTOLIC BLOOD PRESSURE: 142 MMHG

## 2020-09-03 DIAGNOSIS — Z51.11 ENCOUNTER FOR ANTINEOPLASTIC CHEMOTHERAPY: ICD-10-CM

## 2020-09-03 DIAGNOSIS — D64.9 ANEMIA, UNSPECIFIED TYPE: ICD-10-CM

## 2020-09-03 DIAGNOSIS — C53.0 MALIGNANT NEOPLASM OF ENDOCERVIX: ICD-10-CM

## 2020-09-03 DIAGNOSIS — E83.42 HYPOMAGNESEMIA: ICD-10-CM

## 2020-09-03 DIAGNOSIS — C53.9 MALIGNANT NEOPLASM OF CERVIX, UNSPECIFIED SITE: Primary | ICD-10-CM

## 2020-09-03 DIAGNOSIS — D53.9 MACROCYTIC ANEMIA: ICD-10-CM

## 2020-09-03 DIAGNOSIS — C53.9 MALIGNANT NEOPLASM OF CERVIX, UNSPECIFIED SITE: ICD-10-CM

## 2020-09-03 DIAGNOSIS — C53.0 MALIGNANT NEOPLASM OF ENDOCERVIX: Primary | ICD-10-CM

## 2020-09-03 DIAGNOSIS — E83.42 HYPOMAGNESEMIA: Primary | ICD-10-CM

## 2020-09-03 PROCEDURE — 99999 PR PBB SHADOW E&M-EST. PATIENT-LVL IV: ICD-10-PCS | Mod: PBBFAC,,, | Performed by: NURSE PRACTITIONER

## 2020-09-03 PROCEDURE — 96413 CHEMO IV INFUSION 1 HR: CPT

## 2020-09-03 PROCEDURE — 99999 PR PBB SHADOW E&M-EST. PATIENT-LVL IV: CPT | Mod: PBBFAC,,, | Performed by: NURSE PRACTITIONER

## 2020-09-03 PROCEDURE — 96368 THER/DIAG CONCURRENT INF: CPT

## 2020-09-03 PROCEDURE — 96375 TX/PRO/DX INJ NEW DRUG ADDON: CPT

## 2020-09-03 PROCEDURE — 96417 CHEMO IV INFUS EACH ADDL SEQ: CPT

## 2020-09-03 PROCEDURE — S0028 INJECTION, FAMOTIDINE, 20 MG: HCPCS | Performed by: NURSE PRACTITIONER

## 2020-09-03 PROCEDURE — 63600175 PHARM REV CODE 636 W HCPCS: Performed by: NURSE PRACTITIONER

## 2020-09-03 PROCEDURE — 99215 OFFICE O/P EST HI 40 MIN: CPT | Mod: S$PBB,,, | Performed by: NURSE PRACTITIONER

## 2020-09-03 PROCEDURE — 25000003 PHARM REV CODE 250: Performed by: NURSE PRACTITIONER

## 2020-09-03 PROCEDURE — 99214 OFFICE O/P EST MOD 30 MIN: CPT | Mod: PBBFAC,25 | Performed by: NURSE PRACTITIONER

## 2020-09-03 PROCEDURE — 63600175 PHARM REV CODE 636 W HCPCS: Performed by: INTERNAL MEDICINE

## 2020-09-03 PROCEDURE — 99215 PR OFFICE/OUTPT VISIT, EST, LEVL V, 40-54 MIN: ICD-10-PCS | Mod: S$PBB,,, | Performed by: NURSE PRACTITIONER

## 2020-09-03 PROCEDURE — 96367 TX/PROPH/DG ADDL SEQ IV INF: CPT

## 2020-09-03 PROCEDURE — 25000003 PHARM REV CODE 250: Performed by: INTERNAL MEDICINE

## 2020-09-03 RX ORDER — HEPARIN 100 UNIT/ML
500 SYRINGE INTRAVENOUS
Status: DISCONTINUED | OUTPATIENT
Start: 2020-09-03 | End: 2020-09-03 | Stop reason: HOSPADM

## 2020-09-03 RX ORDER — ACETAMINOPHEN 325 MG/1
650 TABLET ORAL
Status: CANCELLED | OUTPATIENT
Start: 2020-09-03

## 2020-09-03 RX ORDER — FAMOTIDINE 10 MG/ML
20 INJECTION INTRAVENOUS
Status: COMPLETED | OUTPATIENT
Start: 2020-09-03 | End: 2020-09-03

## 2020-09-03 RX ORDER — DIPHENHYDRAMINE HCL 25 MG
25 CAPSULE ORAL
Status: CANCELLED | OUTPATIENT
Start: 2020-09-03

## 2020-09-03 RX ORDER — HYDROCODONE BITARTRATE AND ACETAMINOPHEN 500; 5 MG/1; MG/1
TABLET ORAL ONCE
Status: CANCELLED | OUTPATIENT
Start: 2020-09-03 | End: 2020-09-03

## 2020-09-03 RX ORDER — MAGNESIUM SULFATE 1 G/100ML
1 INJECTION INTRAVENOUS ONCE
Status: COMPLETED | OUTPATIENT
Start: 2020-09-03 | End: 2020-09-03

## 2020-09-03 RX ADMIN — DEXAMETHASONE SODIUM PHOSPHATE: 4 INJECTION, SOLUTION INTRAMUSCULAR; INTRAVENOUS at 09:09

## 2020-09-03 RX ADMIN — FAMOTIDINE 20 MG: 10 INJECTION, SOLUTION INTRAVENOUS at 09:09

## 2020-09-03 RX ADMIN — HEPARIN 500 UNITS: 100 SYRINGE at 12:09

## 2020-09-03 RX ADMIN — DIPHENHYDRAMINE HYDROCHLORIDE 50 MG: 50 INJECTION, SOLUTION INTRAMUSCULAR; INTRAVENOUS at 09:09

## 2020-09-03 RX ADMIN — CARBOPLATIN 130 MG: 10 INJECTION, SOLUTION INTRAVENOUS at 12:09

## 2020-09-03 RX ADMIN — MAGNESIUM SULFATE HEPTAHYDRATE 1 G: 1 INJECTION, SOLUTION INTRAVENOUS at 09:09

## 2020-09-03 RX ADMIN — PACLITAXEL 84 MG: 6 INJECTION INTRAVENOUS at 10:09

## 2020-09-03 NOTE — DISCHARGE INSTRUCTIONS
.[facility]  [address] [address]  [address]  [phone] phone     [phone] fax  Hours of Operation: Monday- Friday 8:00am- 5:00pm  After hours phone  [phone]  Hematology / Oncology Physicians on call      Dr. Rayray Beatty, TIM Ornelas NP Tyesha Taylor, NP    Please call with any concerns regarding your appointment today.  .FALL PREVENTION   Falls often occur due to slipping, tripping or losing your balance. Here are ways to reduce your risk of falling again.   Was there anything that caused your fall that can be fixed, removed or replaced?   Make your home safe by keeping walkways clear of objects you may trip over.   Use non-slip pads under rugs.   Do not walk in poorly lit areas.   Do not stand on chairs or wobbly ladders.   Use caution when reaching overhead or looking upward. This position can cause a loss of balance.   Be sure your shoes fit properly, have non-slip bottoms and are in good condition.   Be cautious when going up and down stairs, curbs, and when walking on uneven sidewalks.   If your balance is poor, consider using a cane or walker.   If your fall was related to alcohol use, stop or limit alcohol intake.   If your fall was related to use of sleeping medicines, talk to your doctor about this. You may need to reduce your dosage at bedtime if you awaken during the night to go to the bathroom.   To reduce the need for nighttime bathroom trips:   Avoid drinking fluids for several hours before going to bed   Empty your bladder before going to bed   Men can keep a urinal at the bedside   © 7403-4567 [facility], [address] [place] [address], [place] [address]. All rights reserved. This information is not intended as a substitute for professional medical care. Always follow your healthcare professional's instructions.    .WAYS TO HELP PREVENT  INFECTION         WASH YOUR HANDS OFTEN DURING THE DAY, ESPECIALLY BEFORE YOU EAT, AFTER USING THE BATHROOM, AND AFTER TOUCHING ANIMALS     STAY AWAY FROM PEOPLE WHO HAVE ILLNESSES YOU CAN CATCH; SUCH AS COLDS, FLU, CHICKEN POX     TRY TO AVOID CROWDS     STAY AWAY FROM CHILDREN WHO RECENTLY HAVE RECEIVED LIVE VIRUS VACCINES     MAINTAIN GOOD MOUTH CARE     DO NOT SQUEEZE OR SCRATCH PIMPLES     CLEAN CUTS & SCRAPES RIGHT AWAY AND DAILY UNTIL HEALED WITH WARM WATER, SOAP & AN ANTISEPTIC     AVOID CONTACT WITH LITTER BOXES, BIRD CAGES, & FISH TANKS     AVOID STANDING WATER, IE., BIRD BATHS, FLOWER POTS/VASES, OR HUMIDIFIERS     WEAR GLOVES WHEN GARDENING OR CLEANING UP AFTER OTHERS, ESPECIALLY BABIES & SMALL CHILDREN     DO NOT EAT RAW FISH, SEAFOOD, MEAT, OR EGGS  .HOME CARE AFTER CHEMOTHERAPY   Meals   Many patients feel sick and lose their appetites during treatment. Eat small meals several times a day. Choose bland foods with little taste or smell if you have problems with nausea. Be sure to cook all food thoroughly. This kills bacteria and helps you avoid intestinal infection. Soft foods are easier to swallow and digest.   Activity   Exercise keeps you strong and keeps your heart and lungs active. Talk to your doctor about an appropriate exercise program for you.   Skin Care   To prevent a skin infection, bathe or shower once a day. Use a moisturizing soap and wash with warm water. Avoid very hot or cold water. Chemotherapy can make your skin dry . Apply moisturizing lotion to help relieve dry skin. Some drugs used in high doses can cause slight burns to appear (like sunburn). Ask for a special cream to help relieve the burn and protect your skin.   Prevent Mouth Sores   During chemotherapy, many people get mouth sores. Do the following to help prevent mouth sores or to ease discomfort.   Brush your teeth with a soft-bristle toothbrush after every meal.  Don't use dental floss if your platelet count  "is below 50,000. Your doctor or nurse will tell you if this is the case.  Use an oral swab or special soft toothbrush if your gums bleed during regular brushing.  Use mouthwash as directed. If you can't tolerate commercial mouthwash, use salt and baking soda to clean your mouth. Mix 1 teaspoon of salt and 1 teaspoon of baking soda into a glass of water. Swish and spit.  Call your doctor or return to this facility if you develop any of the following:   Sore throat   White patches in the mouth or throat   Fever of 100.4ºF (38ºC) or higher, or as directed by your healthcare provider  © 7251-8857 [facility], [address] [place] [address], [place] [id number]. All rights reserved. This information is not intended as a substitute for professional medical care. Always follow your healthcare professional's     .Support Groups/Classes    Support groups and classes are being offered at the   [facility] and [facility]!!    "Cooking with Cancer" (Nutrition Class):  Second Wednesday of each month   at noon at the [facility].  Metastatic Support Group:  Third Tuesday of each month   at noon at the [facility].  Next Steps Class/Group: Second and fourth Thursday of each month at noon at the [facility].  [facility] (Breast Cancer Support Group): First Tuesday of each month   at 5:30pm at the [facility] location.  [facility]: [facility]: Second and third Tuesday of each month from 7:30am - 2pm.  [facility]: First and fourth Tuesday of each month from 7:30am - 2pm    If you are interested in attending or would like more information please ask our social workers or your nurse!    "
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
## 2020-09-04 ENCOUNTER — INFUSION (OUTPATIENT)
Dept: INFUSION THERAPY | Facility: HOSPITAL | Age: 85
End: 2020-09-04
Attending: FAMILY MEDICINE
Payer: MEDICARE

## 2020-09-04 VITALS
DIASTOLIC BLOOD PRESSURE: 58 MMHG | RESPIRATION RATE: 18 BRPM | TEMPERATURE: 98 F | SYSTOLIC BLOOD PRESSURE: 115 MMHG | HEART RATE: 70 BPM

## 2020-09-04 DIAGNOSIS — D64.9 ANEMIA, UNSPECIFIED TYPE: ICD-10-CM

## 2020-09-04 DIAGNOSIS — Z12.89 ENCOUNTER FOR SCREENING FOR MALIGNANT NEOPLASM OF OTHER SITES: ICD-10-CM

## 2020-09-04 PROCEDURE — 63600175 PHARM REV CODE 636 W HCPCS: Performed by: NURSE PRACTITIONER

## 2020-09-04 PROCEDURE — 25000003 PHARM REV CODE 250: Performed by: NURSE PRACTITIONER

## 2020-09-04 PROCEDURE — 36430 TRANSFUSION BLD/BLD COMPNT: CPT

## 2020-09-04 RX ORDER — ACETAMINOPHEN 325 MG/1
650 TABLET ORAL
Status: COMPLETED | OUTPATIENT
Start: 2020-09-04 | End: 2020-09-04

## 2020-09-04 RX ORDER — HYDROCODONE BITARTRATE AND ACETAMINOPHEN 500; 5 MG/1; MG/1
TABLET ORAL ONCE
Status: DISCONTINUED | OUTPATIENT
Start: 2020-09-04 | End: 2020-09-04 | Stop reason: HOSPADM

## 2020-09-04 RX ORDER — HEPARIN 100 UNIT/ML
500 SYRINGE INTRAVENOUS
Status: COMPLETED | OUTPATIENT
Start: 2020-09-04 | End: 2020-09-04

## 2020-09-04 RX ORDER — DIPHENHYDRAMINE HCL 25 MG
25 CAPSULE ORAL
Status: COMPLETED | OUTPATIENT
Start: 2020-09-04 | End: 2020-09-04

## 2020-09-04 RX ADMIN — HEPARIN 500 UNITS: 100 SYRINGE at 12:09

## 2020-09-04 RX ADMIN — DIPHENHYDRAMINE HYDROCHLORIDE 25 MG: 25 CAPSULE ORAL at 10:09

## 2020-09-04 RX ADMIN — ACETAMINOPHEN 650 MG: 325 TABLET ORAL at 10:09

## 2020-09-04 NOTE — PLAN OF CARE
Patient tolerated blood transfusion well; no adverse reactions noted. Mediport flushed; chavez needle removed. Ambulatory to car with belongings. Daughter to transport pt home

## 2020-09-08 ENCOUNTER — DOCUMENTATION ONLY (OUTPATIENT)
Dept: HEMATOLOGY/ONCOLOGY | Facility: CLINIC | Age: 85
End: 2020-09-08

## 2020-09-08 NOTE — NURSING
Spoke with pt daughter, Erika, over the phone to schedule CT scan per Dr. Augustine's request. Informed Erika that I called [facility] for most recent scans and was told by [facility] staff that scans were not done at [facility]. Erika confirmed that no scans were not done at [facility], but a PET scan was done 8/2020 in [place]. Spoke with Renata at [facility] [phone], report faxed and scanned it to media tab.

## 2020-09-08 NOTE — PROGRESS NOTES
Per Dr. Augustine's request, Faxed [facility] imaging library @ [phone] to request most recent scan on disc be sent via [facility]. Awaiting disc so that Dr. Augustine can compare to scans from today 9/8/20. Pt's MD appt & chemo will be rescheduled from 9/10/20 to 9/17/20 pending disc from [facility]. Pt notified by April Chang LPN.

## 2020-09-11 ENCOUNTER — TELEPHONE (OUTPATIENT)
Dept: RADIOLOGY | Facility: HOSPITAL | Age: 85
End: 2020-09-11

## 2020-09-14 ENCOUNTER — HOSPITAL ENCOUNTER (OUTPATIENT)
Dept: RADIOLOGY | Facility: HOSPITAL | Age: 85
Discharge: HOME | End: 2020-09-14
Attending: NURSE PRACTITIONER
Payer: MEDICARE

## 2020-09-14 DIAGNOSIS — Z12.89 ENCOUNTER FOR SCREENING FOR MALIGNANT NEOPLASM OF OTHER SITES: ICD-10-CM

## 2020-09-14 PROCEDURE — 71260 CT CHEST ABDOMEN PELVIS WITH CONTRAST (XPD): ICD-10-PCS | Mod: 26,,, | Performed by: RADIOLOGY

## 2020-09-14 PROCEDURE — 25500020 PHARM REV CODE 255: Performed by: NURSE PRACTITIONER

## 2020-09-14 PROCEDURE — 74177 CT CHEST ABDOMEN PELVIS WITH CONTRAST (XPD): ICD-10-PCS | Mod: 26,,, | Performed by: RADIOLOGY

## 2020-09-14 PROCEDURE — 74177 CT ABD & PELVIS W/CONTRAST: CPT | Mod: TC

## 2020-09-14 PROCEDURE — 71260 CT THORAX DX C+: CPT | Mod: 26,,, | Performed by: RADIOLOGY

## 2020-09-14 PROCEDURE — 74177 CT ABD & PELVIS W/CONTRAST: CPT | Mod: 26,,, | Performed by: RADIOLOGY

## 2020-09-14 RX ADMIN — IOHEXOL 100 ML: 350 INJECTION, SOLUTION INTRAVENOUS at 12:09

## 2020-09-16 ENCOUNTER — DOCUMENTATION ONLY (OUTPATIENT)
Dept: HEMATOLOGY/ONCOLOGY | Facility: CLINIC | Age: 85
End: 2020-09-16

## 2020-09-16 ENCOUNTER — LAB VISIT (OUTPATIENT)
Dept: LAB | Facility: HOSPITAL | Age: 85
End: 2020-09-16
Attending: NURSE PRACTITIONER
Payer: MEDICARE

## 2020-09-16 DIAGNOSIS — D53.9 MACROCYTIC ANEMIA: ICD-10-CM

## 2020-09-16 DIAGNOSIS — C53.9 MALIGNANT NEOPLASM OF CERVIX, UNSPECIFIED SITE: ICD-10-CM

## 2020-09-16 DIAGNOSIS — D64.9 ANEMIA, UNSPECIFIED TYPE: ICD-10-CM

## 2020-09-16 LAB
ALBUMIN SERPL BCP-MCNC: 3.5 G/DL (ref 3.5–5.2)
ALP SERPL-CCNC: 86 U/L (ref 55–135)
ALT SERPL W/O P-5'-P-CCNC: 14 U/L (ref 10–44)
ANION GAP SERPL CALC-SCNC: 8 MMOL/L (ref 8–16)
AST SERPL-CCNC: 22 U/L (ref 10–40)
BASOPHILS # BLD AUTO: 0.02 K/UL (ref 0–0.2)
BASOPHILS NFR BLD: 0.4 % (ref 0–1.9)
BILIRUB SERPL-MCNC: 0.4 MG/DL (ref 0.1–1)
BUN SERPL-MCNC: 24 MG/DL (ref 8–23)
CALCIUM SERPL-MCNC: 9.1 MG/DL (ref 8.7–10.5)
CHLORIDE SERPL-SCNC: 110 MMOL/L (ref 95–110)
CO2 SERPL-SCNC: 22 MMOL/L (ref 23–29)
CREAT SERPL-MCNC: 1 MG/DL (ref 0.5–1.4)
DIFFERENTIAL METHOD: ABNORMAL
EOSINOPHIL # BLD AUTO: 0.1 K/UL (ref 0–0.5)
EOSINOPHIL NFR BLD: 1 % (ref 0–8)
ERYTHROCYTE [DISTWIDTH] IN BLOOD BY AUTOMATED COUNT: 18.4 % (ref 11.5–14.5)
EST. GFR  (AFRICAN AMERICAN): >60 ML/MIN/1.73 M^2
EST. GFR  (NON AFRICAN AMERICAN): 52.2 ML/MIN/1.73 M^2
FERRITIN SERPL-MCNC: 1541 NG/ML (ref 20–300)
GLUCOSE SERPL-MCNC: 103 MG/DL (ref 70–110)
HCT VFR BLD AUTO: 29 % (ref 37–48.5)
HGB BLD-MCNC: 9 G/DL (ref 12–16)
IMM GRANULOCYTES # BLD AUTO: 0.02 K/UL (ref 0–0.04)
IMM GRANULOCYTES NFR BLD AUTO: 0.4 % (ref 0–0.5)
IRON SERPL-MCNC: 70 UG/DL (ref 30–160)
LYMPHOCYTES # BLD AUTO: 1.4 K/UL (ref 1–4.8)
LYMPHOCYTES NFR BLD: 27.1 % (ref 18–48)
MAGNESIUM SERPL-MCNC: 1.7 MG/DL (ref 1.6–2.6)
MCH RBC QN AUTO: 34.5 PG (ref 27–31)
MCHC RBC AUTO-ENTMCNC: 31 G/DL (ref 32–36)
MCV RBC AUTO: 111 FL (ref 82–98)
MONOCYTES # BLD AUTO: 0.7 K/UL (ref 0.3–1)
MONOCYTES NFR BLD: 12.9 % (ref 4–15)
NEUTROPHILS # BLD AUTO: 3 K/UL (ref 1.8–7.7)
NEUTROPHILS NFR BLD: 58.2 % (ref 38–73)
NRBC BLD-RTO: 0 /100 WBC
PHOSPHATE SERPL-MCNC: 4.5 MG/DL (ref 2.7–4.5)
PLATELET # BLD AUTO: 163 K/UL (ref 150–350)
PMV BLD AUTO: 10.2 FL (ref 9.2–12.9)
POTASSIUM SERPL-SCNC: 4.7 MMOL/L (ref 3.5–5.1)
PROT SERPL-MCNC: 6.6 G/DL (ref 6–8.4)
RBC # BLD AUTO: 2.61 M/UL (ref 4–5.4)
SATURATED IRON: 26 % (ref 20–50)
SODIUM SERPL-SCNC: 140 MMOL/L (ref 136–145)
TOTAL IRON BINDING CAPACITY: 266 UG/DL (ref 250–450)
TRANSFERRIN SERPL-MCNC: 180 MG/DL (ref 200–375)
WBC # BLD AUTO: 5.13 K/UL (ref 3.9–12.7)

## 2020-09-16 PROCEDURE — 85025 COMPLETE CBC W/AUTO DIFF WBC: CPT

## 2020-09-16 PROCEDURE — 82607 VITAMIN B-12: CPT

## 2020-09-16 PROCEDURE — 82746 ASSAY OF FOLIC ACID SERUM: CPT

## 2020-09-16 PROCEDURE — 80053 COMPREHEN METABOLIC PANEL: CPT

## 2020-09-16 PROCEDURE — 83735 ASSAY OF MAGNESIUM: CPT

## 2020-09-16 PROCEDURE — 83540 ASSAY OF IRON: CPT

## 2020-09-16 PROCEDURE — 84100 ASSAY OF PHOSPHORUS: CPT

## 2020-09-16 PROCEDURE — 82728 ASSAY OF FERRITIN: CPT

## 2020-09-16 PROCEDURE — 36415 COLL VENOUS BLD VENIPUNCTURE: CPT | Mod: PO

## 2020-09-16 NOTE — NURSING
Spoke with Courtney @ [facility] to request scan from 8/17/20 on disc be sent to [facility], address and FedEx acct number given. Courtney will send it Standard overnight.   Oncology Navigation   Intake  Cancer Type:  (Cervical)  Date Worked: 09/16/20     Treatment                  Acuity      Follow Up  Follow up in 1 day (on 9/17/2020) for tx today.

## 2020-09-17 ENCOUNTER — INFUSION (OUTPATIENT)
Dept: INFUSION THERAPY | Facility: HOSPITAL | Age: 85
End: 2020-09-17
Attending: NURSE PRACTITIONER
Payer: MEDICARE

## 2020-09-17 ENCOUNTER — OFFICE VISIT (OUTPATIENT)
Dept: HEMATOLOGY/ONCOLOGY | Facility: CLINIC | Age: 85
End: 2020-09-17
Payer: MEDICARE

## 2020-09-17 VITALS
DIASTOLIC BLOOD PRESSURE: 74 MMHG | OXYGEN SATURATION: 99 % | WEIGHT: 145 LBS | RESPIRATION RATE: 18 BRPM | SYSTOLIC BLOOD PRESSURE: 143 MMHG | HEIGHT: 62 IN | HEART RATE: 62 BPM | TEMPERATURE: 98 F | BODY MASS INDEX: 26.68 KG/M2

## 2020-09-17 VITALS
HEIGHT: 62 IN | HEART RATE: 80 BPM | SYSTOLIC BLOOD PRESSURE: 133 MMHG | WEIGHT: 145.75 LBS | DIASTOLIC BLOOD PRESSURE: 88 MMHG | OXYGEN SATURATION: 100 % | BODY MASS INDEX: 26.82 KG/M2 | RESPIRATION RATE: 16 BRPM | TEMPERATURE: 98 F

## 2020-09-17 DIAGNOSIS — C53.0 MALIGNANT NEOPLASM OF ENDOCERVIX: Primary | ICD-10-CM

## 2020-09-17 DIAGNOSIS — E83.42 HYPOMAGNESEMIA: Primary | ICD-10-CM

## 2020-09-17 DIAGNOSIS — C53.9 MALIGNANT NEOPLASM OF CERVIX, UNSPECIFIED SITE: ICD-10-CM

## 2020-09-17 DIAGNOSIS — D51.3 OTHER DIETARY VITAMIN B12 DEFICIENCY ANEMIA: ICD-10-CM

## 2020-09-17 DIAGNOSIS — C53.0 MALIGNANT NEOPLASM OF ENDOCERVIX: ICD-10-CM

## 2020-09-17 LAB
FOLATE SERPL-MCNC: 15 NG/ML (ref 4–24)
VIT B12 SERPL-MCNC: 204 PG/ML (ref 210–950)

## 2020-09-17 PROCEDURE — 99999 PR PBB SHADOW E&M-EST. PATIENT-LVL III: ICD-10-PCS | Mod: PBBFAC,,, | Performed by: INTERNAL MEDICINE

## 2020-09-17 PROCEDURE — 96372 THER/PROPH/DIAG INJ SC/IM: CPT

## 2020-09-17 PROCEDURE — 25000003 PHARM REV CODE 250: Performed by: INTERNAL MEDICINE

## 2020-09-17 PROCEDURE — 63600175 PHARM REV CODE 636 W HCPCS: Performed by: INTERNAL MEDICINE

## 2020-09-17 PROCEDURE — 99215 PR OFFICE/OUTPT VISIT, EST, LEVL V, 40-54 MIN: ICD-10-PCS | Mod: 25,S$PBB,, | Performed by: INTERNAL MEDICINE

## 2020-09-17 PROCEDURE — 25000003 PHARM REV CODE 250: Performed by: NURSE PRACTITIONER

## 2020-09-17 PROCEDURE — 96413 CHEMO IV INFUSION 1 HR: CPT

## 2020-09-17 PROCEDURE — 63600175 PHARM REV CODE 636 W HCPCS: Performed by: NURSE PRACTITIONER

## 2020-09-17 PROCEDURE — 96367 TX/PROPH/DG ADDL SEQ IV INF: CPT

## 2020-09-17 PROCEDURE — 99215 OFFICE O/P EST HI 40 MIN: CPT | Mod: 25,S$PBB,, | Performed by: INTERNAL MEDICINE

## 2020-09-17 PROCEDURE — 96375 TX/PRO/DX INJ NEW DRUG ADDON: CPT

## 2020-09-17 PROCEDURE — 96417 CHEMO IV INFUS EACH ADDL SEQ: CPT

## 2020-09-17 PROCEDURE — S0028 INJECTION, FAMOTIDINE, 20 MG: HCPCS | Performed by: NURSE PRACTITIONER

## 2020-09-17 PROCEDURE — 99999 PR PBB SHADOW E&M-EST. PATIENT-LVL III: CPT | Mod: PBBFAC,,, | Performed by: INTERNAL MEDICINE

## 2020-09-17 PROCEDURE — 99213 OFFICE O/P EST LOW 20 MIN: CPT | Mod: PBBFAC,25 | Performed by: INTERNAL MEDICINE

## 2020-09-17 RX ORDER — CYANOCOBALAMIN 1000 UG/ML
1000 INJECTION, SOLUTION INTRAMUSCULAR; SUBCUTANEOUS
Status: CANCELLED | OUTPATIENT
Start: 2020-09-17

## 2020-09-17 RX ORDER — CYANOCOBALAMIN 1000 UG/ML
1000 INJECTION, SOLUTION INTRAMUSCULAR; SUBCUTANEOUS
Status: CANCELLED | OUTPATIENT
Start: 2020-09-24

## 2020-09-17 RX ORDER — CYANOCOBALAMIN 1000 UG/ML
1000 INJECTION, SOLUTION INTRAMUSCULAR; SUBCUTANEOUS
Status: COMPLETED | OUTPATIENT
Start: 2020-09-17 | End: 2020-09-17

## 2020-09-17 RX ORDER — FAMOTIDINE 10 MG/ML
20 INJECTION INTRAVENOUS
Status: COMPLETED | OUTPATIENT
Start: 2020-09-17 | End: 2020-09-17

## 2020-09-17 RX ORDER — HEPARIN 100 UNIT/ML
500 SYRINGE INTRAVENOUS
Status: DISCONTINUED | OUTPATIENT
Start: 2020-09-17 | End: 2020-09-17 | Stop reason: HOSPADM

## 2020-09-17 RX ADMIN — HEPARIN 500 UNITS: 100 SYRINGE at 11:09

## 2020-09-17 RX ADMIN — DEXAMETHASONE SODIUM PHOSPHATE: 4 INJECTION, SOLUTION INTRAMUSCULAR; INTRAVENOUS at 08:09

## 2020-09-17 RX ADMIN — FAMOTIDINE 20 MG: 10 INJECTION, SOLUTION INTRAVENOUS at 09:09

## 2020-09-17 RX ADMIN — DIPHENHYDRAMINE HYDROCHLORIDE 50 MG: 50 INJECTION, SOLUTION INTRAMUSCULAR; INTRAVENOUS at 09:09

## 2020-09-17 RX ADMIN — PACLITAXEL 84 MG: 6 INJECTION, SOLUTION INTRAVENOUS at 09:09

## 2020-09-17 RX ADMIN — CARBOPLATIN 115 MG: 10 INJECTION, SOLUTION INTRAVENOUS at 10:09

## 2020-09-17 RX ADMIN — CYANOCOBALAMIN 1000 MCG: 1000 INJECTION, SOLUTION INTRAMUSCULAR at 08:09

## 2020-09-17 NOTE — PROGRESS NOTES
Subjective:       Patient ID: Anna Seay is a 85 y.o. female.    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 Anna Seay in Hematology Oncology clinic at [facility] on 09/16/2020.  Ms. Anna Seay is a 85 y.o. woman with metastatic cervical cancer from [place] currently on weekly carboplatin taxol. Reports doing well, denies neuropathy.  Eating well and gaining weight.    Oncology History   Malignant neoplasm of endocervix   6/27/2019 Initial Diagnosis    Malignant neoplasm of exocervix     7/8/2019 - 7/8/2019 Chemotherapy    Treatment Summary   Plan Name: OP CARBOPLATIN WEEKLY  Treatment Goal: Palliative  Status: Inactive  Start Date: [No treatment day found]  End Date: [No treatment day found]  Provider: Jaime Pinon MD  Chemotherapy: CARBOplatin (PARAPLATIN) in sodium chloride 0.9% 250 mL chemo infusion, , Intravenous, Clinic/HOD 1 time, 0 of 4 cycles     7/9/2019 - 11/25/2019 Chemotherapy    Treatment Summary   Plan Name: OP GYN CARBOPLATIN (AUC) Q4W  Treatment Goal: Palliative  Status: Inactive  Start Date: 7/9/2019  End Date: 10/29/2019  Provider: Jaime Pinon MD  Chemotherapy: CARBOplatin (PARAPLATIN) in sodium chloride 0.9% 250 mL chemo infusion,  (original dose 341.5 mg), Intravenous, Clinic/HOD 1 time, 5 of 6 cycles  Dose modification:   (original dose 341.5 mg, Cycle 1)     5/19/2020 - 6/16/2020 Chemotherapy    Treatment Summary   Plan Name: OP  PACLITAXEL CARBOPLATIN WEEKLY  Treatment Goal: Palliative  Status: Inactive  Start Date: 5/19/2020  End Date: 5/22/2020  Provider: Annette Donis NP  Chemotherapy: CARBOplatin (PARAPLATIN) 115 mg in sodium chloride 0.9% 250 mL chemo infusion, 115 mg (98.6 % of original dose 118.4 mg), Intravenous, Clinic/HOD 1 time, 0 of 1 cycle  Dose modification:   (original dose 118.4 mg, Cycle 1)  PACLitaxeL (TAXOL) 80 mg/m2 = 126 mg in sodium chloride 0.9% 250 mL chemo infusion,  80 mg/m2 = 126 mg (100 % of original dose 80 mg/m2), Intravenous, Clinic/HOD 1 time, 0 of 1 cycle  Dose modification: 80 mg/m2 (original dose 80 mg/m2, Cycle 1)     5/19/2020 -  Chemotherapy    Treatment Summary   Plan Name:  PACLITAXEL CARBOPLATIN WEEKLY  Treatment Goal: Palliative  Status: Active  Start Date: 5/19/2020  End Date: 10/9/2020 (Planned)  Provider: Annette Donis NP  Chemotherapy: CARBOplatin (PARAPLATIN) 115 mg in sodium chloride 0.9% 250 mL chemo infusion, 115 mg (100 % of original dose 114.8 mg), Intravenous, Clinic/HOD 1 time, 2 of 2 cycles  Dose modification:   (original dose 114.8 mg, Cycle 1, Reason: MD Discretion), 100 mg (original dose 114.8 mg, Cycle 1),   (original dose 114.8 mg, Cycle 2)  Administration: 130 mg (9/3/2020)  PACLitaxeL (TAXOL) 80 mg/m2 = 126 mg in sodium chloride 0.9% 250 mL chemo infusion, 80 mg/m2 = 126 mg (100 % of original dose 80 mg/m2), Intravenous, Clinic/HOD 1 time, 2 of 2 cycles  Dose modification: 80 mg/m2 (original dose 80 mg/m2, Cycle 1), 50 mg/m2 (original dose 80 mg/m2, Cycle 1)  Administration: 84 mg (9/3/2020)       Past Medical History:   Diagnosis Date    Anemia     Cataract     Cervical cancer 05/2019    Cervical cancer 6/25/2019    Hypertension      Family History   Problem Relation Age of Onset    Hypertension Mother     Pneumonia Father     Breast cancer Sister     No Known Problems Brother     Leukemia Daughter     No Known Problems Son      Social History     Socioeconomic History    Marital status:      Spouse name: Not on file    Number of children: Not on file    Years of education: Not on file    Highest education level: Not on file   Occupational History    Not on file   Social Needs    Financial resource strain: Patient refused    Food insecurity     Worry: Patient refused     Inability: Patient refused    Transportation needs     Medical: Patient refused     Non-medical: Patient refused   Tobacco Use    Smoking status:  Never Smoker    Smokeless tobacco: Never Used   Substance and Sexual Activity    Alcohol use: Not Currently    Drug use: Never    Sexual activity: Not on file   Lifestyle    Physical activity     Days per week: Patient refused     Minutes per session: Patient refused    Stress: Patient refused   Relationships    Social connections     Talks on phone: Patient refused     Gets together: Patient refused     Attends Orthodox service: Patient refused     Active member of club or organization: Patient refused     Attends meetings of clubs or organizations: Patient refused     Relationship status: Patient refused   Other Topics Concern    Not on file   Social History Narrative    Not on file     Past Surgical History:   Procedure Laterality Date    CATARACT EXTRACTION, BILATERAL      WRIST SURGERY  1984     Current Outpatient Medications   Medication Sig Dispense Refill    cloNIDine (CATAPRES) 0.3 MG tablet TAKE 1 TABLET(0.3 MG) BY MOUTH TWICE DAILY 180 tablet 2    FENOFIBRATE ORAL Take by mouth 2 (two) times daily.      ferrous sulfate (FEOSOL) 325 mg (65 mg iron) Tab tablet TAKE 1 TABLET BY MOUTH TWICE DAILY 180 tablet 1    loratadine (CLARITIN) 10 mg tablet Take 10 mg by mouth once daily.      magnesium oxide (MAGOX) 400 mg (241.3 mg magnesium) tablet Take 1 tablet (400 mg total) by mouth once daily. 30 tablet 6    megestroL (MEGACE) 40 MG Tab TAKE 1 TABLET(40 MG) BY MOUTH TWICE DAILY 60 tablet 6    NIFEdipine (PROCARDIA-XL) 30 MG (OSM) 24 hr tablet TAKE 1 TABLET BY MOUTH ONCE DAILY 90 tablet 5    ondansetron (ZOFRAN-ODT) 8 MG TbDL Take 1 tablet (8 mg total) by mouth every 8 (eight) hours as needed (chemotherapy-induced nausea and vomiting). 40 tablet 5    simvastatin (ZOCOR) 20 MG tablet Take 20 mg by mouth every evening.      varicella-zoster gE-AS01B, PF, (SHINGRIX, PF,) 50 mcg/0.5 mL injection Administer two doses 2 months apart 0.5 mL 0     No current facility-administered medications for this  visit.        Labs:  Lab Results   Component Value Date    WBC 5.13 09/16/2020    HGB 9.0 (L) 09/16/2020    HCT 29.0 (L) 09/16/2020     (H) 09/16/2020     09/16/2020     BMP  Lab Results   Component Value Date     09/02/2020    K 4.1 09/02/2020     09/02/2020    CO2 18 (L) 09/02/2020    BUN 31 (H) 09/02/2020    CREATININE 1.1 09/02/2020    CALCIUM 9.3 09/02/2020    ANIONGAP 11 09/02/2020    ESTGFRAFRICA 54 (A) 09/02/2020    EGFRNONAA 47 (A) 09/02/2020     Lab Results   Component Value Date    ALT 13 09/02/2020    AST 19 09/02/2020    ALKPHOS 75 09/02/2020    BILITOT 0.4 09/02/2020       No results found for: IRON, TIBC, FERRITIN, SATURATEDIRO  No results found for: [id number]  No results found for: FOLATE  No results found for: TSH    I have reviewed the radiology reports and examined the scan/xray images.    Review of Systems   Constitutional: Negative.    HENT: Negative.    Eyes: Negative.    Respiratory: Negative.    Cardiovascular: Negative.    Gastrointestinal: Negative.    Endocrine: Negative.    Genitourinary: Negative.    Musculoskeletal: Negative.    Skin: Negative.    Allergic/Immunologic: Negative.    Neurological: Negative.    Hematological: Negative.    Psychiatric/Behavioral: Negative.      ECOG SCORE              Objective:   There were no vitals filed for this visit.There is no height or weight on file to calculate BMI.  Physical Exam  Vitals signs and nursing note reviewed.   Constitutional:       Appearance: She is well-developed.   HENT:      Head: Normocephalic and atraumatic.   Eyes:      Conjunctiva/sclera: Conjunctivae normal.   Neck:      Musculoskeletal: Normal range of motion and neck supple.   Cardiovascular:      Rate and Rhythm: Normal rate and regular rhythm.   Pulmonary:      Effort: Pulmonary effort is normal.      Breath sounds: Normal breath sounds.   Abdominal:      General: Bowel sounds are normal.      Palpations: Abdomen is soft.   Musculoskeletal:  Normal range of motion.   Skin:     General: Skin is warm and dry.   Neurological:      Mental Status: She is alert and oriented to person, place, and time.   Psychiatric:         Behavior: Behavior normal.         Thought Content: Thought content normal.         Judgment: Judgment normal.           Assessment:      1. Malignant neoplasm of endocervix           Plan:     Malignant neoplasm of endocervix  Continue on weekly taxol carboplatin.  Will give vitamin B12 for B12 deficiency  -     CBC auto differential; Future; Expected date: 09/24/2020  -     Comprehensive metabolic panel; Future; Expected date: 09/24/2020  -     Urinalysis; Future; Expected date: 09/17/2020

## 2020-09-17 NOTE — PLAN OF CARE
"Pt states, " I feel fine today, my ankles are swollen but that is because I have been standing a lot"  "

## 2020-09-17 NOTE — DISCHARGE INSTRUCTIONS
.[facility]  [address] [address]  [address]  [phone] phone     [phone] fax  Hours of Operation: Monday- Friday 8:00am- 5:00pm  After hours phone  [phone]  Hematology / Oncology Physicians on call      Dr. Rayray Beatty, TIM Ornelas NP Tyesha Taylor, NP    Please call with any concerns regarding your appointment today.  .FALL PREVENTION   Falls often occur due to slipping, tripping or losing your balance. Here are ways to reduce your risk of falling again.   Was there anything that caused your fall that can be fixed, removed or replaced?   Make your home safe by keeping walkways clear of objects you may trip over.   Use non-slip pads under rugs.   Do not walk in poorly lit areas.   Do not stand on chairs or wobbly ladders.   Use caution when reaching overhead or looking upward. This position can cause a loss of balance.   Be sure your shoes fit properly, have non-slip bottoms and are in good condition.   Be cautious when going up and down stairs, curbs, and when walking on uneven sidewalks.   If your balance is poor, consider using a cane or walker.   If your fall was related to alcohol use, stop or limit alcohol intake.   If your fall was related to use of sleeping medicines, talk to your doctor about this. You may need to reduce your dosage at bedtime if you awaken during the night to go to the bathroom.   To reduce the need for nighttime bathroom trips:   Avoid drinking fluids for several hours before going to bed   Empty your bladder before going to bed   Men can keep a urinal at the bedside   © 1782-6789 [facility], [address] [place] [address], [place] [address]. All rights reserved. This information is not intended as a substitute for professional medical care. Always follow your healthcare professional's instructions.    .WAYS TO HELP PREVENT  INFECTION         WASH YOUR HANDS OFTEN DURING THE DAY, ESPECIALLY BEFORE YOU EAT, AFTER USING THE BATHROOM, AND AFTER TOUCHING ANIMALS     STAY AWAY FROM PEOPLE WHO HAVE ILLNESSES YOU CAN CATCH; SUCH AS COLDS, FLU, CHICKEN POX     TRY TO AVOID CROWDS     STAY AWAY FROM CHILDREN WHO RECENTLY HAVE RECEIVED LIVE VIRUS VACCINES     MAINTAIN GOOD MOUTH CARE     DO NOT SQUEEZE OR SCRATCH PIMPLES     CLEAN CUTS & SCRAPES RIGHT AWAY AND DAILY UNTIL HEALED WITH WARM WATER, SOAP & AN ANTISEPTIC     AVOID CONTACT WITH LITTER BOXES, BIRD CAGES, & FISH TANKS     AVOID STANDING WATER, IE., BIRD BATHS, FLOWER POTS/VASES, OR HUMIDIFIERS     WEAR GLOVES WHEN GARDENING OR CLEANING UP AFTER OTHERS, ESPECIALLY BABIES & SMALL CHILDREN     DO NOT EAT RAW FISH, SEAFOOD, MEAT, OR EGGS  .HOME CARE AFTER CHEMOTHERAPY   Meals   Many patients feel sick and lose their appetites during treatment. Eat small meals several times a day. Choose bland foods with little taste or smell if you have problems with nausea. Be sure to cook all food thoroughly. This kills bacteria and helps you avoid intestinal infection. Soft foods are easier to swallow and digest.   Activity   Exercise keeps you strong and keeps your heart and lungs active. Talk to your doctor about an appropriate exercise program for you.   Skin Care   To prevent a skin infection, bathe or shower once a day. Use a moisturizing soap and wash with warm water. Avoid very hot or cold water. Chemotherapy can make your skin dry . Apply moisturizing lotion to help relieve dry skin. Some drugs used in high doses can cause slight burns to appear (like sunburn). Ask for a special cream to help relieve the burn and protect your skin.   Prevent Mouth Sores   During chemotherapy, many people get mouth sores. Do the following to help prevent mouth sores or to ease discomfort.   Brush your teeth with a soft-bristle toothbrush after every meal.  Don't use dental floss if your platelet count  "is below 50,000. Your doctor or nurse will tell you if this is the case.  Use an oral swab or special soft toothbrush if your gums bleed during regular brushing.  Use mouthwash as directed. If you can't tolerate commercial mouthwash, use salt and baking soda to clean your mouth. Mix 1 teaspoon of salt and 1 teaspoon of baking soda into a glass of water. Swish and spit.  Call your doctor or return to this facility if you develop any of the following:   Sore throat   White patches in the mouth or throat   Fever of 100.4ºF (38ºC) or higher, or as directed by your healthcare provider  © 5809-1239 [facility], [address] [place] [address], [place] [id number]. All rights reserved. This information is not intended as a substitute for professional medical care. Always follow your healthcare professional's     .Support Groups/Classes    Support groups and classes are being offered at the   [facility] and [facility]!!    "Cooking with Cancer" (Nutrition Class):  Second Wednesday of each month   at noon at the [facility].  Metastatic Support Group:  Third Tuesday of each month   at noon at the [facility].  Next Steps Class/Group: Second and fourth Thursday of each month at noon at the [facility].  [facility] (Breast Cancer Support Group): First Tuesday of each month   at 5:30pm at the [facility] location.  [facility] [employer]: [facility]: Second and third Tuesday of each month from 7:30am - 2pm.  [facility]: First and fourth Tuesday of each month from 7:30am - 2pm    If you are interested in attending or would like more information please ask our social workers or your nurse!    "

## 2020-09-23 ENCOUNTER — LAB VISIT (OUTPATIENT)
Dept: LAB | Facility: HOSPITAL | Age: 85
End: 2020-09-23
Attending: INTERNAL MEDICINE
Payer: MEDICARE

## 2020-09-23 DIAGNOSIS — C53.0 MALIGNANT NEOPLASM OF ENDOCERVIX: ICD-10-CM

## 2020-09-23 LAB
BASOPHILS # BLD AUTO: 0.05 K/UL (ref 0–0.2)
BASOPHILS NFR BLD: 0.7 % (ref 0–1.9)
DIFFERENTIAL METHOD: ABNORMAL
EOSINOPHIL # BLD AUTO: 0.1 K/UL (ref 0–0.5)
EOSINOPHIL NFR BLD: 1.8 % (ref 0–8)
ERYTHROCYTE [DISTWIDTH] IN BLOOD BY AUTOMATED COUNT: 18 % (ref 11.5–14.5)
HCT VFR BLD AUTO: 31.1 % (ref 37–48.5)
HGB BLD-MCNC: 9.4 G/DL (ref 12–16)
IMM GRANULOCYTES # BLD AUTO: 0.03 K/UL (ref 0–0.04)
IMM GRANULOCYTES NFR BLD AUTO: 0.4 % (ref 0–0.5)
LYMPHOCYTES # BLD AUTO: 3 K/UL (ref 1–4.8)
LYMPHOCYTES NFR BLD: 43.7 % (ref 18–48)
MCH RBC QN AUTO: 34.4 PG (ref 27–31)
MCHC RBC AUTO-ENTMCNC: 30.2 G/DL (ref 32–36)
MCV RBC AUTO: 114 FL (ref 82–98)
MONOCYTES # BLD AUTO: 0.5 K/UL (ref 0.3–1)
MONOCYTES NFR BLD: 7.9 % (ref 4–15)
NEUTROPHILS # BLD AUTO: 3.1 K/UL (ref 1.8–7.7)
NEUTROPHILS NFR BLD: 45.5 % (ref 38–73)
NRBC BLD-RTO: 0 /100 WBC
PLATELET # BLD AUTO: 145 K/UL (ref 150–350)
PMV BLD AUTO: 10.9 FL (ref 9.2–12.9)
RBC # BLD AUTO: 2.73 M/UL (ref 4–5.4)
WBC # BLD AUTO: 6.75 K/UL (ref 3.9–12.7)

## 2020-09-23 PROCEDURE — 85025 COMPLETE CBC W/AUTO DIFF WBC: CPT

## 2020-09-23 PROCEDURE — 36415 COLL VENOUS BLD VENIPUNCTURE: CPT | Mod: PO

## 2020-09-23 PROCEDURE — 80053 COMPREHEN METABOLIC PANEL: CPT

## 2020-09-24 ENCOUNTER — OFFICE VISIT (OUTPATIENT)
Dept: HEMATOLOGY/ONCOLOGY | Facility: CLINIC | Age: 85
End: 2020-09-24
Payer: MEDICARE

## 2020-09-24 ENCOUNTER — INFUSION (OUTPATIENT)
Dept: INFUSION THERAPY | Facility: HOSPITAL | Age: 85
End: 2020-09-24
Attending: NURSE PRACTITIONER
Payer: MEDICARE

## 2020-09-24 VITALS
WEIGHT: 146.81 LBS | HEART RATE: 58 BPM | DIASTOLIC BLOOD PRESSURE: 74 MMHG | SYSTOLIC BLOOD PRESSURE: 126 MMHG | BODY MASS INDEX: 27.02 KG/M2 | HEIGHT: 62 IN | TEMPERATURE: 98 F | OXYGEN SATURATION: 99 %

## 2020-09-24 VITALS
SYSTOLIC BLOOD PRESSURE: 136 MMHG | WEIGHT: 147.06 LBS | TEMPERATURE: 97 F | RESPIRATION RATE: 18 BRPM | HEART RATE: 70 BPM | OXYGEN SATURATION: 98 % | BODY MASS INDEX: 27.06 KG/M2 | HEIGHT: 62 IN | DIASTOLIC BLOOD PRESSURE: 74 MMHG

## 2020-09-24 DIAGNOSIS — D69.6 THROMBOCYTOPENIA: Primary | ICD-10-CM

## 2020-09-24 DIAGNOSIS — E86.0 DEHYDRATION: ICD-10-CM

## 2020-09-24 DIAGNOSIS — C53.0 MALIGNANT NEOPLASM OF ENDOCERVIX: ICD-10-CM

## 2020-09-24 DIAGNOSIS — C53.0 MALIGNANT NEOPLASM OF ENDOCERVIX: Primary | ICD-10-CM

## 2020-09-24 DIAGNOSIS — E53.8 B12 DEFICIENCY: ICD-10-CM

## 2020-09-24 DIAGNOSIS — R60.9 EDEMA, UNSPECIFIED TYPE: ICD-10-CM

## 2020-09-24 DIAGNOSIS — E83.42 HYPOMAGNESEMIA: ICD-10-CM

## 2020-09-24 LAB
ALBUMIN SERPL BCP-MCNC: 4 G/DL (ref 3.5–5.2)
ALP SERPL-CCNC: 90 U/L (ref 55–135)
ALT SERPL W/O P-5'-P-CCNC: 18 U/L (ref 10–44)
ANION GAP SERPL CALC-SCNC: 11 MMOL/L (ref 8–16)
AST SERPL-CCNC: 21 U/L (ref 10–40)
BILIRUB SERPL-MCNC: 0.5 MG/DL (ref 0.1–1)
BUN SERPL-MCNC: 29 MG/DL (ref 8–23)
CALCIUM SERPL-MCNC: 9.3 MG/DL (ref 8.7–10.5)
CHLORIDE SERPL-SCNC: 110 MMOL/L (ref 95–110)
CO2 SERPL-SCNC: 19 MMOL/L (ref 23–29)
CREAT SERPL-MCNC: 1.2 MG/DL (ref 0.5–1.4)
EST. GFR  (AFRICAN AMERICAN): 48.3 ML/MIN/1.73 M^2
EST. GFR  (NON AFRICAN AMERICAN): 41.9 ML/MIN/1.73 M^2
GLUCOSE SERPL-MCNC: 104 MG/DL (ref 70–110)
POTASSIUM SERPL-SCNC: 4.5 MMOL/L (ref 3.5–5.1)
PROT SERPL-MCNC: 7.1 G/DL (ref 6–8.4)
SODIUM SERPL-SCNC: 140 MMOL/L (ref 136–145)

## 2020-09-24 PROCEDURE — 25000003 PHARM REV CODE 250: Performed by: NURSE PRACTITIONER

## 2020-09-24 PROCEDURE — 63600175 PHARM REV CODE 636 W HCPCS: Performed by: INTERNAL MEDICINE

## 2020-09-24 PROCEDURE — 96361 HYDRATE IV INFUSION ADD-ON: CPT

## 2020-09-24 PROCEDURE — 99215 OFFICE O/P EST HI 40 MIN: CPT | Mod: S$PBB,,, | Performed by: NURSE PRACTITIONER

## 2020-09-24 PROCEDURE — 63600175 PHARM REV CODE 636 W HCPCS: Performed by: NURSE PRACTITIONER

## 2020-09-24 PROCEDURE — 99215 PR OFFICE/OUTPT VISIT, EST, LEVL V, 40-54 MIN: ICD-10-PCS | Mod: S$PBB,,, | Performed by: NURSE PRACTITIONER

## 2020-09-24 PROCEDURE — S0028 INJECTION, FAMOTIDINE, 20 MG: HCPCS | Performed by: NURSE PRACTITIONER

## 2020-09-24 PROCEDURE — 25000003 PHARM REV CODE 250: Performed by: INTERNAL MEDICINE

## 2020-09-24 PROCEDURE — 99999 PR PBB SHADOW E&M-EST. PATIENT-LVL IV: CPT | Mod: PBBFAC,,, | Performed by: NURSE PRACTITIONER

## 2020-09-24 PROCEDURE — 96367 TX/PROPH/DG ADDL SEQ IV INF: CPT

## 2020-09-24 PROCEDURE — 99214 OFFICE O/P EST MOD 30 MIN: CPT | Mod: PBBFAC,25 | Performed by: NURSE PRACTITIONER

## 2020-09-24 PROCEDURE — 96413 CHEMO IV INFUSION 1 HR: CPT

## 2020-09-24 PROCEDURE — 96417 CHEMO IV INFUS EACH ADDL SEQ: CPT

## 2020-09-24 PROCEDURE — 96375 TX/PRO/DX INJ NEW DRUG ADDON: CPT

## 2020-09-24 PROCEDURE — 99999 PR PBB SHADOW E&M-EST. PATIENT-LVL IV: ICD-10-PCS | Mod: PBBFAC,,, | Performed by: NURSE PRACTITIONER

## 2020-09-24 RX ORDER — SODIUM CHLORIDE 0.9 % (FLUSH) 0.9 %
10 SYRINGE (ML) INJECTION
Status: CANCELLED | OUTPATIENT
Start: 2020-09-24

## 2020-09-24 RX ORDER — FAMOTIDINE 10 MG/ML
20 INJECTION INTRAVENOUS
Status: COMPLETED | OUTPATIENT
Start: 2020-09-24 | End: 2020-09-24

## 2020-09-24 RX ORDER — HEPARIN 100 UNIT/ML
500 SYRINGE INTRAVENOUS
Status: DISCONTINUED | OUTPATIENT
Start: 2020-09-24 | End: 2020-09-24 | Stop reason: HOSPADM

## 2020-09-24 RX ORDER — HEPARIN 100 UNIT/ML
500 SYRINGE INTRAVENOUS
Status: CANCELLED | OUTPATIENT
Start: 2020-09-24

## 2020-09-24 RX ADMIN — FAMOTIDINE 20 MG: 10 INJECTION, SOLUTION INTRAVENOUS at 11:09

## 2020-09-24 RX ADMIN — DEXAMETHASONE SODIUM PHOSPHATE: 4 INJECTION, SOLUTION INTRAMUSCULAR; INTRAVENOUS at 11:09

## 2020-09-24 RX ADMIN — HEPARIN 500 UNITS: 100 SYRINGE at 02:09

## 2020-09-24 RX ADMIN — PACLITAXEL 84 MG: 6 INJECTION, SOLUTION INTRAVENOUS at 12:09

## 2020-09-24 RX ADMIN — SODIUM CHLORIDE 250 ML: 0.9 INJECTION, SOLUTION INTRAVENOUS at 01:09

## 2020-09-24 RX ADMIN — CARBOPLATIN 115 MG: 10 INJECTION, SOLUTION INTRAVENOUS at 01:09

## 2020-09-24 RX ADMIN — DIPHENHYDRAMINE HYDROCHLORIDE 50 MG: 50 INJECTION INTRAMUSCULAR; INTRAVENOUS at 11:09
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
## 2020-09-25 ENCOUNTER — HOSPITAL ENCOUNTER (OUTPATIENT)
Dept: RADIOLOGY | Facility: HOSPITAL | Age: 85
Discharge: HOME | End: 2020-09-25
Attending: NURSE PRACTITIONER
Payer: MEDICARE

## 2020-09-25 ENCOUNTER — TELEPHONE (OUTPATIENT)
Dept: HEMATOLOGY/ONCOLOGY | Facility: CLINIC | Age: 85
End: 2020-09-25

## 2020-09-25 DIAGNOSIS — R60.9 EDEMA, UNSPECIFIED TYPE: ICD-10-CM

## 2020-09-25 PROCEDURE — 93970 EXTREMITY STUDY: CPT | Mod: 26,,, | Performed by: RADIOLOGY

## 2020-09-25 PROCEDURE — 93970 US LOWER EXTREMITY VEINS BILATERAL: ICD-10-PCS | Mod: 26,,, | Performed by: RADIOLOGY

## 2020-09-25 PROCEDURE — 93970 EXTREMITY STUDY: CPT | Mod: TC

## 2020-09-25 NOTE — TELEPHONE ENCOUNTER
----- Message from Criselda Beatty NP sent at 9/25/2020 11:14 AM CDT -----  Please let patient know that there is no evidence of DVT.  Continue to elevate legs when in seated position, wear compression stocking (with non slip shoes/socks), reduce sodium intake.  Thank you

## 2020-09-25 NOTE — PROGRESS NOTES
Please let patient know that there is no evidence of DVT.  Continue to elevate legs when in seated position, wear compression stocking (with non slip shoes/socks), reduce sodium intake.  Thank you

## 2020-09-30 ENCOUNTER — LAB VISIT (OUTPATIENT)
Dept: LAB | Facility: HOSPITAL | Age: 85
End: 2020-09-30
Attending: NURSE PRACTITIONER
Payer: MEDICARE

## 2020-09-30 DIAGNOSIS — C53.0 MALIGNANT NEOPLASM OF ENDOCERVIX: ICD-10-CM

## 2020-09-30 LAB
ALBUMIN SERPL BCP-MCNC: 3.6 G/DL (ref 3.5–5.2)
ALP SERPL-CCNC: 79 U/L (ref 55–135)
ALT SERPL W/O P-5'-P-CCNC: 13 U/L (ref 10–44)
ANION GAP SERPL CALC-SCNC: 10 MMOL/L (ref 8–16)
AST SERPL-CCNC: 17 U/L (ref 10–40)
BASOPHILS # BLD AUTO: 0.03 K/UL (ref 0–0.2)
BASOPHILS NFR BLD: 0.8 % (ref 0–1.9)
BILIRUB SERPL-MCNC: 0.4 MG/DL (ref 0.1–1)
BUN SERPL-MCNC: 28 MG/DL (ref 8–23)
CALCIUM SERPL-MCNC: 8.8 MG/DL (ref 8.7–10.5)
CHLORIDE SERPL-SCNC: 110 MMOL/L (ref 95–110)
CO2 SERPL-SCNC: 20 MMOL/L (ref 23–29)
CREAT SERPL-MCNC: 1 MG/DL (ref 0.5–1.4)
DIFFERENTIAL METHOD: ABNORMAL
EOSINOPHIL # BLD AUTO: 0 K/UL (ref 0–0.5)
EOSINOPHIL NFR BLD: 1 % (ref 0–8)
ERYTHROCYTE [DISTWIDTH] IN BLOOD BY AUTOMATED COUNT: 17.2 % (ref 11.5–14.5)
EST. GFR  (AFRICAN AMERICAN): >60 ML/MIN/1.73 M^2
EST. GFR  (NON AFRICAN AMERICAN): 52 ML/MIN/1.73 M^2
HCT VFR BLD AUTO: 25.3 % (ref 37–48.5)
HGB BLD-MCNC: 7.7 G/DL (ref 12–16)
IMM GRANULOCYTES # BLD AUTO: 0.02 K/UL (ref 0–0.04)
IMM GRANULOCYTES NFR BLD AUTO: 0.5 % (ref 0–0.5)
LYMPHOCYTES # BLD AUTO: 1.2 K/UL (ref 1–4.8)
LYMPHOCYTES NFR BLD: 29.9 % (ref 18–48)
MAGNESIUM SERPL-MCNC: 1.7 MG/DL (ref 1.6–2.6)
MCH RBC QN AUTO: 34.5 PG (ref 27–31)
MCHC RBC AUTO-ENTMCNC: 30.4 G/DL (ref 32–36)
MCV RBC AUTO: 114 FL (ref 82–98)
MONOCYTES # BLD AUTO: 0.3 K/UL (ref 0.3–1)
MONOCYTES NFR BLD: 6.5 % (ref 4–15)
NEUTROPHILS # BLD AUTO: 2.4 K/UL (ref 1.8–7.7)
NEUTROPHILS NFR BLD: 61.3 % (ref 38–73)
NRBC BLD-RTO: 0 /100 WBC
PLATELET # BLD AUTO: 101 K/UL (ref 150–350)
PMV BLD AUTO: 11.2 FL (ref 9.2–12.9)
POTASSIUM SERPL-SCNC: 4.9 MMOL/L (ref 3.5–5.1)
PROT SERPL-MCNC: 6.5 G/DL (ref 6–8.4)
RBC # BLD AUTO: 2.23 M/UL (ref 4–5.4)
SODIUM SERPL-SCNC: 140 MMOL/L (ref 136–145)
WBC # BLD AUTO: 3.85 K/UL (ref 3.9–12.7)

## 2020-09-30 PROCEDURE — 36415 COLL VENOUS BLD VENIPUNCTURE: CPT | Mod: PO

## 2020-09-30 PROCEDURE — 85025 COMPLETE CBC W/AUTO DIFF WBC: CPT

## 2020-09-30 PROCEDURE — 80053 COMPREHEN METABOLIC PANEL: CPT

## 2020-09-30 PROCEDURE — 83735 ASSAY OF MAGNESIUM: CPT

## 2020-09-30 NOTE — PROGRESS NOTES
Subjective:       Patient ID: Anna Seay is a 85 y.o. female.    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 Anna Seay in Hematology Oncology clinic at [facility] on 09/30/2020.  Ms. Anna Seay is a 85 y.o. woman with metastatic cervical cancer from [place] currently on weekly carboplatin taxol. Reports doing well, denies neuropathy.  Eating well and gaining weight.    Oncology History   Malignant neoplasm of endocervix   6/27/2019 Initial Diagnosis    Malignant neoplasm of exocervix     7/8/2019 - 7/8/2019 Chemotherapy    Treatment Summary   Plan Name: OP CARBOPLATIN WEEKLY  Treatment Goal: Palliative  Status: Inactive  Start Date: [No treatment day found]  End Date: [No treatment day found]  Provider: Jaime Pinon MD  Chemotherapy: CARBOplatin (PARAPLATIN) in sodium chloride 0.9% 250 mL chemo infusion, , Intravenous, Clinic/HOD 1 time, 0 of 4 cycles     7/9/2019 - 11/25/2019 Chemotherapy    Treatment Summary   Plan Name: OP GYN CARBOPLATIN (AUC) Q4W  Treatment Goal: Palliative  Status: Inactive  Start Date: 7/9/2019  End Date: 10/29/2019  Provider: Jaime Pinon MD  Chemotherapy: CARBOplatin (PARAPLATIN) in sodium chloride 0.9% 250 mL chemo infusion,  (original dose 341.5 mg), Intravenous, Clinic/HOD 1 time, 5 of 6 cycles  Dose modification:   (original dose 341.5 mg, Cycle 1)     5/19/2020 - 6/16/2020 Chemotherapy    Treatment Summary   Plan Name: OP  PACLITAXEL CARBOPLATIN WEEKLY  Treatment Goal: Palliative  Status: Inactive  Start Date: 5/19/2020  End Date: 5/22/2020  Provider: Annette Donis NP  Chemotherapy: CARBOplatin (PARAPLATIN) 115 mg in sodium chloride 0.9% 250 mL chemo infusion, 115 mg (98.6 % of original dose 118.4 mg), Intravenous, Clinic/HOD 1 time, 0 of 1 cycle  Dose modification:   (original dose 118.4 mg, Cycle 1)  PACLitaxeL (TAXOL) 80 mg/m2 = 126 mg in sodium chloride 0.9% 250 mL chemo infusion,  80 mg/m2 = 126 mg (100 % of original dose 80 mg/m2), Intravenous, Clinic/HOD 1 time, 0 of 1 cycle  Dose modification: 80 mg/m2 (original dose 80 mg/m2, Cycle 1)     5/19/2020 -  Chemotherapy    Treatment Summary   Plan Name:  PACLITAXEL CARBOPLATIN WEEKLY  Treatment Goal: Palliative  Status: Active  Start Date: 5/19/2020  End Date: 10/22/2020 (Planned)  Provider: Annette Donis NP  Chemotherapy: CARBOplatin (PARAPLATIN) 115 mg in sodium chloride 0.9% 250 mL chemo infusion, 115 mg (100 % of original dose 114.8 mg), Intravenous, Clinic/HOD 1 time, 2 of 2 cycles  Dose modification:   (original dose 114.8 mg, Cycle 1, Reason: MD Discretion), 100 mg (original dose 114.8 mg, Cycle 1),   (original dose 114.8 mg, Cycle 2, Reason: MD Discretion),   (original dose 114.8 mg, Cycle 2)  Administration: 115 mg (9/17/2020), 115 mg (9/24/2020), 130 mg (9/3/2020)  PACLitaxeL (TAXOL) 80 mg/m2 = 126 mg in sodium chloride 0.9% 250 mL chemo infusion, 80 mg/m2 = 126 mg (100 % of original dose 80 mg/m2), Intravenous, Clinic/HOD 1 time, 2 of 2 cycles  Dose modification: 80 mg/m2 (original dose 80 mg/m2, Cycle 1), 50 mg/m2 (original dose 80 mg/m2, Cycle 1)  Administration: 84 mg (9/3/2020), 84 mg (9/17/2020), 84 mg (9/24/2020)       Past Medical History:   Diagnosis Date    Anemia     Cataract     Cervical cancer 05/2019    Cervical cancer 6/25/2019    Hypertension      Family History   Problem Relation Age of Onset    Hypertension Mother     Pneumonia Father     Breast cancer Sister     No Known Problems Brother     Leukemia Daughter     No Known Problems Son      Social History     Socioeconomic History    Marital status:      Spouse name: Not on file    Number of children: Not on file    Years of education: Not on file    Highest education level: Not on file   Occupational History    Not on file   Social Needs    Financial resource strain: Patient refused    Food insecurity     Worry: Patient refused     Inability:  Patient refused    Transportation needs     Medical: Patient refused     Non-medical: Patient refused   Tobacco Use    Smoking status: Never Smoker    Smokeless tobacco: Never Used   Substance and Sexual Activity    Alcohol use: Not Currently    Drug use: Never    Sexual activity: Not on file   Lifestyle    Physical activity     Days per week: Patient refused     Minutes per session: Patient refused    Stress: Patient refused   Relationships    Social connections     Talks on phone: Patient refused     Gets together: Patient refused     Attends Confucianism service: Patient refused     Active member of club or organization: Patient refused     Attends meetings of clubs or organizations: Patient refused     Relationship status: Patient refused   Other Topics Concern    Not on file   Social History Narrative    Not on file     Past Surgical History:   Procedure Laterality Date    CATARACT EXTRACTION, BILATERAL      WRIST SURGERY  1984     Current Outpatient Medications   Medication Sig Dispense Refill    cloNIDine (CATAPRES) 0.3 MG tablet TAKE 1 TABLET(0.3 MG) BY MOUTH TWICE DAILY 180 tablet 2    FENOFIBRATE ORAL Take by mouth 2 (two) times daily.      ferrous sulfate (FEOSOL) 325 mg (65 mg iron) Tab tablet TAKE 1 TABLET BY MOUTH TWICE DAILY 180 tablet 1    loratadine (CLARITIN) 10 mg tablet Take 10 mg by mouth once daily.      magnesium oxide (MAGOX) 400 mg (241.3 mg magnesium) tablet Take 1 tablet (400 mg total) by mouth once daily. 30 tablet 6    megestroL (MEGACE) 40 MG Tab TAKE 1 TABLET(40 MG) BY MOUTH TWICE DAILY 60 tablet 6    NIFEdipine (PROCARDIA-XL) 30 MG (OSM) 24 hr tablet TAKE 1 TABLET BY MOUTH ONCE DAILY 90 tablet 5    ondansetron (ZOFRAN-ODT) 8 MG TbDL Take 1 tablet (8 mg total) by mouth every 8 (eight) hours as needed (chemotherapy-induced nausea and vomiting). 40 tablet 5    simvastatin (ZOCOR) 20 MG tablet Take 20 mg by mouth every evening.      varicella-zoster gE-AS01B, PF,  (SHINGRIX, PF,) 50 mcg/0.5 mL injection Administer two doses 2 months apart 0.5 mL 0     No current facility-administered medications for this visit.        Labs:  Lab Results   Component Value Date    WBC 3.85 (L) 09/30/2020    HGB 7.7 (L) 09/30/2020    HCT 25.3 (L) 09/30/2020     (H) 09/30/2020     (L) 09/30/2020     BMP  Lab Results   Component Value Date     09/30/2020    K 4.9 09/30/2020     09/30/2020    CO2 20 (L) 09/30/2020    BUN 28 (H) 09/30/2020    CREATININE 1.0 09/30/2020    CALCIUM 8.8 09/30/2020    ANIONGAP 10 09/30/2020    ESTGFRAFRICA >60 09/30/2020    EGFRNONAA 52 (A) 09/30/2020     Lab Results   Component Value Date    ALT 13 09/30/2020    AST 17 09/30/2020    ALKPHOS 79 09/30/2020    BILITOT 0.4 09/30/2020       Lab Results   Component Value Date    IRON 70 09/16/2020    TIBC 266 09/16/2020    FERRITIN 1,541 (H) 09/16/2020     Lab Results   Component Value Date    YGMNYNJD02 204 (L) 09/16/2020     Lab Results   Component Value Date    FOLATE 15.0 09/16/2020     No results found for: TSH    I have reviewed the radiology reports and examined the scan/xray images.    Review of Systems   Constitutional: Negative.    HENT: Negative.    Eyes: Negative.    Respiratory: Negative.    Cardiovascular: Negative.    Gastrointestinal: Negative.    Endocrine: Negative.    Genitourinary: Negative.    Musculoskeletal: Negative.    Skin: Negative.    Allergic/Immunologic: Negative.    Neurological: Negative.    Hematological: Negative.    Psychiatric/Behavioral: Negative.      ECOG SCORE    0 - Fully active-able to carry on all pre-disease performance without restriction            Objective:     Vitals:    10/01/20 1145   BP: (!) 147/75   Pulse: 90   Temp: 97.9 °F (36.6 °C)   Body mass index is 26.98 kg/m².  Physical Exam  Vitals signs and nursing note reviewed.   Constitutional:       Appearance: She is well-developed.   HENT:      Head: Normocephalic and atraumatic.   Eyes:       Conjunctiva/sclera: Conjunctivae normal.   Neck:      Musculoskeletal: Normal range of motion and neck supple.   Cardiovascular:      Rate and Rhythm: Normal rate and regular rhythm.   Pulmonary:      Effort: Pulmonary effort is normal.      Breath sounds: Normal breath sounds.   Abdominal:      General: Bowel sounds are normal.      Palpations: Abdomen is soft.   Musculoskeletal: Normal range of motion.   Skin:     General: Skin is warm and dry.   Neurological:      Mental Status: She is alert and oriented to person, place, and time.   Psychiatric:         Behavior: Behavior normal.         Thought Content: Thought content normal.         Judgment: Judgment normal.           Assessment:      1. Malignant neoplasm of endocervix           Plan:     Malignant neoplasm of endocervix  Blood counts were low, will postpone chemo until next week.  -     CBC auto differential; Future; Expected date: 10/08/2020  -     Comprehensive metabolic panel; Future; Expected date: 10/08/2020

## 2020-10-01 ENCOUNTER — OFFICE VISIT (OUTPATIENT)
Dept: HEMATOLOGY/ONCOLOGY | Facility: CLINIC | Age: 85
End: 2020-10-01
Payer: MEDICARE

## 2020-10-01 VITALS
TEMPERATURE: 98 F | WEIGHT: 147.5 LBS | BODY MASS INDEX: 27.14 KG/M2 | HEART RATE: 90 BPM | HEIGHT: 62 IN | SYSTOLIC BLOOD PRESSURE: 147 MMHG | DIASTOLIC BLOOD PRESSURE: 75 MMHG | OXYGEN SATURATION: 99 %

## 2020-10-01 DIAGNOSIS — C53.0 MALIGNANT NEOPLASM OF ENDOCERVIX: Primary | ICD-10-CM

## 2020-10-01 PROCEDURE — 99999 PR PBB SHADOW E&M-EST. PATIENT-LVL III: ICD-10-PCS | Mod: PBBFAC,,, | Performed by: INTERNAL MEDICINE

## 2020-10-01 PROCEDURE — 99213 OFFICE O/P EST LOW 20 MIN: CPT | Mod: PBBFAC | Performed by: INTERNAL MEDICINE

## 2020-10-01 PROCEDURE — 99999 PR PBB SHADOW E&M-EST. PATIENT-LVL III: CPT | Mod: PBBFAC,,, | Performed by: INTERNAL MEDICINE

## 2020-10-01 PROCEDURE — 99213 OFFICE O/P EST LOW 20 MIN: CPT | Mod: S$PBB,,, | Performed by: INTERNAL MEDICINE

## 2020-10-01 PROCEDURE — 99213 PR OFFICE/OUTPT VISIT, EST, LEVL III, 20-29 MIN: ICD-10-PCS | Mod: S$PBB,,, | Performed by: INTERNAL MEDICINE

## 2020-10-07 NOTE — PROGRESS NOTES
Subjective:       Patient ID: Anna Seay is a 85 y.o. female.    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 Anna Seay in Hematology Oncology clinic at [facility] on 10/06/2020.  Ms. Anna Seay is a 85 y.o. woman with metastatic cervical cancer from [place] currently on weekly carboplatin taxol. Reports doing well, denies neuropathy.  Eating well and gaining weight.    Oncology History   Malignant neoplasm of endocervix   6/27/2019 Initial Diagnosis    Malignant neoplasm of exocervix     7/8/2019 - 7/8/2019 Chemotherapy    Treatment Summary   Plan Name: OP CARBOPLATIN WEEKLY  Treatment Goal: Palliative  Status: Inactive  Start Date: [No treatment day found]  End Date: [No treatment day found]  Provider: Jaime Pinon MD  Chemotherapy: CARBOplatin (PARAPLATIN) in sodium chloride 0.9% 250 mL chemo infusion, , Intravenous, Clinic/HOD 1 time, 0 of 4 cycles     7/9/2019 - 11/25/2019 Chemotherapy    Treatment Summary   Plan Name: OP GYN CARBOPLATIN (AUC) Q4W  Treatment Goal: Palliative  Status: Inactive  Start Date: 7/9/2019  End Date: 10/29/2019  Provider: Jaime Pinon MD  Chemotherapy: CARBOplatin (PARAPLATIN) in sodium chloride 0.9% 250 mL chemo infusion,  (original dose 341.5 mg), Intravenous, Clinic/HOD 1 time, 5 of 6 cycles  Dose modification:   (original dose 341.5 mg, Cycle 1)     5/19/2020 - 6/16/2020 Chemotherapy    Treatment Summary   Plan Name: OP  PACLITAXEL CARBOPLATIN WEEKLY  Treatment Goal: Palliative  Status: Inactive  Start Date: 5/19/2020  End Date: 5/22/2020  Provider: Annette Donis NP  Chemotherapy: CARBOplatin (PARAPLATIN) 115 mg in sodium chloride 0.9% 250 mL chemo infusion, 115 mg (98.6 % of original dose 118.4 mg), Intravenous, Clinic/HOD 1 time, 0 of 1 cycle  Dose modification:   (original dose 118.4 mg, Cycle 1)  PACLitaxeL (TAXOL) 80 mg/m2 = 126 mg in sodium chloride 0.9% 250 mL chemo infusion,  80 mg/m2 = 126 mg (100 % of original dose 80 mg/m2), Intravenous, Clinic/HOD 1 time, 0 of 1 cycle  Dose modification: 80 mg/m2 (original dose 80 mg/m2, Cycle 1)     5/19/2020 -  Chemotherapy    Treatment Summary   Plan Name:  PACLITAXEL CARBOPLATIN WEEKLY  Treatment Goal: Palliative  Status: Active  Start Date: 5/19/2020  End Date: 10/22/2020 (Planned)  Provider: Annette Donis NP  Chemotherapy: CARBOplatin (PARAPLATIN) 115 mg in sodium chloride 0.9% 250 mL chemo infusion, 115 mg (100 % of original dose 114.8 mg), Intravenous, Clinic/HOD 1 time, 2 of 2 cycles  Dose modification:   (original dose 114.8 mg, Cycle 1, Reason: MD Discretion), 100 mg (original dose 114.8 mg, Cycle 1),   (original dose 114.8 mg, Cycle 2, Reason: MD Discretion),   (original dose 114.8 mg, Cycle 2)  Administration: 115 mg (9/17/2020), 115 mg (9/24/2020), 130 mg (9/3/2020)  PACLitaxeL (TAXOL) 80 mg/m2 = 126 mg in sodium chloride 0.9% 250 mL chemo infusion, 80 mg/m2 = 126 mg (100 % of original dose 80 mg/m2), Intravenous, Clinic/HOD 1 time, 2 of 2 cycles  Dose modification: 80 mg/m2 (original dose 80 mg/m2, Cycle 1), 50 mg/m2 (original dose 80 mg/m2, Cycle 1)  Administration: 84 mg (9/3/2020), 84 mg (9/17/2020), 84 mg (9/24/2020)       Past Medical History:   Diagnosis Date    Anemia     Cataract     Cervical cancer 05/2019    Cervical cancer 6/25/2019    Hypertension      Family History   Problem Relation Age of Onset    Hypertension Mother     Pneumonia Father     Breast cancer Sister     No Known Problems Brother     Leukemia Daughter     No Known Problems Son      Social History     Socioeconomic History    Marital status:      Spouse name: Not on file    Number of children: Not on file    Years of education: Not on file    Highest education level: Not on file   Occupational History    Not on file   Social Needs    Financial resource strain: Patient refused    Food insecurity     Worry: Patient refused     Inability:  Patient refused    Transportation needs     Medical: Patient refused     Non-medical: Patient refused   Tobacco Use    Smoking status: Never Smoker    Smokeless tobacco: Never Used   Substance and Sexual Activity    Alcohol use: Not Currently    Drug use: Never    Sexual activity: Not on file   Lifestyle    Physical activity     Days per week: Patient refused     Minutes per session: Patient refused    Stress: Patient refused   Relationships    Social connections     Talks on phone: Patient refused     Gets together: Patient refused     Attends Religion service: Patient refused     Active member of club or organization: Patient refused     Attends meetings of clubs or organizations: Patient refused     Relationship status: Patient refused   Other Topics Concern    Not on file   Social History Narrative    Not on file     Past Surgical History:   Procedure Laterality Date    CATARACT EXTRACTION, BILATERAL      WRIST SURGERY  1984     Current Outpatient Medications   Medication Sig Dispense Refill    cloNIDine (CATAPRES) 0.3 MG tablet TAKE 1 TABLET(0.3 MG) BY MOUTH TWICE DAILY 180 tablet 2    FENOFIBRATE ORAL Take by mouth 2 (two) times daily.      ferrous sulfate (FEOSOL) 325 mg (65 mg iron) Tab tablet TAKE 1 TABLET BY MOUTH TWICE DAILY 180 tablet 1    loratadine (CLARITIN) 10 mg tablet Take 10 mg by mouth once daily.      magnesium oxide (MAGOX) 400 mg (241.3 mg magnesium) tablet Take 1 tablet (400 mg total) by mouth once daily. 30 tablet 6    megestroL (MEGACE) 40 MG Tab TAKE 1 TABLET(40 MG) BY MOUTH TWICE DAILY 60 tablet 6    NIFEdipine (PROCARDIA-XL) 30 MG (OSM) 24 hr tablet TAKE 1 TABLET BY MOUTH ONCE DAILY 90 tablet 5    ondansetron (ZOFRAN-ODT) 8 MG TbDL Take 1 tablet (8 mg total) by mouth every 8 (eight) hours as needed (chemotherapy-induced nausea and vomiting). 40 tablet 5    simvastatin (ZOCOR) 20 MG tablet Take 20 mg by mouth every evening.      varicella-zoster [id number], PF,  (SHINGRIX, PF,) 50 mcg/0.5 mL injection Administer two doses 2 months apart (Patient not taking: Reported on 10/1/2020) 0.5 mL 0     No current facility-administered medications for this visit.        Labs:  Lab Results   Component Value Date    WBC 3.85 (L) 09/30/2020    HGB 7.7 (L) 09/30/2020    HCT 25.3 (L) 09/30/2020     (H) 09/30/2020     (L) 09/30/2020     BMP  Lab Results   Component Value Date     09/30/2020    K 4.9 09/30/2020     09/30/2020    CO2 20 (L) 09/30/2020    BUN 28 (H) 09/30/2020    CREATININE 1.0 09/30/2020    CALCIUM 8.8 09/30/2020    ANIONGAP 10 09/30/2020    ESTGFRAFRICA >60 09/30/2020    EGFRNONAA 52 (A) 09/30/2020     Lab Results   Component Value Date    ALT 13 09/30/2020    AST 17 09/30/2020    ALKPHOS 79 09/30/2020    BILITOT 0.4 09/30/2020       Lab Results   Component Value Date    IRON 70 09/16/2020    TIBC 266 09/16/2020    FERRITIN 1,541 (H) 09/16/2020     Lab Results   Component Value Date    QNWAROUQ85 204 (L) 09/16/2020     Lab Results   Component Value Date    FOLATE 15.0 09/16/2020     No results found for: TSH    I have reviewed the radiology reports and examined the scan/xray images.    Review of Systems   Constitutional: Negative.    HENT: Negative.    Eyes: Negative.    Respiratory: Negative.    Cardiovascular: Negative.    Gastrointestinal: Negative.    Endocrine: Negative.    Genitourinary: Negative.    Musculoskeletal: Negative.    Skin: Negative.    Allergic/Immunologic: Negative.    Neurological: Negative.    Hematological: Negative.    Psychiatric/Behavioral: Negative.      ECOG SCORE    0 - Fully active-able to carry on all pre-disease performance without restriction            Objective:     Vitals:    10/08/20 0814   BP: (!) 179/79   Pulse: 98   Temp: 97.8 °F (36.6 °C)   Body mass index is 27.81 kg/m².  Physical Exam  Vitals signs and nursing note reviewed.   Constitutional:       Appearance: She is well-developed.   HENT:      Head:  Normocephalic and atraumatic.   Eyes:      Conjunctiva/sclera: Conjunctivae normal.   Neck:      Musculoskeletal: Normal range of motion and neck supple.   Cardiovascular:      Rate and Rhythm: Normal rate and regular rhythm.   Pulmonary:      Effort: Pulmonary effort is normal.      Breath sounds: Normal breath sounds.   Abdominal:      General: Bowel sounds are normal.      Palpations: Abdomen is soft.   Musculoskeletal: Normal range of motion.   Skin:     General: Skin is warm and dry.   Neurological:      Mental Status: She is alert and oriented to person, place, and time.   Psychiatric:         Behavior: Behavior normal.         Thought Content: Thought content normal.         Judgment: Judgment normal.           Assessment:      1. Malignant neoplasm of endocervix           Plan:     Malignant neoplasm of endocervix  Continue taxol carbo weekly  -     CBC auto differential; Future; Expected date: 10/15/2020  -     Comprehensive Metabolic Panel; Future; Expected date: 10/15/2020  -     CBC auto differential; Future; Expected date: 10/08/2020  -     Comprehensive Metabolic Panel; Future; Expected date: 10/08/2020  -     Ferritin; Future; Expected date: 10/08/2020  -     Iron and TIBC; Future; Expected date: 10/08/2020  -     Vitamin B12; Future; Expected date: 10/08/2020  -     TSH; Future; Expected date: 10/08/2020  -     Folate; Future; Expected date: 10/08/2020    Nutritional anemia  Resume B12 injection monthly  -     Ferritin; Future; Expected date: 10/08/2020  -     Iron and TIBC; Future; Expected date: 10/08/2020  -     Vitamin B12; Future; Expected date: 10/08/2020  -     TSH; Future; Expected date: 10/08/2020  -     Folate; Future; Expected date: 10/08/2020

## 2020-10-08 ENCOUNTER — OFFICE VISIT (OUTPATIENT)
Dept: HEMATOLOGY/ONCOLOGY | Facility: CLINIC | Age: 85
End: 2020-10-08
Payer: MEDICARE

## 2020-10-08 ENCOUNTER — INFUSION (OUTPATIENT)
Dept: INFUSION THERAPY | Facility: HOSPITAL | Age: 85
End: 2020-10-08
Attending: NURSE PRACTITIONER
Payer: MEDICARE

## 2020-10-08 VITALS
HEART RATE: 98 BPM | OXYGEN SATURATION: 100 % | HEIGHT: 62 IN | BODY MASS INDEX: 27.97 KG/M2 | SYSTOLIC BLOOD PRESSURE: 179 MMHG | WEIGHT: 152 LBS | DIASTOLIC BLOOD PRESSURE: 79 MMHG | TEMPERATURE: 98 F

## 2020-10-08 VITALS
DIASTOLIC BLOOD PRESSURE: 73 MMHG | SYSTOLIC BLOOD PRESSURE: 140 MMHG | WEIGHT: 152 LBS | BODY MASS INDEX: 27.97 KG/M2 | OXYGEN SATURATION: 98 % | RESPIRATION RATE: 18 BRPM | HEART RATE: 70 BPM | TEMPERATURE: 98 F | HEIGHT: 62 IN

## 2020-10-08 DIAGNOSIS — E83.42 HYPOMAGNESEMIA: Primary | ICD-10-CM

## 2020-10-08 DIAGNOSIS — D53.9 NUTRITIONAL ANEMIA: ICD-10-CM

## 2020-10-08 DIAGNOSIS — C53.0 MALIGNANT NEOPLASM OF ENDOCERVIX: ICD-10-CM

## 2020-10-08 DIAGNOSIS — D51.3 OTHER DIETARY VITAMIN B12 DEFICIENCY ANEMIA: ICD-10-CM

## 2020-10-08 DIAGNOSIS — C53.0 MALIGNANT NEOPLASM OF ENDOCERVIX: Primary | ICD-10-CM

## 2020-10-08 PROCEDURE — 63600175 PHARM REV CODE 636 W HCPCS: Performed by: INTERNAL MEDICINE

## 2020-10-08 PROCEDURE — 96413 CHEMO IV INFUSION 1 HR: CPT

## 2020-10-08 PROCEDURE — 99999 PR PBB SHADOW E&M-EST. PATIENT-LVL IV: CPT | Mod: PBBFAC,,, | Performed by: INTERNAL MEDICINE

## 2020-10-08 PROCEDURE — 96375 TX/PRO/DX INJ NEW DRUG ADDON: CPT

## 2020-10-08 PROCEDURE — S0028 INJECTION, FAMOTIDINE, 20 MG: HCPCS | Performed by: NURSE PRACTITIONER

## 2020-10-08 PROCEDURE — 99215 OFFICE O/P EST HI 40 MIN: CPT | Mod: 25,S$PBB,, | Performed by: INTERNAL MEDICINE

## 2020-10-08 PROCEDURE — 25000003 PHARM REV CODE 250: Performed by: INTERNAL MEDICINE

## 2020-10-08 PROCEDURE — 96417 CHEMO IV INFUS EACH ADDL SEQ: CPT

## 2020-10-08 PROCEDURE — 25000003 PHARM REV CODE 250: Performed by: NURSE PRACTITIONER

## 2020-10-08 PROCEDURE — 96372 THER/PROPH/DIAG INJ SC/IM: CPT

## 2020-10-08 PROCEDURE — 63600175 PHARM REV CODE 636 W HCPCS: Performed by: NURSE PRACTITIONER

## 2020-10-08 PROCEDURE — 99999 PR PBB SHADOW E&M-EST. PATIENT-LVL IV: ICD-10-PCS | Mod: PBBFAC,,, | Performed by: INTERNAL MEDICINE

## 2020-10-08 PROCEDURE — 96367 TX/PROPH/DG ADDL SEQ IV INF: CPT

## 2020-10-08 PROCEDURE — 99214 OFFICE O/P EST MOD 30 MIN: CPT | Mod: PBBFAC | Performed by: INTERNAL MEDICINE

## 2020-10-08 PROCEDURE — 99215 PR OFFICE/OUTPT VISIT, EST, LEVL V, 40-54 MIN: ICD-10-PCS | Mod: 25,S$PBB,, | Performed by: INTERNAL MEDICINE

## 2020-10-08 RX ORDER — HEPARIN 100 UNIT/ML
500 SYRINGE INTRAVENOUS
Status: DISCONTINUED | OUTPATIENT
Start: 2020-10-08 | End: 2020-10-08 | Stop reason: HOSPADM

## 2020-10-08 RX ORDER — CYANOCOBALAMIN 1000 UG/ML
1000 INJECTION, SOLUTION INTRAMUSCULAR; SUBCUTANEOUS
Status: COMPLETED | OUTPATIENT
Start: 2020-10-08 | End: 2020-10-08

## 2020-10-08 RX ORDER — CYANOCOBALAMIN 1000 UG/ML
1000 INJECTION, SOLUTION INTRAMUSCULAR; SUBCUTANEOUS
Status: CANCELLED | OUTPATIENT
Start: 2020-11-08

## 2020-10-08 RX ORDER — CYANOCOBALAMIN 1000 UG/ML
1000 INJECTION, SOLUTION INTRAMUSCULAR; SUBCUTANEOUS
Status: CANCELLED | OUTPATIENT
Start: 2020-10-08

## 2020-10-08 RX ORDER — FAMOTIDINE 10 MG/ML
20 INJECTION INTRAVENOUS
Status: COMPLETED | OUTPATIENT
Start: 2020-10-08 | End: 2020-10-08

## 2020-10-08 RX ADMIN — DIPHENHYDRAMINE HYDROCHLORIDE 50 MG: 50 INJECTION INTRAMUSCULAR; INTRAVENOUS at 09:10

## 2020-10-08 RX ADMIN — FAMOTIDINE 20 MG: 10 INJECTION INTRAVENOUS at 09:10

## 2020-10-08 RX ADMIN — CYANOCOBALAMIN 1000 MCG: 1000 INJECTION, SOLUTION INTRAMUSCULAR at 09:10

## 2020-10-08 RX ADMIN — DEXAMETHASONE SODIUM PHOSPHATE: 4 INJECTION, SOLUTION INTRAMUSCULAR; INTRAVENOUS at 08:10

## 2020-10-08 RX ADMIN — HEPARIN 500 UNITS: 100 SYRINGE at 11:10

## 2020-10-08 RX ADMIN — CARBOPLATIN 125 MG: 10 INJECTION, SOLUTION INTRAVENOUS at 10:10

## 2020-10-08 RX ADMIN — PACLITAXEL 90 MG: 6 INJECTION, SOLUTION INTRAVENOUS at 09:10

## 2020-10-08 NOTE — DISCHARGE INSTRUCTIONS
.[facility]  [address]  [address]  [phone] phone     [phone] fax  Hours of Operation: Monday- Friday 8:00am- 5:00pm  After hours phone  [phone]  Hematology / Oncology Physicians on call      Dr. Rayray Beatty, TIM Ornelas NP Tyesha Taylor, NP    Please call with any concerns regarding your appointment today.  .FALL PREVENTION   Falls often occur due to slipping, tripping or losing your balance. Here are ways to reduce your risk of falling again.   Was there anything that caused your fall that can be fixed, removed or replaced?   Make your home safe by keeping walkways clear of objects you may trip over.   Use non-slip pads under rugs.   Do not walk in poorly lit areas.   Do not stand on chairs or wobbly ladders.   Use caution when reaching overhead or looking upward. This position can cause a loss of balance.   Be sure your shoes fit properly, have non-slip bottoms and are in good condition.   Be cautious when going up and down stairs, curbs, and when walking on uneven sidewalks.   If your balance is poor, consider using a cane or walker.   If your fall was related to alcohol use, stop or limit alcohol intake.   If your fall was related to use of sleeping medicines, talk to your doctor about this. You may need to reduce your dosage at bedtime if you awaken during the night to go to the bathroom.   To reduce the need for nighttime bathroom trips:   Avoid drinking fluids for several hours before going to bed   Empty your bladder before going to bed   Men can keep a urinal at the bedside   © 0058-8319 [facility] [facility], [address] [place] [address], [place] [address]. All rights reserved. This information is not intended as a substitute for professional medical care. Always follow your healthcare professional's instructions.    .WAYS TO HELP PREVENT  INFECTION         WASH YOUR HANDS OFTEN DURING THE DAY, ESPECIALLY BEFORE YOU EAT, AFTER USING THE BATHROOM, AND AFTER TOUCHING ANIMALS     STAY AWAY FROM PEOPLE WHO HAVE ILLNESSES YOU CAN CATCH; SUCH AS COLDS, FLU, CHICKEN POX     TRY TO AVOID CROWDS     STAY AWAY FROM CHILDREN WHO RECENTLY HAVE RECEIVED LIVE VIRUS VACCINES     MAINTAIN GOOD MOUTH CARE     DO NOT SQUEEZE OR SCRATCH PIMPLES     CLEAN CUTS & SCRAPES RIGHT AWAY AND DAILY UNTIL HEALED WITH WARM WATER, SOAP & AN ANTISEPTIC     AVOID CONTACT WITH LITTER BOXES, BIRD CAGES, & FISH TANKS     AVOID STANDING WATER, IE., BIRD BATHS, FLOWER POTS/VASES, OR HUMIDIFIERS     WEAR GLOVES WHEN GARDENING OR CLEANING UP AFTER OTHERS, ESPECIALLY BABIES & SMALL CHILDREN     DO NOT EAT RAW FISH, SEAFOOD, MEAT, OR EGGS  .HOME CARE AFTER CHEMOTHERAPY   Meals   Many patients feel sick and lose their appetites during treatment. Eat small meals several times a day. Choose bland foods with little taste or smell if you have problems with nausea. Be sure to cook all food thoroughly. This kills bacteria and helps you avoid intestinal infection. Soft foods are easier to swallow and digest.   Activity   Exercise keeps you strong and keeps your heart and lungs active. Talk to your doctor about an appropriate exercise program for you.   Skin Care   To prevent a skin infection, bathe or shower once a day. Use a moisturizing soap and wash with warm water. Avoid very hot or cold water. Chemotherapy can make your skin dry . Apply moisturizing lotion to help relieve dry skin. Some drugs used in high doses can cause slight burns to appear (like sunburn). Ask for a special cream to help relieve the burn and protect your skin.   Prevent Mouth Sores   During chemotherapy, many people get mouth sores. Do the following to help prevent mouth sores or to ease discomfort.   Brush your teeth with a soft-bristle toothbrush after every meal.  Don't use dental floss if your platelet count  "is below 50,000. Your doctor or nurse will tell you if this is the case.  Use an oral swab or special soft toothbrush if your gums bleed during regular brushing.  Use mouthwash as directed. If you can't tolerate commercial mouthwash, use salt and baking soda to clean your mouth. Mix 1 teaspoon of salt and 1 teaspoon of baking soda into a glass of water. Swish and spit.  Call your doctor or return to this facility if you develop any of the following:   Sore throat   White patches in the mouth or throat   Fever of 100.4ºF (38ºC) or higher, or as directed by your healthcare provider  © 2695-6012 [facility], [address] [place] [address], [place] [id number]. All rights reserved. This information is not intended as a substitute for professional medical care. Always follow your healthcare professional's     .Support Groups/Classes    Support groups and classes are being offered at the   [facility] and [facility]!!    "Cooking with Cancer" (Nutrition Class):  Second Wednesday of each month   at noon at the [facility].  Metastatic Support Group:  Third Tuesday of each month   at noon at the [facility].  Next Steps Class/Group: Second and fourth Thursday of each month at noon at the [facility].  [facility] (Breast Cancer Support Group): First Tuesday of each month   at 5:30pm at the [facility] location.  [facility]: [facility]: Second and third Tuesday of each month from 7:30am - 2pm.  [facility]: First and fourth Tuesday of each month from 7:30am - 2pm    If you are interested in attending or would like more information please ask our social workers or your nurse!    "

## 2020-10-15 ENCOUNTER — OFFICE VISIT (OUTPATIENT)
Dept: HEMATOLOGY/ONCOLOGY | Facility: CLINIC | Age: 85
End: 2020-10-15
Payer: MEDICARE

## 2020-10-15 ENCOUNTER — INFUSION (OUTPATIENT)
Dept: INFUSION THERAPY | Facility: HOSPITAL | Age: 85
End: 2020-10-15
Attending: NURSE PRACTITIONER
Payer: MEDICARE

## 2020-10-15 VITALS
BODY MASS INDEX: 27.43 KG/M2 | HEART RATE: 64 BPM | WEIGHT: 149.06 LBS | DIASTOLIC BLOOD PRESSURE: 71 MMHG | TEMPERATURE: 98 F | OXYGEN SATURATION: 96 % | HEIGHT: 62 IN | RESPIRATION RATE: 18 BRPM | SYSTOLIC BLOOD PRESSURE: 140 MMHG

## 2020-10-15 VITALS
BODY MASS INDEX: 27.43 KG/M2 | TEMPERATURE: 98 F | HEART RATE: 94 BPM | HEIGHT: 62 IN | OXYGEN SATURATION: 99 % | SYSTOLIC BLOOD PRESSURE: 192 MMHG | WEIGHT: 149.06 LBS | DIASTOLIC BLOOD PRESSURE: 84 MMHG

## 2020-10-15 DIAGNOSIS — D53.9 NUTRITIONAL ANEMIA: ICD-10-CM

## 2020-10-15 DIAGNOSIS — E83.42 HYPOMAGNESEMIA: Primary | ICD-10-CM

## 2020-10-15 DIAGNOSIS — D53.9 MACROCYTIC ANEMIA: ICD-10-CM

## 2020-10-15 DIAGNOSIS — D69.6 THROMBOCYTOPENIA: ICD-10-CM

## 2020-10-15 DIAGNOSIS — C53.0 MALIGNANT NEOPLASM OF ENDOCERVIX: Primary | ICD-10-CM

## 2020-10-15 DIAGNOSIS — C53.0 MALIGNANT NEOPLASM OF ENDOCERVIX: ICD-10-CM

## 2020-10-15 PROCEDURE — 25000003 PHARM REV CODE 250: Performed by: INTERNAL MEDICINE

## 2020-10-15 PROCEDURE — 96417 CHEMO IV INFUS EACH ADDL SEQ: CPT

## 2020-10-15 PROCEDURE — 99999 PR PBB SHADOW E&M-EST. PATIENT-LVL III: ICD-10-PCS | Mod: PBBFAC,,, | Performed by: INTERNAL MEDICINE

## 2020-10-15 PROCEDURE — 63600175 PHARM REV CODE 636 W HCPCS: Performed by: NURSE PRACTITIONER

## 2020-10-15 PROCEDURE — 99215 OFFICE O/P EST HI 40 MIN: CPT | Mod: 25,S$PBB,, | Performed by: INTERNAL MEDICINE

## 2020-10-15 PROCEDURE — 96367 TX/PROPH/DG ADDL SEQ IV INF: CPT

## 2020-10-15 PROCEDURE — 63600175 PHARM REV CODE 636 W HCPCS: Performed by: INTERNAL MEDICINE

## 2020-10-15 PROCEDURE — 96413 CHEMO IV INFUSION 1 HR: CPT

## 2020-10-15 PROCEDURE — 96375 TX/PRO/DX INJ NEW DRUG ADDON: CPT

## 2020-10-15 PROCEDURE — 99999 PR PBB SHADOW E&M-EST. PATIENT-LVL III: CPT | Mod: PBBFAC,,, | Performed by: INTERNAL MEDICINE

## 2020-10-15 PROCEDURE — 99213 OFFICE O/P EST LOW 20 MIN: CPT | Mod: PBBFAC | Performed by: INTERNAL MEDICINE

## 2020-10-15 PROCEDURE — 99215 PR OFFICE/OUTPT VISIT, EST, LEVL V, 40-54 MIN: ICD-10-PCS | Mod: 25,S$PBB,, | Performed by: INTERNAL MEDICINE

## 2020-10-15 PROCEDURE — 25000003 PHARM REV CODE 250: Performed by: NURSE PRACTITIONER

## 2020-10-15 RX ORDER — HEPARIN 100 UNIT/ML
500 SYRINGE INTRAVENOUS
Status: DISCONTINUED | OUTPATIENT
Start: 2020-10-15 | End: 2020-10-15 | Stop reason: HOSPADM

## 2020-10-15 RX ORDER — FAMOTIDINE 10 MG/ML
20 INJECTION INTRAVENOUS
Status: COMPLETED | OUTPATIENT
Start: 2020-10-15 | End: 2020-10-15

## 2020-10-15 RX ADMIN — HEPARIN 500 UNITS: 100 SYRINGE at 11:10

## 2020-10-15 RX ADMIN — DIPHENHYDRAMINE HYDROCHLORIDE 50 MG: 50 INJECTION INTRAMUSCULAR; INTRAVENOUS at 09:10

## 2020-10-15 RX ADMIN — DEXAMETHASONE SODIUM PHOSPHATE: 4 INJECTION, SOLUTION INTRAMUSCULAR; INTRAVENOUS at 09:10

## 2020-10-15 RX ADMIN — FAMOTIDINE 20 MG: 10 INJECTION INTRAVENOUS at 09:10

## 2020-10-15 RX ADMIN — PACLITAXEL 84 MG: 6 INJECTION, SOLUTION INTRAVENOUS at 09:10

## 2020-10-15 RX ADMIN — CARBOPLATIN 140 MG: 10 INJECTION, SOLUTION INTRAVENOUS at 10:10

## 2020-10-15 NOTE — PROGRESS NOTES
Subjective:       Patient ID: Anna Seay is a 85 y.o. female.    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 Anna Seay in Hematology Oncology clinic at [facility] on 10/15/2020.  Ms. Anna Seay is a 85 y.o. woman with metastatic cervical cancer from [place] currently on weekly carboplatin taxol. Reports doing well, denies neuropathy.  Eating well and gaining weight.    Oncology History   Malignant neoplasm of endocervix   6/27/2019 Initial Diagnosis    Malignant neoplasm of exocervix     7/8/2019 - 7/8/2019 Chemotherapy    Treatment Summary   Plan Name: OP CARBOPLATIN WEEKLY  Treatment Goal: Palliative  Status: Inactive  Start Date: [No treatment day found]  End Date: [No treatment day found]  Provider: Jaime Pinon MD  Chemotherapy: CARBOplatin (PARAPLATIN) in sodium chloride 0.9% 250 mL chemo infusion, , Intravenous, Clinic/HOD 1 time, 0 of 4 cycles     7/9/2019 - 11/25/2019 Chemotherapy    Treatment Summary   Plan Name: OP GYN CARBOPLATIN (AUC) Q4W  Treatment Goal: Palliative  Status: Inactive  Start Date: 7/9/2019  End Date: 10/29/2019  Provider: Jaime Pinon MD  Chemotherapy: CARBOplatin (PARAPLATIN) in sodium chloride 0.9% 250 mL chemo infusion,  (original dose 341.5 mg), Intravenous, Clinic/HOD 1 time, 5 of 6 cycles  Dose modification:   (original dose 341.5 mg, Cycle 1)     5/19/2020 - 6/16/2020 Chemotherapy    Treatment Summary   Plan Name: OP  PACLITAXEL CARBOPLATIN WEEKLY  Treatment Goal: Palliative  Status: Inactive  Start Date: 5/19/2020  End Date: 5/22/2020  Provider: Annette Donis NP  Chemotherapy: CARBOplatin (PARAPLATIN) 115 mg in sodium chloride 0.9% 250 mL chemo infusion, 115 mg (98.6 % of original dose 118.4 mg), Intravenous, Clinic/HOD 1 time, 0 of 1 cycle  Dose modification:   (original dose 118.4 mg, Cycle 1)  PACLitaxeL (TAXOL) 80 mg/m2 = 126 mg in sodium chloride 0.9% 250 mL chemo infusion,  80 mg/m2 = 126 mg (100 % of original dose 80 mg/m2), Intravenous, Clinic/HOD 1 time, 0 of 1 cycle  Dose modification: 80 mg/m2 (original dose 80 mg/m2, Cycle 1)     5/19/2020 -  Chemotherapy    Treatment Summary   Plan Name:  PACLITAXEL CARBOPLATIN WEEKLY  Treatment Goal: Palliative  Status: Active  Start Date: 5/19/2020  End Date: 10/29/2020 (Planned)  Provider: Annette Donis NP  Chemotherapy: CARBOplatin (PARAPLATIN) 115 mg in sodium chloride 0.9% 250 mL chemo infusion, 115 mg (100 % of original dose 114.8 mg), Intravenous, Clinic/HOD 1 time, 2 of 2 cycles  Dose modification:   (original dose 114.8 mg, Cycle 1, Reason: MD Discretion), 100 mg (original dose 114.8 mg, Cycle 1),   (original dose 114.8 mg, Cycle 2, Reason: MD Discretion),   (original dose 114.8 mg, Cycle 2, Reason: MD Discretion),   (original dose 114.8 mg, Cycle 2),   (original dose 114.8 mg, Cycle 2)  Administration: 115 mg (9/17/2020), 115 mg (9/24/2020), 125 mg (10/8/2020), 130 mg (9/3/2020)  PACLitaxeL (TAXOL) 80 mg/m2 = 126 mg in sodium chloride 0.9% 250 mL chemo infusion, 80 mg/m2 = 126 mg (100 % of original dose 80 mg/m2), Intravenous, Clinic/HOD 1 time, 2 of 2 cycles  Dose modification: 80 mg/m2 (original dose 80 mg/m2, Cycle 1), 50 mg/m2 (original dose 80 mg/m2, Cycle 1)  Administration: 84 mg (9/3/2020), 84 mg (9/17/2020), 84 mg (9/24/2020), 90 mg (10/8/2020)       Past Medical History:   Diagnosis Date    Anemia     Cataract     Cervical cancer 05/2019    Cervical cancer 6/25/2019    Hypertension      Family History   Problem Relation Age of Onset    Hypertension Mother     Pneumonia Father     Breast cancer Sister     No Known Problems Brother     Leukemia Daughter     No Known Problems Son      Social History     Socioeconomic History    Marital status:      Spouse name: Not on file    Number of children: Not on file    Years of education: Not on file    Highest education level: Not on file   Occupational History     Not on file   Social Needs    Financial resource strain: Patient refused    Food insecurity     Worry: Patient refused     Inability: Patient refused    Transportation needs     Medical: Patient refused     Non-medical: Patient refused   Tobacco Use    Smoking status: Never Smoker    Smokeless tobacco: Never Used   Substance and Sexual Activity    Alcohol use: Not Currently    Drug use: Never    Sexual activity: Not on file   Lifestyle    Physical activity     Days per week: Patient refused     Minutes per session: Patient refused    Stress: Patient refused   Relationships    Social connections     Talks on phone: Patient refused     Gets together: Patient refused     Attends Orthodoxy service: Patient refused     Active member of club or organization: Patient refused     Attends meetings of clubs or organizations: Patient refused     Relationship status: Patient refused   Other Topics Concern    Not on file   Social History Narrative    Not on file     Past Surgical History:   Procedure Laterality Date    CATARACT EXTRACTION, BILATERAL      WRIST SURGERY  1984     Current Outpatient Medications   Medication Sig Dispense Refill    cloNIDine (CATAPRES) 0.3 MG tablet TAKE 1 TABLET(0.3 MG) BY MOUTH TWICE DAILY 180 tablet 2    FENOFIBRATE ORAL Take by mouth 2 (two) times daily.      ferrous sulfate (FEOSOL) 325 mg (65 mg iron) Tab tablet TAKE 1 TABLET BY MOUTH TWICE DAILY 180 tablet 1    loratadine (CLARITIN) 10 mg tablet Take 10 mg by mouth once daily.      magnesium oxide (MAGOX) 400 mg (241.3 mg magnesium) tablet Take 1 tablet (400 mg total) by mouth once daily. 30 tablet 6    megestroL (MEGACE) 40 MG Tab TAKE 1 TABLET(40 MG) BY MOUTH TWICE DAILY 60 tablet 6    NIFEdipine (PROCARDIA-XL) 30 MG (OSM) 24 hr tablet TAKE 1 TABLET BY MOUTH ONCE DAILY 90 tablet 5    ondansetron (ZOFRAN-ODT) 8 MG TbDL Take 1 tablet (8 mg total) by mouth every 8 (eight) hours as needed (chemotherapy-induced nausea and  vomiting). 40 tablet 5    simvastatin (ZOCOR) 20 MG tablet Take 20 mg by mouth every evening.      varicella-zoster [id number], PF, (SHINGRIX, PF,) 50 mcg/0.5 mL injection Administer two doses 2 months apart 0.5 mL 0     No current facility-administered medications for this visit.      Facility-Administered Medications Ordered in Other Visits   Medication Dose Route Frequency Provider Last Rate Last Dose    CARBOplatin (PARAPLATIN) 140 mg in sodium chloride 0.9% 264 mL chemo infusion  140 mg Intravenous 1 time in Clinic/HOD Eric East MD        heparin, porcine (PF) 100 unit/mL injection flush 500 Units  500 Units Intravenous PRN Annette Donis NP        PACLitaxeL (TAXOL) 50 mg/m2 = 84 mg in sodium chloride 0.9% 264 mL chemo infusion  50 mg/m2 Intravenous 1 time in Clinic/HOD Humberto Seo  mL/hr at 10/15/20 0952 84 mg at 10/15/20 0952       Labs:  Lab Results   Component Value Date    WBC 5.12 10/15/2020    HGB 8.5 (L) 10/15/2020    HCT 27.2 (L) 10/15/2020     (H) 10/15/2020     10/15/2020     BMP  Lab Results   Component Value Date     10/15/2020    K 4.3 10/15/2020     10/15/2020    CO2 19 (L) 10/15/2020    BUN 22 10/15/2020    CREATININE 1.0 10/15/2020    CALCIUM 9.5 10/15/2020    ANIONGAP 10 10/15/2020    ESTGFRAFRICA >60 10/15/2020    EGFRNONAA 52 (A) 10/15/2020     Lab Results   Component Value Date    ALT 15 10/15/2020    AST 18 10/15/2020    ALKPHOS 100 10/15/2020    BILITOT 0.4 10/15/2020       Lab Results   Component Value Date    IRON 70 09/16/2020    TIBC 266 09/16/2020    FERRITIN 1,541 (H) 09/16/2020     Lab Results   Component Value Date    YVAFQWMA29 204 (L) 09/16/2020     Lab Results   Component Value Date    FOLATE 15.0 09/16/2020     No results found for: TSH    I have reviewed the radiology reports and examined the scan/xray images.    Review of Systems   Constitutional: Negative.    HENT: Negative.    Eyes: Negative.    Respiratory: Negative.     Cardiovascular: Negative.    Gastrointestinal: Negative.    Endocrine: Negative.    Genitourinary: Negative.    Musculoskeletal: Negative.    Skin: Negative.    Allergic/Immunologic: Negative.    Neurological: Negative.    Hematological: Negative.    Psychiatric/Behavioral: Negative.      ECOG SCORE    1 - Restricted in strenuous activity-ambulatory and able to carry out work of a light nature            Objective:     Vitals:    10/15/20 0819   BP: (!) 192/84   Pulse: 94   Temp: 98 °F (36.7 °C)   Body mass index is 27.26 kg/m².  Physical Exam  Vitals signs and nursing note reviewed.   Constitutional:       Appearance: She is well-developed.   HENT:      Head: Normocephalic and atraumatic.   Eyes:      Conjunctiva/sclera: Conjunctivae normal.   Neck:      Musculoskeletal: Normal range of motion and neck supple.   Cardiovascular:      Rate and Rhythm: Normal rate and regular rhythm.   Pulmonary:      Effort: Pulmonary effort is normal.      Breath sounds: Normal breath sounds.   Abdominal:      General: Bowel sounds are normal.      Palpations: Abdomen is soft.   Musculoskeletal: Normal range of motion.   Skin:     General: Skin is warm and dry.   Neurological:      Mental Status: She is alert and oriented to person, place, and time.   Psychiatric:         Behavior: Behavior normal.         Thought Content: Thought content normal.         Judgment: Judgment normal.           Assessment:      1. Malignant neoplasm of endocervix    2. Nutritional anemia    3. Thrombocytopenia    4. Macrocytic anemia           Plan:     Macrocytic anemia  Secondary to B12 deficiency.  Continue monthly B12 injections.    Malignant neoplasm of endocervix  Reviewed labs, no concerning cytopenia, will proceed with treatment today.  Return back in 1 week with repeat labs or sooner if needed.  Noted mild anemia with hemoglobin at 8.5 grams/deciliter which is also related to chemotherapy and B12 deficiency.  Will continue to  monitor.      Malignant neoplasm of endocervix  -     Comprehensive Metabolic Panel; Future; Expected date: 10/15/2020  -     CBC Oncology; Future; Expected date: 10/15/2020    Nutritional anemia  -     Comprehensive Metabolic Panel; Future; Expected date: 10/15/2020  -     CBC Oncology; Future; Expected date: 10/15/2020    Thrombocytopenia  -     Comprehensive Metabolic Panel; Future; Expected date: 10/15/2020  -     CBC Oncology; Future; Expected date: 10/15/2020    Macrocytic anemia      Eric East MD

## 2020-10-15 NOTE — ASSESSMENT & PLAN NOTE
Reviewed labs, no concerning cytopenia, will proceed with treatment today.  Return back in 1 week with repeat labs or sooner if needed.  Noted mild anemia with hemoglobin at 8.5 grams/deciliter which is also related to chemotherapy and B12 deficiency.  Will continue to monitor.

## 2020-10-18 RX ORDER — SODIUM CHLORIDE 0.9 % (FLUSH) 0.9 %
10 SYRINGE (ML) INJECTION
Status: CANCELLED | OUTPATIENT
Start: 2020-10-19

## 2020-10-18 RX ORDER — HEPARIN 100 UNIT/ML
500 SYRINGE INTRAVENOUS
Status: CANCELLED | OUTPATIENT
Start: 2020-10-19

## 2020-10-19 ENCOUNTER — TELEPHONE (OUTPATIENT)
Dept: HEMATOLOGY/ONCOLOGY | Facility: CLINIC | Age: 85
End: 2020-10-19

## 2020-10-19 NOTE — TELEPHONE ENCOUNTER
Jessica called at 4:10; she received a call stating that Ms. Call needs a iron transfusion per the [facility]. Labs are in the chart. She was sched. To come in on Wed. The 21st for labs and an appt. On 10/22.

## 2020-10-19 NOTE — TELEPHONE ENCOUNTER
Spoke to Jessica and was informed that Ms Seay has returned home to [place] and is scheduled to see oncology there. Notified her of Dr. Augsutine's recommendation of injectafer infusion and asked her to discuss with [facility] there to arrange. She verbalized understanding.

## 2020-10-19 NOTE — TELEPHONE ENCOUNTER
----- Message from Luis Augustine MD sent at 10/18/2020  5:41 PM CDT -----  May we please call Mrs. Seay to see if she has transferred back to [place]. If she is still getting treatment with us, please set her up for iv injectafer x 1 for iron deficiency.

## 2020-10-20 RX ORDER — DIPHENHYDRAMINE HYDROCHLORIDE 50 MG/ML
50 INJECTION INTRAMUSCULAR; INTRAVENOUS ONCE AS NEEDED
Status: CANCELLED | OUTPATIENT
Start: 2020-10-21

## 2020-10-20 RX ORDER — SODIUM CHLORIDE 0.9 % (FLUSH) 0.9 %
10 SYRINGE (ML) INJECTION
Status: CANCELLED | OUTPATIENT
Start: 2020-10-21

## 2020-10-20 RX ORDER — EPINEPHRINE 0.3 MG/.3ML
0.3 INJECTION SUBCUTANEOUS ONCE AS NEEDED
Status: CANCELLED | OUTPATIENT
Start: 2020-10-21

## 2020-10-20 RX ORDER — METHYLPREDNISOLONE SOD SUCC 125 MG
125 VIAL (EA) INJECTION ONCE AS NEEDED
Status: CANCELLED | OUTPATIENT
Start: 2020-10-21

## 2020-10-20 RX ORDER — HEPARIN 100 UNIT/ML
500 SYRINGE INTRAVENOUS
Status: CANCELLED | OUTPATIENT
Start: 2020-10-21

## 2020-10-22 ENCOUNTER — OFFICE VISIT (OUTPATIENT)
Dept: HEMATOLOGY/ONCOLOGY | Facility: CLINIC | Age: 85
End: 2020-10-22
Payer: MEDICARE

## 2020-10-22 VITALS
OXYGEN SATURATION: 99 % | HEART RATE: 67 BPM | DIASTOLIC BLOOD PRESSURE: 62 MMHG | TEMPERATURE: 97 F | BODY MASS INDEX: 27.42 KG/M2 | WEIGHT: 149 LBS | SYSTOLIC BLOOD PRESSURE: 120 MMHG | RESPIRATION RATE: 16 BRPM | HEIGHT: 62 IN

## 2020-10-22 DIAGNOSIS — E83.42 HYPOMAGNESEMIA: ICD-10-CM

## 2020-10-22 DIAGNOSIS — C53.0 MALIGNANT NEOPLASM OF ENDOCERVIX: Primary | ICD-10-CM

## 2020-10-22 PROCEDURE — 99214 PR OFFICE/OUTPT VISIT, EST, LEVL IV, 30-39 MIN: ICD-10-PCS | Mod: S$GLB,,, | Performed by: NURSE PRACTITIONER

## 2020-10-22 PROCEDURE — 99214 OFFICE O/P EST MOD 30 MIN: CPT | Mod: S$GLB,,, | Performed by: NURSE PRACTITIONER

## 2020-10-22 RX ORDER — EPINEPHRINE 0.3 MG/.3ML
0.3 INJECTION SUBCUTANEOUS ONCE AS NEEDED
Status: DISCONTINUED | OUTPATIENT
Start: 2020-10-22 | End: 2020-12-29

## 2020-10-22 RX ORDER — SODIUM CHLORIDE 0.9 % (FLUSH) 0.9 %
10 SYRINGE (ML) INJECTION
Status: DISCONTINUED | OUTPATIENT
Start: 2020-10-22 | End: 2020-12-29

## 2020-10-22 RX ORDER — DIPHENHYDRAMINE HYDROCHLORIDE 50 MG/ML
50 INJECTION INTRAMUSCULAR; INTRAVENOUS ONCE AS NEEDED
Status: DISCONTINUED | OUTPATIENT
Start: 2020-10-22 | End: 2020-12-29

## 2020-10-22 NOTE — PROGRESS NOTES
Medical Oncology Progress Note  [facility]     PATIENT: Anna Seay  : 1935 85 y.o. female  MRN [id number]     PCP: Bria Mitchell NP  Consult Requested By:      Date of Service: 10/22/2020    Subjective:   Interim History:  Malignant neoplasm of endocervix    Anna Seay is here for follow-up on treatment;   Carbotaxol has been on hold because of leukopenia  The patient feel well and she had no complaint today    Oncology History:    History of Present Illness: Ms. Seay is a 84 y.o. female who presents for evaluation and management of cervical cancer. SHe was seen at [facility] on 19.      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 The patient had a CT scan consistent with widely metastatic disease including multiple sites of lymphadenopathy, a possible lung metastases, liver metastases, nodule in the anterior abdominal wall, and carcinomatosis. The patient is mostly asymptomatic but does have a vaginal discharge. Her performance status is 0. On exam, there is a 6 centimeter mass in the anterior abdominal wall superior to the umbilicus. On bimanual examination, tumor is a place the entire cervix apex of the vagina, and on rectovaginal examination, there is a large pelvic mass extending to the bilateral pelvic sidewalls.    We will get her CT reviewed at [facility] and if it is consistent with metastatic disease, we have recommended palliative chemotherapy with either carboplatinum as a single agent or carboplatinum and paclitaxel. Due to concern for bowel involvement on the CT scan, I have recommended not giving bevacizumab as part of this regimen. Patient will return home to [place] and begin chemotherapy with a medical oncologist there. I've also recommended a consultation with radiation oncology in [place] for consideration of palliative  "radiation to lower the chances of significant vaginal bleeding. We will see her back on an as-needed basis."       == 8/5/19 Carbo    ==PET/CT done on 12/2/19. Previous CT imaging done at outside facility, discussed with Dr Nguyen for comparison and he stated pelvic mass smaller, abdominal mass smaller liver lesion likely benign stable but difficult to say if peritoneal implants stable to progressed.    ==FoundationOne Liquid Bx on 12/16/19.  BRCA 2 gene mutation.  ==1/14/20 Pt started lynparza   ==5/13/2020 PET showing early progression.    ==5/2020 resume low dose carbo and taxol. On hold now due to pancytopenia     Oncology History   Malignant neoplasm of endocervix   6/27/2019 Initial Diagnosis    Malignant neoplasm of exocervix     7/8/2019 - 7/8/2019 Chemotherapy    Treatment Summary   Plan Name: OP CARBOPLATIN WEEKLY  Treatment Goal: Palliative  Status: Inactive  Start Date: [No treatment day found]  End Date: [No treatment day found]  Provider: Jaime Pinon MD  Chemotherapy: CARBOplatin (PARAPLATIN) in sodium chloride 0.9% 250 mL chemo infusion, , Intravenous, Clinic/HOD 1 time, 0 of 4 cycles     7/9/2019 - 11/25/2019 Chemotherapy    Treatment Summary   Plan Name: OP GYN CARBOPLATIN (AUC) Q4W  Treatment Goal: Palliative  Status: Inactive  Start Date: 7/9/2019  End Date: 10/29/2019  Provider: Jaime Pinon MD  Chemotherapy: CARBOplatin (PARAPLATIN) in sodium chloride 0.9% 250 mL chemo infusion,  (original dose 341.5 mg), Intravenous, Clinic/HOD 1 time, 5 of 6 cycles  Dose modification:   (original dose 341.5 mg, Cycle 1)     5/19/2020 - 6/16/2020 Chemotherapy    Treatment Summary   Plan Name: OP  PACLITAXEL CARBOPLATIN WEEKLY  Treatment Goal: Palliative  Status: Inactive  Start Date: 5/19/2020  End Date: 5/22/2020  Provider: Annette Donis NP  Chemotherapy: CARBOplatin (PARAPLATIN) 115 mg in sodium chloride 0.9% 250 mL chemo infusion, 115 mg (98.6 % of original dose 118.4 mg), Intravenous, Clinic/HOD 1 " time, 0 of 1 cycle  Dose modification:   (original dose 118.4 mg, Cycle 1)  PACLitaxeL (TAXOL) 80 mg/m2 = 126 mg in sodium chloride 0.9% 250 mL chemo infusion, 80 mg/m2 = 126 mg (100 % of original dose 80 mg/m2), Intravenous, Clinic/HOD 1 time, 0 of 1 cycle  Dose modification: 80 mg/m2 (original dose 80 mg/m2, Cycle 1)     5/19/2020 -  Chemotherapy    Treatment Summary   Plan Name:  PACLITAXEL CARBOPLATIN WEEKLY  Treatment Goal: Palliative  Status: Active  Start Date: 5/19/2020  End Date: 10/29/2020 (Planned)  Provider: Annette Donis NP  Chemotherapy: CARBOplatin (PARAPLATIN) 115 mg in sodium chloride 0.9% 250 mL chemo infusion, 115 mg (100 % of original dose 114.8 mg), Intravenous, Clinic/HOD 1 time, 2 of 2 cycles  Dose modification:   (original dose 114.8 mg, Cycle 1, Reason: MD Discretion), 100 mg (original dose 114.8 mg, Cycle 1),   (original dose 114.8 mg, Cycle 2, Reason: MD Discretion),   (original dose 114.8 mg, Cycle 2, Reason: MD Discretion),   (original dose 114.8 mg, Cycle 2),   (original dose 114.8 mg, Cycle 2)  Administration: 115 mg (9/17/2020), 115 mg (9/24/2020), 125 mg (10/8/2020), 130 mg (9/3/2020)  PACLitaxeL (TAXOL) 80 mg/m2 = 126 mg in sodium chloride 0.9% 250 mL chemo infusion, 80 mg/m2 = 126 mg (100 % of original dose 80 mg/m2), Intravenous, Clinic/HOD 1 time, 2 of 2 cycles  Dose modification: 80 mg/m2 (original dose 80 mg/m2, Cycle 1), 50 mg/m2 (original dose 80 mg/m2, Cycle 1)  Administration: 84 mg (9/3/2020), 84 mg (9/17/2020), 84 mg (9/24/2020), 90 mg (10/8/2020)         Past Medical History:   Past Medical History:   Diagnosis Date    Anemia     Cataract     Cervical cancer 05/2019    Cervical cancer 6/25/2019    Hypertension        Past Surgical HIstory:   Past Surgical History:   Procedure Laterality Date    CATARACT EXTRACTION, BILATERAL      WRIST SURGERY  1984       Allergies:  Review of patient's allergies indicates:   Allergen Reactions    Strawberry Rash        Medications:  Current Outpatient Medications   Medication Sig Dispense Refill    cloNIDine (CATAPRES) 0.3 MG tablet TAKE 1 TABLET(0.3 MG) BY MOUTH TWICE DAILY 180 tablet 2    FENOFIBRATE ORAL Take by mouth 2 (two) times daily.      ferrous sulfate (FEOSOL) 325 mg (65 mg iron) Tab tablet TAKE 1 TABLET BY MOUTH TWICE DAILY 180 tablet 1    loratadine (CLARITIN) 10 mg tablet Take 10 mg by mouth once daily.      magnesium oxide (MAGOX) 400 mg (241.3 mg magnesium) tablet Take 1 tablet (400 mg total) by mouth once daily. 30 tablet 6    megestroL (MEGACE) 40 MG Tab TAKE 1 TABLET(40 MG) BY MOUTH TWICE DAILY 60 tablet 6    NIFEdipine (PROCARDIA-XL) 30 MG (OSM) 24 hr tablet TAKE 1 TABLET BY MOUTH ONCE DAILY 90 tablet 5    ondansetron (ZOFRAN-ODT) 8 MG TbDL Take 1 tablet (8 mg total) by mouth every 8 (eight) hours as needed (chemotherapy-induced nausea and vomiting). 40 tablet 5    simvastatin (ZOCOR) 20 MG tablet Take 20 mg by mouth every evening.      varicella-zoster [id number], PF, (SHINGRIX, PF,) 50 mcg/0.5 mL injection Administer two doses 2 months apart 0.5 mL 0     No current facility-administered medications for this visit.        Family History:   Family History   Problem Relation Age of Onset    Hypertension Mother     Pneumonia Father     Breast cancer Sister     No Known Problems Brother     Leukemia Daughter     No Known Problems Son        Social History:  reports that she has never smoked. She has never used smokeless tobacco. She reports previous alcohol use. She reports that she does not use drugs.    Review of Systemas  Constitutional: No change in weight, appetie, fatigue, fever, or night sweats  Eyes: No changes in vision  Ears, Nose, Mouth, Throat, and Face: No hearing problems, ear pain, rummy nose, or sore throat  Respiratory: No shortness of breath or cough  Cardiovascular: No chest pain, palpitations or dizziness  Gastrointestinal: No abdominal pain, hematochezia,  "melena  Genitourinary: No dysuria, hematuria, nocturia, menstrual problems, post menopausal  Integumentary/Breast: No rashes or itching  Hematologic/Lymphatic: No anemia or bleeding abnormalities  Musculoskeletal: No joint or muscle abnormalities  Neurological: No sensory or motor problems, no headaches  Behavioral/Psych: No depression, anxiety, cognitive problems, or stress  Endocrine: No diabetes or thyroid problems  Allergic/Immunologic: See allergy above    Physical Exam      Vitals:   Vitals:    10/22/20 0825   BP: 120/62   BP Location: Left arm   Patient Position: Sitting   BP Method: Medium (Automatic)   Pulse: 67   Resp: 16   Temp: 97.2 °F (36.2 °C)   TempSrc: Temporal   SpO2: 99%   Weight: 67.6 kg (149 lb)   Height: 5' 2" (1.575 m)     BMI: Body mass index is 27.25 kg/m².  BSA Body surface area is 1.72 meters squared.  ECOG Performance Status:   ECOG SCORE         GENERAL APPEARANCE: Well developed, well nourished, in no acute distress.  SKIN: Inspection of the skin reveals no rashes, ulcerations or petechiae.  HEENT: The sclerae were anicteric and conjunctivae were pink and moist. Extraocular movements were intact and pupils were equal, round, and reactive to light with normal accommodation. External inspection of the ears and nose showed no scars, lesions, or masses. Lips, teeth, and gums showed normal mucosa. The oral mucosa, hard and soft palate, tongue and posterior pharynx were normal.  NECK: Supple and symmetric. There was no thyroid enlargement, and no tenderness, or masses were felt.  CRESPIRATORY: Normal AP diameter and normal contour without any kyphoscoliosis. LUNGS: Auscultation of the lungs revealed normal breath sounds without any other adventitious sounds or rubs.  CARDIOVASCULAR: There was a regular rate and rhythm without any murmurs, gallops, rubs. The carotid pulses were normal and 2+ bilaterally without bruits. Peripheral pulses were 2+ and symmetric.  GASTROINTESTINAL: Soft and " nontender with normal bowel sounds. The liver span was approximately 5-6 cm in the right midclavicular line by percussion. The liver edge was nontender. The spleen was not palpable. There were no inguinal or umbilical hernias noted. No ascites was noted.  LYMPH NODES: No lymphadenopathy was appreciated in the neck, axillae or groin.  MUSCULOSKELETAL: Gait was normal. There was no tenderness or effusions noted. Muscle strength and tone were normal. EXTREMITIES: No cyanosis, clubbing or edema.  NEUROLOGIC: Alert and oriented x 3. Normal affect. Gait was normal. Normal deep tendon reflexes with no pathological reflexes. Sensation to touch was normal.  PSYCHIATRIC: good mood, orientated to place, time and person     Labs and Imagings     Lab Results   Component Value Date    WBC 6.3 10/21/2020    HGB 13.2 10/21/2020    HCT 40.7 10/21/2020     (H) 10/15/2020    LABPLAT 295 10/21/2020       CMP  Sodium   Date Value Ref Range Status   10/21/2020 140 135 - 145 mmol/L Final   03/16/2020 140 136 - 145 mmol/L Final     Potassium   Date Value Ref Range Status   10/21/2020 4.1 3.6 - 5.2 mmol/L Final   03/16/2020 4.5 3.5 - 5.1 mmol/L Final     Chloride   Date Value Ref Range Status   10/21/2020 101 100 - 108 mmol/L Final   03/16/2020 107 98 - 107 mmol/L Final     CO2   Date Value Ref Range Status   10/21/2020 30 21 - 32 mmol/L Final   03/16/2020 21 (L) 22 - 29 mmol/L Final     Glucose   Date Value Ref Range Status   10/21/2020 109 70 - 110 mg/dL Final     BUN, Bld   Date Value Ref Range Status   10/21/2020 19 (H) 7 - 18 mg/dL Final   03/16/2020 17.2 8 - 23 mg/dL Final     Creatinine   Date Value Ref Range Status   10/21/2020 1.37 (H) 0.55 - 1.02 mg/dL Final   03/16/2020 1.17 (H) 0.50 - 0.90 mg/dL Final     Calcium   Date Value Ref Range Status   10/21/2020 9.5 8.8 - 10.5 mg/dL Final   03/16/2020 10.0 8.6 - 10.2 mg/dL Final     Total Protein   Date Value Ref Range Status   10/21/2020 7.1 6.4 - 8.2 g/dL Final     Albumin    Date Value Ref Range Status   10/21/2020 3.4 3.4 - 5.0 g/dL Final   03/16/2020 4.6 3.5 - 5.2 g/dL Final     Total Bilirubin   Date Value Ref Range Status   10/21/2020 0.3 0.0 - 1.0 mg/dL Final     Alkaline Phosphatase   Date Value Ref Range Status   10/21/2020 94 46 - 116 U/L Final     AST   Date Value Ref Range Status   10/21/2020 11 (L) 15 - 37 U/L Final   03/16/2020 17 0 - 32 U/L Final     ALT   Date Value Ref Range Status   10/21/2020 23 12 - 78 U/L Final     Anion Gap   Date Value Ref Range Status   10/21/2020 9.0 3.0 - 11.0 mmol/L Final   03/16/2020 12.0 8.0 - 17.0 mmol/L Final     Comment:     NOTE  Testing performed at:  [facility], [address] [place] [address] CLIA #:[id number]       eGFR if    Date Value Ref Range Status   10/15/2020 >60 >60 mL/min/1.73 m^2 Final     eGFR if non    Date Value Ref Range Status   10/15/2020 52 (A) >60 mL/min/1.73 m^2 Final     Comment:     Calculation used to obtain the estimated glomerular filtration  rate (eGFR) is the CKD-EPI equation.            Imaging:         Assessment:     Principle Problem: No primary diagnosis found.   Co-morbidity/Active Problems:   Patient Active Problem List   Diagnosis    Malignant neoplasm of endocervix    Macrocytic anemia    Cervical cancer    Skin infection    Acute rhinitis    Thrombocytopenia    BRCA2 gene mutation positive in female    Mild protein-calorie malnutrition    Increased creatine kinase level    Essential hypertension    Neoplastic malignant related fatigue    Pancytopenia    Encounter for antineoplastic chemotherapy    Hypomagnesemia    Anemia    Dehydration        Anna Seay is a 85 y.o. female with PMH of There were no encounter diagnoses., with No primary diagnosis found.     ==============================================  *  Assessment:       1. 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 Malignant neoplasm of cervix, unspecified site     3. Increased creatine kinase level    4. Neoplastic malignant related fatigue       Cervical cancer stage IV disease  BRCA2 +  mutation   PARP inhibitor  Lynparza 300 mg BID PET scan shows early progression 4/2020, dc'd  Mid June due to excessive pancytopenia   weekly carbo/taxol added  Pancytopenia and Sr Cr improving with lynparza held       Plan:   Overall PLANS:  carbo Taxol weekly   Lynparza  dc'd pancytopenia     == lab CBC CMP every week  == Will reduce carbo to 50 mg IV weekly, Taxol 60 mg IV weekly, all fixed doses, she is tolerating well, improved kidney function noted  ==Pet/Ct  8/17/2020 shows stable disease  ==continue with combined weekly carbo/taxol, low dose as planned for today, will add 1 L NS due to increased SrCr  == Pt was displaced to [place] and [place] where she continued treatment  ==anemia resolved currently with a oral iron and B12 injections  == we will repeat PET scan in the next 2-3 weeks    Annette Donis NP

## 2020-11-12 ENCOUNTER — OFFICE VISIT (OUTPATIENT)
Dept: HEMATOLOGY/ONCOLOGY | Facility: CLINIC | Age: 85
End: 2020-11-12
Payer: MEDICARE

## 2020-11-12 VITALS
BODY MASS INDEX: 27.38 KG/M2 | SYSTOLIC BLOOD PRESSURE: 135 MMHG | TEMPERATURE: 98 F | HEIGHT: 62 IN | OXYGEN SATURATION: 99 % | WEIGHT: 148.81 LBS | DIASTOLIC BLOOD PRESSURE: 78 MMHG | RESPIRATION RATE: 16 BRPM | HEART RATE: 76 BPM

## 2020-11-12 DIAGNOSIS — Z15.02 BRCA2 GENE MUTATION POSITIVE IN FEMALE: ICD-10-CM

## 2020-11-12 DIAGNOSIS — Z15.09 BRCA2 GENE MUTATION POSITIVE IN FEMALE: ICD-10-CM

## 2020-11-12 DIAGNOSIS — Z51.11 ENCOUNTER FOR ANTINEOPLASTIC CHEMOTHERAPY: ICD-10-CM

## 2020-11-12 DIAGNOSIS — Z15.01 BRCA2 GENE MUTATION POSITIVE IN FEMALE: ICD-10-CM

## 2020-11-12 DIAGNOSIS — C53.0 MALIGNANT NEOPLASM OF ENDOCERVIX: Primary | ICD-10-CM

## 2020-11-12 PROCEDURE — 99214 PR OFFICE/OUTPT VISIT, EST, LEVL IV, 30-39 MIN: ICD-10-PCS | Mod: S$GLB,,, | Performed by: NURSE PRACTITIONER

## 2020-11-12 PROCEDURE — 99214 OFFICE O/P EST MOD 30 MIN: CPT | Mod: S$GLB,,, | Performed by: NURSE PRACTITIONER

## 2020-11-12 RX ORDER — EPINEPHRINE 0.3 MG/.3ML
0.3 INJECTION SUBCUTANEOUS ONCE AS NEEDED
Status: CANCELLED | OUTPATIENT
Start: 2020-12-17

## 2020-11-12 RX ORDER — SODIUM CHLORIDE 0.9 % (FLUSH) 0.9 %
10 SYRINGE (ML) INJECTION
Status: CANCELLED | OUTPATIENT
Start: 2021-01-05

## 2020-11-12 RX ORDER — DIPHENHYDRAMINE HYDROCHLORIDE 50 MG/ML
50 INJECTION INTRAMUSCULAR; INTRAVENOUS ONCE AS NEEDED
Status: CANCELLED | OUTPATIENT
Start: 2020-11-19

## 2020-11-12 RX ORDER — SODIUM CHLORIDE 0.9 % (FLUSH) 0.9 %
10 SYRINGE (ML) INJECTION
Status: CANCELLED | OUTPATIENT
Start: 2020-11-19

## 2020-11-12 RX ORDER — DIPHENHYDRAMINE HYDROCHLORIDE 50 MG/ML
50 INJECTION INTRAMUSCULAR; INTRAVENOUS ONCE AS NEEDED
Status: CANCELLED | OUTPATIENT
Start: 2020-12-17

## 2020-11-12 RX ORDER — FAMOTIDINE 10 MG/ML
20 INJECTION INTRAVENOUS
Status: CANCELLED | OUTPATIENT
Start: 2021-01-12

## 2020-11-12 RX ORDER — SODIUM CHLORIDE 0.9 % (FLUSH) 0.9 %
10 SYRINGE (ML) INJECTION
Status: CANCELLED | OUTPATIENT
Start: 2020-11-12

## 2020-11-12 RX ORDER — SODIUM CHLORIDE 0.9 % (FLUSH) 0.9 %
10 SYRINGE (ML) INJECTION
Status: CANCELLED | OUTPATIENT
Start: 2020-12-17

## 2020-11-12 RX ORDER — DIPHENHYDRAMINE HYDROCHLORIDE 50 MG/ML
50 INJECTION INTRAMUSCULAR; INTRAVENOUS ONCE AS NEEDED
Status: CANCELLED | OUTPATIENT
Start: 2020-12-03

## 2020-11-12 RX ORDER — SODIUM CHLORIDE 0.9 % (FLUSH) 0.9 %
10 SYRINGE (ML) INJECTION
Status: CANCELLED | OUTPATIENT
Start: 2021-01-12

## 2020-11-12 RX ORDER — EPINEPHRINE 0.3 MG/.3ML
0.3 INJECTION SUBCUTANEOUS ONCE AS NEEDED
Status: CANCELLED | OUTPATIENT
Start: 2020-12-03

## 2020-11-12 RX ORDER — DIPHENHYDRAMINE HYDROCHLORIDE 50 MG/ML
50 INJECTION INTRAMUSCULAR; INTRAVENOUS ONCE AS NEEDED
Status: CANCELLED | OUTPATIENT
Start: 2020-11-12

## 2020-11-12 RX ORDER — HEPARIN 100 UNIT/ML
500 SYRINGE INTRAVENOUS
Status: CANCELLED | OUTPATIENT
Start: 2021-01-19

## 2020-11-12 RX ORDER — HEPARIN 100 UNIT/ML
500 SYRINGE INTRAVENOUS
Status: CANCELLED | OUTPATIENT
Start: 2021-01-05

## 2020-11-12 RX ORDER — FAMOTIDINE 10 MG/ML
20 INJECTION INTRAVENOUS
Status: CANCELLED | OUTPATIENT
Start: 2020-12-29

## 2020-11-12 RX ORDER — DIPHENHYDRAMINE HYDROCHLORIDE 50 MG/ML
50 INJECTION INTRAMUSCULAR; INTRAVENOUS ONCE AS NEEDED
Status: CANCELLED | OUTPATIENT
Start: 2021-01-19

## 2020-11-12 RX ORDER — SODIUM CHLORIDE 0.9 % (FLUSH) 0.9 %
10 SYRINGE (ML) INJECTION
Status: CANCELLED | OUTPATIENT
Start: 2021-01-19

## 2020-11-12 RX ORDER — FAMOTIDINE 10 MG/ML
20 INJECTION INTRAVENOUS
Status: CANCELLED | OUTPATIENT
Start: 2020-11-12

## 2020-11-12 RX ORDER — EPINEPHRINE 0.3 MG/.3ML
0.3 INJECTION SUBCUTANEOUS ONCE AS NEEDED
Status: CANCELLED | OUTPATIENT
Start: 2020-12-29

## 2020-11-12 RX ORDER — EPINEPHRINE 0.3 MG/.3ML
0.3 INJECTION SUBCUTANEOUS ONCE AS NEEDED
Status: CANCELLED | OUTPATIENT
Start: 2021-01-12

## 2020-11-12 RX ORDER — SODIUM CHLORIDE 0.9 % (FLUSH) 0.9 %
10 SYRINGE (ML) INJECTION
Status: CANCELLED | OUTPATIENT
Start: 2020-12-29

## 2020-11-12 RX ORDER — DIPHENHYDRAMINE HYDROCHLORIDE 50 MG/ML
50 INJECTION INTRAMUSCULAR; INTRAVENOUS ONCE AS NEEDED
Status: CANCELLED | OUTPATIENT
Start: 2020-12-29

## 2020-11-12 RX ORDER — HEPARIN 100 UNIT/ML
500 SYRINGE INTRAVENOUS
Status: CANCELLED | OUTPATIENT
Start: 2020-12-03

## 2020-11-12 RX ORDER — EPINEPHRINE 0.3 MG/.3ML
0.3 INJECTION SUBCUTANEOUS ONCE AS NEEDED
Status: CANCELLED | OUTPATIENT
Start: 2020-11-19

## 2020-11-12 RX ORDER — HEPARIN 100 UNIT/ML
500 SYRINGE INTRAVENOUS
Status: CANCELLED | OUTPATIENT
Start: 2020-11-19

## 2020-11-12 RX ORDER — FAMOTIDINE 10 MG/ML
20 INJECTION INTRAVENOUS
Status: CANCELLED | OUTPATIENT
Start: 2020-12-03

## 2020-11-12 RX ORDER — FAMOTIDINE 10 MG/ML
20 INJECTION INTRAVENOUS
Status: CANCELLED | OUTPATIENT
Start: 2020-12-17

## 2020-11-12 RX ORDER — SODIUM CHLORIDE 0.9 % (FLUSH) 0.9 %
10 SYRINGE (ML) INJECTION
Status: CANCELLED | OUTPATIENT
Start: 2020-12-03

## 2020-11-12 RX ORDER — HEPARIN 100 UNIT/ML
500 SYRINGE INTRAVENOUS
Status: CANCELLED | OUTPATIENT
Start: 2020-12-29

## 2020-11-12 RX ORDER — HEPARIN 100 UNIT/ML
500 SYRINGE INTRAVENOUS
Status: CANCELLED | OUTPATIENT
Start: 2020-11-12

## 2020-11-12 RX ORDER — HEPARIN 100 UNIT/ML
500 SYRINGE INTRAVENOUS
Status: CANCELLED | OUTPATIENT
Start: 2021-01-12

## 2020-11-12 RX ORDER — EPINEPHRINE 0.3 MG/.3ML
0.3 INJECTION SUBCUTANEOUS ONCE AS NEEDED
Status: CANCELLED | OUTPATIENT
Start: 2021-01-19

## 2020-11-12 RX ORDER — FAMOTIDINE 10 MG/ML
20 INJECTION INTRAVENOUS
Status: CANCELLED | OUTPATIENT
Start: 2020-11-19

## 2020-11-12 RX ORDER — EPINEPHRINE 0.3 MG/.3ML
0.3 INJECTION SUBCUTANEOUS ONCE AS NEEDED
Status: CANCELLED | OUTPATIENT
Start: 2021-01-05

## 2020-11-12 RX ORDER — HEPARIN 100 UNIT/ML
500 SYRINGE INTRAVENOUS
Status: CANCELLED | OUTPATIENT
Start: 2020-12-17

## 2020-11-12 RX ORDER — FAMOTIDINE 10 MG/ML
20 INJECTION INTRAVENOUS
Status: CANCELLED | OUTPATIENT
Start: 2021-01-19

## 2020-11-12 RX ORDER — DIPHENHYDRAMINE HYDROCHLORIDE 50 MG/ML
50 INJECTION INTRAMUSCULAR; INTRAVENOUS ONCE AS NEEDED
Status: CANCELLED | OUTPATIENT
Start: 2021-01-12

## 2020-11-12 RX ORDER — EPINEPHRINE 0.3 MG/.3ML
0.3 INJECTION SUBCUTANEOUS ONCE AS NEEDED
Status: CANCELLED | OUTPATIENT
Start: 2020-11-12

## 2020-11-12 RX ORDER — DIPHENHYDRAMINE HYDROCHLORIDE 50 MG/ML
50 INJECTION INTRAMUSCULAR; INTRAVENOUS ONCE AS NEEDED
Status: CANCELLED | OUTPATIENT
Start: 2021-01-05

## 2020-11-12 RX ORDER — FAMOTIDINE 10 MG/ML
20 INJECTION INTRAVENOUS
Status: CANCELLED | OUTPATIENT
Start: 2021-01-05

## 2020-11-12 NOTE — PROGRESS NOTES
Medical Oncology Progress Note  [facility]     PATIENT: Anna Seay  : 1935 85 y.o. female  MRN [id number]     PCP: Bria Mitchell NP  Consult Requested By:      Date of Service: 2020    Subjective:   Interim History:  Malignant neoplasm of endocervix    Anna Seay is here for follow-up on treatment;   Carbotaxol has been on hold because of leukopenia  The patient feel well and she had no complaint today  Review of recent PET scan shows stable disease     Oncology History:    History of Present Illness: Ms. Seay is a 84 y.o. female who presents for evaluation and management of cervical cancer. SHe was seen at [facility] on 19.      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 The patient had a CT scan consistent with widely metastatic disease including multiple sites of lymphadenopathy, a possible lung metastases, liver metastases, nodule in the anterior abdominal wall, and carcinomatosis. The patient is mostly asymptomatic but does have a vaginal discharge. Her performance status is 0. On exam, there is a 6 centimeter mass in the anterior abdominal wall superior to the umbilicus. On bimanual examination, tumor is a place the entire cervix apex of the vagina, and on rectovaginal examination, there is a large pelvic mass extending to the bilateral pelvic sidewalls.    We will get her CT reviewed at [facility] and if it is consistent with metastatic disease, we have recommended palliative chemotherapy with either carboplatinum as a single agent or carboplatinum and paclitaxel. Due to concern for bowel involvement on the CT scan, I have recommended not giving bevacizumab as part of this regimen. Patient will return home to [place] and begin chemotherapy with a medical oncologist there. I've also recommended a consultation with radiation oncology in [place] for consideration of palliative radiation to lower the chances of significant vaginal bleeding. We will see her back on an as-needed basis."       == 8/5/19 Carbo    ==PET/CT done on 12/2/19. Previous CT imaging done at outside facility, discussed with Dr Nguyen for comparison and he stated pelvic mass smaller, abdominal mass smaller liver lesion likely benign stable but difficult to say if peritoneal implants stable to progressed.    ==FoundationOne Liquid Bx on 12/16/19.  BRCA 2 gene mutation.  ==1/14/20 Pt started lynparza   ==5/13/2020 PET showing early progression.    ==5/2020 resume low dose carbo and taxol.   ==11/2020 PET decreased to stable disease    Oncology History   Malignant neoplasm of endocervix   6/27/2019 Initial Diagnosis    Malignant neoplasm of exocervix     7/8/2019 - 7/8/2019 Chemotherapy    Treatment Summary   Plan Name: OP CARBOPLATIN WEEKLY  Treatment Goal: Palliative  Status: Inactive  Start Date: [No treatment day found]  End Date: [No treatment day found]  Provider: Jaime Pinon MD  Chemotherapy: CARBOplatin (PARAPLATIN) in sodium chloride 0.9% 250 mL chemo infusion, , Intravenous, Clinic/HOD 1 time, 0 of 4 cycles     7/9/2019 - 11/25/2019 Chemotherapy    Treatment Summary   Plan Name: OP GYN CARBOPLATIN (AUC) Q4W  Treatment Goal: Palliative  Status: Inactive  Start Date: 7/9/2019  End Date: 10/29/2019  Provider: Jaime Pinon MD  Chemotherapy: CARBOplatin (PARAPLATIN) in sodium chloride 0.9% 250 mL chemo infusion,  (original dose 341.5 mg), Intravenous, Clinic/HOD 1 time, 5 of 6 cycles  Dose modification:   (original dose 341.5 mg, Cycle 1)     5/19/2020 - 6/16/2020 Chemotherapy    Treatment Summary   Plan Name: OP  PACLITAXEL CARBOPLATIN WEEKLY  Treatment Goal: Palliative  Status: Inactive  Start Date: 5/19/2020  End Date: 5/22/2020  Provider: Annette Donis NP  Chemotherapy: CARBOplatin (PARAPLATIN) 115 mg in sodium chloride 0.9% 250 mL chemo infusion, 115 mg (98.6 % of " original dose 118.4 mg), Intravenous, Clinic/HOD 1 time, 0 of 1 cycle  Dose modification:   (original dose 118.4 mg, Cycle 1)  PACLitaxeL (TAXOL) 80 mg/m2 = 126 mg in sodium chloride 0.9% 250 mL chemo infusion, 80 mg/m2 = 126 mg (100 % of original dose 80 mg/m2), Intravenous, Clinic/HOD 1 time, 0 of 1 cycle  Dose modification: 80 mg/m2 (original dose 80 mg/m2, Cycle 1)     5/19/2020 -  Chemotherapy    Treatment Summary   Plan Name:  PACLITAXEL CARBOPLATIN WEEKLY  Treatment Goal: Palliative  Status: Active  Start Date: 5/19/2020  End Date: 10/29/2020  Provider: Annette Donis NP  Chemotherapy: CARBOplatin (PARAPLATIN) 115 mg in sodium chloride 0.9% 250 mL chemo infusion, 115 mg (100 % of original dose 114.8 mg), Intravenous, Clinic/HOD 1 time, 2 of 2 cycles  Dose modification:   (original dose 114.8 mg, Cycle 1, Reason: MD Discretion), 100 mg (original dose 114.8 mg, Cycle 1),   (original dose 114.8 mg, Cycle 2, Reason: MD Discretion),   (original dose 114.8 mg, Cycle 2, Reason: MD Discretion),   (original dose 114.8 mg, Cycle 2),   (original dose 114.8 mg, Cycle 2)  Administration: 115 mg (9/17/2020), 115 mg (9/24/2020), 125 mg (10/8/2020), 140 mg (10/15/2020), 130 mg (9/3/2020)  PACLitaxeL (TAXOL) 80 mg/m2 = 126 mg in sodium chloride 0.9% 250 mL chemo infusion, 80 mg/m2 = 126 mg (100 % of original dose 80 mg/m2), Intravenous, Clinic/HOD 1 time, 2 of 2 cycles  Dose modification: 80 mg/m2 (original dose 80 mg/m2, Cycle 1), 50 mg/m2 (original dose 80 mg/m2, Cycle 1)  Administration: 84 mg (9/3/2020), 84 mg (9/17/2020), 84 mg (9/24/2020), 90 mg (10/8/2020), 84 mg (10/15/2020)         Past Medical History:   Past Medical History:   Diagnosis Date    Anemia     Cataract     Cervical cancer 05/2019    Cervical cancer 6/25/2019    Hypertension        Past Surgical HIstory:   Past Surgical History:   Procedure Laterality Date    CATARACT EXTRACTION, BILATERAL      WRIST SURGERY  1984       Allergies:  Review of  patient's allergies indicates:   Allergen Reactions    Strawberry Rash       Medications:  Current Outpatient Medications   Medication Sig Dispense Refill    cloNIDine (CATAPRES) 0.3 MG tablet TAKE 1 TABLET(0.3 MG) BY MOUTH TWICE DAILY 180 tablet 2    FENOFIBRATE ORAL Take by mouth 2 (two) times daily.      ferrous sulfate (FEOSOL) 325 mg (65 mg iron) Tab tablet TAKE 1 TABLET BY MOUTH TWICE DAILY 180 tablet 1    loratadine (CLARITIN) 10 mg tablet Take 10 mg by mouth once daily.      magnesium oxide (MAGOX) 400 mg (241.3 mg magnesium) tablet Take 1 tablet (400 mg total) by mouth once daily. 30 tablet 6    megestroL (MEGACE) 40 MG Tab TAKE 1 TABLET(40 MG) BY MOUTH TWICE DAILY 60 tablet 6    NIFEdipine (PROCARDIA-XL) 30 MG (OSM) 24 hr tablet TAKE 1 TABLET BY MOUTH ONCE DAILY 90 tablet 5    ondansetron (ZOFRAN-ODT) 8 MG TbDL Take 1 tablet (8 mg total) by mouth every 8 (eight) hours as needed (chemotherapy-induced nausea and vomiting). 40 tablet 5    simvastatin (ZOCOR) 20 MG tablet Take 20 mg by mouth every evening.      varicella-zoster gE-AS01B, PF, (SHINGRIX, PF,) 50 mcg/0.5 mL injection Administer two doses 2 months apart 0.5 mL 0     Current Facility-Administered Medications   Medication Dose Route Frequency Provider Last Rate Last Dose    alteplase injection 2 mg  2 mg Intra-Catheter PRN Annette Donis NP        diphenhydrAMINE injection 50 mg  50 mg Intravenous Once PRN Annette Donis NP        EPINEPHrine (EPIPEN) 0.3 mg/0.3 mL pen injection 0.3 mg  0.3 mg Intramuscular Once PRN Annette Donis NP        hydrocortisone sodium succinate injection 100 mg  100 mg Intravenous Once PRN Annette Donis NP        sodium chloride 0.9% 250 mL flush bag   Intravenous 1 time in Clinic/HOD Annette Donis NP        sodium chloride 0.9% flush 10 mL  10 mL Intravenous PRN Annette Donis NP           Family History:   Family History   Problem Relation Age of Onset    Hypertension Mother     Pneumonia Father     Breast  "cancer Sister     No Known Problems Brother     Leukemia Daughter     No Known Problems Son        Social History:  reports that she has never smoked. She has never used smokeless tobacco. She reports previous alcohol use. She reports that she does not use drugs.    Review of Systemas  Constitutional: No change in weight, appetie, fatigue, fever, or night sweats  Eyes: No changes in vision  Ears, Nose, Mouth, Throat, and Face: No hearing problems, ear pain, rummy nose, or sore throat  Respiratory: No shortness of breath or cough  Cardiovascular: No chest pain, palpitations or dizziness  Gastrointestinal: No abdominal pain, hematochezia, melena  Genitourinary: No dysuria, hematuria, nocturia, menstrual problems, post menopausal  Integumentary/Breast: No rashes or itching  Hematologic/Lymphatic: No anemia or bleeding abnormalities  Musculoskeletal: No joint or muscle abnormalities  Neurological: No sensory or motor problems, no headaches  Behavioral/Psych: No depression, anxiety, cognitive problems, or stress  Endocrine: No diabetes or thyroid problems  Allergic/Immunologic: See allergy above    Physical Exam      Vitals:   Vitals:    11/12/20 0814   BP: 135/78   BP Location: Left arm   Patient Position: Sitting   BP Method: Medium (Automatic)   Pulse: 76   Resp: 16   Temp: 97.8 °F (36.6 °C)   TempSrc: Temporal   SpO2: 99%   Weight: 67.5 kg (148 lb 12.8 oz)   Height: 5' 2" (1.575 m)     BMI: Body mass index is 27.22 kg/m².  BSA Body surface area is 1.72 meters squared.  ECOG Performance Status:   ECOG SCORE         GENERAL APPEARANCE: Well developed, well nourished, in no acute distress.  SKIN: Inspection of the skin reveals no rashes, ulcerations or petechiae.  HEENT: The sclerae were anicteric and conjunctivae were pink and moist. Extraocular movements were intact and pupils were equal, round, and reactive to light with normal accommodation. External inspection of the ears and nose showed no scars, lesions, or " masses. Lips, teeth, and gums showed normal mucosa. The oral mucosa, hard and soft palate, tongue and posterior pharynx were normal.  NECK: Supple and symmetric. There was no thyroid enlargement, and no tenderness, or masses were felt.  CRESPIRATORY: Normal AP diameter and normal contour without any kyphoscoliosis. LUNGS: Auscultation of the lungs revealed normal breath sounds without any other adventitious sounds or rubs.  CARDIOVASCULAR: There was a regular rate and rhythm without any murmurs, gallops, rubs. The carotid pulses were normal and 2+ bilaterally without bruits. Peripheral pulses were 2+ and symmetric.  GASTROINTESTINAL: Soft and nontender with normal bowel sounds. The liver span was approximately 5-6 cm in the right midclavicular line by percussion. The liver edge was nontender. The spleen was not palpable. There were no inguinal or umbilical hernias noted. No ascites was noted.  LYMPH NODES: No lymphadenopathy was appreciated in the neck, axillae or groin.  MUSCULOSKELETAL: Gait was normal. There was no tenderness or effusions noted. Muscle strength and tone were normal. EXTREMITIES: No cyanosis, clubbing or edema.  NEUROLOGIC: Alert and oriented x 3. Normal affect. Gait was normal. Normal deep tendon reflexes with no pathological reflexes. Sensation to touch was normal.  PSYCHIATRIC: good mood, orientated to place, time and person     Labs and Imagings     Lab Results   Component Value Date    WBC 5.9 11/11/2020    HGB 8.6 (L) 11/11/2020    HCT 27.8 (L) 11/11/2020     (H) 10/15/2020    LABPLAT 177 11/11/2020       CMP  Sodium   Date Value Ref Range Status   11/11/2020 140 135 - 145 mmol/L Final   03/16/2020 140 136 - 145 mmol/L Final     Potassium   Date Value Ref Range Status   11/11/2020 4.4 3.6 - 5.2 mmol/L Final   03/16/2020 4.5 3.5 - 5.1 mmol/L Final     Chloride   Date Value Ref Range Status   11/11/2020 107 100 - 108 mmol/L Final   03/16/2020 107 98 - 107 mmol/L Final     CO2   Date  Value Ref Range Status   11/11/2020 21 21 - 32 mmol/L Final   03/16/2020 21 (L) 22 - 29 mmol/L Final     Glucose   Date Value Ref Range Status   11/11/2020 98 70 - 110 mg/dL Final     BUN   Date Value Ref Range Status   11/11/2020 18 7 - 18 mg/dL Final   03/16/2020 17.2 8 - 23 mg/dL Final     Creatinine   Date Value Ref Range Status   11/11/2020 1.19 (H) 0.55 - 1.02 mg/dL Final   03/16/2020 1.17 (H) 0.50 - 0.90 mg/dL Final     Calcium   Date Value Ref Range Status   11/11/2020 9.1 8.8 - 10.5 mg/dL Final   03/16/2020 10.0 8.6 - 10.2 mg/dL Final     Total Protein   Date Value Ref Range Status   11/11/2020 7.1 6.4 - 8.2 g/dL Final     Albumin   Date Value Ref Range Status   11/11/2020 4.0 3.4 - 5.0 g/dL Final   03/16/2020 4.6 3.5 - 5.2 g/dL Final     Total Bilirubin   Date Value Ref Range Status   11/11/2020 0.5 0.0 - 1.0 mg/dL Final     Alkaline Phosphatase   Date Value Ref Range Status   11/11/2020 99 46 - 116 U/L Final     AST   Date Value Ref Range Status   11/11/2020 18 15 - 37 U/L Final   03/16/2020 17 0 - 32 U/L Final     ALT   Date Value Ref Range Status   11/11/2020 22 12 - 78 U/L Final     Anion Gap   Date Value Ref Range Status   11/11/2020 12.0 (H) 3.0 - 11.0 mmol/L Final   03/16/2020 12.0 8.0 - 17.0 mmol/L Final     Comment:     NOTE  Testing performed at:  [facility], [address] [place] [address]  [id number] CLIA #:[id number]       eGFR if    Date Value Ref Range Status   10/15/2020 >60 >60 mL/min/1.73 m^2 Final     eGFR if non    Date Value Ref Range Status   10/15/2020 52 (A) >60 mL/min/1.73 m^2 Final     Comment:     Calculation used to obtain the estimated glomerular filtration  rate (eGFR) is the CKD-EPI equation.            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 female with PMH of The primary encounter diagnosis was Malignant neoplasm of endocervix. 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 Malignant neoplasm of cervix, unspecified site    3. Increased creatine kinase level    4. Neoplastic malignant related fatigue       Cervical cancer stage IV disease  BRCA2 +  mutation   PARP inhibitor  Lynparza 300 mg BID PET scan shows early progression 4/2020, dc'd  Mid June due to excessive pancytopenia   weekly carbo/taxol added  Pancytopenia and Sr Cr improving with lynparza held       Plan:   Overall PLANS:  carbo Taxol weekly   Lynparza  dc'd pancytopenia     == lab CBC CMP every week  == Will reduce carbo to 50 mg IV weekly, Taxol 60 mg IV weekly, all fixed doses, she is tolerating well, improved kidney function noted  ==Pet/Ct 11/2020 shows stable to decrease in disease  ==continue with combined weekly carbo/taxol, low dose as planned for today,  ==RTC in 3 weeks, no chemo for week of paola Donis NP

## 2020-11-23 DIAGNOSIS — I10 ESSENTIAL HYPERTENSION: ICD-10-CM

## 2020-11-24 RX ORDER — CLONIDINE HYDROCHLORIDE 0.3 MG/1
TABLET ORAL
Qty: 180 TABLET | Refills: 2 | Status: SHIPPED | OUTPATIENT
Start: 2020-11-24 | End: 2021-08-19 | Stop reason: SDUPTHER

## 2020-12-02 LAB
ALBUMIN SERPL BCP-MCNC: 3.9 G/DL (ref 3.4–5)
ALBUMIN/GLOBULIN RATIO: 1.18 RATIO (ref 1.1–1.8)
ALP SERPL-CCNC: 97 U/L (ref 46–116)
ALT SERPL W P-5'-P-CCNC: 18 U/L (ref 12–78)
ANION GAP SERPL CALC-SCNC: 12 MMOL/L (ref 3–11)
AST SERPL-CCNC: 18 U/L (ref 15–37)
BASOPHILS NFR BLD: 0.5 % (ref 0–3)
BILIRUB SERPL-MCNC: 0.5 MG/DL (ref 0–1)
BUN SERPL-MCNC: 17 MG/DL (ref 7–18)
BUN/CREAT SERPL: 15.17 RATIO (ref 7–18)
CALCIUM SERPL-MCNC: 9.5 MG/DL (ref 8.8–10.5)
CHLORIDE SERPL-SCNC: 106 MMOL/L (ref 100–108)
CO2 SERPL-SCNC: 23 MMOL/L (ref 21–32)
CREAT SERPL-MCNC: 1.12 MG/DL (ref 0.55–1.02)
EOSINOPHIL NFR BLD: 1 % (ref 1–3)
ERYTHROCYTE [DISTWIDTH] IN BLOOD BY AUTOMATED COUNT: 14.6 % (ref 12.5–18)
GFR ESTIMATION: 56
GLOBULIN: 3.3 G/DL (ref 2.3–3.5)
GLUCOSE SERPL-MCNC: 97 MG/DL (ref 70–110)
HCT VFR BLD AUTO: 27.6 % (ref 37–47)
HGB BLD-MCNC: 8.6 G/DL (ref 12–16)
LYMPHOCYTES NFR BLD: 29.4 % (ref 25–40)
MACROCYTES BLD QL SMEAR: NORMAL
MCH RBC QN AUTO: 35.7 PG (ref 27–31.2)
MCHC RBC AUTO-ENTMCNC: 31.2 G/DL (ref 31.8–35.4)
MCV RBC AUTO: 114.5 FL (ref 80–97)
MONOCYTES NFR BLD: 12.2 % (ref 1–15)
NEUTROPHILS # BLD AUTO: 2.23 10*3/UL (ref 1.8–7.7)
NEUTROPHILS NFR BLD: 56.6 % (ref 37–80)
NUCLEATED RED BLOOD CELLS: 0 %
PLATELETS: 157 10*3/UL (ref 142–424)
POTASSIUM SERPL-SCNC: 4.2 MMOL/L (ref 3.6–5.2)
PROT SERPL-MCNC: 7.2 G/DL (ref 6.4–8.2)
RBC # BLD AUTO: 2.41 10*6/UL (ref 4.2–5.4)
SODIUM BLD-SCNC: 141 MMOL/L (ref 135–145)
WBC # BLD: 3.9 10*3/UL (ref 4.6–10.2)

## 2020-12-03 ENCOUNTER — OFFICE VISIT (OUTPATIENT)
Dept: HEMATOLOGY/ONCOLOGY | Facility: CLINIC | Age: 85
End: 2020-12-03
Payer: MEDICARE

## 2020-12-03 VITALS
HEART RATE: 61 BPM | WEIGHT: 147 LBS | TEMPERATURE: 98 F | SYSTOLIC BLOOD PRESSURE: 119 MMHG | OXYGEN SATURATION: 99 % | DIASTOLIC BLOOD PRESSURE: 74 MMHG | HEIGHT: 62 IN | RESPIRATION RATE: 16 BRPM | BODY MASS INDEX: 27.05 KG/M2

## 2020-12-03 DIAGNOSIS — Z15.01 BRCA2 GENE MUTATION POSITIVE IN FEMALE: ICD-10-CM

## 2020-12-03 DIAGNOSIS — C53.0 MALIGNANT NEOPLASM OF ENDOCERVIX: Primary | ICD-10-CM

## 2020-12-03 DIAGNOSIS — Z15.09 BRCA2 GENE MUTATION POSITIVE IN FEMALE: ICD-10-CM

## 2020-12-03 DIAGNOSIS — Z51.11 ENCOUNTER FOR ANTINEOPLASTIC CHEMOTHERAPY: ICD-10-CM

## 2020-12-03 DIAGNOSIS — E83.42 HYPOMAGNESEMIA: ICD-10-CM

## 2020-12-03 DIAGNOSIS — Z15.02 BRCA2 GENE MUTATION POSITIVE IN FEMALE: ICD-10-CM

## 2020-12-03 PROCEDURE — 99214 OFFICE O/P EST MOD 30 MIN: CPT | Mod: S$GLB,,, | Performed by: NURSE PRACTITIONER

## 2020-12-03 PROCEDURE — 99214 PR OFFICE/OUTPT VISIT, EST, LEVL IV, 30-39 MIN: ICD-10-PCS | Mod: S$GLB,,, | Performed by: NURSE PRACTITIONER

## 2020-12-03 NOTE — PROGRESS NOTES
Medical Oncology Progress Note  [facility]     PATIENT: Anna Seay  : 1935 85 y.o. female  MRN [id number]     PCP: Bria Mitchell NP  Consult Requested By:      Date of Service: 12/3/2020    Subjective:   Interim History:  Malignant neoplasm of endocervix    Anna Seay is here for follow-up on treatment;   Carbotaxol has been on hold because of leukopenia  The patient feel well and she had no complaint today  Review of recent PET scan shows stable disease     Oncology History:    History of Present Illness: Ms. Seay is a 84 y.o. female who presents for evaluation and management of cervical cancer. SHe was seen at [facility] on 19.      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 The patient had a CT scan consistent with widely metastatic disease including multiple sites of lymphadenopathy, a possible lung metastases, liver metastases, nodule in the anterior abdominal wall, and carcinomatosis. The patient is mostly asymptomatic but does have a vaginal discharge. Her performance status is 0. On exam, there is a 6 centimeter mass in the anterior abdominal wall superior to the umbilicus. On bimanual examination, tumor is a place the entire cervix apex of the vagina, and on rectovaginal examination, there is a large pelvic mass extending to the bilateral pelvic sidewalls.    We will get her CT reviewed at [facility] and if it is consistent with metastatic disease, we have recommended palliative chemotherapy with either carboplatinum as a single agent or carboplatinum and paclitaxel. Due to concern for bowel involvement on the CT scan, I have recommended not giving bevacizumab as part of this regimen. Patient will return home to [place] and begin chemotherapy with a medical oncologist there. I've also recommended a consultation with radiation oncology in [place] for consideration of palliative radiation to lower the chances of significant vaginal bleeding. We will see her back on an as-needed basis."       == 8/5/19 Carbo    ==PET/CT done on 12/2/19. Previous CT imaging done at outside facility, discussed with Dr Nguyen for comparison and he stated pelvic mass smaller, abdominal mass smaller liver lesion likely benign stable but difficult to say if peritoneal implants stable to progressed.    ==FoundationOne Liquid Bx on 12/16/19.  BRCA 2 gene mutation.  ==1/14/20 Pt started lynparza   ==5/13/2020 PET showing early progression.    ==5/2020 resume low dose carbo and taxol.   ==11/2020 PET decreased to stable disease    Oncology History   Malignant neoplasm of endocervix   6/27/2019 Initial Diagnosis    Malignant neoplasm of exocervix     7/8/2019 - 7/8/2019 Chemotherapy    Treatment Summary   Plan Name: OP CARBOPLATIN WEEKLY  Treatment Goal: Palliative  Status: Inactive  Start Date: [No treatment day found]  End Date: [No treatment day found]  Provider: Jaime Pinon MD  Chemotherapy: CARBOplatin (PARAPLATIN) in sodium chloride 0.9% 250 mL chemo infusion, , Intravenous, Clinic/HOD 1 time, 0 of 4 cycles     7/9/2019 - 11/25/2019 Chemotherapy    Treatment Summary   Plan Name: OP GYN CARBOPLATIN (AUC) Q4W  Treatment Goal: Palliative  Status: Inactive  Start Date: 7/9/2019  End Date: 10/29/2019  Provider: Jaime Pinon MD  Chemotherapy: CARBOplatin (PARAPLATIN) in sodium chloride 0.9% 250 mL chemo infusion,  (original dose 341.5 mg), Intravenous, Clinic/HOD 1 time, 5 of 6 cycles  Dose modification:   (original dose 341.5 mg, Cycle 1)     5/19/2020 - 6/16/2020 Chemotherapy    Treatment Summary   Plan Name: OP  PACLITAXEL CARBOPLATIN WEEKLY  Treatment Goal: Palliative  Status: Inactive  Start Date: 5/19/2020  End Date: 5/22/2020  Provider: Annette Donis NP  Chemotherapy: CARBOplatin (PARAPLATIN) 115 mg in sodium chloride 0.9% 250 mL chemo infusion, 115 mg (98.6 % of " original dose 118.4 mg), Intravenous, Clinic/HOD 1 time, 0 of 1 cycle  Dose modification:   (original dose 118.4 mg, Cycle 1)  PACLitaxeL (TAXOL) 80 mg/m2 = 126 mg in sodium chloride 0.9% 250 mL chemo infusion, 80 mg/m2 = 126 mg (100 % of original dose 80 mg/m2), Intravenous, Clinic/HOD 1 time, 0 of 1 cycle  Dose modification: 80 mg/m2 (original dose 80 mg/m2, Cycle 1)     5/19/2020 -  Chemotherapy    Treatment Summary   Plan Name:  PACLITAXEL CARBOPLATIN WEEKLY  Treatment Goal: Palliative  Status: Active  Start Date: 5/19/2020  End Date: 1/13/2021 (Planned)  Provider: Annette Donis NP  Chemotherapy: CARBOplatin (PARAPLATIN) 115 mg in sodium chloride 0.9% 250 mL chemo infusion, 115 mg (100 % of original dose 114.8 mg), Intravenous, Clinic/HOD 1 time, 3 of 3 cycles  Dose modification:   (original dose 114.8 mg, Cycle 1, Reason: MD Discretion), 100 mg (original dose 114.8 mg, Cycle 1),   (original dose 114.8 mg, Cycle 2, Reason: MD Discretion),   (original dose 114.8 mg, Cycle 2, Reason: MD Discretion),   (original dose 114.8 mg, Cycle 2),   (original dose 114.8 mg, Cycle 2)  Administration: 115 mg (9/17/2020), 115 mg (9/24/2020), 125 mg (10/8/2020), 140 mg (10/15/2020), 130 mg (9/3/2020)  PACLitaxeL (TAXOL) 80 mg/m2 = 126 mg in sodium chloride 0.9% 250 mL chemo infusion, 80 mg/m2 = 126 mg (100 % of original dose 80 mg/m2), Intravenous, Clinic/HOD 1 time, 3 of 3 cycles  Dose modification: 80 mg/m2 (original dose 80 mg/m2, Cycle 1), 50 mg/m2 (original dose 80 mg/m2, Cycle 1), 50 mg/m2 (original dose 80 mg/m2, Cycle 3)  Administration: 84 mg (9/3/2020), 84 mg (9/17/2020), 84 mg (9/24/2020), 90 mg (10/8/2020), 84 mg (10/15/2020)         Past Medical History:   Past Medical History:   Diagnosis Date    Anemia     Cataract     Cervical cancer 05/2019    Cervical cancer 6/25/2019    Hypertension        Past Surgical HIstory:   Past Surgical History:   Procedure Laterality Date    CATARACT EXTRACTION, BILATERAL       WRIST SURGERY  1984       Allergies:  Review of patient's allergies indicates:   Allergen Reactions    Strawberry Rash       Medications:  Current Outpatient Medications   Medication Sig Dispense Refill    cloNIDine (CATAPRES) 0.3 MG tablet TAKE 1 TABLET(0.3 MG) BY MOUTH TWICE DAILY 180 tablet 2    FENOFIBRATE ORAL Take by mouth 2 (two) times daily.      ferrous sulfate (FEOSOL) 325 mg (65 mg iron) Tab tablet TAKE 1 TABLET BY MOUTH TWICE DAILY 180 tablet 1    loratadine (CLARITIN) 10 mg tablet Take 10 mg by mouth once daily.      magnesium oxide (MAGOX) 400 mg (241.3 mg magnesium) tablet Take 1 tablet (400 mg total) by mouth once daily. 30 tablet 6    megestroL (MEGACE) 40 MG Tab TAKE 1 TABLET(40 MG) BY MOUTH TWICE DAILY 60 tablet 6    NIFEdipine (PROCARDIA-XL) 30 MG (OSM) 24 hr tablet TAKE 1 TABLET BY MOUTH ONCE DAILY 90 tablet 5    ondansetron (ZOFRAN-ODT) 8 MG TbDL Take 1 tablet (8 mg total) by mouth every 8 (eight) hours as needed (chemotherapy-induced nausea and vomiting). 40 tablet 5    simvastatin (ZOCOR) 20 MG tablet Take 20 mg by mouth every evening.      varicella-zoster gE-AS01B, PF, (SHINGRIX, PF,) 50 mcg/0.5 mL injection Administer two doses 2 months apart 0.5 mL 0     Current Facility-Administered Medications   Medication Dose Route Frequency Provider Last Rate Last Dose    alteplase injection 2 mg  2 mg Intra-Catheter PRN Annette Donis NP        diphenhydrAMINE injection 50 mg  50 mg Intravenous Once PRN Annette Donis NP        EPINEPHrine (EPIPEN) 0.3 mg/0.3 mL pen injection 0.3 mg  0.3 mg Intramuscular Once PRN Annette Donis NP        hydrocortisone sodium succinate injection 100 mg  100 mg Intravenous Once PRN Annette Donis NP        sodium chloride 0.9% 250 mL flush bag   Intravenous 1 time in Clinic/HOD Annette Donis NP        sodium chloride 0.9% flush 10 mL  10 mL Intravenous PRN Annette Donis NP           Family History:   Family History   Problem Relation Age of Onset     "Hypertension Mother     Pneumonia Father     Breast cancer Sister     No Known Problems Brother     Leukemia Daughter     No Known Problems Son        Social History:  reports that she has never smoked. She has never used smokeless tobacco. She reports previous alcohol use. She reports that she does not use drugs.    Review of Systemas  Constitutional: No change in weight, appetie, fatigue, fever, or night sweats  Eyes: No changes in vision  Ears, Nose, Mouth, Throat, and Face: No hearing problems, ear pain, rummy nose, or sore throat  Respiratory: No shortness of breath or cough  Cardiovascular: No chest pain, palpitations or dizziness  Gastrointestinal: No abdominal pain, hematochezia, melena  Genitourinary: No dysuria, hematuria, nocturia, menstrual problems, post menopausal  Integumentary/Breast: No rashes or itching  Hematologic/Lymphatic: No anemia or bleeding abnormalities  Musculoskeletal: No joint or muscle abnormalities  Neurological: No sensory or motor problems, no headaches  Behavioral/Psych: No depression, anxiety, cognitive problems, or stress  Endocrine: No diabetes or thyroid problems  Allergic/Immunologic: See allergy above    Physical Exam      Vitals:   Vitals:    12/03/20 0831   BP: 119/74   BP Location: Left arm   Patient Position: Sitting   BP Method: Medium (Automatic)   Pulse: 61   Resp: 16   Temp: 97.7 °F (36.5 °C)   TempSrc: Temporal   SpO2: 99%   Weight: 66.7 kg (147 lb)   Height: 5' 2" (1.575 m)     BMI: Body mass index is 26.89 kg/m².  BSA Body surface area is 1.71 meters squared.  ECOG Performance Status:   ECOG SCORE         GENERAL APPEARANCE: Well developed, well nourished, in no acute distress.  SKIN: Inspection of the skin reveals no rashes, ulcerations or petechiae.  HEENT: The sclerae were anicteric and conjunctivae were pink and moist. Extraocular movements were intact and pupils were equal, round, and reactive to light with normal accommodation. External inspection of the " ears and nose showed no scars, lesions, or masses. Lips, teeth, and gums showed normal mucosa. The oral mucosa, hard and soft palate, tongue and posterior pharynx were normal.  NECK: Supple and symmetric. There was no thyroid enlargement, and no tenderness, or masses were felt.  CRESPIRATORY: Normal AP diameter and normal contour without any kyphoscoliosis. LUNGS: Auscultation of the lungs revealed normal breath sounds without any other adventitious sounds or rubs.  CARDIOVASCULAR: There was a regular rate and rhythm without any murmurs, gallops, rubs. The carotid pulses were normal and 2+ bilaterally without bruits. Peripheral pulses were 2+ and symmetric.  GASTROINTESTINAL: Soft and nontender with normal bowel sounds. The liver span was approximately 5-6 cm in the right midclavicular line by percussion. The liver edge was nontender. The spleen was not palpable. There were no inguinal or umbilical hernias noted. No ascites was noted.  LYMPH NODES: No lymphadenopathy was appreciated in the neck, axillae or groin.  MUSCULOSKELETAL: Gait was normal. There was no tenderness or effusions noted. Muscle strength and tone were normal. EXTREMITIES: No cyanosis, clubbing or edema.  NEUROLOGIC: Alert and oriented x 3. Normal affect. Gait was normal. Normal deep tendon reflexes with no pathological reflexes. Sensation to touch was normal.  PSYCHIATRIC: good mood, orientated to place, time and person     Labs and Imagings     Lab Results   Component Value Date    WBC 3.9 (L) 12/02/2020    HGB 8.6 (L) 12/02/2020    HCT 27.6 (L) 12/02/2020     (H) 10/15/2020    LABPLAT 157 12/02/2020       CMP  Sodium   Date Value Ref Range Status   12/02/2020 141 135 - 145 mmol/L Final   03/16/2020 140 136 - 145 mmol/L Final     Potassium   Date Value Ref Range Status   12/02/2020 4.2 3.6 - 5.2 mmol/L Final   03/16/2020 4.5 3.5 - 5.1 mmol/L Final     Chloride   Date Value Ref Range Status   12/02/2020 106 100 - 108 mmol/L Final    03/16/2020 107 98 - 107 mmol/L Final     CO2   Date Value Ref Range Status   12/02/2020 23 21 - 32 mmol/L Final   03/16/2020 21 (L) 22 - 29 mmol/L Final     Glucose   Date Value Ref Range Status   12/02/2020 97 70 - 110 mg/dL Final     BUN   Date Value Ref Range Status   12/02/2020 17 7 - 18 mg/dL Final   03/16/2020 17.2 8 - 23 mg/dL Final     Creatinine   Date Value Ref Range Status   12/02/2020 1.12 (H) 0.55 - 1.02 mg/dL Final   03/16/2020 1.17 (H) 0.50 - 0.90 mg/dL Final     Calcium   Date Value Ref Range Status   12/02/2020 9.5 8.8 - 10.5 mg/dL Final   03/16/2020 10.0 8.6 - 10.2 mg/dL Final     Total Protein   Date Value Ref Range Status   12/02/2020 7.2 6.4 - 8.2 g/dL Final     Albumin   Date Value Ref Range Status   12/02/2020 3.9 3.4 - 5.0 g/dL Final   03/16/2020 4.6 3.5 - 5.2 g/dL Final     Total Bilirubin   Date Value Ref Range Status   12/02/2020 0.5 0.0 - 1.0 mg/dL Final     Alkaline Phosphatase   Date Value Ref Range Status   12/02/2020 97 46 - 116 U/L Final     AST   Date Value Ref Range Status   12/02/2020 18 15 - 37 U/L Final   03/16/2020 17 0 - 32 U/L Final     ALT   Date Value Ref Range Status   12/02/2020 18 12 - 78 U/L Final     Anion Gap   Date Value Ref Range Status   12/02/2020 12.0 (H) 3.0 - 11.0 mmol/L Final   03/16/2020 12.0 8.0 - 17.0 mmol/L Final     Comment:     NOTE  Testing performed at:  [facility], [address] [place] [address]  [id number] CLIA #:[id number]       eGFR if    Date Value Ref Range Status   10/15/2020 >60 >60 mL/min/1.73 m^2 Final     eGFR if non    Date Value Ref Range Status   10/15/2020 52 (A) >60 mL/min/1.73 m^2 Final     Comment:     Calculation used to obtain the estimated glomerular filtration  rate (eGFR) is the CKD-EPI equation.            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 female with PMH of The primary encounter diagnosis was Malignant neoplasm of endocervix. Diagnoses of Encounter for antineoplastic chemotherapy, BRCA2 gene mutation positive in female, and Hypomagnesemia were also pertinent to this visit., with Malignant neoplasm of endocervix [C53.0]     ==============================================  *  Assessment:       1. 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 Malignant neoplasm of cervix, unspecified site    3. Increased creatine kinase level    4. Neoplastic malignant related fatigue       Cervical cancer stage IV disease  BRCA2 +  mutation   PARP inhibitor  Lynparza 300 mg BID PET scan shows early progression 4/2020, dc'd  Mid June due to excessive pancytopenia   weekly carbo/taxol added  Pancytopenia and Sr Cr improving with lynparza held       Plan:   Overall PLANS:  carbo Taxol weekly   Lynparza  dc'd pancytopenia     == lab CBC CMP every week  == Will reduce carbo to 50 mg IV weekly, Taxol 60 mg IV weekly, all fixed doses, she is tolerating well, improved kidney function noted  ==Pet/Ct 11/2020 shows stable to decrease in disease  ==continue with combined weekly carbo/taxol, low dose as planned for today,  ==RTC in 2 weeks     Annette Donis NP

## 2020-12-16 LAB
ALBUMIN SERPL BCP-MCNC: 4.1 G/DL (ref 3.4–5)
ALBUMIN/GLOBULIN RATIO: 1.28 RATIO (ref 1.1–1.8)
ALP SERPL-CCNC: 98 U/L (ref 46–116)
ALT SERPL W P-5'-P-CCNC: 21 U/L (ref 12–78)
ANION GAP SERPL CALC-SCNC: 12 MMOL/L (ref 3–11)
AST SERPL-CCNC: 17 U/L (ref 15–37)
BASOPHILS NFR BLD: 0.2 % (ref 0–3)
BILIRUB SERPL-MCNC: 0.6 MG/DL (ref 0–1)
BUN SERPL-MCNC: 16 MG/DL (ref 7–18)
BUN/CREAT SERPL: 14.95 RATIO (ref 7–18)
CALCIUM SERPL-MCNC: 9.7 MG/DL (ref 8.8–10.5)
CHLORIDE SERPL-SCNC: 106 MMOL/L (ref 100–108)
CO2 SERPL-SCNC: 24 MMOL/L (ref 21–32)
CREAT SERPL-MCNC: 1.07 MG/DL (ref 0.55–1.02)
EOSINOPHIL NFR BLD: 1.2 % (ref 1–3)
ERYTHROCYTE [DISTWIDTH] IN BLOOD BY AUTOMATED COUNT: 14.2 % (ref 12.5–18)
GFR ESTIMATION: 59
GLOBULIN: 3.2 G/DL (ref 2.3–3.5)
GLUCOSE SERPL-MCNC: 101 MG/DL (ref 70–110)
HCT VFR BLD AUTO: 28.1 % (ref 37–47)
HGB BLD-MCNC: 8.6 G/DL (ref 12–16)
HYPOCHROMIA BLD QL SMEAR: NORMAL
LYMPHOCYTES NFR BLD: 22 % (ref 25–40)
MACROCYTES BLD QL SMEAR: NORMAL
MCH RBC QN AUTO: 35.7 PG (ref 27–31.2)
MCHC RBC AUTO-ENTMCNC: 30.6 G/DL (ref 31.8–35.4)
MCV RBC AUTO: 116.6 FL (ref 80–97)
MONOCYTES NFR BLD: 8.9 % (ref 1–15)
NEUTROPHILS # BLD AUTO: 3.46 10*3/UL (ref 1.8–7.7)
NEUTROPHILS NFR BLD: 67.3 % (ref 37–80)
NUCLEATED RED BLOOD CELLS: 0 %
PLATELETS: 167 10*3/UL (ref 142–424)
POTASSIUM SERPL-SCNC: 4.2 MMOL/L (ref 3.6–5.2)
PROT SERPL-MCNC: 7.3 G/DL (ref 6.4–8.2)
RBC # BLD AUTO: 2.41 10*6/UL (ref 4.2–5.4)
SODIUM BLD-SCNC: 142 MMOL/L (ref 135–145)
WBC # BLD: 5.1 10*3/UL (ref 4.6–10.2)

## 2020-12-17 ENCOUNTER — OFFICE VISIT (OUTPATIENT)
Dept: HEMATOLOGY/ONCOLOGY | Facility: CLINIC | Age: 85
End: 2020-12-17
Payer: MEDICARE

## 2020-12-17 VITALS
DIASTOLIC BLOOD PRESSURE: 76 MMHG | BODY MASS INDEX: 26.87 KG/M2 | HEIGHT: 62 IN | WEIGHT: 146 LBS | SYSTOLIC BLOOD PRESSURE: 131 MMHG | HEART RATE: 60 BPM | RESPIRATION RATE: 16 BRPM | TEMPERATURE: 98 F | OXYGEN SATURATION: 99 %

## 2020-12-17 DIAGNOSIS — Z15.02 BRCA2 GENE MUTATION POSITIVE IN FEMALE: ICD-10-CM

## 2020-12-17 DIAGNOSIS — Z15.01 BRCA2 GENE MUTATION POSITIVE IN FEMALE: ICD-10-CM

## 2020-12-17 DIAGNOSIS — C53.0 MALIGNANT NEOPLASM OF ENDOCERVIX: Primary | ICD-10-CM

## 2020-12-17 DIAGNOSIS — E83.42 HYPOMAGNESEMIA: ICD-10-CM

## 2020-12-17 DIAGNOSIS — Z15.09 BRCA2 GENE MUTATION POSITIVE IN FEMALE: ICD-10-CM

## 2020-12-17 DIAGNOSIS — Z51.11 ENCOUNTER FOR ANTINEOPLASTIC CHEMOTHERAPY: ICD-10-CM

## 2020-12-17 DIAGNOSIS — R53.0 NEOPLASTIC MALIGNANT RELATED FATIGUE: ICD-10-CM

## 2020-12-17 PROCEDURE — 99214 OFFICE O/P EST MOD 30 MIN: CPT | Mod: S$GLB,,, | Performed by: NURSE PRACTITIONER

## 2020-12-17 PROCEDURE — 99214 PR OFFICE/OUTPT VISIT, EST, LEVL IV, 30-39 MIN: ICD-10-PCS | Mod: S$GLB,,, | Performed by: NURSE PRACTITIONER

## 2020-12-17 RX ORDER — DIPHENHYDRAMINE HYDROCHLORIDE 50 MG/ML
50 INJECTION INTRAMUSCULAR; INTRAVENOUS ONCE AS NEEDED
Status: DISCONTINUED | OUTPATIENT
Start: 2020-12-17 | End: 2022-01-20

## 2020-12-17 RX ORDER — FAMOTIDINE 10 MG/ML
20 INJECTION INTRAVENOUS
Status: DISCONTINUED | OUTPATIENT
Start: 2020-12-17 | End: 2022-01-20

## 2020-12-17 RX ORDER — EPINEPHRINE 0.3 MG/.3ML
0.3 INJECTION SUBCUTANEOUS ONCE AS NEEDED
Status: SHIPPED | OUTPATIENT
Start: 2020-12-17 | End: 2032-05-15

## 2020-12-17 RX ORDER — HEPARIN 100 UNIT/ML
500 SYRINGE INTRAVENOUS
Status: DISCONTINUED | OUTPATIENT
Start: 2020-12-17 | End: 2022-01-20

## 2020-12-17 RX ORDER — SODIUM CHLORIDE 0.9 % (FLUSH) 0.9 %
10 SYRINGE (ML) INJECTION
Status: DISCONTINUED | OUTPATIENT
Start: 2020-12-17 | End: 2022-01-20

## 2020-12-17 NOTE — PROGRESS NOTES
Medical Oncology Progress Note  [facility]     PATIENT: Anna Seay  : 1935 85 y.o. female  MRN [id number]     PCP: Bria Mitchell NP  Consult Requested By:      Date of Service: 2020    Subjective:   Interim History:  Malignant neoplasm of endocervix    Anna Seay is here for follow-up on treatment;   Carbotaxol has been on hold because of leukopenia  The patient feel well and she had no complaint today  Review of recent PET scan shows stable disease     Oncology History:    History of Present Illness: Ms. Seay is a 84 y.o. female who presents for evaluation and management of cervical cancer. SHe was seen at [facility] on 19.      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 The patient had a CT scan consistent with widely metastatic disease including multiple sites of lymphadenopathy, a possible lung metastases, liver metastases, nodule in the anterior abdominal wall, and carcinomatosis. The patient is mostly asymptomatic but does have a vaginal discharge. Her performance status is 0. On exam, there is a 6 centimeter mass in the anterior abdominal wall superior to the umbilicus. On bimanual examination, tumor is a place the entire cervix apex of the vagina, and on rectovaginal examination, there is a large pelvic mass extending to the bilateral pelvic sidewalls.    We will get her CT reviewed at [facility] and if it is consistent with metastatic disease, we have recommended palliative chemotherapy with either carboplatinum as a single agent or carboplatinum and paclitaxel. Due to concern for bowel involvement on the CT scan, I have recommended not giving bevacizumab as part of this regimen. Patient will return home to [place] and begin chemotherapy with a medical oncologist there. I've also recommended a consultation with radiation oncology [place] for consideration of palliative radiation to lower the chances of significant vaginal bleeding. We will see her back on an as-needed basis."       == 8/5/19 Carbo    ==PET/CT done on 12/2/19. Previous CT imaging done at outside facility, discussed with Dr Nguyen for comparison and he stated pelvic mass smaller, abdominal mass smaller liver lesion likely benign stable but difficult to say if peritoneal implants stable to progressed.    ==FoundationOne Liquid Bx on 12/16/19.  BRCA 2 gene mutation.  ==1/14/20 Pt started lynparza   ==5/13/2020 PET showing early progression.    ==5/2020 resume low dose carbo and taxol.   ==11/2020 PET decreased to stable disease    Oncology History   Malignant neoplasm of endocervix   6/27/2019 Initial Diagnosis    Malignant neoplasm of exocervix     7/8/2019 - 7/8/2019 Chemotherapy    Treatment Summary   Plan Name: OP CARBOPLATIN WEEKLY  Treatment Goal: Palliative  Status: Inactive  Start Date: [No treatment day found]  End Date: [No treatment day found]  Provider: Jaime Pinon MD  Chemotherapy: CARBOplatin (PARAPLATIN) in sodium chloride 0.9% 250 mL chemo infusion, , Intravenous, Clinic/HOD 1 time, 0 of 4 cycles     7/9/2019 - 11/25/2019 Chemotherapy    Treatment Summary   Plan Name: OP GYN CARBOPLATIN (AUC) Q4W  Treatment Goal: Palliative  Status: Inactive  Start Date: 7/9/2019  End Date: 10/29/2019  Provider: Jaime Pinon MD  Chemotherapy: CARBOplatin (PARAPLATIN) in sodium chloride 0.9% 250 mL chemo infusion,  (original dose 341.5 mg), Intravenous, Clinic/HOD 1 time, 5 of 6 cycles  Dose modification:   (original dose 341.5 mg, Cycle 1)     5/19/2020 - 6/16/2020 Chemotherapy    Treatment Summary   Plan Name: OP  PACLITAXEL CARBOPLATIN WEEKLY  Treatment Goal: Palliative  Status: Inactive  Start Date: 5/19/2020  End Date: 5/22/2020  Provider: Annette Donis NP  Chemotherapy: CARBOplatin (PARAPLATIN) 115 mg in sodium chloride 0.9% 250 mL chemo infusion, 115 mg (98.6 % of " original dose 118.4 mg), Intravenous, Clinic/HOD 1 time, 0 of 1 cycle  Dose modification:   (original dose 118.4 mg, Cycle 1)  PACLitaxeL (TAXOL) 80 mg/m2 = 126 mg in sodium chloride 0.9% 250 mL chemo infusion, 80 mg/m2 = 126 mg (100 % of original dose 80 mg/m2), Intravenous, Clinic/HOD 1 time, 0 of 1 cycle  Dose modification: 80 mg/m2 (original dose 80 mg/m2, Cycle 1)     5/19/2020 -  Chemotherapy    Treatment Summary   Plan Name:  PACLITAXEL CARBOPLATIN WEEKLY  Treatment Goal: Palliative  Status: Active  Start Date: 5/19/2020  End Date: 1/13/2021 (Planned)  Provider: Annette Donis NP  Chemotherapy: CARBOplatin (PARAPLATIN) 115 mg in sodium chloride 0.9% 250 mL chemo infusion, 115 mg (100 % of original dose 114.8 mg), Intravenous, Clinic/HOD 1 time, 3 of 3 cycles  Dose modification:   (original dose 114.8 mg, Cycle 1, Reason: MD Discretion), 100 mg (original dose 114.8 mg, Cycle 1),   (original dose 114.8 mg, Cycle 2, Reason: MD Discretion),   (original dose 114.8 mg, Cycle 2, Reason: MD Discretion),   (original dose 114.8 mg, Cycle 2),   (original dose 114.8 mg, Cycle 2)  Administration: 115 mg (9/17/2020), 115 mg (9/24/2020), 125 mg (10/8/2020), 140 mg (10/15/2020), 130 mg (9/3/2020)  PACLitaxeL (TAXOL) 80 mg/m2 = 126 mg in sodium chloride 0.9% 250 mL chemo infusion, 80 mg/m2 = 126 mg (100 % of original dose 80 mg/m2), Intravenous, Clinic/HOD 1 time, 3 of 3 cycles  Dose modification: 80 mg/m2 (original dose 80 mg/m2, Cycle 1), 50 mg/m2 (original dose 80 mg/m2, Cycle 1), 50 mg/m2 (original dose 80 mg/m2, Cycle 3)  Administration: 84 mg (9/3/2020), 84 mg (9/17/2020), 84 mg (9/24/2020), 90 mg (10/8/2020), 84 mg (10/15/2020)         Past Medical History:   Past Medical History:   Diagnosis Date    Anemia     Cataract     Cervical cancer 05/2019    Cervical cancer 6/25/2019    Hypertension        Past Surgical HIstory:   Past Surgical History:   Procedure Laterality Date    CATARACT EXTRACTION, BILATERAL       WRIST SURGERY  1984       Allergies:  Review of patient's allergies indicates:   Allergen Reactions    Strawberry Rash       Medications:  Current Outpatient Medications   Medication Sig Dispense Refill    cloNIDine (CATAPRES) 0.3 MG tablet TAKE 1 TABLET(0.3 MG) BY MOUTH TWICE DAILY 180 tablet 2    FENOFIBRATE ORAL Take by mouth 2 (two) times daily.      ferrous sulfate (FEOSOL) 325 mg (65 mg iron) Tab tablet TAKE 1 TABLET BY MOUTH TWICE DAILY 180 tablet 1    loratadine (CLARITIN) 10 mg tablet Take 10 mg by mouth once daily.      magnesium oxide (MAGOX) 400 mg (241.3 mg magnesium) tablet Take 1 tablet (400 mg total) by mouth once daily. 30 tablet 6    megestroL (MEGACE) 40 MG Tab TAKE 1 TABLET(40 MG) BY MOUTH TWICE DAILY 60 tablet 6    NIFEdipine (PROCARDIA-XL) 30 MG (OSM) 24 hr tablet TAKE 1 TABLET BY MOUTH ONCE DAILY 90 tablet 5    ondansetron (ZOFRAN-ODT) 8 MG TbDL Take 1 tablet (8 mg total) by mouth every 8 (eight) hours as needed (chemotherapy-induced nausea and vomiting). 40 tablet 5    simvastatin (ZOCOR) 20 MG tablet Take 20 mg by mouth every evening.      varicella-zoster gE-AS01B, PF, (SHINGRIX, PF,) 50 mcg/0.5 mL injection Administer two doses 2 months apart 0.5 mL 0     Current Facility-Administered Medications   Medication Dose Route Frequency Provider Last Rate Last Dose    alteplase injection 2 mg  2 mg Intra-Catheter PRN Annette Donis NP        CARBOplatin (PARAPLATIN) 115 mg in sodium chloride 0.9% 261.5 mL chemo infusion  115 mg Intravenous 1 time in Clinic/HOD Annette Donis NP        diphenhydrAMINE injection 50 mg  50 mg Intravenous Once PRN Annette Donis NP        EPINEPHrine (EPIPEN) 0.3 mg/0.3 mL pen injection 0.3 mg  0.3 mg Intramuscular Once PRN Annette Donis NP        hydrocortisone sodium succinate injection 100 mg  100 mg Intravenous Once PRN Annette Donis NP        PACLitaxeL (TAXOL) 50 mg/m2 = 84 mg in sodium chloride 0.9% 264 mL chemo infusion  50 mg/m2 Intravenous 1 time  "in Clinic/HOD Annette Donis NP        sodium chloride 0.9% 250 mL flush bag   Intravenous 1 time in Clinic/HOD Annette Donis NP        sodium chloride 0.9% flush 10 mL  10 mL Intravenous PRN Annette Donis NP           Family History:   Family History   Problem Relation Age of Onset    Hypertension Mother     Pneumonia Father     Breast cancer Sister     No Known Problems Brother     Leukemia Daughter     No Known Problems Son        Social History:  reports that she has never smoked. She has never used smokeless tobacco. She reports previous alcohol use. She reports that she does not use drugs.    Review of Systemas  Constitutional: No change in weight, appetie, fatigue, fever, or night sweats  Eyes: No changes in vision  Ears, Nose, Mouth, Throat, and Face: No hearing problems, ear pain, rummy nose, or sore throat  Respiratory: No shortness of breath or cough  Cardiovascular: No chest pain, palpitations or dizziness  Gastrointestinal: No abdominal pain, hematochezia, melena  Genitourinary: No dysuria, hematuria, nocturia, menstrual problems, post menopausal  Integumentary/Breast: No rashes or itching  Hematologic/Lymphatic: No anemia or bleeding abnormalities  Musculoskeletal: No joint or muscle abnormalities  Neurological: No sensory or motor problems, no headaches  Behavioral/Psych: No depression, anxiety, cognitive problems, or stress  Endocrine: No diabetes or thyroid problems  Allergic/Immunologic: See allergy above    Physical Exam      Vitals:   Vitals:    12/17/20 0826   BP: 131/76   BP Location: Left arm   Patient Position: Sitting   BP Method: Medium (Automatic)   Pulse: 60   Resp: 16   Temp: 97.7 °F (36.5 °C)   TempSrc: Temporal   SpO2: 99%   Weight: 66.2 kg (146 lb)   Height: 5' 2" (1.575 m)     BMI: Body mass index is 26.7 kg/m².  BSA Body surface area is 1.7 meters squared.  ECOG Performance Status:   ECOG SCORE         GENERAL APPEARANCE: Well developed, well nourished, in no acute " distress.  SKIN: Inspection of the skin reveals no rashes, ulcerations or petechiae.  HEENT: The sclerae were anicteric and conjunctivae were pink and moist. Extraocular movements were intact and pupils were equal, round, and reactive to light with normal accommodation. External inspection of the ears and nose showed no scars, lesions, or masses. Lips, teeth, and gums showed normal mucosa. The oral mucosa, hard and soft palate, tongue and posterior pharynx were normal.  NECK: Supple and symmetric. There was no thyroid enlargement, and no tenderness, or masses were felt.  CRESPIRATORY: Normal AP diameter and normal contour without any kyphoscoliosis. LUNGS: Auscultation of the lungs revealed normal breath sounds without any other adventitious sounds or rubs.  CARDIOVASCULAR: There was a regular rate and rhythm without any murmurs, gallops, rubs. The carotid pulses were normal and 2+ bilaterally without bruits. Peripheral pulses were 2+ and symmetric.  GASTROINTESTINAL: Soft and nontender with normal bowel sounds. The liver span was approximately 5-6 cm in the right midclavicular line by percussion. The liver edge was nontender. The spleen was not palpable. There were no inguinal or umbilical hernias noted. No ascites was noted.  LYMPH NODES: No lymphadenopathy was appreciated in the neck, axillae or groin.  MUSCULOSKELETAL: Gait was normal. There was no tenderness or effusions noted. Muscle strength and tone were normal. EXTREMITIES: No cyanosis, clubbing or edema.  NEUROLOGIC: Alert and oriented x 3. Normal affect. Gait was normal. Normal deep tendon reflexes with no pathological reflexes. Sensation to touch was normal.  PSYCHIATRIC: good mood, orientated to place, time and person     Labs and Imagings     Lab Results   Component Value Date    WBC 5.1 12/16/2020    HGB 8.6 (L) 12/16/2020    HCT 28.1 (L) 12/16/2020     (H) 10/15/2020    LABPLAT 167 12/16/2020       CMP  Sodium   Date Value Ref Range Status    12/16/2020 142 135 - 145 mmol/L Final   03/16/2020 140 136 - 145 mmol/L Final     Potassium   Date Value Ref Range Status   12/16/2020 4.2 3.6 - 5.2 mmol/L Final   03/16/2020 4.5 3.5 - 5.1 mmol/L Final     Chloride   Date Value Ref Range Status   12/16/2020 106 100 - 108 mmol/L Final   03/16/2020 107 98 - 107 mmol/L Final     CO2   Date Value Ref Range Status   12/16/2020 24 21 - 32 mmol/L Final   03/16/2020 21 (L) 22 - 29 mmol/L Final     Glucose   Date Value Ref Range Status   12/16/2020 101 70 - 110 mg/dL Final     BUN   Date Value Ref Range Status   12/16/2020 16 7 - 18 mg/dL Final   03/16/2020 17.2 8 - 23 mg/dL Final     Creatinine   Date Value Ref Range Status   12/16/2020 1.07 (H) 0.55 - 1.02 mg/dL Final   03/16/2020 1.17 (H) 0.50 - 0.90 mg/dL Final     Calcium   Date Value Ref Range Status   12/16/2020 9.7 8.8 - 10.5 mg/dL Final   03/16/2020 10.0 8.6 - 10.2 mg/dL Final     Total Protein   Date Value Ref Range Status   12/16/2020 7.3 6.4 - 8.2 g/dL Final     Albumin   Date Value Ref Range Status   12/16/2020 4.1 3.4 - 5.0 g/dL Final   03/16/2020 4.6 3.5 - 5.2 g/dL Final     Total Bilirubin   Date Value Ref Range Status   12/16/2020 0.6 0.0 - 1.0 mg/dL Final     Alkaline Phosphatase   Date Value Ref Range Status   12/16/2020 98 46 - 116 U/L Final     AST   Date Value Ref Range Status   12/16/2020 17 15 - 37 U/L Final   03/16/2020 17 0 - 32 U/L Final     ALT   Date Value Ref Range Status   12/16/2020 21 12 - 78 U/L Final     Anion Gap   Date Value Ref Range Status   12/16/2020 12.0 (H) 3.0 - 11.0 mmol/L Final   03/16/2020 12.0 8.0 - 17.0 mmol/L Final     Comment:     NOTE  Testing performed at:  [facility], [address] [place]  [address] CLIA #:[id number]       eGFR if    Date Value Ref Range Status   10/15/2020 >60 >60 mL/min/1.73 m^2 Final     eGFR if non    Date Value Ref Range Status   10/15/2020 52 (A) >60 mL/min/1.73 m^2 Final     Comment:      Calculation used to obtain the estimated glomerular filtration  rate (eGFR) is the CKD-EPI equation.            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 female with PMH of The primary encounter diagnosis was Malignant neoplasm of endocervix. Diagnoses of Encounter for antineoplastic chemotherapy, Neoplastic malignant related fatigue, and BRCA2 gene mutation positive in female were also pertinent to this visit., with Malignant neoplasm of endocervix [C53.0]     ==============================================  *  Assessment:       1. 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 Malignant neoplasm of cervix, unspecified site    3. Increased creatine kinase level    4. Neoplastic malignant related fatigue       Cervical cancer stage IV disease  BRCA2 +  mutation   PARP inhibitor  Lynparza 300 mg BID PET scan shows early progression 4/2020, dc'd  Mid June due to excessive pancytopenia   weekly carbo/taxol added  Pancytopenia and Sr Cr improving with lynparza held       Plan:   Overall PLANS:  carbo Taxol weekly   Lynparza  dc'd pancytopenia     == lab CBC CMP every week  == Will reduce carbo to 50 mg IV weekly, Taxol 60 mg IV weekly, all fixed doses, she is tolerating well, improved kidney function noted  ==Pet/Ct 11/2020 shows stable to decrease in disease  ==continue with combined weekly carbo/taxol, low dose as planned for  today,  == treatment today, then 12/29/2020 (delay due to holiday)   ==RTC on 1/5/2021     Annette Donis NP

## 2020-12-28 LAB
ALBUMIN SERPL BCP-MCNC: 4 G/DL (ref 3.4–5)
ALBUMIN/GLOBULIN RATIO: 1.29 RATIO (ref 1.1–1.8)
ALP SERPL-CCNC: 86 U/L (ref 46–116)
ALT SERPL W P-5'-P-CCNC: 22 U/L (ref 12–78)
ANION GAP SERPL CALC-SCNC: 9 MMOL/L (ref 3–11)
ANISOCYTOSIS: NORMAL
AST SERPL-CCNC: 18 U/L (ref 15–37)
BASOPHILS NFR BLD: 0.4 % (ref 0–3)
BILIRUB SERPL-MCNC: 0.5 MG/DL (ref 0–1)
BUN SERPL-MCNC: 20 MG/DL (ref 7–18)
BUN/CREAT SERPL: 16.94 RATIO (ref 7–18)
CALCIUM SERPL-MCNC: 9.8 MG/DL (ref 8.8–10.5)
CHLORIDE SERPL-SCNC: 105 MMOL/L (ref 100–108)
CO2 SERPL-SCNC: 25 MMOL/L (ref 21–32)
CREAT SERPL-MCNC: 1.18 MG/DL (ref 0.55–1.02)
EOSINOPHIL NFR BLD: 1 % (ref 1–3)
ERYTHROCYTE [DISTWIDTH] IN BLOOD BY AUTOMATED COUNT: 13.4 % (ref 12.5–18)
GFR ESTIMATION: 53
GLOBULIN: 3.1 G/DL (ref 2.3–3.5)
GLUCOSE SERPL-MCNC: 98 MG/DL (ref 70–110)
HCT VFR BLD AUTO: 26.8 % (ref 37–47)
HGB BLD-MCNC: 8.3 G/DL (ref 12–16)
LYMPHOCYTES NFR BLD: 23.2 % (ref 25–40)
MACROCYTES BLD QL SMEAR: NORMAL
MCH RBC QN AUTO: 35.5 PG (ref 27–31.2)
MCHC RBC AUTO-ENTMCNC: 31 G/DL (ref 31.8–35.4)
MCV RBC AUTO: 114.5 FL (ref 80–97)
MONOCYTES NFR BLD: 9 % (ref 1–15)
NEUTROPHILS # BLD AUTO: 3.38 10*3/UL (ref 1.8–7.7)
NEUTROPHILS NFR BLD: 65.8 % (ref 37–80)
NUCLEATED RED BLOOD CELLS: 0 %
PLATELETS: 179 10*3/UL (ref 142–424)
POTASSIUM SERPL-SCNC: 4.4 MMOL/L (ref 3.6–5.2)
PROT SERPL-MCNC: 7.1 G/DL (ref 6.4–8.2)
RBC # BLD AUTO: 2.34 10*6/UL (ref 4.2–5.4)
SODIUM BLD-SCNC: 139 MMOL/L (ref 135–145)
WBC # BLD: 5.1 10*3/UL (ref 4.6–10.2)

## 2020-12-29 RX ORDER — DIPHENHYDRAMINE HYDROCHLORIDE 50 MG/ML
50 INJECTION INTRAMUSCULAR; INTRAVENOUS ONCE AS NEEDED
Status: CANCELLED | OUTPATIENT
Start: 2021-01-14

## 2020-12-29 RX ORDER — EPINEPHRINE 0.3 MG/.3ML
0.3 INJECTION SUBCUTANEOUS ONCE AS NEEDED
Status: CANCELLED | OUTPATIENT
Start: 2021-01-14

## 2020-12-29 RX ORDER — EPINEPHRINE 0.3 MG/.3ML
0.3 INJECTION SUBCUTANEOUS ONCE AS NEEDED
Status: CANCELLED | OUTPATIENT
Start: 2020-12-30

## 2020-12-29 RX ORDER — FAMOTIDINE 10 MG/ML
20 INJECTION INTRAVENOUS
Status: CANCELLED | OUTPATIENT
Start: 2021-01-07

## 2020-12-29 RX ORDER — HEPARIN 100 UNIT/ML
500 SYRINGE INTRAVENOUS
Status: CANCELLED | OUTPATIENT
Start: 2021-01-14

## 2020-12-29 RX ORDER — FAMOTIDINE 10 MG/ML
20 INJECTION INTRAVENOUS
Status: CANCELLED | OUTPATIENT
Start: 2020-12-30

## 2020-12-29 RX ORDER — SODIUM CHLORIDE 0.9 % (FLUSH) 0.9 %
10 SYRINGE (ML) INJECTION
Status: CANCELLED | OUTPATIENT
Start: 2021-01-07

## 2020-12-29 RX ORDER — HEPARIN 100 UNIT/ML
500 SYRINGE INTRAVENOUS
Status: CANCELLED | OUTPATIENT
Start: 2021-01-07

## 2020-12-29 RX ORDER — DIPHENHYDRAMINE HYDROCHLORIDE 50 MG/ML
50 INJECTION INTRAMUSCULAR; INTRAVENOUS ONCE AS NEEDED
Status: CANCELLED | OUTPATIENT
Start: 2020-12-30

## 2020-12-29 RX ORDER — HEPARIN 100 UNIT/ML
500 SYRINGE INTRAVENOUS
Status: CANCELLED | OUTPATIENT
Start: 2020-12-30

## 2020-12-29 RX ORDER — SODIUM CHLORIDE 0.9 % (FLUSH) 0.9 %
10 SYRINGE (ML) INJECTION
Status: CANCELLED | OUTPATIENT
Start: 2020-12-30

## 2020-12-29 RX ORDER — EPINEPHRINE 0.3 MG/.3ML
0.3 INJECTION SUBCUTANEOUS ONCE AS NEEDED
Status: CANCELLED | OUTPATIENT
Start: 2021-01-07

## 2020-12-29 RX ORDER — DIPHENHYDRAMINE HYDROCHLORIDE 50 MG/ML
50 INJECTION INTRAMUSCULAR; INTRAVENOUS ONCE AS NEEDED
Status: CANCELLED | OUTPATIENT
Start: 2021-01-07

## 2020-12-29 RX ORDER — SODIUM CHLORIDE 0.9 % (FLUSH) 0.9 %
10 SYRINGE (ML) INJECTION
Status: CANCELLED | OUTPATIENT
Start: 2021-01-14

## 2020-12-29 RX ORDER — FAMOTIDINE 10 MG/ML
20 INJECTION INTRAVENOUS
Status: CANCELLED | OUTPATIENT
Start: 2021-01-14

## 2020-12-31 DIAGNOSIS — C53.0 MALIGNANT NEOPLASM OF ENDOCERVIX: Primary | ICD-10-CM

## 2021-01-04 LAB
ALBUMIN SERPL BCP-MCNC: 4 G/DL (ref 3.4–5)
ALBUMIN/GLOBULIN RATIO: 1.29 RATIO (ref 1.1–1.8)
ALP SERPL-CCNC: 91 U/L (ref 46–116)
ALT SERPL W P-5'-P-CCNC: 17 U/L (ref 12–78)
ANION GAP SERPL CALC-SCNC: 11 MMOL/L (ref 3–11)
AST SERPL-CCNC: 16 U/L (ref 15–37)
BASOPHILS NFR BLD: 0.7 % (ref 0–3)
BILIRUB SERPL-MCNC: 0.5 MG/DL (ref 0–1)
BUN SERPL-MCNC: 28 MG/DL (ref 7–18)
BUN/CREAT SERPL: 22.58 RATIO (ref 7–18)
CALCIUM SERPL-MCNC: 9.5 MG/DL (ref 8.8–10.5)
CHLORIDE SERPL-SCNC: 106 MMOL/L (ref 100–108)
CO2 SERPL-SCNC: 23 MMOL/L (ref 21–32)
CREAT SERPL-MCNC: 1.24 MG/DL (ref 0.55–1.02)
EOSINOPHIL NFR BLD: 0.7 % (ref 1–3)
ERYTHROCYTE [DISTWIDTH] IN BLOOD BY AUTOMATED COUNT: 12.7 % (ref 12.5–18)
GFR ESTIMATION: 50
GLOBULIN: 3.1 G/DL (ref 2.3–3.5)
GLUCOSE SERPL-MCNC: 103 MG/DL (ref 70–110)
HCT VFR BLD AUTO: 34.6 % (ref 37–47)
HGB BLD-MCNC: 9.2 G/DL (ref 12–16)
HYPOCHROMIA BLD QL SMEAR: NORMAL
LYMPHOCYTES NFR BLD: 35.4 % (ref 25–40)
MACROCYTES BLD QL SMEAR: NORMAL
MCH RBC QN AUTO: 36.5 PG (ref 27–31.2)
MCHC RBC AUTO-ENTMCNC: 26.6 G/DL (ref 31.8–35.4)
MCV RBC AUTO: 137.3 FL (ref 80–97)
MONOCYTES NFR BLD: 6.8 % (ref 1–15)
NEUTROPHILS # BLD AUTO: 2.48 10*3/UL (ref 1.8–7.7)
NEUTROPHILS NFR BLD: 55.7 % (ref 37–80)
NUCLEATED RED BLOOD CELLS: 0 %
PLATELETS: 187 10*3/UL (ref 142–424)
POTASSIUM SERPL-SCNC: 4.9 MMOL/L (ref 3.6–5.2)
PROT SERPL-MCNC: 7.1 G/DL (ref 6.4–8.2)
RBC # BLD AUTO: 2.52 10*6/UL (ref 4.2–5.4)
SODIUM BLD-SCNC: 140 MMOL/L (ref 135–145)
WBC # BLD: 4.4 10*3/UL (ref 4.6–10.2)

## 2021-01-05 ENCOUNTER — OFFICE VISIT (OUTPATIENT)
Dept: HEMATOLOGY/ONCOLOGY | Facility: CLINIC | Age: 86
End: 2021-01-05
Payer: MEDICARE

## 2021-01-05 VITALS
HEIGHT: 62 IN | DIASTOLIC BLOOD PRESSURE: 66 MMHG | TEMPERATURE: 97 F | RESPIRATION RATE: 16 BRPM | SYSTOLIC BLOOD PRESSURE: 133 MMHG | BODY MASS INDEX: 26.98 KG/M2 | OXYGEN SATURATION: 98 % | HEART RATE: 62 BPM | WEIGHT: 146.63 LBS

## 2021-01-05 DIAGNOSIS — C53.0 MALIGNANT NEOPLASM OF ENDOCERVIX: Primary | ICD-10-CM

## 2021-01-05 DIAGNOSIS — Z15.02 BRCA2 GENE MUTATION POSITIVE IN FEMALE: ICD-10-CM

## 2021-01-05 DIAGNOSIS — D51.3 OTHER DIETARY VITAMIN B12 DEFICIENCY ANEMIA: ICD-10-CM

## 2021-01-05 DIAGNOSIS — Z51.11 ENCOUNTER FOR ANTINEOPLASTIC CHEMOTHERAPY: ICD-10-CM

## 2021-01-05 DIAGNOSIS — Z15.09 BRCA2 GENE MUTATION POSITIVE IN FEMALE: ICD-10-CM

## 2021-01-05 DIAGNOSIS — Z15.01 BRCA2 GENE MUTATION POSITIVE IN FEMALE: ICD-10-CM

## 2021-01-05 PROCEDURE — 99214 PR OFFICE/OUTPT VISIT, EST, LEVL IV, 30-39 MIN: ICD-10-PCS | Mod: S$GLB,,, | Performed by: NURSE PRACTITIONER

## 2021-01-05 PROCEDURE — 99214 OFFICE O/P EST MOD 30 MIN: CPT | Mod: S$GLB,,, | Performed by: NURSE PRACTITIONER

## 2021-01-13 LAB
ALBUMIN SERPL BCP-MCNC: 4 G/DL (ref 3.4–5)
ALBUMIN/GLOBULIN RATIO: 1.29 RATIO (ref 1.1–1.8)
ALP SERPL-CCNC: 82 U/L (ref 46–116)
ALT SERPL W P-5'-P-CCNC: 17 U/L (ref 12–78)
ANION GAP SERPL CALC-SCNC: 12 MMOL/L (ref 3–11)
ANISOCYTOSIS: ABNORMAL
AST SERPL-CCNC: 15 U/L (ref 15–37)
BANDS: 3 % (ref 0–5)
BILIRUB SERPL-MCNC: 0.4 MG/DL (ref 0–1)
BUN SERPL-MCNC: 22 MG/DL (ref 7–18)
BUN/CREAT SERPL: 20.37 RATIO (ref 7–18)
CALCIUM SERPL-MCNC: 9.2 MG/DL (ref 8.8–10.5)
CELLS COUNTED: 100
CHLORIDE SERPL-SCNC: 108 MMOL/L (ref 100–108)
CO2 SERPL-SCNC: 21 MMOL/L (ref 21–32)
CREAT SERPL-MCNC: 1.08 MG/DL (ref 0.55–1.02)
EOSINOPHIL NFR BLD: 3 % (ref 1–3)
ERYTHROCYTE [DISTWIDTH] IN BLOOD BY AUTOMATED COUNT: 13.2 % (ref 12.5–18)
GFR ESTIMATION: 59
GLOBULIN: 3.1 G/DL (ref 2.3–3.5)
GLUCOSE SERPL-MCNC: 96 MG/DL (ref 70–110)
HCT VFR BLD AUTO: 26.4 % (ref 37–47)
HGB BLD-MCNC: 8.3 G/DL (ref 12–16)
LYMPHOCYTES NFR BLD: 41 % (ref 25–40)
MACROCYTES BLD QL SMEAR: ABNORMAL
MCH RBC QN AUTO: 35.5 PG (ref 27–31.2)
MCHC RBC AUTO-ENTMCNC: 31.4 G/DL (ref 31.8–35.4)
MCV RBC AUTO: 112.8 FL (ref 80–97)
MONOCYTES NFR BLD: 8 % (ref 1–15)
NEUTROPHILS # BLD AUTO: 1.5 10*3/UL (ref 1.8–7.7)
NEUTROPHILS NFR BLD: 45 % (ref 37–80)
NUCLEATED RED BLOOD CELLS: 0 %
PLATELETS: 164 10*3/UL (ref 142–424)
POTASSIUM SERPL-SCNC: 4.3 MMOL/L (ref 3.6–5.2)
PROT SERPL-MCNC: 7.1 G/DL (ref 6.4–8.2)
RBC # BLD AUTO: 2.34 10*6/UL (ref 4.2–5.4)
SMALL PLATELETS BLD QL SMEAR: ADEQUATE
SODIUM BLD-SCNC: 141 MMOL/L (ref 135–145)
WBC # BLD: 3.1 10*3/UL (ref 4.6–10.2)

## 2021-01-20 LAB
ALBUMIN SERPL BCP-MCNC: 3.9 G/DL (ref 3.4–5)
ALBUMIN/GLOBULIN RATIO: 1.34 RATIO (ref 1.1–1.8)
ALP SERPL-CCNC: 83 U/L (ref 46–116)
ALT SERPL W P-5'-P-CCNC: 21 U/L (ref 12–78)
ANION GAP SERPL CALC-SCNC: 12 MMOL/L (ref 3–11)
AST SERPL-CCNC: 19 U/L (ref 15–37)
BASOPHILS NFR BLD: 0.5 % (ref 0–3)
BILIRUB SERPL-MCNC: 0.5 MG/DL (ref 0–1)
BUN SERPL-MCNC: 27 MG/DL (ref 7–18)
BUN/CREAT SERPL: 21.42 RATIO (ref 7–18)
CALCIUM SERPL-MCNC: 9.3 MG/DL (ref 8.8–10.5)
CHLORIDE SERPL-SCNC: 107 MMOL/L (ref 100–108)
CO2 SERPL-SCNC: 23 MMOL/L (ref 21–32)
CREAT SERPL-MCNC: 1.26 MG/DL (ref 0.55–1.02)
EOSINOPHIL NFR BLD: 0.7 % (ref 1–3)
ERYTHROCYTE [DISTWIDTH] IN BLOOD BY AUTOMATED COUNT: 14 % (ref 12.5–18)
GFR ESTIMATION: 49
GLOBULIN: 2.9 G/DL (ref 2.3–3.5)
GLUCOSE SERPL-MCNC: 97 MG/DL (ref 70–110)
HCT VFR BLD AUTO: 25.6 % (ref 37–47)
HGB BLD-MCNC: 7.9 G/DL (ref 12–16)
HYPOCHROMIA BLD QL SMEAR: NORMAL
LYMPHOCYTES NFR BLD: 29.9 % (ref 25–40)
MCH RBC QN AUTO: 35.1 PG (ref 27–31.2)
MCHC RBC AUTO-ENTMCNC: 30.9 G/DL (ref 31.8–35.4)
MCV RBC AUTO: 113.8 FL (ref 80–97)
MONOCYTES NFR BLD: 7.3 % (ref 1–15)
NEUTROPHILS # BLD AUTO: 2.58 10*3/UL (ref 1.8–7.7)
NEUTROPHILS NFR BLD: 61.1 % (ref 37–80)
NUCLEATED RED BLOOD CELLS: 0 %
PLATELETS: 169 10*3/UL (ref 142–424)
POIKILOCYTOSIS: NORMAL
POTASSIUM SERPL-SCNC: 4.6 MMOL/L (ref 3.6–5.2)
PROT SERPL-MCNC: 6.8 G/DL (ref 6.4–8.2)
RBC # BLD AUTO: 2.25 10*6/UL (ref 4.2–5.4)
SODIUM BLD-SCNC: 142 MMOL/L (ref 135–145)
WBC # BLD: 4.2 10*3/UL (ref 4.6–10.2)

## 2021-01-21 ENCOUNTER — OFFICE VISIT (OUTPATIENT)
Dept: HEMATOLOGY/ONCOLOGY | Facility: CLINIC | Age: 86
End: 2021-01-21
Payer: MEDICARE

## 2021-01-21 VITALS
TEMPERATURE: 98 F | SYSTOLIC BLOOD PRESSURE: 122 MMHG | HEIGHT: 62 IN | RESPIRATION RATE: 16 BRPM | HEART RATE: 65 BPM | BODY MASS INDEX: 27.49 KG/M2 | OXYGEN SATURATION: 98 % | DIASTOLIC BLOOD PRESSURE: 78 MMHG | WEIGHT: 149.38 LBS

## 2021-01-21 DIAGNOSIS — Z51.11 ENCOUNTER FOR ANTINEOPLASTIC CHEMOTHERAPY: ICD-10-CM

## 2021-01-21 DIAGNOSIS — D51.3 OTHER DIETARY VITAMIN B12 DEFICIENCY ANEMIA: ICD-10-CM

## 2021-01-21 DIAGNOSIS — C53.0 MALIGNANT NEOPLASM OF ENDOCERVIX: Primary | ICD-10-CM

## 2021-01-21 PROCEDURE — 99214 OFFICE O/P EST MOD 30 MIN: CPT | Mod: S$GLB,,, | Performed by: NURSE PRACTITIONER

## 2021-01-21 PROCEDURE — 99214 PR OFFICE/OUTPT VISIT, EST, LEVL IV, 30-39 MIN: ICD-10-PCS | Mod: S$GLB,,, | Performed by: NURSE PRACTITIONER

## 2021-01-21 RX ORDER — DIPHENHYDRAMINE HYDROCHLORIDE 50 MG/ML
50 INJECTION INTRAMUSCULAR; INTRAVENOUS ONCE AS NEEDED
Status: CANCELLED | OUTPATIENT
Start: 2021-01-21

## 2021-01-21 RX ORDER — FAMOTIDINE 10 MG/ML
20 INJECTION INTRAVENOUS
Status: CANCELLED | OUTPATIENT
Start: 2021-01-21

## 2021-01-21 RX ORDER — HEPARIN 100 UNIT/ML
500 SYRINGE INTRAVENOUS
Status: CANCELLED | OUTPATIENT
Start: 2021-01-21

## 2021-01-21 RX ORDER — EPINEPHRINE 0.3 MG/.3ML
0.3 INJECTION SUBCUTANEOUS ONCE AS NEEDED
Status: CANCELLED | OUTPATIENT
Start: 2021-01-21

## 2021-01-21 RX ORDER — SODIUM CHLORIDE 0.9 % (FLUSH) 0.9 %
10 SYRINGE (ML) INJECTION
Status: CANCELLED | OUTPATIENT
Start: 2021-01-21

## 2021-01-27 LAB
ALBUMIN SERPL BCP-MCNC: 4.1 G/DL (ref 3.4–5)
ALBUMIN/GLOBULIN RATIO: 1.32 RATIO (ref 1.1–1.8)
ALP SERPL-CCNC: 85 U/L (ref 46–116)
ALT SERPL W P-5'-P-CCNC: 21 U/L (ref 12–78)
ANION GAP SERPL CALC-SCNC: 10 MMOL/L (ref 3–11)
ANISOCYTOSIS: NORMAL
AST SERPL-CCNC: 16 U/L (ref 15–37)
BASOPHILS NFR BLD: 0.5 % (ref 0–3)
BILIRUB SERPL-MCNC: 0.6 MG/DL (ref 0–1)
BUN SERPL-MCNC: 20 MG/DL (ref 7–18)
BUN/CREAT SERPL: 18.69 RATIO (ref 7–18)
CALCIUM SERPL-MCNC: 9.3 MG/DL (ref 8.8–10.5)
CHLORIDE SERPL-SCNC: 106 MMOL/L (ref 100–108)
CO2 SERPL-SCNC: 22 MMOL/L (ref 21–32)
CREAT SERPL-MCNC: 1.07 MG/DL (ref 0.55–1.02)
EOSINOPHIL NFR BLD: 0.3 % (ref 1–3)
ERYTHROCYTE [DISTWIDTH] IN BLOOD BY AUTOMATED COUNT: 14.6 % (ref 12.5–18)
GFR ESTIMATION: 59
GLOBULIN: 3.1 G/DL (ref 2.3–3.5)
GLUCOSE SERPL-MCNC: 95 MG/DL (ref 70–110)
HCT VFR BLD AUTO: 25.5 % (ref 37–47)
HGB BLD-MCNC: 8.1 G/DL (ref 12–16)
LYMPHOCYTES NFR BLD: 33.5 % (ref 25–40)
MACROCYTES BLD QL SMEAR: NORMAL
MCH RBC QN AUTO: 36.2 PG (ref 27–31.2)
MCHC RBC AUTO-ENTMCNC: 31.8 G/DL (ref 31.8–35.4)
MCV RBC AUTO: 113.8 FL (ref 80–97)
MONOCYTES NFR BLD: 7.9 % (ref 1–15)
NEUTROPHILS # BLD AUTO: 2.17 10*3/UL (ref 1.8–7.7)
NEUTROPHILS NFR BLD: 57.3 % (ref 37–80)
NUCLEATED RED BLOOD CELLS: 0 %
PLATELETS: 163 10*3/UL (ref 142–424)
POTASSIUM SERPL-SCNC: 4.3 MMOL/L (ref 3.6–5.2)
PROT SERPL-MCNC: 7.2 G/DL (ref 6.4–8.2)
RBC # BLD AUTO: 2.24 10*6/UL (ref 4.2–5.4)
SODIUM BLD-SCNC: 138 MMOL/L (ref 135–145)
WBC # BLD: 3.8 10*3/UL (ref 4.6–10.2)

## 2021-01-28 RX ORDER — DIPHENHYDRAMINE HYDROCHLORIDE 50 MG/ML
50 INJECTION INTRAMUSCULAR; INTRAVENOUS ONCE AS NEEDED
Status: CANCELLED | OUTPATIENT
Start: 2021-01-28

## 2021-01-28 RX ORDER — HEPARIN 100 UNIT/ML
500 SYRINGE INTRAVENOUS
Status: CANCELLED | OUTPATIENT
Start: 2021-01-28

## 2021-01-28 RX ORDER — FAMOTIDINE 10 MG/ML
20 INJECTION INTRAVENOUS
Status: CANCELLED | OUTPATIENT
Start: 2021-01-28

## 2021-01-28 RX ORDER — SODIUM CHLORIDE 0.9 % (FLUSH) 0.9 %
10 SYRINGE (ML) INJECTION
Status: CANCELLED | OUTPATIENT
Start: 2021-01-28

## 2021-01-28 RX ORDER — EPINEPHRINE 0.3 MG/.3ML
0.3 INJECTION SUBCUTANEOUS ONCE AS NEEDED
Status: CANCELLED | OUTPATIENT
Start: 2021-01-28

## 2021-02-03 LAB
ALBUMIN SERPL BCP-MCNC: 3.9 G/DL (ref 3.4–5)
ALBUMIN/GLOBULIN RATIO: 1.3 RATIO (ref 1.1–1.8)
ALP SERPL-CCNC: 77 U/L (ref 46–116)
ALT SERPL W P-5'-P-CCNC: 23 U/L (ref 12–78)
ANION GAP SERPL CALC-SCNC: 14 MMOL/L (ref 3–11)
AST SERPL-CCNC: 19 U/L (ref 15–37)
BASOPHILS NFR BLD: 0.6 % (ref 0–3)
BILIRUB SERPL-MCNC: 0.5 MG/DL (ref 0–1)
BUN SERPL-MCNC: 16 MG/DL (ref 7–18)
BUN/CREAT SERPL: 14.81 RATIO (ref 7–18)
CALCIUM SERPL-MCNC: 9.2 MG/DL (ref 8.8–10.5)
CHLORIDE SERPL-SCNC: 107 MMOL/L (ref 100–108)
CO2 SERPL-SCNC: 22 MMOL/L (ref 21–32)
CREAT SERPL-MCNC: 1.08 MG/DL (ref 0.55–1.02)
EOSINOPHIL NFR BLD: 0.8 % (ref 1–3)
ERYTHROCYTE [DISTWIDTH] IN BLOOD BY AUTOMATED COUNT: 14.7 % (ref 12.5–18)
GFR ESTIMATION: 59
GLOBULIN: 3 G/DL (ref 2.3–3.5)
GLUCOSE SERPL-MCNC: 94 MG/DL (ref 70–110)
HCT VFR BLD AUTO: 26.1 % (ref 37–47)
HGB BLD-MCNC: 7.9 G/DL (ref 12–16)
HYPOCHROMIA BLD QL SMEAR: NORMAL
LYMPHOCYTES NFR BLD: 25.2 % (ref 25–40)
MACROCYTES BLD QL SMEAR: NORMAL
MCH RBC QN AUTO: 35.6 PG (ref 27–31.2)
MCHC RBC AUTO-ENTMCNC: 30.3 G/DL (ref 31.8–35.4)
MCV RBC AUTO: 117.6 FL (ref 80–97)
MONOCYTES NFR BLD: 6.1 % (ref 1–15)
NEUTROPHILS # BLD AUTO: 3.42 10*3/UL (ref 1.8–7.7)
NEUTROPHILS NFR BLD: 66.7 % (ref 37–80)
NUCLEATED RED BLOOD CELLS: 0.4 %
PLATELETS: 160 10*3/UL (ref 142–424)
POTASSIUM SERPL-SCNC: 4.2 MMOL/L (ref 3.6–5.2)
PROT SERPL-MCNC: 6.9 G/DL (ref 6.4–8.2)
RBC # BLD AUTO: 2.22 10*6/UL (ref 4.2–5.4)
SODIUM BLD-SCNC: 143 MMOL/L (ref 135–145)
WBC # BLD: 5.1 10*3/UL (ref 4.6–10.2)

## 2021-02-03 RX ORDER — SODIUM CHLORIDE 0.9 % (FLUSH) 0.9 %
10 SYRINGE (ML) INJECTION
Status: CANCELLED | OUTPATIENT
Start: 2021-02-04

## 2021-02-03 RX ORDER — DIPHENHYDRAMINE HYDROCHLORIDE 50 MG/ML
50 INJECTION INTRAMUSCULAR; INTRAVENOUS ONCE AS NEEDED
Status: CANCELLED | OUTPATIENT
Start: 2021-02-04

## 2021-02-03 RX ORDER — EPINEPHRINE 0.3 MG/.3ML
0.3 INJECTION SUBCUTANEOUS ONCE AS NEEDED
Status: CANCELLED | OUTPATIENT
Start: 2021-02-04

## 2021-02-03 RX ORDER — FAMOTIDINE 10 MG/ML
20 INJECTION INTRAVENOUS
Status: CANCELLED | OUTPATIENT
Start: 2021-02-04

## 2021-02-03 RX ORDER — HEPARIN 100 UNIT/ML
500 SYRINGE INTRAVENOUS
Status: CANCELLED | OUTPATIENT
Start: 2021-02-04

## 2021-02-04 ENCOUNTER — OFFICE VISIT (OUTPATIENT)
Dept: HEMATOLOGY/ONCOLOGY | Facility: CLINIC | Age: 86
End: 2021-02-04
Payer: MEDICARE

## 2021-02-04 VITALS
DIASTOLIC BLOOD PRESSURE: 93 MMHG | WEIGHT: 148.38 LBS | SYSTOLIC BLOOD PRESSURE: 180 MMHG | HEART RATE: 95 BPM | OXYGEN SATURATION: 99 % | RESPIRATION RATE: 19 BRPM | HEIGHT: 62 IN | TEMPERATURE: 99 F | BODY MASS INDEX: 27.3 KG/M2

## 2021-02-04 DIAGNOSIS — C53.0 MALIGNANT NEOPLASM OF ENDOCERVIX: Primary | ICD-10-CM

## 2021-02-04 PROCEDURE — 99214 OFFICE O/P EST MOD 30 MIN: CPT | Mod: S$GLB,,, | Performed by: NURSE PRACTITIONER

## 2021-02-04 PROCEDURE — 99214 PR OFFICE/OUTPT VISIT, EST, LEVL IV, 30-39 MIN: ICD-10-PCS | Mod: S$GLB,,, | Performed by: NURSE PRACTITIONER

## 2021-02-05 DIAGNOSIS — C53.0 MALIGNANT NEOPLASM OF ENDOCERVIX: Primary | ICD-10-CM

## 2021-02-10 LAB
ALBUMIN SERPL BCP-MCNC: 3.7 G/DL (ref 3.4–5)
ALBUMIN/GLOBULIN RATIO: 1.19 RATIO (ref 1.1–1.8)
ALP SERPL-CCNC: 74 U/L (ref 46–116)
ALT SERPL W P-5'-P-CCNC: 17 U/L (ref 12–78)
ANION GAP SERPL CALC-SCNC: 10 MMOL/L (ref 3–11)
ANISOCYTOSIS: NORMAL
AST SERPL-CCNC: 15 U/L (ref 15–37)
BASOPHILS NFR BLD: 0.5 % (ref 0–3)
BILIRUB SERPL-MCNC: 0.5 MG/DL (ref 0–1)
BUN SERPL-MCNC: 16 MG/DL (ref 7–18)
BUN/CREAT SERPL: 16.16 RATIO (ref 7–18)
CALCIUM SERPL-MCNC: 9.6 MG/DL (ref 8.8–10.5)
CHLORIDE SERPL-SCNC: 108 MMOL/L (ref 100–108)
CO2 SERPL-SCNC: 23 MMOL/L (ref 21–32)
CREAT SERPL-MCNC: 0.99 MG/DL (ref 0.55–1.02)
EOSINOPHIL NFR BLD: 0.5 % (ref 1–3)
ERYTHROCYTE [DISTWIDTH] IN BLOOD BY AUTOMATED COUNT: 14.9 % (ref 12.5–18)
GFR ESTIMATION: > 60
GLOBULIN: 3.1 G/DL (ref 2.3–3.5)
GLUCOSE SERPL-MCNC: 93 MG/DL (ref 70–110)
HCT VFR BLD AUTO: 25.1 % (ref 37–47)
HGB BLD-MCNC: 7.8 G/DL (ref 12–16)
LYMPHOCYTES NFR BLD: 38 % (ref 25–40)
MACROCYTES BLD QL SMEAR: NORMAL
MCH RBC QN AUTO: 36.6 PG (ref 27–31.2)
MCHC RBC AUTO-ENTMCNC: 31.1 G/DL (ref 31.8–35.4)
MCV RBC AUTO: 117.8 FL (ref 80–97)
MONOCYTES NFR BLD: 7.9 % (ref 1–15)
NEUTROPHILS # BLD AUTO: 2.01 10*3/UL (ref 1.8–7.7)
NEUTROPHILS NFR BLD: 52.6 % (ref 37–80)
NUCLEATED RED BLOOD CELLS: 0 %
PLATELETS: 157 10*3/UL (ref 142–424)
POTASSIUM SERPL-SCNC: 4.2 MMOL/L (ref 3.6–5.2)
PROT SERPL-MCNC: 6.8 G/DL (ref 6.4–8.2)
RBC # BLD AUTO: 2.13 10*6/UL (ref 4.2–5.4)
SODIUM BLD-SCNC: 141 MMOL/L (ref 135–145)
WBC # BLD: 3.8 10*3/UL (ref 4.6–10.2)

## 2021-02-11 RX ORDER — HEPARIN 100 UNIT/ML
500 SYRINGE INTRAVENOUS
Status: CANCELLED | OUTPATIENT
Start: 2021-02-11

## 2021-02-11 RX ORDER — FAMOTIDINE 10 MG/ML
20 INJECTION INTRAVENOUS
Status: CANCELLED | OUTPATIENT
Start: 2021-02-11

## 2021-02-11 RX ORDER — SODIUM CHLORIDE 0.9 % (FLUSH) 0.9 %
10 SYRINGE (ML) INJECTION
Status: CANCELLED | OUTPATIENT
Start: 2021-02-11

## 2021-02-11 RX ORDER — EPINEPHRINE 0.3 MG/.3ML
0.3 INJECTION SUBCUTANEOUS ONCE AS NEEDED
Status: CANCELLED | OUTPATIENT
Start: 2021-02-11

## 2021-02-11 RX ORDER — DIPHENHYDRAMINE HYDROCHLORIDE 50 MG/ML
50 INJECTION INTRAMUSCULAR; INTRAVENOUS ONCE AS NEEDED
Status: CANCELLED | OUTPATIENT
Start: 2021-02-11

## 2021-02-16 ENCOUNTER — TELEPHONE (OUTPATIENT)
Dept: HEMATOLOGY/ONCOLOGY | Facility: CLINIC | Age: 86
End: 2021-02-16

## 2021-02-17 DIAGNOSIS — C53.0 MALIGNANT NEOPLASM OF ENDOCERVIX: Primary | ICD-10-CM

## 2021-02-17 LAB
ALBUMIN SERPL BCP-MCNC: 4.1 G/DL (ref 3.4–5)
ALBUMIN/GLOBULIN RATIO: 1.28 RATIO (ref 1.1–1.8)
ALP SERPL-CCNC: 79 U/L (ref 46–116)
ALT SERPL W P-5'-P-CCNC: 17 U/L (ref 12–78)
ANION GAP SERPL CALC-SCNC: 14 MMOL/L (ref 3–11)
ANISOCYTOSIS: NORMAL
AST SERPL-CCNC: 17 U/L (ref 15–37)
BASOPHILS NFR BLD: 0.7 % (ref 0–3)
BILIRUB SERPL-MCNC: 0.5 MG/DL (ref 0–1)
BUN SERPL-MCNC: 32 MG/DL (ref 7–18)
BUN/CREAT SERPL: 25.8 RATIO (ref 7–18)
CALCIUM SERPL-MCNC: 9.4 MG/DL (ref 8.8–10.5)
CHLORIDE SERPL-SCNC: 107 MMOL/L (ref 100–108)
CO2 SERPL-SCNC: 20 MMOL/L (ref 21–32)
CREAT SERPL-MCNC: 1.24 MG/DL (ref 0.55–1.02)
EOSINOPHIL NFR BLD: 0.4 % (ref 1–3)
ERYTHROCYTE [DISTWIDTH] IN BLOOD BY AUTOMATED COUNT: 15.2 % (ref 12.5–18)
GFR ESTIMATION: 50
GLOBULIN: 3.2 G/DL (ref 2.3–3.5)
GLUCOSE SERPL-MCNC: 103 MG/DL (ref 70–110)
HCT VFR BLD AUTO: 27.2 % (ref 37–47)
HGB BLD-MCNC: 8.4 G/DL (ref 12–16)
HYPOCHROMIA BLD QL SMEAR: NORMAL
LYMPHOCYTES NFR BLD: 41.2 % (ref 25–40)
MACROCYTES BLD QL SMEAR: NORMAL
MCH RBC QN AUTO: 35.9 PG (ref 27–31.2)
MCHC RBC AUTO-ENTMCNC: 30.9 G/DL (ref 31.8–35.4)
MCV RBC AUTO: 116.2 FL (ref 80–97)
MONOCYTES NFR BLD: 6.7 % (ref 1–15)
NEUTROPHILS # BLD AUTO: 2.33 10*3/UL (ref 1.8–7.7)
NEUTROPHILS NFR BLD: 50.6 % (ref 37–80)
NUCLEATED RED BLOOD CELLS: 0 %
PLATELETS: 178 10*3/UL (ref 142–424)
POTASSIUM SERPL-SCNC: 4.7 MMOL/L (ref 3.6–5.2)
PROT SERPL-MCNC: 7.3 G/DL (ref 6.4–8.2)
RBC # BLD AUTO: 2.34 10*6/UL (ref 4.2–5.4)
SODIUM BLD-SCNC: 141 MMOL/L (ref 135–145)
WBC # BLD: 4.6 10*3/UL (ref 4.6–10.2)

## 2021-02-18 ENCOUNTER — OFFICE VISIT (OUTPATIENT)
Dept: HEMATOLOGY/ONCOLOGY | Facility: CLINIC | Age: 86
End: 2021-02-18
Payer: MEDICARE

## 2021-02-18 DIAGNOSIS — D51.3 OTHER DIETARY VITAMIN B12 DEFICIENCY ANEMIA: ICD-10-CM

## 2021-02-18 DIAGNOSIS — Z51.11 ENCOUNTER FOR ANTINEOPLASTIC CHEMOTHERAPY: ICD-10-CM

## 2021-02-18 DIAGNOSIS — Z15.02 BRCA2 GENE MUTATION POSITIVE IN FEMALE: ICD-10-CM

## 2021-02-18 DIAGNOSIS — C53.0 MALIGNANT NEOPLASM OF ENDOCERVIX: Primary | ICD-10-CM

## 2021-02-18 DIAGNOSIS — Z15.01 BRCA2 GENE MUTATION POSITIVE IN FEMALE: ICD-10-CM

## 2021-02-18 DIAGNOSIS — Z15.09 BRCA2 GENE MUTATION POSITIVE IN FEMALE: ICD-10-CM

## 2021-02-18 PROCEDURE — 99214 PR OFFICE/OUTPT VISIT, EST, LEVL IV, 30-39 MIN: ICD-10-PCS | Mod: 95,,, | Performed by: NURSE PRACTITIONER

## 2021-02-18 PROCEDURE — 99214 OFFICE O/P EST MOD 30 MIN: CPT | Mod: 95,,, | Performed by: NURSE PRACTITIONER

## 2021-02-18 RX ORDER — EPINEPHRINE 0.3 MG/.3ML
0.3 INJECTION SUBCUTANEOUS ONCE AS NEEDED
Status: CANCELLED | OUTPATIENT
Start: 2021-02-25

## 2021-02-18 RX ORDER — FAMOTIDINE 10 MG/ML
20 INJECTION INTRAVENOUS
Status: CANCELLED | OUTPATIENT
Start: 2021-02-25

## 2021-02-18 RX ORDER — DIPHENHYDRAMINE HYDROCHLORIDE 50 MG/ML
50 INJECTION INTRAMUSCULAR; INTRAVENOUS ONCE AS NEEDED
Status: CANCELLED | OUTPATIENT
Start: 2021-02-25

## 2021-02-18 RX ORDER — HEPARIN 100 UNIT/ML
500 SYRINGE INTRAVENOUS
Status: CANCELLED | OUTPATIENT
Start: 2021-02-25

## 2021-02-18 RX ORDER — SODIUM CHLORIDE 0.9 % (FLUSH) 0.9 %
10 SYRINGE (ML) INJECTION
Status: CANCELLED | OUTPATIENT
Start: 2021-02-25

## 2021-02-24 LAB
ALBUMIN SERPL BCP-MCNC: 4 G/DL (ref 3.4–5)
ALBUMIN/GLOBULIN RATIO: 1.21 RATIO (ref 1.1–1.8)
ALP SERPL-CCNC: 88 U/L (ref 46–116)
ALT SERPL W P-5'-P-CCNC: 21 U/L (ref 12–78)
ANION GAP SERPL CALC-SCNC: 14 MMOL/L (ref 3–11)
AST SERPL-CCNC: 20 U/L (ref 15–37)
BASOPHILS NFR BLD: 0.5 % (ref 0–3)
BILIRUB SERPL-MCNC: 0.4 MG/DL (ref 0–1)
BUN SERPL-MCNC: 18 MG/DL (ref 7–18)
BUN/CREAT SERPL: 15.65 RATIO (ref 7–18)
CALCIUM SERPL-MCNC: 9.5 MG/DL (ref 8.8–10.5)
CHLORIDE SERPL-SCNC: 109 MMOL/L (ref 100–108)
CO2 SERPL-SCNC: 19 MMOL/L (ref 21–32)
CREAT SERPL-MCNC: 1.15 MG/DL (ref 0.55–1.02)
EOSINOPHIL NFR BLD: 0.7 % (ref 1–3)
ERYTHROCYTE [DISTWIDTH] IN BLOOD BY AUTOMATED COUNT: 14.7 % (ref 12.5–18)
GFR ESTIMATION: 55
GLOBULIN: 3.3 G/DL (ref 2.3–3.5)
GLUCOSE SERPL-MCNC: 101 MG/DL (ref 70–110)
HCT VFR BLD AUTO: 28.2 % (ref 37–47)
HGB BLD-MCNC: 8.8 G/DL (ref 12–16)
LYMPHOCYTES NFR BLD: 27.6 % (ref 25–40)
MACROCYTES BLD QL SMEAR: NORMAL
MCH RBC QN AUTO: 36.5 PG (ref 27–31.2)
MCHC RBC AUTO-ENTMCNC: 31.2 G/DL (ref 31.8–35.4)
MCV RBC AUTO: 117 FL (ref 80–97)
MONOCYTES NFR BLD: 11.2 % (ref 1–15)
NEUTROPHILS # BLD AUTO: 2.62 10*3/UL (ref 1.8–7.7)
NEUTROPHILS NFR BLD: 59.5 % (ref 37–80)
NUCLEATED RED BLOOD CELLS: 0 %
PLATELETS: 183 10*3/UL (ref 142–424)
POTASSIUM SERPL-SCNC: 4.2 MMOL/L (ref 3.6–5.2)
PROT SERPL-MCNC: 7.3 G/DL (ref 6.4–8.2)
RBC # BLD AUTO: 2.41 10*6/UL (ref 4.2–5.4)
SODIUM BLD-SCNC: 142 MMOL/L (ref 135–145)
WBC # BLD: 4.4 10*3/UL (ref 4.6–10.2)

## 2021-02-25 ENCOUNTER — OFFICE VISIT (OUTPATIENT)
Dept: HEMATOLOGY/ONCOLOGY | Facility: CLINIC | Age: 86
End: 2021-02-25
Payer: MEDICARE

## 2021-02-25 VITALS
HEART RATE: 66 BPM | OXYGEN SATURATION: 99 % | TEMPERATURE: 97 F | HEIGHT: 62 IN | RESPIRATION RATE: 16 BRPM | WEIGHT: 145.5 LBS | DIASTOLIC BLOOD PRESSURE: 75 MMHG | SYSTOLIC BLOOD PRESSURE: 145 MMHG | BODY MASS INDEX: 26.78 KG/M2

## 2021-02-25 DIAGNOSIS — Z15.02 BRCA2 GENE MUTATION POSITIVE IN FEMALE: ICD-10-CM

## 2021-02-25 DIAGNOSIS — C53.0 MALIGNANT NEOPLASM OF ENDOCERVIX: Primary | ICD-10-CM

## 2021-02-25 DIAGNOSIS — Z15.01 BRCA2 GENE MUTATION POSITIVE IN FEMALE: ICD-10-CM

## 2021-02-25 DIAGNOSIS — Z15.09 BRCA2 GENE MUTATION POSITIVE IN FEMALE: ICD-10-CM

## 2021-02-25 DIAGNOSIS — Z51.11 ENCOUNTER FOR ANTINEOPLASTIC CHEMOTHERAPY: ICD-10-CM

## 2021-02-25 PROCEDURE — 99214 OFFICE O/P EST MOD 30 MIN: CPT | Mod: S$GLB,,, | Performed by: NURSE PRACTITIONER

## 2021-02-25 PROCEDURE — 99214 PR OFFICE/OUTPT VISIT, EST, LEVL IV, 30-39 MIN: ICD-10-PCS | Mod: S$GLB,,, | Performed by: NURSE PRACTITIONER

## 2021-02-27 ENCOUNTER — IMMUNIZATION (OUTPATIENT)
Dept: HEMATOLOGY/ONCOLOGY | Facility: CLINIC | Age: 86
End: 2021-02-27
Payer: MEDICARE

## 2021-02-27 DIAGNOSIS — Z23 NEED FOR VACCINATION: Primary | ICD-10-CM

## 2021-02-27 PROCEDURE — 91301 COVID-19, MRNA, LNP-S, PF, 100 MCG/0.5 ML DOSE VACCINE: ICD-10-PCS | Mod: S$GLB,,, | Performed by: FAMILY MEDICINE

## 2021-02-27 PROCEDURE — 0011A COVID-19, MRNA, LNP-S, PF, 100 MCG/0.5 ML DOSE VACCINE: CPT | Mod: CV19,S$GLB,, | Performed by: FAMILY MEDICINE

## 2021-02-27 PROCEDURE — 91301 COVID-19, MRNA, LNP-S, PF, 100 MCG/0.5 ML DOSE VACCINE: CPT | Mod: S$GLB,,, | Performed by: FAMILY MEDICINE

## 2021-02-27 PROCEDURE — 0011A COVID-19, MRNA, LNP-S, PF, 100 MCG/0.5 ML DOSE VACCINE: ICD-10-PCS | Mod: CV19,S$GLB,, | Performed by: FAMILY MEDICINE

## 2021-03-03 LAB
ALBUMIN SERPL BCP-MCNC: 4 G/DL (ref 3.4–5)
ALBUMIN/GLOBULIN RATIO: 1.21 RATIO (ref 1.1–1.8)
ALP SERPL-CCNC: 80 U/L (ref 46–116)
ALT SERPL W P-5'-P-CCNC: 21 U/L (ref 12–78)
ANION GAP SERPL CALC-SCNC: 12 MMOL/L (ref 3–11)
AST SERPL-CCNC: 17 U/L (ref 15–37)
BASOPHILS NFR BLD: 0.5 % (ref 0–3)
BILIRUB SERPL-MCNC: 0.5 MG/DL (ref 0–1)
BUN SERPL-MCNC: 18 MG/DL (ref 7–18)
BUN/CREAT SERPL: 18 RATIO (ref 7–18)
CALCIUM SERPL-MCNC: 9.8 MG/DL (ref 8.8–10.5)
CHLORIDE SERPL-SCNC: 107 MMOL/L (ref 100–108)
CO2 SERPL-SCNC: 23 MMOL/L (ref 21–32)
CREAT SERPL-MCNC: 1 MG/DL (ref 0.55–1.02)
EOSINOPHIL NFR BLD: 0.5 % (ref 1–3)
ERYTHROCYTE [DISTWIDTH] IN BLOOD BY AUTOMATED COUNT: 13.5 % (ref 12.5–18)
GFR ESTIMATION: > 60
GLOBULIN: 3.3 G/DL (ref 2.3–3.5)
GLUCOSE SERPL-MCNC: 104 MG/DL (ref 70–110)
HCT VFR BLD AUTO: 29.4 % (ref 37–47)
HGB BLD-MCNC: 9.3 G/DL (ref 12–16)
HYPOCHROMIA BLD QL SMEAR: NORMAL
LYMPHOCYTES NFR BLD: 31.7 % (ref 25–40)
MACROCYTES BLD QL SMEAR: NORMAL
MCH RBC QN AUTO: 36.6 PG (ref 27–31.2)
MCHC RBC AUTO-ENTMCNC: 31.6 G/DL (ref 31.8–35.4)
MCV RBC AUTO: 115.7 FL (ref 80–97)
MONOCYTES NFR BLD: 6.4 % (ref 1–15)
NEUTROPHILS # BLD AUTO: 3.41 10*3/UL (ref 1.8–7.7)
NEUTROPHILS NFR BLD: 60.4 % (ref 37–80)
NUCLEATED RED BLOOD CELLS: 0 %
PLATELETS: 180 10*3/UL (ref 142–424)
POTASSIUM SERPL-SCNC: 4.1 MMOL/L (ref 3.6–5.2)
PROT SERPL-MCNC: 7.3 G/DL (ref 6.4–8.2)
RBC # BLD AUTO: 2.54 10*6/UL (ref 4.2–5.4)
SODIUM BLD-SCNC: 142 MMOL/L (ref 135–145)
WBC # BLD: 5.7 10*3/UL (ref 4.6–10.2)

## 2021-03-04 RX ORDER — EPINEPHRINE 0.3 MG/.3ML
0.3 INJECTION SUBCUTANEOUS ONCE AS NEEDED
Status: CANCELLED | OUTPATIENT
Start: 2021-03-04

## 2021-03-04 RX ORDER — SODIUM CHLORIDE 0.9 % (FLUSH) 0.9 %
10 SYRINGE (ML) INJECTION
Status: CANCELLED | OUTPATIENT
Start: 2021-03-04

## 2021-03-04 RX ORDER — DIPHENHYDRAMINE HYDROCHLORIDE 50 MG/ML
50 INJECTION INTRAMUSCULAR; INTRAVENOUS ONCE AS NEEDED
Status: CANCELLED | OUTPATIENT
Start: 2021-03-04

## 2021-03-04 RX ORDER — FAMOTIDINE 10 MG/ML
20 INJECTION INTRAVENOUS
Status: CANCELLED | OUTPATIENT
Start: 2021-03-04

## 2021-03-04 RX ORDER — HEPARIN 100 UNIT/ML
500 SYRINGE INTRAVENOUS
Status: CANCELLED | OUTPATIENT
Start: 2021-03-04

## 2021-03-05 DIAGNOSIS — C53.0 MALIGNANT NEOPLASM OF ENDOCERVIX: Primary | ICD-10-CM

## 2021-03-10 LAB
ALBUMIN SERPL BCP-MCNC: 4 G/DL (ref 3.4–5)
ALBUMIN/GLOBULIN RATIO: 1.33 RATIO (ref 1.1–1.8)
ALP SERPL-CCNC: 73 U/L (ref 46–116)
ALT SERPL W P-5'-P-CCNC: 21 U/L (ref 12–78)
ANION GAP SERPL CALC-SCNC: 12 MMOL/L (ref 3–11)
AST SERPL-CCNC: 17 U/L (ref 15–37)
BASOPHILS NFR BLD: 0.5 % (ref 0–3)
BILIRUB SERPL-MCNC: 0.4 MG/DL (ref 0–1)
BUN SERPL-MCNC: 22 MG/DL (ref 7–18)
BUN/CREAT SERPL: 23.15 RATIO (ref 7–18)
CALCIUM SERPL-MCNC: 9.4 MG/DL (ref 8.8–10.5)
CHLORIDE SERPL-SCNC: 109 MMOL/L (ref 100–108)
CO2 SERPL-SCNC: 23 MMOL/L (ref 21–32)
CREAT SERPL-MCNC: 0.95 MG/DL (ref 0.55–1.02)
EOSINOPHIL NFR BLD: 0.8 % (ref 1–3)
ERYTHROCYTE [DISTWIDTH] IN BLOOD BY AUTOMATED COUNT: 13.7 % (ref 12.5–18)
GFR ESTIMATION: > 60
GLOBULIN: 3 G/DL (ref 2.3–3.5)
GLUCOSE SERPL-MCNC: 98 MG/DL (ref 70–110)
HCT VFR BLD AUTO: 28.3 % (ref 37–47)
HGB BLD-MCNC: 8.5 G/DL (ref 12–16)
HYPOCHROMIA BLD QL SMEAR: NORMAL
LYMPHOCYTES NFR BLD: 31.1 % (ref 25–40)
MACROCYTES BLD QL SMEAR: NORMAL
MCH RBC QN AUTO: 35.4 PG (ref 27–31.2)
MCHC RBC AUTO-ENTMCNC: 30 G/DL (ref 31.8–35.4)
MCV RBC AUTO: 117.9 FL (ref 80–97)
MONOCYTES NFR BLD: 6.8 % (ref 1–15)
NEUTROPHILS # BLD AUTO: 2.38 10*3/UL (ref 1.8–7.7)
NEUTROPHILS NFR BLD: 60.3 % (ref 37–80)
NUCLEATED RED BLOOD CELLS: 0 %
PLATELETS: 156 10*3/UL (ref 142–424)
POTASSIUM SERPL-SCNC: 4.2 MMOL/L (ref 3.6–5.2)
PROT SERPL-MCNC: 7 G/DL (ref 6.4–8.2)
RBC # BLD AUTO: 2.4 10*6/UL (ref 4.2–5.4)
SODIUM BLD-SCNC: 144 MMOL/L (ref 135–145)
WBC # BLD: 4 10*3/UL (ref 4.6–10.2)

## 2021-03-11 ENCOUNTER — OFFICE VISIT (OUTPATIENT)
Dept: HEMATOLOGY/ONCOLOGY | Facility: CLINIC | Age: 86
End: 2021-03-11
Payer: MEDICARE

## 2021-03-11 VITALS
SYSTOLIC BLOOD PRESSURE: 121 MMHG | TEMPERATURE: 97 F | WEIGHT: 145.63 LBS | HEIGHT: 62 IN | HEART RATE: 56 BPM | DIASTOLIC BLOOD PRESSURE: 56 MMHG | OXYGEN SATURATION: 99 % | BODY MASS INDEX: 26.8 KG/M2 | RESPIRATION RATE: 16 BRPM

## 2021-03-11 DIAGNOSIS — C53.0 MALIGNANT NEOPLASM OF ENDOCERVIX: Primary | ICD-10-CM

## 2021-03-11 DIAGNOSIS — E78.00 HYPERCHOLESTEREMIA: ICD-10-CM

## 2021-03-11 DIAGNOSIS — Z51.11 ENCOUNTER FOR ANTINEOPLASTIC CHEMOTHERAPY: ICD-10-CM

## 2021-03-11 PROCEDURE — 99214 OFFICE O/P EST MOD 30 MIN: CPT | Mod: S$GLB,,, | Performed by: NURSE PRACTITIONER

## 2021-03-11 PROCEDURE — 99214 PR OFFICE/OUTPT VISIT, EST, LEVL IV, 30-39 MIN: ICD-10-PCS | Mod: S$GLB,,, | Performed by: NURSE PRACTITIONER

## 2021-03-11 RX ORDER — SODIUM CHLORIDE 0.9 % (FLUSH) 0.9 %
10 SYRINGE (ML) INJECTION
Status: CANCELLED | OUTPATIENT
Start: 2021-03-11

## 2021-03-11 RX ORDER — FAMOTIDINE 10 MG/ML
20 INJECTION INTRAVENOUS
Status: CANCELLED | OUTPATIENT
Start: 2021-03-11

## 2021-03-11 RX ORDER — DIPHENHYDRAMINE HYDROCHLORIDE 50 MG/ML
50 INJECTION INTRAMUSCULAR; INTRAVENOUS ONCE AS NEEDED
Status: CANCELLED | OUTPATIENT
Start: 2021-03-11

## 2021-03-11 RX ORDER — SIMVASTATIN 20 MG/1
20 TABLET, FILM COATED ORAL NIGHTLY
Qty: 30 TABLET | Refills: 11 | Status: SHIPPED | OUTPATIENT
Start: 2021-03-11 | End: 2021-08-19 | Stop reason: SDUPTHER

## 2021-03-11 RX ORDER — EPINEPHRINE 0.3 MG/.3ML
0.3 INJECTION SUBCUTANEOUS ONCE AS NEEDED
Status: CANCELLED | OUTPATIENT
Start: 2021-03-11

## 2021-03-11 RX ORDER — HEPARIN 100 UNIT/ML
500 SYRINGE INTRAVENOUS
Status: CANCELLED | OUTPATIENT
Start: 2021-03-11

## 2021-03-17 LAB
HYPOCHROMIA BLD QL SMEAR: NORMAL
MACROCYTES BLD QL SMEAR: NORMAL

## 2021-03-17 RX ORDER — FAMOTIDINE 10 MG/ML
20 INJECTION INTRAVENOUS
Status: CANCELLED | OUTPATIENT
Start: 2021-03-18

## 2021-03-17 RX ORDER — SODIUM CHLORIDE 0.9 % (FLUSH) 0.9 %
10 SYRINGE (ML) INJECTION
Status: CANCELLED | OUTPATIENT
Start: 2021-03-18

## 2021-03-17 RX ORDER — DIPHENHYDRAMINE HYDROCHLORIDE 50 MG/ML
50 INJECTION INTRAMUSCULAR; INTRAVENOUS ONCE AS NEEDED
Status: CANCELLED | OUTPATIENT
Start: 2021-03-18

## 2021-03-17 RX ORDER — EPINEPHRINE 0.3 MG/.3ML
0.3 INJECTION SUBCUTANEOUS ONCE AS NEEDED
Status: CANCELLED | OUTPATIENT
Start: 2021-03-18

## 2021-03-17 RX ORDER — HEPARIN 100 UNIT/ML
500 SYRINGE INTRAVENOUS
Status: CANCELLED | OUTPATIENT
Start: 2021-03-18

## 2021-03-24 LAB
ANISOCYTOSIS: ABNORMAL
BANDS: 8 % (ref 0–5)
CELLS COUNTED: 100
HYPOCHROMIA BLD QL SMEAR: ABNORMAL
LYMPHOCYTES NFR BLD: 35 % (ref 25–40)
MONOCYTES NFR BLD: 7 % (ref 1–15)
NEUTROPHILS # BLD AUTO: 1.7 10*3/UL (ref 1.8–7.7)
NEUTROPHILS NFR BLD: 50 % (ref 37–80)
SMALL PLATELETS BLD QL SMEAR: ADEQUATE

## 2021-03-25 ENCOUNTER — OFFICE VISIT (OUTPATIENT)
Dept: HEMATOLOGY/ONCOLOGY | Facility: CLINIC | Age: 86
End: 2021-03-25
Payer: MEDICARE

## 2021-03-25 VITALS
RESPIRATION RATE: 16 BRPM | HEIGHT: 62 IN | HEART RATE: 62 BPM | DIASTOLIC BLOOD PRESSURE: 60 MMHG | BODY MASS INDEX: 27.03 KG/M2 | TEMPERATURE: 97 F | WEIGHT: 146.88 LBS | OXYGEN SATURATION: 99 % | SYSTOLIC BLOOD PRESSURE: 112 MMHG

## 2021-03-25 DIAGNOSIS — D53.9 NUTRITIONAL ANEMIA: Primary | ICD-10-CM

## 2021-03-25 DIAGNOSIS — D53.9 MACROCYTIC ANEMIA: ICD-10-CM

## 2021-03-25 DIAGNOSIS — Z15.02 BRCA2 GENE MUTATION POSITIVE IN FEMALE: ICD-10-CM

## 2021-03-25 DIAGNOSIS — Z15.01 BRCA2 GENE MUTATION POSITIVE IN FEMALE: ICD-10-CM

## 2021-03-25 DIAGNOSIS — Z15.09 BRCA2 GENE MUTATION POSITIVE IN FEMALE: ICD-10-CM

## 2021-03-25 LAB
IRON: 57 UG/DL (ref 26–170)
TOTAL IRON BINDING CAPACITY: 225 UG/DL (ref 250–450)

## 2021-03-25 PROCEDURE — 3078F PR MOST RECENT DIASTOLIC BLOOD PRESSURE < 80 MM HG: ICD-10-PCS | Mod: CPTII,S$GLB,, | Performed by: NURSE PRACTITIONER

## 2021-03-25 PROCEDURE — 3074F PR MOST RECENT SYSTOLIC BLOOD PRESSURE < 130 MM HG: ICD-10-PCS | Mod: CPTII,S$GLB,, | Performed by: NURSE PRACTITIONER

## 2021-03-25 PROCEDURE — 3078F DIAST BP <80 MM HG: CPT | Mod: CPTII,S$GLB,, | Performed by: NURSE PRACTITIONER

## 2021-03-25 PROCEDURE — 1159F MED LIST DOCD IN RCRD: CPT | Mod: S$GLB,,, | Performed by: NURSE PRACTITIONER

## 2021-03-25 PROCEDURE — 99214 PR OFFICE/OUTPT VISIT, EST, LEVL IV, 30-39 MIN: ICD-10-PCS | Mod: S$GLB,,, | Performed by: NURSE PRACTITIONER

## 2021-03-25 PROCEDURE — 3074F SYST BP LT 130 MM HG: CPT | Mod: CPTII,S$GLB,, | Performed by: NURSE PRACTITIONER

## 2021-03-25 PROCEDURE — 1101F PT FALLS ASSESS-DOCD LE1/YR: CPT | Mod: CPTII,S$GLB,, | Performed by: NURSE PRACTITIONER

## 2021-03-25 PROCEDURE — 1159F PR MEDICATION LIST DOCUMENTED IN MEDICAL RECORD: ICD-10-PCS | Mod: S$GLB,,, | Performed by: NURSE PRACTITIONER

## 2021-03-25 PROCEDURE — 3288F FALL RISK ASSESSMENT DOCD: CPT | Mod: CPTII,S$GLB,, | Performed by: NURSE PRACTITIONER

## 2021-03-25 PROCEDURE — 3288F PR FALLS RISK ASSESSMENT DOCUMENTED: ICD-10-PCS | Mod: CPTII,S$GLB,, | Performed by: NURSE PRACTITIONER

## 2021-03-25 PROCEDURE — 1157F ADVNC CARE PLAN IN RCRD: CPT | Mod: S$GLB,,, | Performed by: NURSE PRACTITIONER

## 2021-03-25 PROCEDURE — 99214 OFFICE O/P EST MOD 30 MIN: CPT | Mod: S$GLB,,, | Performed by: NURSE PRACTITIONER

## 2021-03-25 PROCEDURE — 1101F PR PT FALLS ASSESS DOC 0-1 FALLS W/OUT INJ PAST YR: ICD-10-PCS | Mod: CPTII,S$GLB,, | Performed by: NURSE PRACTITIONER

## 2021-03-25 PROCEDURE — 1126F PR PAIN SEVERITY QUANTIFIED, NO PAIN PRESENT: ICD-10-PCS | Mod: S$GLB,,, | Performed by: NURSE PRACTITIONER

## 2021-03-25 PROCEDURE — 1126F AMNT PAIN NOTED NONE PRSNT: CPT | Mod: S$GLB,,, | Performed by: NURSE PRACTITIONER

## 2021-03-25 PROCEDURE — 1157F PR ADVANCE CARE PLAN OR EQUIV PRESENT IN MEDICAL RECORD: ICD-10-PCS | Mod: S$GLB,,, | Performed by: NURSE PRACTITIONER

## 2021-03-25 RX ORDER — HEPARIN 100 UNIT/ML
500 SYRINGE INTRAVENOUS
Status: CANCELLED | OUTPATIENT
Start: 2021-03-25

## 2021-03-25 RX ORDER — SODIUM CHLORIDE 0.9 % (FLUSH) 0.9 %
10 SYRINGE (ML) INJECTION
Status: CANCELLED | OUTPATIENT
Start: 2021-03-25

## 2021-03-25 RX ORDER — EPINEPHRINE 0.3 MG/.3ML
0.3 INJECTION SUBCUTANEOUS ONCE AS NEEDED
Status: CANCELLED | OUTPATIENT
Start: 2021-03-25

## 2021-03-25 RX ORDER — DIPHENHYDRAMINE HYDROCHLORIDE 50 MG/ML
50 INJECTION INTRAMUSCULAR; INTRAVENOUS ONCE AS NEEDED
Status: CANCELLED | OUTPATIENT
Start: 2021-03-25

## 2021-03-25 RX ORDER — NIFEDIPINE 30 MG/1
30 TABLET, EXTENDED RELEASE ORAL DAILY
Qty: 90 TABLET | Refills: 5 | Status: SHIPPED | OUTPATIENT
Start: 2021-03-25 | End: 2021-06-21

## 2021-03-25 RX ORDER — FAMOTIDINE 10 MG/ML
20 INJECTION INTRAVENOUS
Status: CANCELLED | OUTPATIENT
Start: 2021-03-25

## 2021-03-27 ENCOUNTER — IMMUNIZATION (OUTPATIENT)
Dept: HEMATOLOGY/ONCOLOGY | Facility: CLINIC | Age: 86
End: 2021-03-27
Payer: MEDICARE

## 2021-03-27 DIAGNOSIS — Z23 NEED FOR VACCINATION: Primary | ICD-10-CM

## 2021-03-27 PROCEDURE — 91301 COVID-19, MRNA, LNP-S, PF, 100 MCG/0.5 ML DOSE VACCINE: CPT | Mod: S$GLB,,, | Performed by: FAMILY MEDICINE

## 2021-03-27 PROCEDURE — 0012A COVID-19, MRNA, LNP-S, PF, 100 MCG/0.5 ML DOSE VACCINE: CPT | Mod: CV19,S$GLB,, | Performed by: FAMILY MEDICINE

## 2021-03-27 PROCEDURE — 91301 COVID-19, MRNA, LNP-S, PF, 100 MCG/0.5 ML DOSE VACCINE: ICD-10-PCS | Mod: S$GLB,,, | Performed by: FAMILY MEDICINE

## 2021-03-27 PROCEDURE — 0012A COVID-19, MRNA, LNP-S, PF, 100 MCG/0.5 ML DOSE VACCINE: ICD-10-PCS | Mod: CV19,S$GLB,, | Performed by: FAMILY MEDICINE

## 2021-03-31 LAB
IRON: 73 UG/DL (ref 26–170)
TOTAL IRON BINDING CAPACITY: 226 UG/DL (ref 250–450)

## 2021-03-31 RX ORDER — HEPARIN 100 UNIT/ML
500 SYRINGE INTRAVENOUS
Status: CANCELLED | OUTPATIENT
Start: 2021-04-01

## 2021-03-31 RX ORDER — EPINEPHRINE 0.3 MG/.3ML
0.3 INJECTION SUBCUTANEOUS ONCE AS NEEDED
Status: CANCELLED | OUTPATIENT
Start: 2021-04-01

## 2021-03-31 RX ORDER — DIPHENHYDRAMINE HYDROCHLORIDE 50 MG/ML
50 INJECTION INTRAMUSCULAR; INTRAVENOUS ONCE AS NEEDED
Status: CANCELLED | OUTPATIENT
Start: 2021-04-01

## 2021-03-31 RX ORDER — SODIUM CHLORIDE 0.9 % (FLUSH) 0.9 %
10 SYRINGE (ML) INJECTION
Status: CANCELLED | OUTPATIENT
Start: 2021-04-01

## 2021-03-31 RX ORDER — FAMOTIDINE 10 MG/ML
20 INJECTION INTRAVENOUS
Status: CANCELLED | OUTPATIENT
Start: 2021-04-01

## 2021-04-07 ENCOUNTER — TELEPHONE (OUTPATIENT)
Dept: HEMATOLOGY/ONCOLOGY | Facility: CLINIC | Age: 86
End: 2021-04-07

## 2021-04-08 ENCOUNTER — OFFICE VISIT (OUTPATIENT)
Dept: HEMATOLOGY/ONCOLOGY | Facility: CLINIC | Age: 86
End: 2021-04-08
Payer: MEDICARE

## 2021-04-08 VITALS
HEART RATE: 66 BPM | BODY MASS INDEX: 26.94 KG/M2 | DIASTOLIC BLOOD PRESSURE: 66 MMHG | TEMPERATURE: 98 F | SYSTOLIC BLOOD PRESSURE: 122 MMHG | OXYGEN SATURATION: 99 % | HEIGHT: 62 IN | WEIGHT: 146.38 LBS | RESPIRATION RATE: 16 BRPM

## 2021-04-08 DIAGNOSIS — Z51.11 ENCOUNTER FOR ANTINEOPLASTIC CHEMOTHERAPY: ICD-10-CM

## 2021-04-08 DIAGNOSIS — D51.3 OTHER DIETARY VITAMIN B12 DEFICIENCY ANEMIA: ICD-10-CM

## 2021-04-08 DIAGNOSIS — C53.0 MALIGNANT NEOPLASM OF ENDOCERVIX: Primary | ICD-10-CM

## 2021-04-08 PROCEDURE — 1101F PR PT FALLS ASSESS DOC 0-1 FALLS W/OUT INJ PAST YR: ICD-10-PCS | Mod: CPTII,S$GLB,, | Performed by: NURSE PRACTITIONER

## 2021-04-08 PROCEDURE — 1101F PT FALLS ASSESS-DOCD LE1/YR: CPT | Mod: CPTII,S$GLB,, | Performed by: NURSE PRACTITIONER

## 2021-04-08 PROCEDURE — 1157F PR ADVANCE CARE PLAN OR EQUIV PRESENT IN MEDICAL RECORD: ICD-10-PCS | Mod: S$GLB,,, | Performed by: NURSE PRACTITIONER

## 2021-04-08 PROCEDURE — 99214 OFFICE O/P EST MOD 30 MIN: CPT | Mod: S$GLB,,, | Performed by: NURSE PRACTITIONER

## 2021-04-08 PROCEDURE — 1159F PR MEDICATION LIST DOCUMENTED IN MEDICAL RECORD: ICD-10-PCS | Mod: S$GLB,,, | Performed by: NURSE PRACTITIONER

## 2021-04-08 PROCEDURE — 1157F ADVNC CARE PLAN IN RCRD: CPT | Mod: S$GLB,,, | Performed by: NURSE PRACTITIONER

## 2021-04-08 PROCEDURE — 1126F PR PAIN SEVERITY QUANTIFIED, NO PAIN PRESENT: ICD-10-PCS | Mod: S$GLB,,, | Performed by: NURSE PRACTITIONER

## 2021-04-08 PROCEDURE — 3288F FALL RISK ASSESSMENT DOCD: CPT | Mod: CPTII,S$GLB,, | Performed by: NURSE PRACTITIONER

## 2021-04-08 PROCEDURE — 99214 PR OFFICE/OUTPT VISIT, EST, LEVL IV, 30-39 MIN: ICD-10-PCS | Mod: S$GLB,,, | Performed by: NURSE PRACTITIONER

## 2021-04-08 PROCEDURE — 3288F PR FALLS RISK ASSESSMENT DOCUMENTED: ICD-10-PCS | Mod: CPTII,S$GLB,, | Performed by: NURSE PRACTITIONER

## 2021-04-08 PROCEDURE — 1126F AMNT PAIN NOTED NONE PRSNT: CPT | Mod: S$GLB,,, | Performed by: NURSE PRACTITIONER

## 2021-04-08 PROCEDURE — 1159F MED LIST DOCD IN RCRD: CPT | Mod: S$GLB,,, | Performed by: NURSE PRACTITIONER

## 2021-04-08 RX ORDER — EPINEPHRINE 0.3 MG/.3ML
0.3 INJECTION SUBCUTANEOUS ONCE AS NEEDED
Status: CANCELLED | OUTPATIENT
Start: 2021-04-08

## 2021-04-08 RX ORDER — SODIUM CHLORIDE 0.9 % (FLUSH) 0.9 %
10 SYRINGE (ML) INJECTION
Status: CANCELLED | OUTPATIENT
Start: 2021-04-08

## 2021-04-08 RX ORDER — DIPHENHYDRAMINE HYDROCHLORIDE 50 MG/ML
50 INJECTION INTRAMUSCULAR; INTRAVENOUS ONCE AS NEEDED
Status: CANCELLED | OUTPATIENT
Start: 2021-04-08

## 2021-04-08 RX ORDER — HEPARIN 100 UNIT/ML
500 SYRINGE INTRAVENOUS
Status: CANCELLED | OUTPATIENT
Start: 2021-04-08

## 2021-04-08 RX ORDER — FAMOTIDINE 10 MG/ML
20 INJECTION INTRAVENOUS
Status: CANCELLED | OUTPATIENT
Start: 2021-04-08

## 2021-04-14 LAB
BANDS: 4 % (ref 0–5)
CELLS COUNTED: 100
EOSINOPHIL NFR BLD: 2 % (ref 1–3)
LYMPHOCYTES NFR BLD: 29 % (ref 25–40)
MACROCYTES BLD QL SMEAR: NORMAL
MONOCYTES NFR BLD: 9 % (ref 1–15)
NEUTROPHILS # BLD AUTO: 2 10*3/UL (ref 1.8–7.7)
NEUTROPHILS NFR BLD: 56 % (ref 37–80)
SMALL PLATELETS BLD QL SMEAR: ADEQUATE

## 2021-04-14 RX ORDER — HEPARIN 100 UNIT/ML
500 SYRINGE INTRAVENOUS
Status: CANCELLED | OUTPATIENT
Start: 2021-04-15

## 2021-04-14 RX ORDER — SODIUM CHLORIDE 0.9 % (FLUSH) 0.9 %
10 SYRINGE (ML) INJECTION
Status: CANCELLED | OUTPATIENT
Start: 2021-04-15

## 2021-04-14 RX ORDER — EPINEPHRINE 0.3 MG/.3ML
0.3 INJECTION SUBCUTANEOUS ONCE AS NEEDED
Status: CANCELLED | OUTPATIENT
Start: 2021-04-15

## 2021-04-14 RX ORDER — FAMOTIDINE 10 MG/ML
20 INJECTION INTRAVENOUS
Status: CANCELLED | OUTPATIENT
Start: 2021-04-15

## 2021-04-14 RX ORDER — DIPHENHYDRAMINE HYDROCHLORIDE 50 MG/ML
50 INJECTION INTRAMUSCULAR; INTRAVENOUS ONCE AS NEEDED
Status: CANCELLED | OUTPATIENT
Start: 2021-04-15

## 2021-04-22 ENCOUNTER — OFFICE VISIT (OUTPATIENT)
Dept: HEMATOLOGY/ONCOLOGY | Facility: CLINIC | Age: 86
End: 2021-04-22
Payer: MEDICARE

## 2021-04-22 VITALS
HEIGHT: 62 IN | DIASTOLIC BLOOD PRESSURE: 77 MMHG | OXYGEN SATURATION: 97 % | WEIGHT: 142 LBS | BODY MASS INDEX: 26.13 KG/M2 | SYSTOLIC BLOOD PRESSURE: 138 MMHG | TEMPERATURE: 97 F | RESPIRATION RATE: 16 BRPM | HEART RATE: 67 BPM

## 2021-04-22 DIAGNOSIS — Z51.11 ENCOUNTER FOR ANTINEOPLASTIC CHEMOTHERAPY: ICD-10-CM

## 2021-04-22 DIAGNOSIS — E44.1 MILD PROTEIN-CALORIE MALNUTRITION: ICD-10-CM

## 2021-04-22 DIAGNOSIS — C53.0 MALIGNANT NEOPLASM OF ENDOCERVIX: ICD-10-CM

## 2021-04-22 DIAGNOSIS — D53.9 MACROCYTIC ANEMIA: Primary | ICD-10-CM

## 2021-04-22 PROCEDURE — 1126F AMNT PAIN NOTED NONE PRSNT: CPT | Mod: S$GLB,,, | Performed by: NURSE PRACTITIONER

## 2021-04-22 PROCEDURE — 1159F PR MEDICATION LIST DOCUMENTED IN MEDICAL RECORD: ICD-10-PCS | Mod: S$GLB,,, | Performed by: NURSE PRACTITIONER

## 2021-04-22 PROCEDURE — 1157F ADVNC CARE PLAN IN RCRD: CPT | Mod: S$GLB,,, | Performed by: NURSE PRACTITIONER

## 2021-04-22 PROCEDURE — 1159F MED LIST DOCD IN RCRD: CPT | Mod: S$GLB,,, | Performed by: NURSE PRACTITIONER

## 2021-04-22 PROCEDURE — 99214 OFFICE O/P EST MOD 30 MIN: CPT | Mod: S$GLB,,, | Performed by: NURSE PRACTITIONER

## 2021-04-22 PROCEDURE — 1157F PR ADVANCE CARE PLAN OR EQUIV PRESENT IN MEDICAL RECORD: ICD-10-PCS | Mod: S$GLB,,, | Performed by: NURSE PRACTITIONER

## 2021-04-22 PROCEDURE — 1126F PR PAIN SEVERITY QUANTIFIED, NO PAIN PRESENT: ICD-10-PCS | Mod: S$GLB,,, | Performed by: NURSE PRACTITIONER

## 2021-04-22 PROCEDURE — 99214 PR OFFICE/OUTPT VISIT, EST, LEVL IV, 30-39 MIN: ICD-10-PCS | Mod: S$GLB,,, | Performed by: NURSE PRACTITIONER

## 2021-04-22 RX ORDER — SODIUM CHLORIDE 0.9 % (FLUSH) 0.9 %
10 SYRINGE (ML) INJECTION
Status: CANCELLED | OUTPATIENT
Start: 2021-04-22

## 2021-04-22 RX ORDER — HEPARIN 100 UNIT/ML
500 SYRINGE INTRAVENOUS
Status: CANCELLED | OUTPATIENT
Start: 2021-04-22

## 2021-04-22 RX ORDER — FAMOTIDINE 10 MG/ML
20 INJECTION INTRAVENOUS
Status: CANCELLED | OUTPATIENT
Start: 2021-04-22

## 2021-04-22 RX ORDER — DIPHENHYDRAMINE HYDROCHLORIDE 50 MG/ML
50 INJECTION INTRAMUSCULAR; INTRAVENOUS ONCE AS NEEDED
Status: CANCELLED | OUTPATIENT
Start: 2021-04-22

## 2021-04-22 RX ORDER — MEGESTROL ACETATE 40 MG/1
40 TABLET ORAL 2 TIMES DAILY
Qty: 30 TABLET | Refills: 6 | Status: SHIPPED | OUTPATIENT
Start: 2021-04-22 | End: 2021-05-06 | Stop reason: SDUPTHER

## 2021-04-22 RX ORDER — EPINEPHRINE 0.3 MG/.3ML
0.3 INJECTION SUBCUTANEOUS ONCE AS NEEDED
Status: CANCELLED | OUTPATIENT
Start: 2021-04-22

## 2021-04-28 RX ORDER — HEPARIN 100 UNIT/ML
500 SYRINGE INTRAVENOUS
Status: CANCELLED | OUTPATIENT
Start: 2021-04-29

## 2021-04-28 RX ORDER — FAMOTIDINE 10 MG/ML
20 INJECTION INTRAVENOUS
Status: CANCELLED | OUTPATIENT
Start: 2021-04-29

## 2021-04-28 RX ORDER — DIPHENHYDRAMINE HYDROCHLORIDE 50 MG/ML
50 INJECTION INTRAMUSCULAR; INTRAVENOUS ONCE AS NEEDED
Status: CANCELLED | OUTPATIENT
Start: 2021-04-29

## 2021-04-28 RX ORDER — SODIUM CHLORIDE 0.9 % (FLUSH) 0.9 %
10 SYRINGE (ML) INJECTION
Status: CANCELLED | OUTPATIENT
Start: 2021-04-29

## 2021-04-28 RX ORDER — EPINEPHRINE 0.3 MG/.3ML
0.3 INJECTION SUBCUTANEOUS ONCE AS NEEDED
Status: CANCELLED | OUTPATIENT
Start: 2021-04-29

## 2021-05-05 LAB
ANISOCYTOSIS: ABNORMAL
BANDS: 1 % (ref 0–5)
CELLS COUNTED: 100
LYMPHOCYTES NFR BLD: 47 % (ref 25–40)
MACROCYTES BLD QL SMEAR: ABNORMAL
MONOCYTES NFR BLD: 10 % (ref 1–15)
NEUTROPHILS # BLD AUTO: 1.5 10*3/UL (ref 1.8–7.7)
NEUTROPHILS NFR BLD: 42 % (ref 37–80)
SMALL PLATELETS BLD QL SMEAR: ADEQUATE

## 2021-05-06 ENCOUNTER — OFFICE VISIT (OUTPATIENT)
Dept: HEMATOLOGY/ONCOLOGY | Facility: CLINIC | Age: 86
End: 2021-05-06
Payer: MEDICARE

## 2021-05-06 VITALS
BODY MASS INDEX: 26.16 KG/M2 | RESPIRATION RATE: 18 BRPM | HEIGHT: 62 IN | SYSTOLIC BLOOD PRESSURE: 130 MMHG | HEART RATE: 60 BPM | DIASTOLIC BLOOD PRESSURE: 65 MMHG | TEMPERATURE: 97 F | WEIGHT: 142.13 LBS | OXYGEN SATURATION: 99 %

## 2021-05-06 DIAGNOSIS — C53.0 MALIGNANT NEOPLASM OF ENDOCERVIX: ICD-10-CM

## 2021-05-06 DIAGNOSIS — E44.1 MILD PROTEIN-CALORIE MALNUTRITION: ICD-10-CM

## 2021-05-06 DIAGNOSIS — D53.9 MACROCYTIC ANEMIA: Primary | ICD-10-CM

## 2021-05-06 PROCEDURE — 1126F PR PAIN SEVERITY QUANTIFIED, NO PAIN PRESENT: ICD-10-PCS | Mod: S$GLB,,, | Performed by: NURSE PRACTITIONER

## 2021-05-06 PROCEDURE — 3288F PR FALLS RISK ASSESSMENT DOCUMENTED: ICD-10-PCS | Mod: CPTII,S$GLB,, | Performed by: NURSE PRACTITIONER

## 2021-05-06 PROCEDURE — 99214 OFFICE O/P EST MOD 30 MIN: CPT | Mod: S$GLB,,, | Performed by: NURSE PRACTITIONER

## 2021-05-06 PROCEDURE — 1126F AMNT PAIN NOTED NONE PRSNT: CPT | Mod: S$GLB,,, | Performed by: NURSE PRACTITIONER

## 2021-05-06 PROCEDURE — 1101F PT FALLS ASSESS-DOCD LE1/YR: CPT | Mod: CPTII,S$GLB,, | Performed by: NURSE PRACTITIONER

## 2021-05-06 PROCEDURE — 99214 PR OFFICE/OUTPT VISIT, EST, LEVL IV, 30-39 MIN: ICD-10-PCS | Mod: S$GLB,,, | Performed by: NURSE PRACTITIONER

## 2021-05-06 PROCEDURE — 1101F PR PT FALLS ASSESS DOC 0-1 FALLS W/OUT INJ PAST YR: ICD-10-PCS | Mod: CPTII,S$GLB,, | Performed by: NURSE PRACTITIONER

## 2021-05-06 PROCEDURE — 3288F FALL RISK ASSESSMENT DOCD: CPT | Mod: CPTII,S$GLB,, | Performed by: NURSE PRACTITIONER

## 2021-05-06 PROCEDURE — 1159F PR MEDICATION LIST DOCUMENTED IN MEDICAL RECORD: ICD-10-PCS | Mod: S$GLB,,, | Performed by: NURSE PRACTITIONER

## 2021-05-06 PROCEDURE — 1157F ADVNC CARE PLAN IN RCRD: CPT | Mod: S$GLB,,, | Performed by: NURSE PRACTITIONER

## 2021-05-06 PROCEDURE — 1157F PR ADVANCE CARE PLAN OR EQUIV PRESENT IN MEDICAL RECORD: ICD-10-PCS | Mod: S$GLB,,, | Performed by: NURSE PRACTITIONER

## 2021-05-06 PROCEDURE — 1159F MED LIST DOCD IN RCRD: CPT | Mod: S$GLB,,, | Performed by: NURSE PRACTITIONER

## 2021-05-06 RX ORDER — SODIUM CHLORIDE 0.9 % (FLUSH) 0.9 %
10 SYRINGE (ML) INJECTION
Status: CANCELLED | OUTPATIENT
Start: 2021-05-06

## 2021-05-06 RX ORDER — EPINEPHRINE 0.3 MG/.3ML
0.3 INJECTION SUBCUTANEOUS ONCE AS NEEDED
Status: CANCELLED | OUTPATIENT
Start: 2021-05-06

## 2021-05-06 RX ORDER — FAMOTIDINE 10 MG/ML
20 INJECTION INTRAVENOUS
Status: CANCELLED | OUTPATIENT
Start: 2021-05-06

## 2021-05-06 RX ORDER — DIPHENHYDRAMINE HYDROCHLORIDE 50 MG/ML
50 INJECTION INTRAMUSCULAR; INTRAVENOUS ONCE AS NEEDED
Status: CANCELLED | OUTPATIENT
Start: 2021-05-06

## 2021-05-06 RX ORDER — FERROUS SULFATE 325(65) MG
TABLET ORAL
Qty: 60 TABLET | Refills: 6 | Status: SHIPPED | OUTPATIENT
Start: 2021-05-06 | End: 2021-09-15 | Stop reason: SDUPTHER

## 2021-05-06 RX ORDER — MEGESTROL ACETATE 40 MG/1
40 TABLET ORAL 2 TIMES DAILY
Qty: 30 TABLET | Refills: 6 | Status: SHIPPED | OUTPATIENT
Start: 2021-05-06 | End: 2021-09-15 | Stop reason: SDUPTHER

## 2021-05-06 RX ORDER — HEPARIN 100 UNIT/ML
500 SYRINGE INTRAVENOUS
Status: CANCELLED | OUTPATIENT
Start: 2021-05-06

## 2021-05-07 ENCOUNTER — TELEPHONE (OUTPATIENT)
Dept: HEMATOLOGY/ONCOLOGY | Facility: CLINIC | Age: 86
End: 2021-05-07

## 2021-05-12 LAB
ANISOCYTOSIS: NORMAL
MACROCYTES BLD QL SMEAR: NORMAL

## 2021-05-13 RX ORDER — FAMOTIDINE 10 MG/ML
20 INJECTION INTRAVENOUS
Status: CANCELLED | OUTPATIENT
Start: 2021-05-13

## 2021-05-13 RX ORDER — SODIUM CHLORIDE 0.9 % (FLUSH) 0.9 %
10 SYRINGE (ML) INJECTION
Status: CANCELLED | OUTPATIENT
Start: 2021-05-13

## 2021-05-13 RX ORDER — EPINEPHRINE 0.3 MG/.3ML
0.3 INJECTION SUBCUTANEOUS ONCE AS NEEDED
Status: CANCELLED | OUTPATIENT
Start: 2021-05-13

## 2021-05-13 RX ORDER — DIPHENHYDRAMINE HYDROCHLORIDE 50 MG/ML
50 INJECTION INTRAMUSCULAR; INTRAVENOUS ONCE AS NEEDED
Status: CANCELLED | OUTPATIENT
Start: 2021-05-13

## 2021-05-13 RX ORDER — HEPARIN 100 UNIT/ML
500 SYRINGE INTRAVENOUS
Status: CANCELLED | OUTPATIENT
Start: 2021-05-13

## 2021-05-18 RX ORDER — DIPHENHYDRAMINE HYDROCHLORIDE 50 MG/ML
50 INJECTION INTRAMUSCULAR; INTRAVENOUS ONCE AS NEEDED
Status: CANCELLED | OUTPATIENT
Start: 2021-05-20

## 2021-05-18 RX ORDER — FAMOTIDINE 10 MG/ML
20 INJECTION INTRAVENOUS
Status: CANCELLED | OUTPATIENT
Start: 2021-05-20

## 2021-05-18 RX ORDER — HEPARIN 100 UNIT/ML
500 SYRINGE INTRAVENOUS
Status: CANCELLED | OUTPATIENT
Start: 2021-05-20

## 2021-05-18 RX ORDER — SODIUM CHLORIDE 0.9 % (FLUSH) 0.9 %
10 SYRINGE (ML) INJECTION
Status: CANCELLED | OUTPATIENT
Start: 2021-05-20

## 2021-05-18 RX ORDER — EPINEPHRINE 0.3 MG/.3ML
0.3 INJECTION SUBCUTANEOUS ONCE AS NEEDED
Status: CANCELLED | OUTPATIENT
Start: 2021-05-20

## 2021-05-19 ENCOUNTER — OFFICE VISIT (OUTPATIENT)
Dept: HEMATOLOGY/ONCOLOGY | Facility: CLINIC | Age: 86
End: 2021-05-19
Payer: MEDICARE

## 2021-05-19 VITALS
TEMPERATURE: 98 F | RESPIRATION RATE: 74 BRPM | HEART RATE: 74 BPM | HEIGHT: 62 IN | OXYGEN SATURATION: 98 % | SYSTOLIC BLOOD PRESSURE: 132 MMHG | BODY MASS INDEX: 24.84 KG/M2 | DIASTOLIC BLOOD PRESSURE: 74 MMHG | WEIGHT: 135 LBS

## 2021-05-19 DIAGNOSIS — C53.0 MALIGNANT NEOPLASM OF ENDOCERVIX: Primary | ICD-10-CM

## 2021-05-19 DIAGNOSIS — Z51.11 ENCOUNTER FOR ANTINEOPLASTIC CHEMOTHERAPY: ICD-10-CM

## 2021-05-19 PROCEDURE — 99214 PR OFFICE/OUTPT VISIT, EST, LEVL IV, 30-39 MIN: ICD-10-PCS | Mod: S$GLB,,, | Performed by: NURSE PRACTITIONER

## 2021-05-19 PROCEDURE — 1101F PR PT FALLS ASSESS DOC 0-1 FALLS W/OUT INJ PAST YR: ICD-10-PCS | Mod: CPTII,S$GLB,, | Performed by: NURSE PRACTITIONER

## 2021-05-19 PROCEDURE — 1159F PR MEDICATION LIST DOCUMENTED IN MEDICAL RECORD: ICD-10-PCS | Mod: S$GLB,,, | Performed by: NURSE PRACTITIONER

## 2021-05-19 PROCEDURE — 3288F FALL RISK ASSESSMENT DOCD: CPT | Mod: CPTII,S$GLB,, | Performed by: NURSE PRACTITIONER

## 2021-05-19 PROCEDURE — 1157F PR ADVANCE CARE PLAN OR EQUIV PRESENT IN MEDICAL RECORD: ICD-10-PCS | Mod: S$GLB,,, | Performed by: NURSE PRACTITIONER

## 2021-05-19 PROCEDURE — 1159F MED LIST DOCD IN RCRD: CPT | Mod: S$GLB,,, | Performed by: NURSE PRACTITIONER

## 2021-05-19 PROCEDURE — 1101F PT FALLS ASSESS-DOCD LE1/YR: CPT | Mod: CPTII,S$GLB,, | Performed by: NURSE PRACTITIONER

## 2021-05-19 PROCEDURE — 1126F AMNT PAIN NOTED NONE PRSNT: CPT | Mod: S$GLB,,, | Performed by: NURSE PRACTITIONER

## 2021-05-19 PROCEDURE — 1126F PR PAIN SEVERITY QUANTIFIED, NO PAIN PRESENT: ICD-10-PCS | Mod: S$GLB,,, | Performed by: NURSE PRACTITIONER

## 2021-05-19 PROCEDURE — 1157F ADVNC CARE PLAN IN RCRD: CPT | Mod: S$GLB,,, | Performed by: NURSE PRACTITIONER

## 2021-05-19 PROCEDURE — 99214 OFFICE O/P EST MOD 30 MIN: CPT | Mod: S$GLB,,, | Performed by: NURSE PRACTITIONER

## 2021-05-19 PROCEDURE — 3288F PR FALLS RISK ASSESSMENT DOCUMENTED: ICD-10-PCS | Mod: CPTII,S$GLB,, | Performed by: NURSE PRACTITIONER

## 2021-05-25 LAB
ANISOCYTOSIS: NORMAL
MACROCYTES BLD QL SMEAR: NORMAL

## 2021-05-26 ENCOUNTER — OFFICE VISIT (OUTPATIENT)
Dept: HEMATOLOGY/ONCOLOGY | Facility: CLINIC | Age: 86
End: 2021-05-26
Payer: MEDICARE

## 2021-05-26 VITALS
HEART RATE: 70 BPM | RESPIRATION RATE: 18 BRPM | DIASTOLIC BLOOD PRESSURE: 75 MMHG | BODY MASS INDEX: 26.48 KG/M2 | HEIGHT: 62 IN | WEIGHT: 143.88 LBS | OXYGEN SATURATION: 98 % | SYSTOLIC BLOOD PRESSURE: 122 MMHG

## 2021-05-26 DIAGNOSIS — D63.0 ANEMIA IN NEOPLASTIC DISEASE: ICD-10-CM

## 2021-05-26 DIAGNOSIS — C53.0 MALIGNANT NEOPLASM OF ENDOCERVIX: Primary | ICD-10-CM

## 2021-05-26 PROBLEM — D64.9 ABSOLUTE ANEMIA: Status: ACTIVE | Noted: 2021-05-26

## 2021-05-26 PROCEDURE — 1159F PR MEDICATION LIST DOCUMENTED IN MEDICAL RECORD: ICD-10-PCS | Mod: S$GLB,,, | Performed by: NURSE PRACTITIONER

## 2021-05-26 PROCEDURE — 1101F PR PT FALLS ASSESS DOC 0-1 FALLS W/OUT INJ PAST YR: ICD-10-PCS | Mod: CPTII,S$GLB,, | Performed by: NURSE PRACTITIONER

## 2021-05-26 PROCEDURE — 1157F ADVNC CARE PLAN IN RCRD: CPT | Mod: S$GLB,,, | Performed by: NURSE PRACTITIONER

## 2021-05-26 PROCEDURE — 3288F PR FALLS RISK ASSESSMENT DOCUMENTED: ICD-10-PCS | Mod: CPTII,S$GLB,, | Performed by: NURSE PRACTITIONER

## 2021-05-26 PROCEDURE — 1159F MED LIST DOCD IN RCRD: CPT | Mod: S$GLB,,, | Performed by: NURSE PRACTITIONER

## 2021-05-26 PROCEDURE — 99214 OFFICE O/P EST MOD 30 MIN: CPT | Mod: S$GLB,,, | Performed by: NURSE PRACTITIONER

## 2021-05-26 PROCEDURE — 1101F PT FALLS ASSESS-DOCD LE1/YR: CPT | Mod: CPTII,S$GLB,, | Performed by: NURSE PRACTITIONER

## 2021-05-26 PROCEDURE — 99214 PR OFFICE/OUTPT VISIT, EST, LEVL IV, 30-39 MIN: ICD-10-PCS | Mod: S$GLB,,, | Performed by: NURSE PRACTITIONER

## 2021-05-26 PROCEDURE — 3288F FALL RISK ASSESSMENT DOCD: CPT | Mod: CPTII,S$GLB,, | Performed by: NURSE PRACTITIONER

## 2021-05-26 PROCEDURE — 1126F PR PAIN SEVERITY QUANTIFIED, NO PAIN PRESENT: ICD-10-PCS | Mod: S$GLB,,, | Performed by: NURSE PRACTITIONER

## 2021-05-26 PROCEDURE — 1157F PR ADVANCE CARE PLAN OR EQUIV PRESENT IN MEDICAL RECORD: ICD-10-PCS | Mod: S$GLB,,, | Performed by: NURSE PRACTITIONER

## 2021-05-26 PROCEDURE — 1126F AMNT PAIN NOTED NONE PRSNT: CPT | Mod: S$GLB,,, | Performed by: NURSE PRACTITIONER

## 2021-05-26 RX ORDER — HEPARIN 100 UNIT/ML
500 SYRINGE INTRAVENOUS
Status: CANCELLED | OUTPATIENT
Start: 2021-05-27

## 2021-05-26 RX ORDER — FAMOTIDINE 10 MG/ML
20 INJECTION INTRAVENOUS
Status: CANCELLED | OUTPATIENT
Start: 2021-05-27

## 2021-05-26 RX ORDER — SODIUM CHLORIDE 0.9 % (FLUSH) 0.9 %
10 SYRINGE (ML) INJECTION
Status: CANCELLED | OUTPATIENT
Start: 2021-05-27

## 2021-05-26 RX ORDER — EPINEPHRINE 0.3 MG/.3ML
0.3 INJECTION SUBCUTANEOUS ONCE AS NEEDED
Status: CANCELLED | OUTPATIENT
Start: 2021-05-27

## 2021-05-26 RX ORDER — DIPHENHYDRAMINE HYDROCHLORIDE 50 MG/ML
50 INJECTION INTRAMUSCULAR; INTRAVENOUS ONCE AS NEEDED
Status: CANCELLED | OUTPATIENT
Start: 2021-05-27

## 2021-05-28 ENCOUNTER — ANCILLARY ORDERS (OUTPATIENT)
Dept: HEMATOLOGY/ONCOLOGY | Facility: CLINIC | Age: 86
End: 2021-05-28

## 2021-05-28 DIAGNOSIS — C53.0 MALIGNANT NEOPLASM OF ENDOCERVIX: ICD-10-CM

## 2021-06-02 LAB
ALBUMIN SERPL BCP-MCNC: 3.8 G/DL (ref 3.4–5)
ALBUMIN/GLOBULIN RATIO: 1.23 RATIO (ref 1.1–1.8)
ALP SERPL-CCNC: 68 U/L (ref 46–116)
ALT SERPL W P-5'-P-CCNC: 16 U/L (ref 12–78)
ANION GAP SERPL CALC-SCNC: 10 MMOL/L (ref 3–11)
ANISOCYTOSIS: ABNORMAL
AST SERPL-CCNC: 20 U/L (ref 15–37)
BANDS: 3 % (ref 0–5)
BILIRUB SERPL-MCNC: 0.5 MG/DL (ref 0–1)
BUN SERPL-MCNC: 19 MG/DL (ref 7–18)
BUN/CREAT SERPL: 18.81 RATIO (ref 7–18)
CALCIUM SERPL-MCNC: 9.1 MG/DL (ref 8.8–10.5)
CELLS COUNTED: 100
CHLORIDE SERPL-SCNC: 109 MMOL/L (ref 100–108)
CO2 SERPL-SCNC: 23 MMOL/L (ref 21–32)
CREAT SERPL-MCNC: 1.01 MG/DL (ref 0.55–1.02)
ERYTHROCYTE [DISTWIDTH] IN BLOOD BY AUTOMATED COUNT: 18.7 % (ref 12.5–18)
GFR ESTIMATION: > 60
GLOBULIN: 3.1 G/DL (ref 2.3–3.5)
GLUCOSE SERPL-MCNC: 105 MG/DL (ref 70–110)
HCT VFR BLD AUTO: 27.3 % (ref 37–47)
HGB BLD-MCNC: 8.6 G/DL (ref 12–16)
LYMPHOCYTES NFR BLD: 43 % (ref 25–40)
MACROCYTES BLD QL SMEAR: ABNORMAL
MCH RBC QN AUTO: 36 PG (ref 27–31.2)
MCHC RBC AUTO-ENTMCNC: 31.5 G/DL (ref 31.8–35.4)
MCV RBC AUTO: 114.2 FL (ref 80–97)
MONOCYTES NFR BLD: 9 % (ref 1–15)
NEUTROPHILS # BLD AUTO: 1.6 10*3/UL (ref 1.8–7.7)
NEUTROPHILS NFR BLD: 45 % (ref 37–80)
NUCLEATED RED BLOOD CELLS: 0 %
PLATELETS: 146 10*3/UL (ref 142–424)
POTASSIUM SERPL-SCNC: 4.3 MMOL/L (ref 3.6–5.2)
PROT SERPL-MCNC: 6.9 G/DL (ref 6.4–8.2)
RBC # BLD AUTO: 2.39 10*6/UL (ref 4.2–5.4)
SMALL PLATELETS BLD QL SMEAR: ADEQUATE
SODIUM BLD-SCNC: 142 MMOL/L (ref 135–145)
WBC # BLD: 3.3 10*3/UL (ref 4.6–10.2)

## 2021-06-02 RX ORDER — DIPHENHYDRAMINE HYDROCHLORIDE 50 MG/ML
50 INJECTION INTRAMUSCULAR; INTRAVENOUS ONCE AS NEEDED
Status: CANCELLED | OUTPATIENT
Start: 2021-06-03

## 2021-06-02 RX ORDER — EPINEPHRINE 0.3 MG/.3ML
0.3 INJECTION SUBCUTANEOUS ONCE AS NEEDED
Status: CANCELLED | OUTPATIENT
Start: 2021-06-03

## 2021-06-02 RX ORDER — HEPARIN 100 UNIT/ML
500 SYRINGE INTRAVENOUS
Status: CANCELLED | OUTPATIENT
Start: 2021-06-03

## 2021-06-02 RX ORDER — FAMOTIDINE 10 MG/ML
20 INJECTION INTRAVENOUS
Status: CANCELLED | OUTPATIENT
Start: 2021-06-03

## 2021-06-02 RX ORDER — SODIUM CHLORIDE 0.9 % (FLUSH) 0.9 %
10 SYRINGE (ML) INJECTION
Status: CANCELLED | OUTPATIENT
Start: 2021-06-03

## 2021-06-09 ENCOUNTER — OFFICE VISIT (OUTPATIENT)
Dept: HEMATOLOGY/ONCOLOGY | Facility: CLINIC | Age: 86
End: 2021-06-09
Payer: MEDICARE

## 2021-06-09 VITALS
HEIGHT: 62 IN | HEART RATE: 78 BPM | BODY MASS INDEX: 26.5 KG/M2 | WEIGHT: 144 LBS | SYSTOLIC BLOOD PRESSURE: 130 MMHG | DIASTOLIC BLOOD PRESSURE: 74 MMHG | TEMPERATURE: 97 F | RESPIRATION RATE: 16 BRPM | OXYGEN SATURATION: 98 %

## 2021-06-09 DIAGNOSIS — Z15.01 BRCA2 GENE MUTATION POSITIVE IN FEMALE: ICD-10-CM

## 2021-06-09 DIAGNOSIS — D51.3 OTHER DIETARY VITAMIN B12 DEFICIENCY ANEMIA: ICD-10-CM

## 2021-06-09 DIAGNOSIS — C53.0 MALIGNANT NEOPLASM OF ENDOCERVIX: Primary | ICD-10-CM

## 2021-06-09 DIAGNOSIS — Z15.09 BRCA2 GENE MUTATION POSITIVE IN FEMALE: ICD-10-CM

## 2021-06-09 DIAGNOSIS — Z15.02 BRCA2 GENE MUTATION POSITIVE IN FEMALE: ICD-10-CM

## 2021-06-09 DIAGNOSIS — N39.3 STRESS INCONTINENCE OF URINE: ICD-10-CM

## 2021-06-09 DIAGNOSIS — Z51.11 ENCOUNTER FOR ANTINEOPLASTIC CHEMOTHERAPY: ICD-10-CM

## 2021-06-09 PROCEDURE — 1157F PR ADVANCE CARE PLAN OR EQUIV PRESENT IN MEDICAL RECORD: ICD-10-PCS | Mod: S$GLB,,, | Performed by: NURSE PRACTITIONER

## 2021-06-09 PROCEDURE — 3288F FALL RISK ASSESSMENT DOCD: CPT | Mod: CPTII,S$GLB,, | Performed by: NURSE PRACTITIONER

## 2021-06-09 PROCEDURE — 99214 PR OFFICE/OUTPT VISIT, EST, LEVL IV, 30-39 MIN: ICD-10-PCS | Mod: S$GLB,,, | Performed by: NURSE PRACTITIONER

## 2021-06-09 PROCEDURE — 1126F AMNT PAIN NOTED NONE PRSNT: CPT | Mod: S$GLB,,, | Performed by: NURSE PRACTITIONER

## 2021-06-09 PROCEDURE — 1101F PR PT FALLS ASSESS DOC 0-1 FALLS W/OUT INJ PAST YR: ICD-10-PCS | Mod: CPTII,S$GLB,, | Performed by: NURSE PRACTITIONER

## 2021-06-09 PROCEDURE — 1101F PT FALLS ASSESS-DOCD LE1/YR: CPT | Mod: CPTII,S$GLB,, | Performed by: NURSE PRACTITIONER

## 2021-06-09 PROCEDURE — 99214 OFFICE O/P EST MOD 30 MIN: CPT | Mod: S$GLB,,, | Performed by: NURSE PRACTITIONER

## 2021-06-09 PROCEDURE — 1126F PR PAIN SEVERITY QUANTIFIED, NO PAIN PRESENT: ICD-10-PCS | Mod: S$GLB,,, | Performed by: NURSE PRACTITIONER

## 2021-06-09 PROCEDURE — 1159F MED LIST DOCD IN RCRD: CPT | Mod: S$GLB,,, | Performed by: NURSE PRACTITIONER

## 2021-06-09 PROCEDURE — 1157F ADVNC CARE PLAN IN RCRD: CPT | Mod: S$GLB,,, | Performed by: NURSE PRACTITIONER

## 2021-06-09 PROCEDURE — 1159F PR MEDICATION LIST DOCUMENTED IN MEDICAL RECORD: ICD-10-PCS | Mod: S$GLB,,, | Performed by: NURSE PRACTITIONER

## 2021-06-09 PROCEDURE — 3288F PR FALLS RISK ASSESSMENT DOCUMENTED: ICD-10-PCS | Mod: CPTII,S$GLB,, | Performed by: NURSE PRACTITIONER

## 2021-06-09 RX ORDER — CYANOCOBALAMIN 1000 UG/ML
1000 INJECTION, SOLUTION INTRAMUSCULAR; SUBCUTANEOUS
Status: CANCELLED | OUTPATIENT
Start: 2021-07-08

## 2021-06-10 RX ORDER — HEPARIN 100 UNIT/ML
500 SYRINGE INTRAVENOUS
Status: CANCELLED | OUTPATIENT
Start: 2021-06-10

## 2021-06-10 RX ORDER — EPINEPHRINE 0.3 MG/.3ML
0.3 INJECTION SUBCUTANEOUS ONCE AS NEEDED
Status: CANCELLED | OUTPATIENT
Start: 2021-06-10

## 2021-06-10 RX ORDER — DIPHENHYDRAMINE HYDROCHLORIDE 50 MG/ML
50 INJECTION INTRAMUSCULAR; INTRAVENOUS ONCE AS NEEDED
Status: CANCELLED | OUTPATIENT
Start: 2021-06-10

## 2021-06-10 RX ORDER — FAMOTIDINE 10 MG/ML
20 INJECTION INTRAVENOUS
Status: CANCELLED | OUTPATIENT
Start: 2021-06-10

## 2021-06-10 RX ORDER — SODIUM CHLORIDE 0.9 % (FLUSH) 0.9 %
10 SYRINGE (ML) INJECTION
Status: CANCELLED | OUTPATIENT
Start: 2021-06-10

## 2021-06-11 ENCOUNTER — ANCILLARY ORDERS (OUTPATIENT)
Dept: HEMATOLOGY/ONCOLOGY | Facility: CLINIC | Age: 86
End: 2021-06-11

## 2021-06-11 ENCOUNTER — OFFICE VISIT (OUTPATIENT)
Dept: UROLOGY | Facility: CLINIC | Age: 86
End: 2021-06-11
Payer: MEDICARE

## 2021-06-11 VITALS
WEIGHT: 143.38 LBS | RESPIRATION RATE: 12 BRPM | HEART RATE: 72 BPM | SYSTOLIC BLOOD PRESSURE: 143 MMHG | DIASTOLIC BLOOD PRESSURE: 75 MMHG | BODY MASS INDEX: 26.39 KG/M2 | HEIGHT: 62 IN

## 2021-06-11 DIAGNOSIS — C53.0 MALIGNANT NEOPLASM OF ENDOCERVIX: ICD-10-CM

## 2021-06-11 DIAGNOSIS — R32 FEMALE INCONTINENCE: ICD-10-CM

## 2021-06-11 DIAGNOSIS — R31.29 HEMATURIA, MICROSCOPIC: Primary | ICD-10-CM

## 2021-06-11 LAB — POC RESIDUAL URINE VOLUME: 23 ML (ref 0–100)

## 2021-06-11 PROCEDURE — 51798 US URINE CAPACITY MEASURE: CPT | Mod: S$GLB,,, | Performed by: NURSE PRACTITIONER

## 2021-06-11 PROCEDURE — 1159F MED LIST DOCD IN RCRD: CPT | Mod: S$GLB,,, | Performed by: NURSE PRACTITIONER

## 2021-06-11 PROCEDURE — 1157F PR ADVANCE CARE PLAN OR EQUIV PRESENT IN MEDICAL RECORD: ICD-10-PCS | Mod: S$GLB,,, | Performed by: NURSE PRACTITIONER

## 2021-06-11 PROCEDURE — 51798 POCT BLADDER SCAN: ICD-10-PCS | Mod: S$GLB,,, | Performed by: NURSE PRACTITIONER

## 2021-06-11 PROCEDURE — 1159F PR MEDICATION LIST DOCUMENTED IN MEDICAL RECORD: ICD-10-PCS | Mod: S$GLB,,, | Performed by: NURSE PRACTITIONER

## 2021-06-11 PROCEDURE — 99214 PR OFFICE/OUTPT VISIT, EST, LEVL IV, 30-39 MIN: ICD-10-PCS | Mod: S$GLB,,, | Performed by: NURSE PRACTITIONER

## 2021-06-11 PROCEDURE — 99214 OFFICE O/P EST MOD 30 MIN: CPT | Mod: S$GLB,,, | Performed by: NURSE PRACTITIONER

## 2021-06-11 PROCEDURE — 1157F ADVNC CARE PLAN IN RCRD: CPT | Mod: S$GLB,,, | Performed by: NURSE PRACTITIONER

## 2021-06-13 LAB — URINE CULTURE, ROUTINE: NORMAL

## 2021-06-14 ENCOUNTER — TELEPHONE (OUTPATIENT)
Dept: UROLOGY | Facility: CLINIC | Age: 86
End: 2021-06-14

## 2021-06-15 LAB
ALBUMIN SERPL BCP-MCNC: 3.7 G/DL (ref 3.4–5)
ALBUMIN/GLOBULIN RATIO: 1.03 RATIO (ref 1.1–1.8)
ALP SERPL-CCNC: 62 U/L (ref 46–116)
ALT SERPL W P-5'-P-CCNC: 19 U/L (ref 12–78)
ANION GAP SERPL CALC-SCNC: 12 MMOL/L (ref 3–11)
ANISOCYTOSIS: ABNORMAL
AST SERPL-CCNC: 20 U/L (ref 15–37)
BANDS: 7 % (ref 0–5)
BILIRUB SERPL-MCNC: 0.5 MG/DL (ref 0–1)
BUN SERPL-MCNC: 18 MG/DL (ref 7–18)
BUN/CREAT SERPL: 18.94 RATIO (ref 7–18)
CALCIUM SERPL-MCNC: 9.4 MG/DL (ref 8.8–10.5)
CELLS COUNTED: 100
CHLORIDE SERPL-SCNC: 108 MMOL/L (ref 100–108)
CO2 SERPL-SCNC: 22 MMOL/L (ref 21–32)
CREAT SERPL-MCNC: 0.95 MG/DL (ref 0.55–1.02)
ERYTHROCYTE [DISTWIDTH] IN BLOOD BY AUTOMATED COUNT: 18.4 % (ref 12.5–18)
GFR ESTIMATION: > 60
GLOBULIN: 3.6 G/DL (ref 2.3–3.5)
GLUCOSE SERPL-MCNC: 101 MG/DL (ref 70–110)
HCT VFR BLD AUTO: 25.3 % (ref 37–47)
HGB BLD-MCNC: 8.1 G/DL (ref 12–16)
LYMPHOCYTES NFR BLD: 30 % (ref 25–40)
MACROCYTES BLD QL SMEAR: ABNORMAL
MCH RBC QN AUTO: 36.7 PG (ref 27–31.2)
MCHC RBC AUTO-ENTMCNC: 32 G/DL (ref 31.8–35.4)
MCV RBC AUTO: 114.5 FL (ref 80–97)
MONOCYTES NFR BLD: 3 % (ref 1–15)
NEUTROPHILS # BLD AUTO: 1.8 10*3/UL (ref 1.8–7.7)
NEUTROPHILS NFR BLD: 60 % (ref 37–80)
NUCLEATED RED BLOOD CELLS: 0 %
PLATELETS: 139 10*3/UL (ref 142–424)
POTASSIUM SERPL-SCNC: 4.1 MMOL/L (ref 3.6–5.2)
PROT SERPL-MCNC: 7.3 G/DL (ref 6.4–8.2)
RBC # BLD AUTO: 2.21 10*6/UL (ref 4.2–5.4)
SMALL PLATELETS BLD QL SMEAR: ADEQUATE
SODIUM BLD-SCNC: 142 MMOL/L (ref 135–145)
WBC # BLD: 2.7 10*3/UL (ref 4.6–10.2)

## 2021-06-15 RX ORDER — DIPHENHYDRAMINE HYDROCHLORIDE 50 MG/ML
50 INJECTION INTRAMUSCULAR; INTRAVENOUS ONCE AS NEEDED
Status: CANCELLED | OUTPATIENT
Start: 2021-06-17

## 2021-06-15 RX ORDER — EPINEPHRINE 0.3 MG/.3ML
0.3 INJECTION SUBCUTANEOUS ONCE AS NEEDED
Status: CANCELLED | OUTPATIENT
Start: 2021-06-17

## 2021-06-15 RX ORDER — FAMOTIDINE 10 MG/ML
20 INJECTION INTRAVENOUS
Status: CANCELLED | OUTPATIENT
Start: 2021-06-17

## 2021-06-15 RX ORDER — HEPARIN 100 UNIT/ML
500 SYRINGE INTRAVENOUS
Status: CANCELLED | OUTPATIENT
Start: 2021-06-17

## 2021-06-15 RX ORDER — SODIUM CHLORIDE 0.9 % (FLUSH) 0.9 %
10 SYRINGE (ML) INJECTION
Status: CANCELLED | OUTPATIENT
Start: 2021-06-17

## 2021-06-23 ENCOUNTER — OFFICE VISIT (OUTPATIENT)
Dept: HEMATOLOGY/ONCOLOGY | Facility: CLINIC | Age: 86
End: 2021-06-23
Payer: MEDICARE

## 2021-06-23 VITALS
BODY MASS INDEX: 26.09 KG/M2 | TEMPERATURE: 98 F | RESPIRATION RATE: 18 BRPM | HEART RATE: 67 BPM | DIASTOLIC BLOOD PRESSURE: 75 MMHG | SYSTOLIC BLOOD PRESSURE: 123 MMHG | WEIGHT: 141.81 LBS | OXYGEN SATURATION: 98 % | HEIGHT: 62 IN

## 2021-06-23 DIAGNOSIS — Z15.09 BRCA2 GENE MUTATION POSITIVE IN FEMALE: ICD-10-CM

## 2021-06-23 DIAGNOSIS — Z51.11 ENCOUNTER FOR ANTINEOPLASTIC CHEMOTHERAPY: ICD-10-CM

## 2021-06-23 DIAGNOSIS — C53.0 MALIGNANT NEOPLASM OF ENDOCERVIX: Primary | ICD-10-CM

## 2021-06-23 DIAGNOSIS — Z15.02 BRCA2 GENE MUTATION POSITIVE IN FEMALE: ICD-10-CM

## 2021-06-23 DIAGNOSIS — Z15.01 BRCA2 GENE MUTATION POSITIVE IN FEMALE: ICD-10-CM

## 2021-06-23 PROCEDURE — 1126F AMNT PAIN NOTED NONE PRSNT: CPT | Mod: S$GLB,,, | Performed by: NURSE PRACTITIONER

## 2021-06-23 PROCEDURE — 1159F PR MEDICATION LIST DOCUMENTED IN MEDICAL RECORD: ICD-10-PCS | Mod: S$GLB,,, | Performed by: NURSE PRACTITIONER

## 2021-06-23 PROCEDURE — 1101F PR PT FALLS ASSESS DOC 0-1 FALLS W/OUT INJ PAST YR: ICD-10-PCS | Mod: CPTII,S$GLB,, | Performed by: NURSE PRACTITIONER

## 2021-06-23 PROCEDURE — 1159F MED LIST DOCD IN RCRD: CPT | Mod: S$GLB,,, | Performed by: NURSE PRACTITIONER

## 2021-06-23 PROCEDURE — 1101F PT FALLS ASSESS-DOCD LE1/YR: CPT | Mod: CPTII,S$GLB,, | Performed by: NURSE PRACTITIONER

## 2021-06-23 PROCEDURE — 99214 OFFICE O/P EST MOD 30 MIN: CPT | Mod: S$GLB,,, | Performed by: NURSE PRACTITIONER

## 2021-06-23 PROCEDURE — 3288F PR FALLS RISK ASSESSMENT DOCUMENTED: ICD-10-PCS | Mod: CPTII,S$GLB,, | Performed by: NURSE PRACTITIONER

## 2021-06-23 PROCEDURE — 99214 PR OFFICE/OUTPT VISIT, EST, LEVL IV, 30-39 MIN: ICD-10-PCS | Mod: S$GLB,,, | Performed by: NURSE PRACTITIONER

## 2021-06-23 PROCEDURE — 3288F FALL RISK ASSESSMENT DOCD: CPT | Mod: CPTII,S$GLB,, | Performed by: NURSE PRACTITIONER

## 2021-06-23 PROCEDURE — 1126F PR PAIN SEVERITY QUANTIFIED, NO PAIN PRESENT: ICD-10-PCS | Mod: S$GLB,,, | Performed by: NURSE PRACTITIONER

## 2021-06-23 PROCEDURE — 1157F ADVNC CARE PLAN IN RCRD: CPT | Mod: S$GLB,,, | Performed by: NURSE PRACTITIONER

## 2021-06-23 PROCEDURE — 1157F PR ADVANCE CARE PLAN OR EQUIV PRESENT IN MEDICAL RECORD: ICD-10-PCS | Mod: S$GLB,,, | Performed by: NURSE PRACTITIONER

## 2021-06-23 RX ORDER — EPINEPHRINE 0.3 MG/.3ML
0.3 INJECTION SUBCUTANEOUS ONCE AS NEEDED
Status: CANCELLED | OUTPATIENT
Start: 2021-06-24

## 2021-06-23 RX ORDER — DIPHENHYDRAMINE HYDROCHLORIDE 50 MG/ML
50 INJECTION INTRAMUSCULAR; INTRAVENOUS ONCE AS NEEDED
Status: CANCELLED | OUTPATIENT
Start: 2021-06-24

## 2021-06-23 RX ORDER — SODIUM CHLORIDE 0.9 % (FLUSH) 0.9 %
10 SYRINGE (ML) INJECTION
Status: CANCELLED | OUTPATIENT
Start: 2021-06-24

## 2021-06-23 RX ORDER — HEPARIN 100 UNIT/ML
500 SYRINGE INTRAVENOUS
Status: CANCELLED | OUTPATIENT
Start: 2021-06-24

## 2021-06-23 RX ORDER — FAMOTIDINE 10 MG/ML
20 INJECTION INTRAVENOUS
Status: CANCELLED | OUTPATIENT
Start: 2021-06-24

## 2021-06-30 ENCOUNTER — TELEPHONE (OUTPATIENT)
Dept: HEMATOLOGY/ONCOLOGY | Facility: CLINIC | Age: 86
End: 2021-06-30

## 2021-06-30 LAB — HYPOCHROMIA BLD QL SMEAR: NORMAL

## 2021-07-01 RX ORDER — HEPARIN 100 UNIT/ML
500 SYRINGE INTRAVENOUS
Status: CANCELLED | OUTPATIENT
Start: 2021-07-01

## 2021-07-01 RX ORDER — EPINEPHRINE 0.3 MG/.3ML
0.3 INJECTION SUBCUTANEOUS ONCE AS NEEDED
Status: CANCELLED | OUTPATIENT
Start: 2021-07-01

## 2021-07-01 RX ORDER — FAMOTIDINE 10 MG/ML
20 INJECTION INTRAVENOUS
Status: CANCELLED | OUTPATIENT
Start: 2021-07-01

## 2021-07-01 RX ORDER — SODIUM CHLORIDE 0.9 % (FLUSH) 0.9 %
10 SYRINGE (ML) INJECTION
Status: CANCELLED | OUTPATIENT
Start: 2021-07-01

## 2021-07-01 RX ORDER — DIPHENHYDRAMINE HYDROCHLORIDE 50 MG/ML
50 INJECTION INTRAMUSCULAR; INTRAVENOUS ONCE AS NEEDED
Status: CANCELLED | OUTPATIENT
Start: 2021-07-01

## 2021-07-07 ENCOUNTER — PATIENT MESSAGE (OUTPATIENT)
Dept: HEMATOLOGY/ONCOLOGY | Facility: CLINIC | Age: 86
End: 2021-07-07

## 2021-07-07 ENCOUNTER — OFFICE VISIT (OUTPATIENT)
Dept: HEMATOLOGY/ONCOLOGY | Facility: CLINIC | Age: 86
End: 2021-07-07
Payer: MEDICARE

## 2021-07-07 VITALS
TEMPERATURE: 97 F | RESPIRATION RATE: 16 BRPM | HEART RATE: 65 BPM | SYSTOLIC BLOOD PRESSURE: 138 MMHG | DIASTOLIC BLOOD PRESSURE: 85 MMHG | HEIGHT: 62 IN | OXYGEN SATURATION: 99 % | WEIGHT: 141.63 LBS | BODY MASS INDEX: 26.06 KG/M2

## 2021-07-07 DIAGNOSIS — K92.1 MELENA: Primary | ICD-10-CM

## 2021-07-07 DIAGNOSIS — Z51.11 ENCOUNTER FOR ANTINEOPLASTIC CHEMOTHERAPY: ICD-10-CM

## 2021-07-07 DIAGNOSIS — C53.0 MALIGNANT NEOPLASM OF ENDOCERVIX: ICD-10-CM

## 2021-07-07 PROCEDURE — 1157F PR ADVANCE CARE PLAN OR EQUIV PRESENT IN MEDICAL RECORD: ICD-10-PCS | Mod: S$GLB,,, | Performed by: NURSE PRACTITIONER

## 2021-07-07 PROCEDURE — 1159F MED LIST DOCD IN RCRD: CPT | Mod: S$GLB,,, | Performed by: NURSE PRACTITIONER

## 2021-07-07 PROCEDURE — 1157F ADVNC CARE PLAN IN RCRD: CPT | Mod: S$GLB,,, | Performed by: NURSE PRACTITIONER

## 2021-07-07 PROCEDURE — 1126F AMNT PAIN NOTED NONE PRSNT: CPT | Mod: S$GLB,,, | Performed by: NURSE PRACTITIONER

## 2021-07-07 PROCEDURE — 1159F PR MEDICATION LIST DOCUMENTED IN MEDICAL RECORD: ICD-10-PCS | Mod: S$GLB,,, | Performed by: NURSE PRACTITIONER

## 2021-07-07 PROCEDURE — 1101F PR PT FALLS ASSESS DOC 0-1 FALLS W/OUT INJ PAST YR: ICD-10-PCS | Mod: CPTII,S$GLB,, | Performed by: NURSE PRACTITIONER

## 2021-07-07 PROCEDURE — 1126F PR PAIN SEVERITY QUANTIFIED, NO PAIN PRESENT: ICD-10-PCS | Mod: S$GLB,,, | Performed by: NURSE PRACTITIONER

## 2021-07-07 PROCEDURE — 3288F PR FALLS RISK ASSESSMENT DOCUMENTED: ICD-10-PCS | Mod: CPTII,S$GLB,, | Performed by: NURSE PRACTITIONER

## 2021-07-07 PROCEDURE — 3288F FALL RISK ASSESSMENT DOCD: CPT | Mod: CPTII,S$GLB,, | Performed by: NURSE PRACTITIONER

## 2021-07-07 PROCEDURE — 99214 OFFICE O/P EST MOD 30 MIN: CPT | Mod: S$GLB,,, | Performed by: NURSE PRACTITIONER

## 2021-07-07 PROCEDURE — 1101F PT FALLS ASSESS-DOCD LE1/YR: CPT | Mod: CPTII,S$GLB,, | Performed by: NURSE PRACTITIONER

## 2021-07-07 PROCEDURE — 99214 PR OFFICE/OUTPT VISIT, EST, LEVL IV, 30-39 MIN: ICD-10-PCS | Mod: S$GLB,,, | Performed by: NURSE PRACTITIONER

## 2021-07-07 RX ORDER — HEPARIN 100 UNIT/ML
500 SYRINGE INTRAVENOUS
Status: CANCELLED | OUTPATIENT
Start: 2021-07-08

## 2021-07-07 RX ORDER — EPINEPHRINE 0.3 MG/.3ML
0.3 INJECTION SUBCUTANEOUS ONCE AS NEEDED
Status: CANCELLED | OUTPATIENT
Start: 2021-07-08

## 2021-07-07 RX ORDER — SODIUM CHLORIDE 0.9 % (FLUSH) 0.9 %
10 SYRINGE (ML) INJECTION
Status: CANCELLED | OUTPATIENT
Start: 2021-07-08

## 2021-07-07 RX ORDER — FAMOTIDINE 10 MG/ML
20 INJECTION INTRAVENOUS
Status: CANCELLED | OUTPATIENT
Start: 2021-07-08

## 2021-07-07 RX ORDER — DIPHENHYDRAMINE HYDROCHLORIDE 50 MG/ML
50 INJECTION INTRAMUSCULAR; INTRAVENOUS ONCE AS NEEDED
Status: CANCELLED | OUTPATIENT
Start: 2021-07-08

## 2021-07-15 RX ORDER — HEPARIN 100 UNIT/ML
500 SYRINGE INTRAVENOUS
Status: CANCELLED | OUTPATIENT
Start: 2021-07-15

## 2021-07-15 RX ORDER — FAMOTIDINE 10 MG/ML
20 INJECTION INTRAVENOUS
Status: CANCELLED | OUTPATIENT
Start: 2021-07-15

## 2021-07-15 RX ORDER — DIPHENHYDRAMINE HYDROCHLORIDE 50 MG/ML
50 INJECTION INTRAMUSCULAR; INTRAVENOUS ONCE AS NEEDED
Status: CANCELLED | OUTPATIENT
Start: 2021-07-15

## 2021-07-15 RX ORDER — EPINEPHRINE 0.3 MG/.3ML
0.3 INJECTION SUBCUTANEOUS ONCE AS NEEDED
Status: CANCELLED | OUTPATIENT
Start: 2021-07-15

## 2021-07-15 RX ORDER — SODIUM CHLORIDE 0.9 % (FLUSH) 0.9 %
10 SYRINGE (ML) INJECTION
Status: CANCELLED | OUTPATIENT
Start: 2021-07-15

## 2021-07-20 LAB
ANISOCYTOSIS: ABNORMAL
BANDS: 4 % (ref 0–5)
CELLS COUNTED: 100
HYPOCHROMIA BLD QL SMEAR: ABNORMAL
LYMPHOCYTES NFR BLD: 34 % (ref 25–40)
MACROCYTES BLD QL SMEAR: ABNORMAL
MONOCYTES NFR BLD: 8 % (ref 1–15)
NEUTROPHILS # BLD AUTO: 1.6 10*3/UL (ref 1.8–7.7)
NEUTROPHILS NFR BLD: 54 % (ref 37–80)
SMALL PLATELETS BLD QL SMEAR: ADEQUATE

## 2021-07-21 ENCOUNTER — OFFICE VISIT (OUTPATIENT)
Dept: HEMATOLOGY/ONCOLOGY | Facility: CLINIC | Age: 86
End: 2021-07-21
Payer: MEDICARE

## 2021-07-21 VITALS
HEART RATE: 73 BPM | WEIGHT: 141.38 LBS | OXYGEN SATURATION: 98 % | HEIGHT: 62 IN | BODY MASS INDEX: 26.02 KG/M2 | TEMPERATURE: 97 F | RESPIRATION RATE: 16 BRPM | DIASTOLIC BLOOD PRESSURE: 79 MMHG | SYSTOLIC BLOOD PRESSURE: 127 MMHG

## 2021-07-21 DIAGNOSIS — C53.0 MALIGNANT NEOPLASM OF ENDOCERVIX: Primary | ICD-10-CM

## 2021-07-21 DIAGNOSIS — G62.9 NEUROPATHY: ICD-10-CM

## 2021-07-21 PROCEDURE — 3078F DIAST BP <80 MM HG: CPT | Mod: CPTII,S$GLB,, | Performed by: NURSE PRACTITIONER

## 2021-07-21 PROCEDURE — 1157F ADVNC CARE PLAN IN RCRD: CPT | Mod: CPTII,S$GLB,, | Performed by: NURSE PRACTITIONER

## 2021-07-21 PROCEDURE — 3288F FALL RISK ASSESSMENT DOCD: CPT | Mod: CPTII,S$GLB,, | Performed by: NURSE PRACTITIONER

## 2021-07-21 PROCEDURE — 99214 PR OFFICE/OUTPT VISIT, EST, LEVL IV, 30-39 MIN: ICD-10-PCS | Mod: S$GLB,,, | Performed by: NURSE PRACTITIONER

## 2021-07-21 PROCEDURE — 1157F PR ADVANCE CARE PLAN OR EQUIV PRESENT IN MEDICAL RECORD: ICD-10-PCS | Mod: CPTII,S$GLB,, | Performed by: NURSE PRACTITIONER

## 2021-07-21 PROCEDURE — 99214 OFFICE O/P EST MOD 30 MIN: CPT | Mod: S$GLB,,, | Performed by: NURSE PRACTITIONER

## 2021-07-21 PROCEDURE — 3078F PR MOST RECENT DIASTOLIC BLOOD PRESSURE < 80 MM HG: ICD-10-PCS | Mod: CPTII,S$GLB,, | Performed by: NURSE PRACTITIONER

## 2021-07-21 PROCEDURE — 1101F PT FALLS ASSESS-DOCD LE1/YR: CPT | Mod: CPTII,S$GLB,, | Performed by: NURSE PRACTITIONER

## 2021-07-21 PROCEDURE — 1126F AMNT PAIN NOTED NONE PRSNT: CPT | Mod: CPTII,S$GLB,, | Performed by: NURSE PRACTITIONER

## 2021-07-21 PROCEDURE — 1159F MED LIST DOCD IN RCRD: CPT | Mod: CPTII,S$GLB,, | Performed by: NURSE PRACTITIONER

## 2021-07-21 PROCEDURE — 1126F PR PAIN SEVERITY QUANTIFIED, NO PAIN PRESENT: ICD-10-PCS | Mod: CPTII,S$GLB,, | Performed by: NURSE PRACTITIONER

## 2021-07-21 PROCEDURE — 1101F PR PT FALLS ASSESS DOC 0-1 FALLS W/OUT INJ PAST YR: ICD-10-PCS | Mod: CPTII,S$GLB,, | Performed by: NURSE PRACTITIONER

## 2021-07-21 PROCEDURE — 3074F SYST BP LT 130 MM HG: CPT | Mod: CPTII,S$GLB,, | Performed by: NURSE PRACTITIONER

## 2021-07-21 PROCEDURE — 3074F PR MOST RECENT SYSTOLIC BLOOD PRESSURE < 130 MM HG: ICD-10-PCS | Mod: CPTII,S$GLB,, | Performed by: NURSE PRACTITIONER

## 2021-07-21 PROCEDURE — 3288F PR FALLS RISK ASSESSMENT DOCUMENTED: ICD-10-PCS | Mod: CPTII,S$GLB,, | Performed by: NURSE PRACTITIONER

## 2021-07-21 PROCEDURE — 1159F PR MEDICATION LIST DOCUMENTED IN MEDICAL RECORD: ICD-10-PCS | Mod: CPTII,S$GLB,, | Performed by: NURSE PRACTITIONER

## 2021-07-28 ENCOUNTER — OFFICE VISIT (OUTPATIENT)
Dept: HEMATOLOGY/ONCOLOGY | Facility: CLINIC | Age: 86
End: 2021-07-28
Payer: MEDICARE

## 2021-07-28 VITALS
BODY MASS INDEX: 25.79 KG/M2 | SYSTOLIC BLOOD PRESSURE: 112 MMHG | HEART RATE: 58 BPM | OXYGEN SATURATION: 99 % | RESPIRATION RATE: 18 BRPM | HEIGHT: 62 IN | TEMPERATURE: 97 F | DIASTOLIC BLOOD PRESSURE: 73 MMHG | WEIGHT: 140.13 LBS

## 2021-07-28 DIAGNOSIS — C53.0 MALIGNANT NEOPLASM OF ENDOCERVIX: Primary | ICD-10-CM

## 2021-07-28 DIAGNOSIS — D63.0 ANEMIA IN NEOPLASTIC DISEASE: ICD-10-CM

## 2021-07-28 DIAGNOSIS — Z51.11 ENCOUNTER FOR ANTINEOPLASTIC CHEMOTHERAPY: ICD-10-CM

## 2021-07-28 PROCEDURE — 3078F PR MOST RECENT DIASTOLIC BLOOD PRESSURE < 80 MM HG: ICD-10-PCS | Mod: CPTII,S$GLB,, | Performed by: NURSE PRACTITIONER

## 2021-07-28 PROCEDURE — 99214 PR OFFICE/OUTPT VISIT, EST, LEVL IV, 30-39 MIN: ICD-10-PCS | Mod: S$GLB,,, | Performed by: NURSE PRACTITIONER

## 2021-07-28 PROCEDURE — 3074F PR MOST RECENT SYSTOLIC BLOOD PRESSURE < 130 MM HG: ICD-10-PCS | Mod: CPTII,S$GLB,, | Performed by: NURSE PRACTITIONER

## 2021-07-28 PROCEDURE — 1126F AMNT PAIN NOTED NONE PRSNT: CPT | Mod: CPTII,S$GLB,, | Performed by: NURSE PRACTITIONER

## 2021-07-28 PROCEDURE — 1160F RVW MEDS BY RX/DR IN RCRD: CPT | Mod: CPTII,S$GLB,, | Performed by: NURSE PRACTITIONER

## 2021-07-28 PROCEDURE — 1159F PR MEDICATION LIST DOCUMENTED IN MEDICAL RECORD: ICD-10-PCS | Mod: CPTII,S$GLB,, | Performed by: NURSE PRACTITIONER

## 2021-07-28 PROCEDURE — 1101F PR PT FALLS ASSESS DOC 0-1 FALLS W/OUT INJ PAST YR: ICD-10-PCS | Mod: CPTII,S$GLB,, | Performed by: NURSE PRACTITIONER

## 2021-07-28 PROCEDURE — 3078F DIAST BP <80 MM HG: CPT | Mod: CPTII,S$GLB,, | Performed by: NURSE PRACTITIONER

## 2021-07-28 PROCEDURE — 1126F PR PAIN SEVERITY QUANTIFIED, NO PAIN PRESENT: ICD-10-PCS | Mod: CPTII,S$GLB,, | Performed by: NURSE PRACTITIONER

## 2021-07-28 PROCEDURE — 99214 OFFICE O/P EST MOD 30 MIN: CPT | Mod: S$GLB,,, | Performed by: NURSE PRACTITIONER

## 2021-07-28 PROCEDURE — 1159F MED LIST DOCD IN RCRD: CPT | Mod: CPTII,S$GLB,, | Performed by: NURSE PRACTITIONER

## 2021-07-28 PROCEDURE — 1160F PR REVIEW ALL MEDS BY PRESCRIBER/CLIN PHARMACIST DOCUMENTED: ICD-10-PCS | Mod: CPTII,S$GLB,, | Performed by: NURSE PRACTITIONER

## 2021-07-28 PROCEDURE — 3288F PR FALLS RISK ASSESSMENT DOCUMENTED: ICD-10-PCS | Mod: CPTII,S$GLB,, | Performed by: NURSE PRACTITIONER

## 2021-07-28 PROCEDURE — 3288F FALL RISK ASSESSMENT DOCD: CPT | Mod: CPTII,S$GLB,, | Performed by: NURSE PRACTITIONER

## 2021-07-28 PROCEDURE — 1157F PR ADVANCE CARE PLAN OR EQUIV PRESENT IN MEDICAL RECORD: ICD-10-PCS | Mod: CPTII,S$GLB,, | Performed by: NURSE PRACTITIONER

## 2021-07-28 PROCEDURE — 1157F ADVNC CARE PLAN IN RCRD: CPT | Mod: CPTII,S$GLB,, | Performed by: NURSE PRACTITIONER

## 2021-07-28 PROCEDURE — 3074F SYST BP LT 130 MM HG: CPT | Mod: CPTII,S$GLB,, | Performed by: NURSE PRACTITIONER

## 2021-07-28 PROCEDURE — 1101F PT FALLS ASSESS-DOCD LE1/YR: CPT | Mod: CPTII,S$GLB,, | Performed by: NURSE PRACTITIONER

## 2021-07-28 RX ORDER — EPINEPHRINE 0.3 MG/.3ML
0.3 INJECTION SUBCUTANEOUS ONCE AS NEEDED
Status: CANCELLED | OUTPATIENT
Start: 2021-08-12

## 2021-07-28 RX ORDER — FAMOTIDINE 10 MG/ML
20 INJECTION INTRAVENOUS
Status: CANCELLED | OUTPATIENT
Start: 2021-08-12

## 2021-07-28 RX ORDER — DIPHENHYDRAMINE HYDROCHLORIDE 50 MG/ML
50 INJECTION INTRAMUSCULAR; INTRAVENOUS ONCE AS NEEDED
Status: CANCELLED | OUTPATIENT
Start: 2021-08-12

## 2021-07-28 RX ORDER — HEPARIN 100 UNIT/ML
500 SYRINGE INTRAVENOUS
Status: CANCELLED | OUTPATIENT
Start: 2021-08-12

## 2021-07-28 RX ORDER — SODIUM CHLORIDE 0.9 % (FLUSH) 0.9 %
10 SYRINGE (ML) INJECTION
Status: CANCELLED | OUTPATIENT
Start: 2021-08-12

## 2021-08-10 LAB — MACROCYTES BLD QL SMEAR: NORMAL

## 2021-08-11 ENCOUNTER — OFFICE VISIT (OUTPATIENT)
Dept: HEMATOLOGY/ONCOLOGY | Facility: CLINIC | Age: 86
End: 2021-08-11
Payer: MEDICARE

## 2021-08-11 VITALS
HEART RATE: 89 BPM | TEMPERATURE: 97 F | RESPIRATION RATE: 18 BRPM | HEIGHT: 62 IN | SYSTOLIC BLOOD PRESSURE: 104 MMHG | WEIGHT: 139.38 LBS | BODY MASS INDEX: 25.65 KG/M2 | DIASTOLIC BLOOD PRESSURE: 66 MMHG | OXYGEN SATURATION: 99 %

## 2021-08-11 DIAGNOSIS — C53.0 MALIGNANT NEOPLASM OF ENDOCERVIX: Primary | ICD-10-CM

## 2021-08-11 PROCEDURE — 1157F PR ADVANCE CARE PLAN OR EQUIV PRESENT IN MEDICAL RECORD: ICD-10-PCS | Mod: CPTII,S$GLB,, | Performed by: NURSE PRACTITIONER

## 2021-08-11 PROCEDURE — 3078F DIAST BP <80 MM HG: CPT | Mod: CPTII,S$GLB,, | Performed by: NURSE PRACTITIONER

## 2021-08-11 PROCEDURE — 1159F MED LIST DOCD IN RCRD: CPT | Mod: CPTII,S$GLB,, | Performed by: NURSE PRACTITIONER

## 2021-08-11 PROCEDURE — 1126F PR PAIN SEVERITY QUANTIFIED, NO PAIN PRESENT: ICD-10-PCS | Mod: CPTII,S$GLB,, | Performed by: NURSE PRACTITIONER

## 2021-08-11 PROCEDURE — 99214 PR OFFICE/OUTPT VISIT, EST, LEVL IV, 30-39 MIN: ICD-10-PCS | Mod: S$GLB,,, | Performed by: NURSE PRACTITIONER

## 2021-08-11 PROCEDURE — 1157F ADVNC CARE PLAN IN RCRD: CPT | Mod: CPTII,S$GLB,, | Performed by: NURSE PRACTITIONER

## 2021-08-11 PROCEDURE — 3288F PR FALLS RISK ASSESSMENT DOCUMENTED: ICD-10-PCS | Mod: CPTII,S$GLB,, | Performed by: NURSE PRACTITIONER

## 2021-08-11 PROCEDURE — 1101F PT FALLS ASSESS-DOCD LE1/YR: CPT | Mod: CPTII,S$GLB,, | Performed by: NURSE PRACTITIONER

## 2021-08-11 PROCEDURE — 1160F PR REVIEW ALL MEDS BY PRESCRIBER/CLIN PHARMACIST DOCUMENTED: ICD-10-PCS | Mod: CPTII,S$GLB,, | Performed by: NURSE PRACTITIONER

## 2021-08-11 PROCEDURE — 3074F SYST BP LT 130 MM HG: CPT | Mod: CPTII,S$GLB,, | Performed by: NURSE PRACTITIONER

## 2021-08-11 PROCEDURE — 3078F PR MOST RECENT DIASTOLIC BLOOD PRESSURE < 80 MM HG: ICD-10-PCS | Mod: CPTII,S$GLB,, | Performed by: NURSE PRACTITIONER

## 2021-08-11 PROCEDURE — 3288F FALL RISK ASSESSMENT DOCD: CPT | Mod: CPTII,S$GLB,, | Performed by: NURSE PRACTITIONER

## 2021-08-11 PROCEDURE — 99214 OFFICE O/P EST MOD 30 MIN: CPT | Mod: S$GLB,,, | Performed by: NURSE PRACTITIONER

## 2021-08-11 PROCEDURE — 1160F RVW MEDS BY RX/DR IN RCRD: CPT | Mod: CPTII,S$GLB,, | Performed by: NURSE PRACTITIONER

## 2021-08-11 PROCEDURE — 1126F AMNT PAIN NOTED NONE PRSNT: CPT | Mod: CPTII,S$GLB,, | Performed by: NURSE PRACTITIONER

## 2021-08-11 PROCEDURE — 3074F PR MOST RECENT SYSTOLIC BLOOD PRESSURE < 130 MM HG: ICD-10-PCS | Mod: CPTII,S$GLB,, | Performed by: NURSE PRACTITIONER

## 2021-08-11 PROCEDURE — 1101F PR PT FALLS ASSESS DOC 0-1 FALLS W/OUT INJ PAST YR: ICD-10-PCS | Mod: CPTII,S$GLB,, | Performed by: NURSE PRACTITIONER

## 2021-08-11 PROCEDURE — 1159F PR MEDICATION LIST DOCUMENTED IN MEDICAL RECORD: ICD-10-PCS | Mod: CPTII,S$GLB,, | Performed by: NURSE PRACTITIONER

## 2021-08-11 RX ORDER — CYANOCOBALAMIN 1000 UG/ML
1000 INJECTION, SOLUTION INTRAMUSCULAR; SUBCUTANEOUS
Status: CANCELLED | OUTPATIENT
Start: 2021-08-12

## 2021-08-19 DIAGNOSIS — E78.00 HYPERCHOLESTEREMIA: ICD-10-CM

## 2021-08-19 DIAGNOSIS — I10 ESSENTIAL HYPERTENSION: ICD-10-CM

## 2021-08-19 RX ORDER — CLONIDINE HYDROCHLORIDE 0.3 MG/1
TABLET ORAL
Qty: 180 TABLET | Refills: 2 | Status: SHIPPED | OUTPATIENT
Start: 2021-08-19 | End: 2022-01-19 | Stop reason: SDUPTHER

## 2021-08-19 RX ORDER — HEPARIN 100 UNIT/ML
500 SYRINGE INTRAVENOUS
Status: CANCELLED | OUTPATIENT
Start: 2021-08-19

## 2021-08-19 RX ORDER — SODIUM CHLORIDE 0.9 % (FLUSH) 0.9 %
10 SYRINGE (ML) INJECTION
Status: CANCELLED | OUTPATIENT
Start: 2021-08-19

## 2021-08-19 RX ORDER — FAMOTIDINE 10 MG/ML
20 INJECTION INTRAVENOUS
Status: CANCELLED | OUTPATIENT
Start: 2021-08-19

## 2021-08-19 RX ORDER — EPINEPHRINE 0.3 MG/.3ML
0.3 INJECTION SUBCUTANEOUS ONCE AS NEEDED
Status: CANCELLED | OUTPATIENT
Start: 2021-08-19

## 2021-08-19 RX ORDER — SIMVASTATIN 20 MG/1
20 TABLET, FILM COATED ORAL NIGHTLY
Qty: 30 TABLET | Refills: 11 | Status: SHIPPED | OUTPATIENT
Start: 2021-08-19 | End: 2022-01-19 | Stop reason: SDUPTHER

## 2021-08-19 RX ORDER — DIPHENHYDRAMINE HYDROCHLORIDE 50 MG/ML
50 INJECTION INTRAMUSCULAR; INTRAVENOUS ONCE AS NEEDED
Status: CANCELLED | OUTPATIENT
Start: 2021-08-19

## 2021-08-24 ENCOUNTER — TELEPHONE (OUTPATIENT)
Dept: HEMATOLOGY/ONCOLOGY | Facility: CLINIC | Age: 86
End: 2021-08-24

## 2021-09-01 ENCOUNTER — OFFICE VISIT (OUTPATIENT)
Dept: HEMATOLOGY/ONCOLOGY | Facility: CLINIC | Age: 86
End: 2021-09-01
Payer: MEDICARE

## 2021-09-01 VITALS
WEIGHT: 141.63 LBS | OXYGEN SATURATION: 99 % | DIASTOLIC BLOOD PRESSURE: 71 MMHG | HEART RATE: 60 BPM | RESPIRATION RATE: 18 BRPM | TEMPERATURE: 97 F | BODY MASS INDEX: 26.06 KG/M2 | SYSTOLIC BLOOD PRESSURE: 139 MMHG | HEIGHT: 62 IN

## 2021-09-01 DIAGNOSIS — C78.01 MALIGNANT NEOPLASM METASTATIC TO RIGHT LUNG: ICD-10-CM

## 2021-09-01 DIAGNOSIS — C53.0 MALIGNANT NEOPLASM OF ENDOCERVIX: Primary | ICD-10-CM

## 2021-09-01 DIAGNOSIS — C77.8 MALIGNANT NEOPLASM METASTATIC TO LYMPH NODES OF MULTIPLE SITES: ICD-10-CM

## 2021-09-01 DIAGNOSIS — Z51.11 ENCOUNTER FOR ANTINEOPLASTIC CHEMOTHERAPY: ICD-10-CM

## 2021-09-01 PROCEDURE — 3078F PR MOST RECENT DIASTOLIC BLOOD PRESSURE < 80 MM HG: ICD-10-PCS | Mod: CPTII,S$GLB,, | Performed by: NURSE PRACTITIONER

## 2021-09-01 PROCEDURE — 1126F AMNT PAIN NOTED NONE PRSNT: CPT | Mod: CPTII,S$GLB,, | Performed by: NURSE PRACTITIONER

## 2021-09-01 PROCEDURE — 1126F PR PAIN SEVERITY QUANTIFIED, NO PAIN PRESENT: ICD-10-PCS | Mod: CPTII,S$GLB,, | Performed by: NURSE PRACTITIONER

## 2021-09-01 PROCEDURE — 3078F DIAST BP <80 MM HG: CPT | Mod: CPTII,S$GLB,, | Performed by: NURSE PRACTITIONER

## 2021-09-01 PROCEDURE — 3075F PR MOST RECENT SYSTOLIC BLOOD PRESS GE 130-139MM HG: ICD-10-PCS | Mod: CPTII,S$GLB,, | Performed by: NURSE PRACTITIONER

## 2021-09-01 PROCEDURE — 1101F PR PT FALLS ASSESS DOC 0-1 FALLS W/OUT INJ PAST YR: ICD-10-PCS | Mod: CPTII,S$GLB,, | Performed by: NURSE PRACTITIONER

## 2021-09-01 PROCEDURE — 1159F MED LIST DOCD IN RCRD: CPT | Mod: CPTII,S$GLB,, | Performed by: NURSE PRACTITIONER

## 2021-09-01 PROCEDURE — 1159F PR MEDICATION LIST DOCUMENTED IN MEDICAL RECORD: ICD-10-PCS | Mod: CPTII,S$GLB,, | Performed by: NURSE PRACTITIONER

## 2021-09-01 PROCEDURE — 3288F FALL RISK ASSESSMENT DOCD: CPT | Mod: CPTII,S$GLB,, | Performed by: NURSE PRACTITIONER

## 2021-09-01 PROCEDURE — 3288F PR FALLS RISK ASSESSMENT DOCUMENTED: ICD-10-PCS | Mod: CPTII,S$GLB,, | Performed by: NURSE PRACTITIONER

## 2021-09-01 PROCEDURE — 99215 OFFICE O/P EST HI 40 MIN: CPT | Mod: S$GLB,,, | Performed by: NURSE PRACTITIONER

## 2021-09-01 PROCEDURE — 1157F PR ADVANCE CARE PLAN OR EQUIV PRESENT IN MEDICAL RECORD: ICD-10-PCS | Mod: CPTII,S$GLB,, | Performed by: NURSE PRACTITIONER

## 2021-09-01 PROCEDURE — 3075F SYST BP GE 130 - 139MM HG: CPT | Mod: CPTII,S$GLB,, | Performed by: NURSE PRACTITIONER

## 2021-09-01 PROCEDURE — 99215 PR OFFICE/OUTPT VISIT, EST, LEVL V, 40-54 MIN: ICD-10-PCS | Mod: S$GLB,,, | Performed by: NURSE PRACTITIONER

## 2021-09-01 PROCEDURE — 1157F ADVNC CARE PLAN IN RCRD: CPT | Mod: CPTII,S$GLB,, | Performed by: NURSE PRACTITIONER

## 2021-09-01 PROCEDURE — 1101F PT FALLS ASSESS-DOCD LE1/YR: CPT | Mod: CPTII,S$GLB,, | Performed by: NURSE PRACTITIONER

## 2021-09-01 RX ORDER — SODIUM CHLORIDE 0.9 % (FLUSH) 0.9 %
10 SYRINGE (ML) INJECTION
Status: CANCELLED | OUTPATIENT
Start: 2021-09-16

## 2021-09-01 RX ORDER — HEPARIN 100 UNIT/ML
500 SYRINGE INTRAVENOUS
Status: CANCELLED | OUTPATIENT
Start: 2021-09-30

## 2021-09-01 RX ORDER — HEPARIN 100 UNIT/ML
500 SYRINGE INTRAVENOUS
Status: CANCELLED | OUTPATIENT
Start: 2021-09-23

## 2021-09-01 RX ORDER — SODIUM CHLORIDE 0.9 % (FLUSH) 0.9 %
10 SYRINGE (ML) INJECTION
Status: CANCELLED | OUTPATIENT
Start: 2021-09-30

## 2021-09-01 RX ORDER — HEPARIN 100 UNIT/ML
500 SYRINGE INTRAVENOUS
Status: CANCELLED | OUTPATIENT
Start: 2021-09-16

## 2021-09-01 RX ORDER — SODIUM CHLORIDE 0.9 % (FLUSH) 0.9 %
10 SYRINGE (ML) INJECTION
Status: CANCELLED | OUTPATIENT
Start: 2021-09-23

## 2021-09-02 ENCOUNTER — IMMUNIZATION (OUTPATIENT)
Dept: HEMATOLOGY/ONCOLOGY | Facility: CLINIC | Age: 86
End: 2021-09-02
Payer: MEDICARE

## 2021-09-02 DIAGNOSIS — Z23 NEED FOR VACCINATION: Primary | ICD-10-CM

## 2021-09-02 PROCEDURE — 0013A COVID-19, MRNA, LNP-S, PF, 100 MCG/0.5 ML DOSE VACCINE: ICD-10-PCS | Mod: CV19,S$GLB,, | Performed by: FAMILY MEDICINE

## 2021-09-02 PROCEDURE — 91301 COVID-19, MRNA, LNP-S, PF, 100 MCG/0.5 ML DOSE VACCINE: CPT | Mod: S$GLB,,, | Performed by: FAMILY MEDICINE

## 2021-09-02 PROCEDURE — 0013A COVID-19, MRNA, LNP-S, PF, 100 MCG/0.5 ML DOSE VACCINE: CPT | Mod: CV19,S$GLB,, | Performed by: FAMILY MEDICINE

## 2021-09-02 PROCEDURE — 91301 COVID-19, MRNA, LNP-S, PF, 100 MCG/0.5 ML DOSE VACCINE: ICD-10-PCS | Mod: S$GLB,,, | Performed by: FAMILY MEDICINE

## 2021-09-13 ENCOUNTER — OFFICE VISIT (OUTPATIENT)
Dept: UROLOGY | Facility: CLINIC | Age: 86
End: 2021-09-13
Payer: MEDICARE

## 2021-09-13 VITALS
HEIGHT: 62 IN | WEIGHT: 141 LBS | HEART RATE: 88 BPM | SYSTOLIC BLOOD PRESSURE: 135 MMHG | DIASTOLIC BLOOD PRESSURE: 73 MMHG | BODY MASS INDEX: 25.95 KG/M2 | RESPIRATION RATE: 18 BRPM

## 2021-09-13 DIAGNOSIS — R31.29 HEMATURIA, MICROSCOPIC: ICD-10-CM

## 2021-09-13 DIAGNOSIS — R32 FEMALE INCONTINENCE: Primary | ICD-10-CM

## 2021-09-13 PROCEDURE — 99214 OFFICE O/P EST MOD 30 MIN: CPT | Mod: S$GLB,,, | Performed by: NURSE PRACTITIONER

## 2021-09-13 PROCEDURE — 3075F PR MOST RECENT SYSTOLIC BLOOD PRESS GE 130-139MM HG: ICD-10-PCS | Mod: CPTII,S$GLB,, | Performed by: NURSE PRACTITIONER

## 2021-09-13 PROCEDURE — 3075F SYST BP GE 130 - 139MM HG: CPT | Mod: CPTII,S$GLB,, | Performed by: NURSE PRACTITIONER

## 2021-09-13 PROCEDURE — 1159F PR MEDICATION LIST DOCUMENTED IN MEDICAL RECORD: ICD-10-PCS | Mod: CPTII,S$GLB,, | Performed by: NURSE PRACTITIONER

## 2021-09-13 PROCEDURE — 3078F PR MOST RECENT DIASTOLIC BLOOD PRESSURE < 80 MM HG: ICD-10-PCS | Mod: CPTII,S$GLB,, | Performed by: NURSE PRACTITIONER

## 2021-09-13 PROCEDURE — 3078F DIAST BP <80 MM HG: CPT | Mod: CPTII,S$GLB,, | Performed by: NURSE PRACTITIONER

## 2021-09-13 PROCEDURE — 99214 PR OFFICE/OUTPT VISIT, EST, LEVL IV, 30-39 MIN: ICD-10-PCS | Mod: S$GLB,,, | Performed by: NURSE PRACTITIONER

## 2021-09-13 PROCEDURE — 1159F MED LIST DOCD IN RCRD: CPT | Mod: CPTII,S$GLB,, | Performed by: NURSE PRACTITIONER

## 2021-09-13 PROCEDURE — 1157F PR ADVANCE CARE PLAN OR EQUIV PRESENT IN MEDICAL RECORD: ICD-10-PCS | Mod: CPTII,S$GLB,, | Performed by: NURSE PRACTITIONER

## 2021-09-13 PROCEDURE — 1160F PR REVIEW ALL MEDS BY PRESCRIBER/CLIN PHARMACIST DOCUMENTED: ICD-10-PCS | Mod: CPTII,S$GLB,, | Performed by: NURSE PRACTITIONER

## 2021-09-13 PROCEDURE — 1160F RVW MEDS BY RX/DR IN RCRD: CPT | Mod: CPTII,S$GLB,, | Performed by: NURSE PRACTITIONER

## 2021-09-13 PROCEDURE — 1157F ADVNC CARE PLAN IN RCRD: CPT | Mod: CPTII,S$GLB,, | Performed by: NURSE PRACTITIONER

## 2021-09-15 ENCOUNTER — OFFICE VISIT (OUTPATIENT)
Dept: HEMATOLOGY/ONCOLOGY | Facility: CLINIC | Age: 86
End: 2021-09-15
Payer: MEDICARE

## 2021-09-15 ENCOUNTER — TELEPHONE (OUTPATIENT)
Dept: HEMATOLOGY/ONCOLOGY | Facility: CLINIC | Age: 86
End: 2021-09-15

## 2021-09-15 VITALS
RESPIRATION RATE: 18 BRPM | SYSTOLIC BLOOD PRESSURE: 175 MMHG | TEMPERATURE: 98 F | BODY MASS INDEX: 24.97 KG/M2 | OXYGEN SATURATION: 98 % | DIASTOLIC BLOOD PRESSURE: 60 MMHG | HEIGHT: 62 IN | WEIGHT: 135.69 LBS | HEART RATE: 52 BPM

## 2021-09-15 DIAGNOSIS — C53.0 MALIGNANT NEOPLASM OF ENDOCERVIX: ICD-10-CM

## 2021-09-15 DIAGNOSIS — Z51.11 ENCOUNTER FOR ANTINEOPLASTIC CHEMOTHERAPY: ICD-10-CM

## 2021-09-15 DIAGNOSIS — E44.1 MILD PROTEIN-CALORIE MALNUTRITION: ICD-10-CM

## 2021-09-15 DIAGNOSIS — C77.8 MALIGNANT NEOPLASM METASTATIC TO LYMPH NODES OF MULTIPLE SITES: Primary | ICD-10-CM

## 2021-09-15 DIAGNOSIS — D53.9 MACROCYTIC ANEMIA: ICD-10-CM

## 2021-09-15 LAB
ALBUMIN SERPL BCP-MCNC: 3.4 G/DL (ref 3.4–5)
ALBUMIN/GLOBULIN RATIO: 0.79 RATIO (ref 1.1–1.8)
ALP SERPL-CCNC: 76 U/L (ref 46–116)
ALT SERPL W P-5'-P-CCNC: 16 U/L (ref 12–78)
ANION GAP SERPL CALC-SCNC: 11 MMOL/L (ref 3–11)
ANISOCYTOSIS: NORMAL
AST SERPL-CCNC: 27 U/L (ref 15–37)
BASOPHILS NFR BLD: 0.4 % (ref 0–3)
BILIRUB SERPL-MCNC: 0.4 MG/DL (ref 0–1)
BUN SERPL-MCNC: 20 MG/DL (ref 7–18)
BUN/CREAT SERPL: 16.39 RATIO (ref 7–18)
CALCIUM SERPL-MCNC: 9.4 MG/DL (ref 8.8–10.5)
CHLORIDE SERPL-SCNC: 105 MMOL/L (ref 100–108)
CO2 SERPL-SCNC: 23 MMOL/L (ref 21–32)
CREAT SERPL-MCNC: 1.22 MG/DL (ref 0.55–1.02)
EOSINOPHIL NFR BLD: 2.8 % (ref 1–3)
ERYTHROCYTE [DISTWIDTH] IN BLOOD BY AUTOMATED COUNT: 16.8 % (ref 12.5–18)
GFR ESTIMATION: 51
GLOBULIN: 4.3 G/DL (ref 2.3–3.5)
GLUCOSE SERPL-MCNC: 99 MG/DL (ref 70–110)
HCT VFR BLD AUTO: 28.4 % (ref 37–47)
HGB BLD-MCNC: 8.7 G/DL (ref 12–16)
HYPOCHROMIA BLD QL SMEAR: NORMAL
LYMPHOCYTES NFR BLD: 23.1 % (ref 25–40)
MACROCYTES BLD QL SMEAR: NORMAL
MCH RBC QN AUTO: 32.5 PG (ref 27–31.2)
MCHC RBC AUTO-ENTMCNC: 30.6 G/DL (ref 31.8–35.4)
MCV RBC AUTO: 106 FL (ref 80–97)
MONOCYTES NFR BLD: 8.9 % (ref 1–15)
NEUTROPHILS # BLD AUTO: 7.28 10*3/UL (ref 1.8–7.7)
NEUTROPHILS NFR BLD: 64.4 % (ref 37–80)
NUCLEATED RED BLOOD CELLS: 0 %
PLATELETS: 229 10*3/UL (ref 142–424)
POLYCHROMASIA BLD QL SMEAR: NORMAL
POTASSIUM SERPL-SCNC: 3.7 MMOL/L (ref 3.6–5.2)
PROT SERPL-MCNC: 7.7 G/DL (ref 6.4–8.2)
RBC # BLD AUTO: 2.68 10*6/UL (ref 4.2–5.4)
SODIUM BLD-SCNC: 139 MMOL/L (ref 135–145)
WBC # BLD: 11.3 10*3/UL (ref 4.6–10.2)

## 2021-09-15 PROCEDURE — 3288F FALL RISK ASSESSMENT DOCD: CPT | Mod: CPTII,S$GLB,, | Performed by: NURSE PRACTITIONER

## 2021-09-15 PROCEDURE — 3077F SYST BP >= 140 MM HG: CPT | Mod: CPTII,S$GLB,, | Performed by: NURSE PRACTITIONER

## 2021-09-15 PROCEDURE — 1126F AMNT PAIN NOTED NONE PRSNT: CPT | Mod: CPTII,S$GLB,, | Performed by: NURSE PRACTITIONER

## 2021-09-15 PROCEDURE — 1157F ADVNC CARE PLAN IN RCRD: CPT | Mod: CPTII,S$GLB,, | Performed by: NURSE PRACTITIONER

## 2021-09-15 PROCEDURE — 99214 OFFICE O/P EST MOD 30 MIN: CPT | Mod: S$GLB,,, | Performed by: NURSE PRACTITIONER

## 2021-09-15 PROCEDURE — 3288F PR FALLS RISK ASSESSMENT DOCUMENTED: ICD-10-PCS | Mod: CPTII,S$GLB,, | Performed by: NURSE PRACTITIONER

## 2021-09-15 PROCEDURE — 1126F PR PAIN SEVERITY QUANTIFIED, NO PAIN PRESENT: ICD-10-PCS | Mod: CPTII,S$GLB,, | Performed by: NURSE PRACTITIONER

## 2021-09-15 PROCEDURE — 3078F DIAST BP <80 MM HG: CPT | Mod: CPTII,S$GLB,, | Performed by: NURSE PRACTITIONER

## 2021-09-15 PROCEDURE — 1160F PR REVIEW ALL MEDS BY PRESCRIBER/CLIN PHARMACIST DOCUMENTED: ICD-10-PCS | Mod: CPTII,S$GLB,, | Performed by: NURSE PRACTITIONER

## 2021-09-15 PROCEDURE — 1101F PT FALLS ASSESS-DOCD LE1/YR: CPT | Mod: CPTII,S$GLB,, | Performed by: NURSE PRACTITIONER

## 2021-09-15 PROCEDURE — 1159F MED LIST DOCD IN RCRD: CPT | Mod: CPTII,S$GLB,, | Performed by: NURSE PRACTITIONER

## 2021-09-15 PROCEDURE — 1101F PR PT FALLS ASSESS DOC 0-1 FALLS W/OUT INJ PAST YR: ICD-10-PCS | Mod: CPTII,S$GLB,, | Performed by: NURSE PRACTITIONER

## 2021-09-15 PROCEDURE — 1160F RVW MEDS BY RX/DR IN RCRD: CPT | Mod: CPTII,S$GLB,, | Performed by: NURSE PRACTITIONER

## 2021-09-15 PROCEDURE — 1157F PR ADVANCE CARE PLAN OR EQUIV PRESENT IN MEDICAL RECORD: ICD-10-PCS | Mod: CPTII,S$GLB,, | Performed by: NURSE PRACTITIONER

## 2021-09-15 PROCEDURE — 3078F PR MOST RECENT DIASTOLIC BLOOD PRESSURE < 80 MM HG: ICD-10-PCS | Mod: CPTII,S$GLB,, | Performed by: NURSE PRACTITIONER

## 2021-09-15 PROCEDURE — 1159F PR MEDICATION LIST DOCUMENTED IN MEDICAL RECORD: ICD-10-PCS | Mod: CPTII,S$GLB,, | Performed by: NURSE PRACTITIONER

## 2021-09-15 PROCEDURE — 99214 PR OFFICE/OUTPT VISIT, EST, LEVL IV, 30-39 MIN: ICD-10-PCS | Mod: S$GLB,,, | Performed by: NURSE PRACTITIONER

## 2021-09-15 PROCEDURE — 3077F PR MOST RECENT SYSTOLIC BLOOD PRESSURE >= 140 MM HG: ICD-10-PCS | Mod: CPTII,S$GLB,, | Performed by: NURSE PRACTITIONER

## 2021-09-15 RX ORDER — FERROUS SULFATE 325(65) MG
TABLET ORAL
Qty: 60 TABLET | Refills: 6 | Status: SHIPPED | OUTPATIENT
Start: 2021-09-15 | End: 2022-01-19 | Stop reason: SDUPTHER

## 2021-09-15 RX ORDER — MEGESTROL ACETATE 40 MG/1
40 TABLET ORAL 2 TIMES DAILY
Qty: 30 TABLET | Refills: 6 | Status: SHIPPED | OUTPATIENT
Start: 2021-09-15 | End: 2022-01-19 | Stop reason: SDUPTHER

## 2021-09-21 LAB
BASOPHILS NFR BLD: 0.6 % (ref 0–3)
EOSINOPHIL NFR BLD: 4.8 % (ref 1–3)
ERYTHROCYTE [DISTWIDTH] IN BLOOD BY AUTOMATED COUNT: 15.9 % (ref 12.5–18)
HCT VFR BLD AUTO: 24.8 % (ref 37–47)
HGB BLD-MCNC: 7.7 G/DL (ref 12–16)
LYMPHOCYTES NFR BLD: 28.1 % (ref 25–40)
MACROCYTES BLD QL SMEAR: NORMAL
MCH RBC QN AUTO: 32.4 PG (ref 27–31.2)
MCHC RBC AUTO-ENTMCNC: 31 G/DL (ref 31.8–35.4)
MCV RBC AUTO: 104.2 FL (ref 80–97)
MONOCYTES NFR BLD: 4.2 % (ref 1–15)
NEUTROPHILS # BLD AUTO: 3.38 10*3/UL (ref 1.8–7.7)
NEUTROPHILS NFR BLD: 61.9 % (ref 37–80)
NUCLEATED RED BLOOD CELLS: 0 %
PLATELETS: 154 10*3/UL (ref 142–424)
RBC # BLD AUTO: 2.38 10*6/UL (ref 4.2–5.4)
WBC # BLD: 5.5 10*3/UL (ref 4.6–10.2)

## 2021-09-22 ENCOUNTER — OFFICE VISIT (OUTPATIENT)
Dept: HEMATOLOGY/ONCOLOGY | Facility: CLINIC | Age: 86
End: 2021-09-22
Payer: MEDICARE

## 2021-09-22 VITALS
BODY MASS INDEX: 24.97 KG/M2 | HEIGHT: 62 IN | DIASTOLIC BLOOD PRESSURE: 72 MMHG | WEIGHT: 135.69 LBS | SYSTOLIC BLOOD PRESSURE: 126 MMHG | HEART RATE: 64 BPM | TEMPERATURE: 97 F | OXYGEN SATURATION: 99 % | RESPIRATION RATE: 16 BRPM

## 2021-09-22 DIAGNOSIS — E03.9 HYPOTHYROIDISM, UNSPECIFIED TYPE: ICD-10-CM

## 2021-09-22 DIAGNOSIS — C53.0 MALIGNANT NEOPLASM OF ENDOCERVIX: ICD-10-CM

## 2021-09-22 DIAGNOSIS — C77.8 MALIGNANT NEOPLASM METASTATIC TO LYMPH NODES OF MULTIPLE SITES: Primary | ICD-10-CM

## 2021-09-22 DIAGNOSIS — Z29.89 ENCOUNTER FOR IMMUNOTHERAPY: ICD-10-CM

## 2021-09-22 LAB
ALBUMIN SERPL BCP-MCNC: 3.2 G/DL (ref 3.4–5)
ALBUMIN/GLOBULIN RATIO: 0.74 RATIO (ref 1.1–1.8)
ALP SERPL-CCNC: 75 U/L (ref 46–116)
ALT SERPL W P-5'-P-CCNC: 16 U/L (ref 12–78)
ANION GAP SERPL CALC-SCNC: 10 MMOL/L (ref 3–11)
AST SERPL-CCNC: 31 U/L (ref 15–37)
BILIRUB SERPL-MCNC: 0.5 MG/DL (ref 0–1)
BUN SERPL-MCNC: 25 MG/DL (ref 7–18)
BUN/CREAT SERPL: 18.79 RATIO (ref 7–18)
CALCIUM SERPL-MCNC: 9.4 MG/DL (ref 8.8–10.5)
CHLORIDE SERPL-SCNC: 107 MMOL/L (ref 100–108)
CO2 SERPL-SCNC: 22 MMOL/L (ref 21–32)
CREAT SERPL-MCNC: 1.33 MG/DL (ref 0.55–1.02)
GFR ESTIMATION: 46
GLOBULIN: 4.3 G/DL (ref 2.3–3.5)
GLUCOSE SERPL-MCNC: 111 MG/DL (ref 70–110)
POTASSIUM SERPL-SCNC: 3.6 MMOL/L (ref 3.6–5.2)
PROT SERPL-MCNC: 7.5 G/DL (ref 6.4–8.2)
SODIUM BLD-SCNC: 139 MMOL/L (ref 135–145)
TSH SERPL DL<=0.005 MIU/L-ACNC: 1.26 UIU/ML (ref 0.36–3.74)

## 2021-09-22 PROCEDURE — 1126F AMNT PAIN NOTED NONE PRSNT: CPT | Mod: CPTII,S$GLB,, | Performed by: NURSE PRACTITIONER

## 2021-09-22 PROCEDURE — 3288F FALL RISK ASSESSMENT DOCD: CPT | Mod: CPTII,S$GLB,, | Performed by: NURSE PRACTITIONER

## 2021-09-22 PROCEDURE — 99214 PR OFFICE/OUTPT VISIT, EST, LEVL IV, 30-39 MIN: ICD-10-PCS | Mod: S$GLB,,, | Performed by: NURSE PRACTITIONER

## 2021-09-22 PROCEDURE — 3074F PR MOST RECENT SYSTOLIC BLOOD PRESSURE < 130 MM HG: ICD-10-PCS | Mod: CPTII,S$GLB,, | Performed by: NURSE PRACTITIONER

## 2021-09-22 PROCEDURE — 3288F PR FALLS RISK ASSESSMENT DOCUMENTED: ICD-10-PCS | Mod: CPTII,S$GLB,, | Performed by: NURSE PRACTITIONER

## 2021-09-22 PROCEDURE — 1101F PR PT FALLS ASSESS DOC 0-1 FALLS W/OUT INJ PAST YR: ICD-10-PCS | Mod: CPTII,S$GLB,, | Performed by: NURSE PRACTITIONER

## 2021-09-22 PROCEDURE — 1157F ADVNC CARE PLAN IN RCRD: CPT | Mod: CPTII,S$GLB,, | Performed by: NURSE PRACTITIONER

## 2021-09-22 PROCEDURE — 1159F MED LIST DOCD IN RCRD: CPT | Mod: CPTII,S$GLB,, | Performed by: NURSE PRACTITIONER

## 2021-09-22 PROCEDURE — 1159F PR MEDICATION LIST DOCUMENTED IN MEDICAL RECORD: ICD-10-PCS | Mod: CPTII,S$GLB,, | Performed by: NURSE PRACTITIONER

## 2021-09-22 PROCEDURE — 1101F PT FALLS ASSESS-DOCD LE1/YR: CPT | Mod: CPTII,S$GLB,, | Performed by: NURSE PRACTITIONER

## 2021-09-22 PROCEDURE — 3078F DIAST BP <80 MM HG: CPT | Mod: CPTII,S$GLB,, | Performed by: NURSE PRACTITIONER

## 2021-09-22 PROCEDURE — 3074F SYST BP LT 130 MM HG: CPT | Mod: CPTII,S$GLB,, | Performed by: NURSE PRACTITIONER

## 2021-09-22 PROCEDURE — 1126F PR PAIN SEVERITY QUANTIFIED, NO PAIN PRESENT: ICD-10-PCS | Mod: CPTII,S$GLB,, | Performed by: NURSE PRACTITIONER

## 2021-09-22 PROCEDURE — 1157F PR ADVANCE CARE PLAN OR EQUIV PRESENT IN MEDICAL RECORD: ICD-10-PCS | Mod: CPTII,S$GLB,, | Performed by: NURSE PRACTITIONER

## 2021-09-22 PROCEDURE — 3078F PR MOST RECENT DIASTOLIC BLOOD PRESSURE < 80 MM HG: ICD-10-PCS | Mod: CPTII,S$GLB,, | Performed by: NURSE PRACTITIONER

## 2021-09-22 PROCEDURE — 99214 OFFICE O/P EST MOD 30 MIN: CPT | Mod: S$GLB,,, | Performed by: NURSE PRACTITIONER

## 2021-09-29 DIAGNOSIS — D63.0 ANEMIA IN NEOPLASTIC DISEASE: Primary | ICD-10-CM

## 2021-09-29 RX ORDER — SODIUM CHLORIDE 0.9 % (FLUSH) 0.9 %
10 SYRINGE (ML) INJECTION
Status: CANCELLED | OUTPATIENT
Start: 2021-09-30

## 2021-09-29 RX ORDER — HEPARIN 100 UNIT/ML
500 SYRINGE INTRAVENOUS
Status: CANCELLED | OUTPATIENT
Start: 2021-09-30

## 2021-09-30 LAB
ANISOCYTOSIS: NORMAL
BASOPHILIC STIPPLING: NORMAL
BASOPHILS NFR BLD: 0.3 % (ref 0–3)
EOSINOPHIL NFR BLD: 2.1 % (ref 1–3)
ERYTHROCYTE [DISTWIDTH] IN BLOOD BY AUTOMATED COUNT: 17.7 % (ref 12.5–18)
HCT VFR BLD AUTO: 22.3 % (ref 37–47)
HGB BLD-MCNC: 6.9 G/DL (ref 12–16)
HYPOCHROMIA BLD QL SMEAR: NORMAL
LYMPHOCYTES NFR BLD: 18 % (ref 25–40)
MACROCYTES BLD QL SMEAR: NORMAL
MCH RBC QN AUTO: 33.7 PG (ref 27–31.2)
MCHC RBC AUTO-ENTMCNC: 30.9 G/DL (ref 31.8–35.4)
MCV RBC AUTO: 108.8 FL (ref 80–97)
MONOCYTES NFR BLD: 11 % (ref 1–15)
NEUTROPHILS # BLD AUTO: 4.16 10*3/UL (ref 1.8–7.7)
NEUTROPHILS NFR BLD: 68.3 % (ref 37–80)
NUCLEATED RED BLOOD CELLS: 0 %
PLATELETS: 172 10*3/UL (ref 142–424)
RBC # BLD AUTO: 2.05 10*6/UL (ref 4.2–5.4)
WBC # BLD: 6.1 10*3/UL (ref 4.6–10.2)

## 2021-10-12 LAB
ALBUMIN SERPL BCP-MCNC: 3.2 G/DL (ref 3.4–5)
ALBUMIN/GLOBULIN RATIO: 0.8 RATIO (ref 1.1–1.8)
ALP SERPL-CCNC: 80 U/L (ref 46–116)
ALT SERPL W P-5'-P-CCNC: 17 U/L (ref 12–78)
ANION GAP SERPL CALC-SCNC: 9 MMOL/L (ref 3–11)
ANISOCYTOSIS: NORMAL
AST SERPL-CCNC: 23 U/L (ref 15–37)
BASOPHILS NFR BLD: 0.6 % (ref 0–3)
BILIRUB SERPL-MCNC: 0.6 MG/DL (ref 0–1)
BUN SERPL-MCNC: 17 MG/DL (ref 7–18)
BUN/CREAT SERPL: 15.59 RATIO (ref 7–18)
CALCIUM SERPL-MCNC: 9.2 MG/DL (ref 8.8–10.5)
CHLORIDE SERPL-SCNC: 104 MMOL/L (ref 100–108)
CO2 SERPL-SCNC: 25 MMOL/L (ref 21–32)
CREAT SERPL-MCNC: 1.09 MG/DL (ref 0.55–1.02)
EOSINOPHIL NFR BLD: 1.7 % (ref 1–3)
ERYTHROCYTE [DISTWIDTH] IN BLOOD BY AUTOMATED COUNT: 19.2 % (ref 12.5–18)
GFR ESTIMATION: 58
GLOBULIN: 4 G/DL (ref 2.3–3.5)
GLUCOSE SERPL-MCNC: 94 MG/DL (ref 70–110)
HCT VFR BLD AUTO: 28.7 % (ref 37–47)
HGB BLD-MCNC: 9 G/DL (ref 12–16)
LYMPHOCYTES NFR BLD: 20 % (ref 25–40)
MACROCYTES BLD QL SMEAR: NORMAL
MCH RBC QN AUTO: 31.5 PG (ref 27–31.2)
MCHC RBC AUTO-ENTMCNC: 31.4 G/DL (ref 31.8–35.4)
MCV RBC AUTO: 100.3 FL (ref 80–97)
MONOCYTES NFR BLD: 12.4 % (ref 1–15)
NEUTROPHILS # BLD AUTO: 6.77 10*3/UL (ref 1.8–7.7)
NEUTROPHILS NFR BLD: 64.8 % (ref 37–80)
NUCLEATED RED BLOOD CELLS: 0 %
PLATELETS: 215 10*3/UL (ref 142–424)
POTASSIUM SERPL-SCNC: 3.7 MMOL/L (ref 3.6–5.2)
PROT SERPL-MCNC: 7.2 G/DL (ref 6.4–8.2)
RBC # BLD AUTO: 2.86 10*6/UL (ref 4.2–5.4)
SODIUM BLD-SCNC: 138 MMOL/L (ref 135–145)
WBC # BLD: 10.4 10*3/UL (ref 4.6–10.2)

## 2021-10-13 ENCOUNTER — OFFICE VISIT (OUTPATIENT)
Dept: HEMATOLOGY/ONCOLOGY | Facility: CLINIC | Age: 86
End: 2021-10-13
Payer: MEDICARE

## 2021-10-13 VITALS
HEIGHT: 62 IN | RESPIRATION RATE: 18 BRPM | BODY MASS INDEX: 24.84 KG/M2 | SYSTOLIC BLOOD PRESSURE: 137 MMHG | TEMPERATURE: 96 F | OXYGEN SATURATION: 98 % | DIASTOLIC BLOOD PRESSURE: 64 MMHG | HEART RATE: 70 BPM | WEIGHT: 135 LBS

## 2021-10-13 DIAGNOSIS — C77.8 MALIGNANT NEOPLASM METASTATIC TO LYMPH NODES OF MULTIPLE SITES: ICD-10-CM

## 2021-10-13 DIAGNOSIS — Z29.89 ENCOUNTER FOR IMMUNOTHERAPY: ICD-10-CM

## 2021-10-13 DIAGNOSIS — C53.0 MALIGNANT NEOPLASM OF ENDOCERVIX: Primary | ICD-10-CM

## 2021-10-13 PROCEDURE — 1157F PR ADVANCE CARE PLAN OR EQUIV PRESENT IN MEDICAL RECORD: ICD-10-PCS | Mod: CPTII,S$GLB,, | Performed by: NURSE PRACTITIONER

## 2021-10-13 PROCEDURE — 1159F PR MEDICATION LIST DOCUMENTED IN MEDICAL RECORD: ICD-10-PCS | Mod: CPTII,S$GLB,, | Performed by: NURSE PRACTITIONER

## 2021-10-13 PROCEDURE — 99214 PR OFFICE/OUTPT VISIT, EST, LEVL IV, 30-39 MIN: ICD-10-PCS | Mod: S$GLB,,, | Performed by: NURSE PRACTITIONER

## 2021-10-13 PROCEDURE — 1101F PT FALLS ASSESS-DOCD LE1/YR: CPT | Mod: CPTII,S$GLB,, | Performed by: NURSE PRACTITIONER

## 2021-10-13 PROCEDURE — 1126F AMNT PAIN NOTED NONE PRSNT: CPT | Mod: CPTII,S$GLB,, | Performed by: NURSE PRACTITIONER

## 2021-10-13 PROCEDURE — 1126F PR PAIN SEVERITY QUANTIFIED, NO PAIN PRESENT: ICD-10-PCS | Mod: CPTII,S$GLB,, | Performed by: NURSE PRACTITIONER

## 2021-10-13 PROCEDURE — 3075F PR MOST RECENT SYSTOLIC BLOOD PRESS GE 130-139MM HG: ICD-10-PCS | Mod: CPTII,S$GLB,, | Performed by: NURSE PRACTITIONER

## 2021-10-13 PROCEDURE — 3288F PR FALLS RISK ASSESSMENT DOCUMENTED: ICD-10-PCS | Mod: CPTII,S$GLB,, | Performed by: NURSE PRACTITIONER

## 2021-10-13 PROCEDURE — 1157F ADVNC CARE PLAN IN RCRD: CPT | Mod: CPTII,S$GLB,, | Performed by: NURSE PRACTITIONER

## 2021-10-13 PROCEDURE — 1160F RVW MEDS BY RX/DR IN RCRD: CPT | Mod: CPTII,S$GLB,, | Performed by: NURSE PRACTITIONER

## 2021-10-13 PROCEDURE — 3075F SYST BP GE 130 - 139MM HG: CPT | Mod: CPTII,S$GLB,, | Performed by: NURSE PRACTITIONER

## 2021-10-13 PROCEDURE — 1159F MED LIST DOCD IN RCRD: CPT | Mod: CPTII,S$GLB,, | Performed by: NURSE PRACTITIONER

## 2021-10-13 PROCEDURE — 99214 OFFICE O/P EST MOD 30 MIN: CPT | Mod: S$GLB,,, | Performed by: NURSE PRACTITIONER

## 2021-10-13 PROCEDURE — 3078F DIAST BP <80 MM HG: CPT | Mod: CPTII,S$GLB,, | Performed by: NURSE PRACTITIONER

## 2021-10-13 PROCEDURE — 1160F PR REVIEW ALL MEDS BY PRESCRIBER/CLIN PHARMACIST DOCUMENTED: ICD-10-PCS | Mod: CPTII,S$GLB,, | Performed by: NURSE PRACTITIONER

## 2021-10-13 PROCEDURE — 1101F PR PT FALLS ASSESS DOC 0-1 FALLS W/OUT INJ PAST YR: ICD-10-PCS | Mod: CPTII,S$GLB,, | Performed by: NURSE PRACTITIONER

## 2021-10-13 PROCEDURE — 3288F FALL RISK ASSESSMENT DOCD: CPT | Mod: CPTII,S$GLB,, | Performed by: NURSE PRACTITIONER

## 2021-10-13 PROCEDURE — 3078F PR MOST RECENT DIASTOLIC BLOOD PRESSURE < 80 MM HG: ICD-10-PCS | Mod: CPTII,S$GLB,, | Performed by: NURSE PRACTITIONER

## 2021-10-13 RX ORDER — SODIUM CHLORIDE 0.9 % (FLUSH) 0.9 %
10 SYRINGE (ML) INJECTION
Status: CANCELLED | OUTPATIENT
Start: 2021-10-21

## 2021-10-13 RX ORDER — HEPARIN 100 UNIT/ML
500 SYRINGE INTRAVENOUS
Status: CANCELLED | OUTPATIENT
Start: 2021-10-21

## 2021-11-09 LAB
ALBUMIN SERPL BCP-MCNC: 3.1 G/DL (ref 3.4–5)
ALBUMIN/GLOBULIN RATIO: 0.84 RATIO (ref 1.1–1.8)
ALP SERPL-CCNC: 85 U/L (ref 46–116)
ALT SERPL W P-5'-P-CCNC: 21 U/L (ref 12–78)
ANION GAP SERPL CALC-SCNC: 13 MMOL/L (ref 3–11)
AST SERPL-CCNC: 22 U/L (ref 15–37)
BASOPHILS NFR BLD: 0.5 % (ref 0–3)
BILIRUB SERPL-MCNC: 0.6 MG/DL (ref 0–1)
BUN SERPL-MCNC: 22 MG/DL (ref 7–18)
BUN/CREAT SERPL: 14.1 RATIO (ref 7–18)
CALCIUM SERPL-MCNC: 9 MG/DL (ref 8.8–10.5)
CHLORIDE SERPL-SCNC: 105 MMOL/L (ref 100–108)
CO2 SERPL-SCNC: 23 MMOL/L (ref 21–32)
CREAT SERPL-MCNC: 1.56 MG/DL (ref 0.55–1.02)
EOSINOPHIL NFR BLD: 1.8 % (ref 1–3)
ERYTHROCYTE [DISTWIDTH] IN BLOOD BY AUTOMATED COUNT: 16.6 % (ref 12.5–18)
GFR ESTIMATION: 38
GLOBULIN: 3.7 G/DL (ref 2.3–3.5)
GLUCOSE SERPL-MCNC: 146 MG/DL (ref 70–110)
HCT VFR BLD AUTO: 29.5 % (ref 37–47)
HGB BLD-MCNC: 8.8 G/DL (ref 12–16)
HYPOCHROMIA BLD QL SMEAR: NORMAL
LYMPHOCYTES NFR BLD: 18.1 % (ref 25–40)
MACROCYTES BLD QL SMEAR: NORMAL
MCH RBC QN AUTO: 31.7 PG (ref 27–31.2)
MCHC RBC AUTO-ENTMCNC: 29.8 G/DL (ref 31.8–35.4)
MCV RBC AUTO: 106.1 FL (ref 80–97)
MONOCYTES NFR BLD: 6.8 % (ref 1–15)
NEUTROPHILS # BLD AUTO: 8.98 10*3/UL (ref 1.8–7.7)
NEUTROPHILS NFR BLD: 72.1 % (ref 37–80)
NUCLEATED RED BLOOD CELLS: 0 %
PLATELETS: 241 10*3/UL (ref 142–424)
POTASSIUM SERPL-SCNC: 3.8 MMOL/L (ref 3.6–5.2)
PROT SERPL-MCNC: 6.8 G/DL (ref 6.4–8.2)
RBC # BLD AUTO: 2.78 10*6/UL (ref 4.2–5.4)
SODIUM BLD-SCNC: 141 MMOL/L (ref 135–145)
TSH SERPL DL<=0.005 MIU/L-ACNC: <0.01 UIU/ML (ref 0.36–3.74)
WBC # BLD: 12.4 10*3/UL (ref 4.6–10.2)

## 2021-11-10 ENCOUNTER — OUTSIDE PLACE OF SERVICE (OUTPATIENT)
Dept: HEMATOLOGY/ONCOLOGY | Facility: CLINIC | Age: 86
End: 2021-11-10

## 2021-11-10 PROCEDURE — 99233 PR SUBSEQUENT HOSPITAL CARE,LEVL III: ICD-10-PCS | Mod: ,,, | Performed by: NURSE PRACTITIONER

## 2021-11-10 PROCEDURE — 99233 SBSQ HOSP IP/OBS HIGH 50: CPT | Mod: ,,, | Performed by: NURSE PRACTITIONER

## 2021-11-17 ENCOUNTER — TELEPHONE (OUTPATIENT)
Dept: HEMATOLOGY/ONCOLOGY | Facility: CLINIC | Age: 86
End: 2021-11-17

## 2021-11-17 ENCOUNTER — OFFICE VISIT (OUTPATIENT)
Dept: HEMATOLOGY/ONCOLOGY | Facility: CLINIC | Age: 86
End: 2021-11-17
Payer: MEDICARE

## 2021-11-17 VITALS
BODY MASS INDEX: 25.58 KG/M2 | TEMPERATURE: 98 F | HEART RATE: 66 BPM | HEIGHT: 62 IN | WEIGHT: 139 LBS | RESPIRATION RATE: 16 BRPM | OXYGEN SATURATION: 98 % | SYSTOLIC BLOOD PRESSURE: 174 MMHG | DIASTOLIC BLOOD PRESSURE: 82 MMHG

## 2021-11-17 DIAGNOSIS — C77.8 MALIGNANT NEOPLASM METASTATIC TO LYMPH NODES OF MULTIPLE SITES: Primary | ICD-10-CM

## 2021-11-17 DIAGNOSIS — R41.3 MEMORY PROBLEM: ICD-10-CM

## 2021-11-17 DIAGNOSIS — D64.9 ANEMIA, UNSPECIFIED TYPE: ICD-10-CM

## 2021-11-17 DIAGNOSIS — R53.1 WEAKNESS: ICD-10-CM

## 2021-11-17 PROCEDURE — 3077F SYST BP >= 140 MM HG: CPT | Mod: CPTII,S$GLB,, | Performed by: NURSE PRACTITIONER

## 2021-11-17 PROCEDURE — 3288F PR FALLS RISK ASSESSMENT DOCUMENTED: ICD-10-PCS | Mod: CPTII,S$GLB,, | Performed by: NURSE PRACTITIONER

## 2021-11-17 PROCEDURE — 3077F PR MOST RECENT SYSTOLIC BLOOD PRESSURE >= 140 MM HG: ICD-10-PCS | Mod: CPTII,S$GLB,, | Performed by: NURSE PRACTITIONER

## 2021-11-17 PROCEDURE — 1159F PR MEDICATION LIST DOCUMENTED IN MEDICAL RECORD: ICD-10-PCS | Mod: CPTII,S$GLB,, | Performed by: NURSE PRACTITIONER

## 2021-11-17 PROCEDURE — 1159F MED LIST DOCD IN RCRD: CPT | Mod: CPTII,S$GLB,, | Performed by: NURSE PRACTITIONER

## 2021-11-17 PROCEDURE — 1101F PR PT FALLS ASSESS DOC 0-1 FALLS W/OUT INJ PAST YR: ICD-10-PCS | Mod: CPTII,S$GLB,, | Performed by: NURSE PRACTITIONER

## 2021-11-17 PROCEDURE — 99214 PR OFFICE/OUTPT VISIT, EST, LEVL IV, 30-39 MIN: ICD-10-PCS | Mod: S$GLB,,, | Performed by: NURSE PRACTITIONER

## 2021-11-17 PROCEDURE — 3079F DIAST BP 80-89 MM HG: CPT | Mod: CPTII,S$GLB,, | Performed by: NURSE PRACTITIONER

## 2021-11-17 PROCEDURE — 3079F PR MOST RECENT DIASTOLIC BLOOD PRESSURE 80-89 MM HG: ICD-10-PCS | Mod: CPTII,S$GLB,, | Performed by: NURSE PRACTITIONER

## 2021-11-17 PROCEDURE — 1157F PR ADVANCE CARE PLAN OR EQUIV PRESENT IN MEDICAL RECORD: ICD-10-PCS | Mod: CPTII,S$GLB,, | Performed by: NURSE PRACTITIONER

## 2021-11-17 PROCEDURE — 1126F PR PAIN SEVERITY QUANTIFIED, NO PAIN PRESENT: ICD-10-PCS | Mod: CPTII,S$GLB,, | Performed by: NURSE PRACTITIONER

## 2021-11-17 PROCEDURE — 3288F FALL RISK ASSESSMENT DOCD: CPT | Mod: CPTII,S$GLB,, | Performed by: NURSE PRACTITIONER

## 2021-11-17 PROCEDURE — 99214 OFFICE O/P EST MOD 30 MIN: CPT | Mod: S$GLB,,, | Performed by: NURSE PRACTITIONER

## 2021-11-17 PROCEDURE — 1126F AMNT PAIN NOTED NONE PRSNT: CPT | Mod: CPTII,S$GLB,, | Performed by: NURSE PRACTITIONER

## 2021-11-17 PROCEDURE — 1101F PT FALLS ASSESS-DOCD LE1/YR: CPT | Mod: CPTII,S$GLB,, | Performed by: NURSE PRACTITIONER

## 2021-11-17 PROCEDURE — 1157F ADVNC CARE PLAN IN RCRD: CPT | Mod: CPTII,S$GLB,, | Performed by: NURSE PRACTITIONER

## 2021-11-30 ENCOUNTER — OUTSIDE PLACE OF SERVICE (OUTPATIENT)
Dept: HEMATOLOGY/ONCOLOGY | Facility: CLINIC | Age: 86
End: 2021-11-30

## 2021-11-30 PROCEDURE — 99223 1ST HOSP IP/OBS HIGH 75: CPT | Mod: ,,, | Performed by: INTERNAL MEDICINE

## 2021-11-30 PROCEDURE — 99223 PR INITIAL HOSPITAL CARE,LEVL III: ICD-10-PCS | Mod: ,,, | Performed by: INTERNAL MEDICINE

## 2021-12-16 DIAGNOSIS — C77.8 MALIGNANT NEOPLASM METASTATIC TO LYMPH NODES OF MULTIPLE SITES: Primary | ICD-10-CM

## 2021-12-27 ENCOUNTER — TELEPHONE (OUTPATIENT)
Dept: HEMATOLOGY/ONCOLOGY | Facility: CLINIC | Age: 86
End: 2021-12-27

## 2021-12-27 ENCOUNTER — OFFICE VISIT (OUTPATIENT)
Dept: HEMATOLOGY/ONCOLOGY | Facility: CLINIC | Age: 86
End: 2021-12-27
Payer: MEDICARE

## 2021-12-27 VITALS
RESPIRATION RATE: 16 BRPM | SYSTOLIC BLOOD PRESSURE: 122 MMHG | BODY MASS INDEX: 24.31 KG/M2 | WEIGHT: 132.13 LBS | HEART RATE: 99 BPM | TEMPERATURE: 98 F | OXYGEN SATURATION: 98 % | HEIGHT: 62 IN | DIASTOLIC BLOOD PRESSURE: 69 MMHG

## 2021-12-27 DIAGNOSIS — C77.8 MALIGNANT NEOPLASM METASTATIC TO LYMPH NODES OF MULTIPLE SITES: ICD-10-CM

## 2021-12-27 DIAGNOSIS — N93.9 ABNORMAL VAGINAL BLEEDING: Primary | ICD-10-CM

## 2021-12-27 PROCEDURE — 1157F PR ADVANCE CARE PLAN OR EQUIV PRESENT IN MEDICAL RECORD: ICD-10-PCS | Mod: CPTII,S$GLB,, | Performed by: NURSE PRACTITIONER

## 2021-12-27 PROCEDURE — 1100F PR PT FALLS ASSESS DOC 2+ FALLS/FALL W/INJURY/YR: ICD-10-PCS | Mod: CPTII,S$GLB,, | Performed by: NURSE PRACTITIONER

## 2021-12-27 PROCEDURE — 1159F PR MEDICATION LIST DOCUMENTED IN MEDICAL RECORD: ICD-10-PCS | Mod: CPTII,S$GLB,, | Performed by: NURSE PRACTITIONER

## 2021-12-27 PROCEDURE — 3078F DIAST BP <80 MM HG: CPT | Mod: CPTII,S$GLB,, | Performed by: NURSE PRACTITIONER

## 2021-12-27 PROCEDURE — 1126F AMNT PAIN NOTED NONE PRSNT: CPT | Mod: CPTII,S$GLB,, | Performed by: NURSE PRACTITIONER

## 2021-12-27 PROCEDURE — 1100F PTFALLS ASSESS-DOCD GE2>/YR: CPT | Mod: CPTII,S$GLB,, | Performed by: NURSE PRACTITIONER

## 2021-12-27 PROCEDURE — 3074F PR MOST RECENT SYSTOLIC BLOOD PRESSURE < 130 MM HG: ICD-10-PCS | Mod: CPTII,S$GLB,, | Performed by: NURSE PRACTITIONER

## 2021-12-27 PROCEDURE — 1160F PR REVIEW ALL MEDS BY PRESCRIBER/CLIN PHARMACIST DOCUMENTED: ICD-10-PCS | Mod: CPTII,S$GLB,, | Performed by: NURSE PRACTITIONER

## 2021-12-27 PROCEDURE — 3078F PR MOST RECENT DIASTOLIC BLOOD PRESSURE < 80 MM HG: ICD-10-PCS | Mod: CPTII,S$GLB,, | Performed by: NURSE PRACTITIONER

## 2021-12-27 PROCEDURE — 1160F RVW MEDS BY RX/DR IN RCRD: CPT | Mod: CPTII,S$GLB,, | Performed by: NURSE PRACTITIONER

## 2021-12-27 PROCEDURE — 3288F PR FALLS RISK ASSESSMENT DOCUMENTED: ICD-10-PCS | Mod: CPTII,S$GLB,, | Performed by: NURSE PRACTITIONER

## 2021-12-27 PROCEDURE — 1126F PR PAIN SEVERITY QUANTIFIED, NO PAIN PRESENT: ICD-10-PCS | Mod: CPTII,S$GLB,, | Performed by: NURSE PRACTITIONER

## 2021-12-27 PROCEDURE — 1159F MED LIST DOCD IN RCRD: CPT | Mod: CPTII,S$GLB,, | Performed by: NURSE PRACTITIONER

## 2021-12-27 PROCEDURE — 3074F SYST BP LT 130 MM HG: CPT | Mod: CPTII,S$GLB,, | Performed by: NURSE PRACTITIONER

## 2021-12-27 PROCEDURE — 99214 OFFICE O/P EST MOD 30 MIN: CPT | Mod: S$GLB,,, | Performed by: NURSE PRACTITIONER

## 2021-12-27 PROCEDURE — 99214 PR OFFICE/OUTPT VISIT, EST, LEVL IV, 30-39 MIN: ICD-10-PCS | Mod: S$GLB,,, | Performed by: NURSE PRACTITIONER

## 2021-12-27 PROCEDURE — 1157F ADVNC CARE PLAN IN RCRD: CPT | Mod: CPTII,S$GLB,, | Performed by: NURSE PRACTITIONER

## 2021-12-27 PROCEDURE — 3288F FALL RISK ASSESSMENT DOCD: CPT | Mod: CPTII,S$GLB,, | Performed by: NURSE PRACTITIONER

## 2021-12-27 RX ORDER — OXYCODONE AND ACETAMINOPHEN 5; 325 MG/1; MG/1
TABLET ORAL
COMMUNITY
Start: 2021-12-08

## 2021-12-28 LAB
BASOPHILS NFR BLD: 0.5 % (ref 0–3)
EOSINOPHIL NFR BLD: 0.9 % (ref 1–3)
ERYTHROCYTE [DISTWIDTH] IN BLOOD BY AUTOMATED COUNT: 17.7 % (ref 12.5–18)
HCT VFR BLD AUTO: 24.3 % (ref 37–47)
HGB BLD-MCNC: 7.7 G/DL (ref 12–16)
LYMPHOCYTES NFR BLD: 12.6 % (ref 25–40)
MACROCYTES BLD QL SMEAR: NORMAL
MCH RBC QN AUTO: 32.4 PG (ref 27–31.2)
MCHC RBC AUTO-ENTMCNC: 31.7 G/DL (ref 31.8–35.4)
MCV RBC AUTO: 102.1 FL (ref 80–97)
MONOCYTES NFR BLD: 8.7 % (ref 1–15)
NEUTROPHILS # BLD AUTO: 6.48 10*3/UL (ref 1.8–7.7)
NEUTROPHILS NFR BLD: 76.6 % (ref 37–80)
NUCLEATED RED BLOOD CELLS: 0 %
PLATELETS: 186 10*3/UL (ref 142–424)
RBC # BLD AUTO: 2.38 10*6/UL (ref 4.2–5.4)
WBC # BLD: 8.5 10*3/UL (ref 4.6–10.2)

## 2022-01-11 ENCOUNTER — TELEPHONE (OUTPATIENT)
Dept: HEMATOLOGY/ONCOLOGY | Facility: CLINIC | Age: 87
End: 2022-01-11
Payer: MEDICAID

## 2022-01-11 ENCOUNTER — OUTSIDE PLACE OF SERVICE (OUTPATIENT)
Dept: HEMATOLOGY/ONCOLOGY | Facility: CLINIC | Age: 87
End: 2022-01-11
Payer: MEDICARE

## 2022-01-11 PROCEDURE — 99223 PR INITIAL HOSPITAL CARE,LEVL III: ICD-10-PCS | Mod: ,,, | Performed by: INTERNAL MEDICINE

## 2022-01-11 PROCEDURE — 99223 1ST HOSP IP/OBS HIGH 75: CPT | Mod: ,,, | Performed by: INTERNAL MEDICINE

## 2022-01-11 NOTE — TELEPHONE ENCOUNTER
----- Message from Annette Donis NP sent at 1/11/2022  8:36 AM CST -----   She is back in the hospital for a PE but we need to get her on the schedule for next week appt with labs please. She will resume keytruda also.

## 2022-01-11 NOTE — TELEPHONE ENCOUNTER
Scheduled appt on January 20 @ 11:00am.  With labs before. Pt's daughter will call us if she needs to r/s due to her being at the hospital at this time.

## 2022-01-18 DIAGNOSIS — I82.403 ACUTE DEEP VEIN THROMBOSIS (DVT) OF BOTH LOWER EXTREMITIES, UNSPECIFIED VEIN: Primary | ICD-10-CM

## 2022-01-19 ENCOUNTER — TELEPHONE (OUTPATIENT)
Dept: HEMATOLOGY/ONCOLOGY | Facility: CLINIC | Age: 87
End: 2022-01-19
Payer: MEDICAID

## 2022-01-19 DIAGNOSIS — Z15.02 BRCA2 GENE MUTATION POSITIVE IN FEMALE: ICD-10-CM

## 2022-01-19 DIAGNOSIS — I10 ESSENTIAL HYPERTENSION: ICD-10-CM

## 2022-01-19 DIAGNOSIS — Z15.01 BRCA2 GENE MUTATION POSITIVE IN FEMALE: ICD-10-CM

## 2022-01-19 DIAGNOSIS — E44.1 MILD PROTEIN-CALORIE MALNUTRITION: ICD-10-CM

## 2022-01-19 DIAGNOSIS — E78.00 HYPERCHOLESTEREMIA: ICD-10-CM

## 2022-01-19 DIAGNOSIS — D53.9 MACROCYTIC ANEMIA: ICD-10-CM

## 2022-01-19 DIAGNOSIS — I82.403 ACUTE DEEP VEIN THROMBOSIS (DVT) OF BOTH LOWER EXTREMITIES, UNSPECIFIED VEIN: ICD-10-CM

## 2022-01-19 DIAGNOSIS — Z15.09 BRCA2 GENE MUTATION POSITIVE IN FEMALE: ICD-10-CM

## 2022-01-19 RX ORDER — FERROUS SULFATE 325(65) MG
TABLET ORAL
Qty: 180 TABLET | Refills: 3 | Status: SHIPPED | OUTPATIENT
Start: 2022-01-19 | End: 2022-01-20

## 2022-01-19 RX ORDER — MEGESTROL ACETATE 40 MG/1
40 TABLET ORAL 2 TIMES DAILY
Qty: 180 TABLET | Refills: 3 | Status: SHIPPED | OUTPATIENT
Start: 2022-01-19 | End: 2022-01-20

## 2022-01-19 RX ORDER — CLONIDINE HYDROCHLORIDE 0.3 MG/1
TABLET ORAL
Qty: 180 TABLET | Refills: 3 | Status: SHIPPED | OUTPATIENT
Start: 2022-01-19

## 2022-01-19 RX ORDER — NIFEDIPINE 30 MG/1
30 TABLET, EXTENDED RELEASE ORAL DAILY
Qty: 90 TABLET | Refills: 3 | Status: SHIPPED | OUTPATIENT
Start: 2022-01-19

## 2022-01-19 RX ORDER — SIMVASTATIN 20 MG/1
20 TABLET, FILM COATED ORAL NIGHTLY
Qty: 90 TABLET | Refills: 3 | Status: SHIPPED | OUTPATIENT
Start: 2022-01-19

## 2022-01-19 NOTE — TELEPHONE ENCOUNTER
----- Message from Dolly Estrada sent at 1/19/2022 11:44 AM CST -----  Adri Beckett August daughter, would like to provide the nurse an update on Oneyda's medication and insurance information. Please give her a call back at [phone]

## 2022-01-19 NOTE — TELEPHONE ENCOUNTER
----- Message from Zaida Mercado sent at 1/19/2022  9:43 AM CST -----  Contact: PT daughter      Who Called: Sophy     Does the patient know what this is regarding?: RX refill   Would the patient rather a call back or a response via [facility]? Callback   Best Call Back Number: [phone]   Additional Information:

## 2022-01-19 NOTE — TELEPHONE ENCOUNTER
Spoke with Sophy juarez daughter Miriam Parsonsto sent to the [facility] [address] [facility]. Message sent to the provider. TYLER

## 2022-01-20 ENCOUNTER — OFFICE VISIT (OUTPATIENT)
Dept: HEMATOLOGY/ONCOLOGY | Facility: CLINIC | Age: 87
End: 2022-01-20
Payer: MEDICARE

## 2022-01-20 DIAGNOSIS — C78.01 MALIGNANT NEOPLASM METASTATIC TO RIGHT LUNG: ICD-10-CM

## 2022-01-20 DIAGNOSIS — C53.0 MALIGNANT NEOPLASM OF ENDOCERVIX: ICD-10-CM

## 2022-01-20 DIAGNOSIS — C77.8 MALIGNANT NEOPLASM METASTATIC TO LYMPH NODES OF MULTIPLE SITES: Primary | ICD-10-CM

## 2022-01-20 DIAGNOSIS — I82.403 ACUTE DEEP VEIN THROMBOSIS (DVT) OF BOTH LOWER EXTREMITIES, UNSPECIFIED VEIN: ICD-10-CM

## 2022-01-20 DIAGNOSIS — D64.9 ANEMIA, UNSPECIFIED TYPE: ICD-10-CM

## 2022-01-20 LAB
ALBUMIN SERPL BCP-MCNC: 3 G/DL (ref 3.4–5)
ALBUMIN/GLOBULIN RATIO: 0.88 RATIO (ref 1.1–1.8)
ALP SERPL-CCNC: 88 U/L (ref 46–116)
ALT SERPL W P-5'-P-CCNC: 20 U/L (ref 12–78)
ANION GAP SERPL CALC-SCNC: 11 MMOL/L (ref 3–11)
ANISOCYTOSIS: NORMAL
AST SERPL-CCNC: 30 U/L (ref 15–37)
BASOPHILS NFR BLD: 0.5 % (ref 0–3)
BILIRUB SERPL-MCNC: 0.4 MG/DL (ref 0–1)
BUN SERPL-MCNC: 15 MG/DL (ref 7–18)
BUN/CREAT SERPL: 15.15 RATIO (ref 7–18)
CALCIUM SERPL-MCNC: 9.2 MG/DL (ref 8.8–10.5)
CHLORIDE SERPL-SCNC: 105 MMOL/L (ref 100–108)
CO2 SERPL-SCNC: 22 MMOL/L (ref 21–32)
CREAT SERPL-MCNC: 0.99 MG/DL (ref 0.55–1.02)
EOSINOPHIL NFR BLD: 4.3 % (ref 1–3)
ERYTHROCYTE [DISTWIDTH] IN BLOOD BY AUTOMATED COUNT: 18.3 % (ref 12.5–18)
GFR ESTIMATION: > 60
GLOBULIN: 3.4 G/DL (ref 2.3–3.5)
GLUCOSE SERPL-MCNC: 95 MG/DL (ref 70–110)
HCT VFR BLD AUTO: 31.8 % (ref 37–47)
HGB BLD-MCNC: 10.2 G/DL (ref 12–16)
LYMPHOCYTES NFR BLD: 15.1 % (ref 25–40)
MCH RBC QN AUTO: 31.5 PG (ref 27–31.2)
MCHC RBC AUTO-ENTMCNC: 32.1 G/DL (ref 31.8–35.4)
MCV RBC AUTO: 98.1 FL (ref 80–97)
MONOCYTES NFR BLD: 11.6 % (ref 1–15)
NEUTROPHILS # BLD AUTO: 4.1 10*3/UL (ref 1.8–7.7)
NEUTROPHILS NFR BLD: 67.8 % (ref 37–80)
NUCLEATED RED BLOOD CELLS: 0 %
PLATELETS: 225 10*3/UL (ref 142–424)
POTASSIUM SERPL-SCNC: 4.3 MMOL/L (ref 3.6–5.2)
PROT SERPL-MCNC: 6.4 G/DL (ref 6.4–8.2)
RBC # BLD AUTO: 3.24 10*6/UL (ref 4.2–5.4)
SODIUM BLD-SCNC: 138 MMOL/L (ref 135–145)
WBC # BLD: 6 10*3/UL (ref 4.6–10.2)

## 2022-01-20 PROCEDURE — 99215 PR OFFICE/OUTPT VISIT, EST, LEVL V, 40-54 MIN: ICD-10-PCS | Mod: 95,ICN,CMP, | Performed by: NURSE PRACTITIONER

## 2022-01-20 PROCEDURE — 99215 OFFICE O/P EST HI 40 MIN: CPT | Mod: 95,ICN,CMP, | Performed by: NURSE PRACTITIONER

## 2022-01-20 NOTE — PROGRESS NOTES
The patient location is: home  The chief complaint leading to consultation is: lab evaluation    Visit type: audiovisual    Face to Face time with patient: 25  25 minutes of total time spent on the encounter, which includes face to face time and non-face to face time preparing to see the patient (eg, review of tests), Obtaining and/or reviewing separately obtained history, Documenting clinical information in the electronic or other health record, Independently interpreting results (not separately reported) and communicating results to the patient/family/caregiver, or Care coordination (not separately reported).         Each patient to whom he or she provides medical services by telemedicine is:  (1) informed of the relationship between the physician and patient and the respective role of any other health care provider with respect to management of the patient; and (2) notified that he or she may decline to receive medical services by telemedicine and may withdraw from such care at any time.    Notes:     Medical Oncology Progress Note  [facility]     PATIENT: Adri Seay  : 1935 86 y.o. female  MRN [id number]     PCP: Primary Doctor No  Consult Requested By:      Date of Service: 2022    Subjective:   Interim History:  No chief complaint on file.    Adri Seay is here for follow-up on treatment;   Carbotaxol has been on hold because of leukopenia, restarted on low dose, cory well  The patient feel well and she had no complaint today  Review of recent PET scan shows stable disease     Oncology History:    History of Present Illness: Ms. Seay is a 86 y.o. female who presents for evaluation and management of cervical cancer. S[facility] at [facility]      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 The patient had a CT scan consistent with widely metastatic disease  "including multiple sites of lymphadenopathy, a possible lung metastases, liver metastases, nodule in the anterior abdominal wall, and carcinomatosis. The patient is mostly asymptomatic but does have a vaginal discharge. Her performance status is 0. On exam, there is a 6 centimeter mass in the anterior abdominal wall superior to the umbilicus. On bimanual examination, tumor is a place the entire cervix apex of the vagina, and on rectovaginal examination, there is a large pelvic mass extending to the bilateral pelvic sidewalls.    [facility] and if it is consistent with metastatic disease, we have recommended palliative chemotherapy with either carboplatinum as a single agent or carboplatinum and paclitaxel. Due to concern for bowel involvement on the CT scan, I have recommended not giving bevacizumab as part of this regimen. Patient will return home to [place] and begin chemotherapy with a medical oncologist there. I've also recommended a consultation with radiation oncology in [place] for consideration of palliative radiation to lower the chances of significant vaginal bleeding. We will see her back on an as-needed basis."       == 8/5/19 Carbo    ==PET/CT done on 12/2/19. Previous CT imaging done at outside facility, discussed with Dr Nguyen for comparison and he stated pelvic mass smaller, abdominal mass smaller liver lesion likely benign stable but difficult to say if peritoneal implants stable to progressed.    ==FoundationOne Liquid Bx on 12/16/19.  BRCA 2 gene mutation.  ==1/14/20 Pt started lynparza   ==5/13/2020 PET showing early progression.    ==5/2020 resume low dose carbo and taxol.   ==11/2020 PET decreased to stable disease  == 2/25/21 Stable Disease  ==8/27/21 PET progression of disease    Oncology History   Malignant neoplasm of endocervix   6/27/2019 Initial Diagnosis    Malignant neoplasm of exocervix     7/8/2019 - 7/8/2019 Chemotherapy    Treatment " Summary   Plan Name: OP CARBOPLATIN WEEKLY  Treatment Goal: Palliative  Status: Inactive  Start Date: [No treatment day found]  End Date: [No treatment day found]  Provider: Jaime Pinon MD  Chemotherapy: CARBOplatin (PARAPLATIN) in sodium chloride 0.9% 250 mL chemo infusion, , Intravenous, Clinic/HOD 1 time, 0 of 4 cycles     7/9/2019 - 11/25/2019 Chemotherapy    Treatment Summary   Plan Name: OP GYN CARBOPLATIN (AUC) Q4W  Treatment Goal: Palliative  Status: Inactive  Start Date: 7/9/2019  End Date: 10/29/2019  Provider: Jaime Pinon MD  Chemotherapy: CARBOplatin (PARAPLATIN) in sodium chloride 0.9% 250 mL chemo infusion,  (original dose 341.5 mg), Intravenous, Clinic/HOD 1 time, 5 of 6 cycles  Dose modification:   (original dose 341.5 mg, Cycle 1)     5/19/2020 - 6/16/2020 Chemotherapy    Treatment Summary   Plan Name: OP  PACLITAXEL CARBOPLATIN WEEKLY  Treatment Goal: Palliative  Status: Inactive  Start Date: 5/19/2020  End Date: 5/22/2020  Provider: Annette Donis NP  Chemotherapy: CARBOplatin (PARAPLATIN) 115 mg in sodium chloride 0.9% 250 mL chemo infusion, 115 mg (98.6 % of original dose 118.4 mg), Intravenous, Clinic/HOD 1 time, 0 of 1 cycle  Dose modification:   (original dose 118.4 mg, Cycle 1)  PACLitaxeL (TAXOL) 80 mg/m2 = 126 mg in sodium chloride 0.9% 250 mL chemo infusion, 80 mg/m2 = 126 mg (100 % of original dose 80 mg/m2), Intravenous, Clinic/HOD 1 time, 0 of 1 cycle  Dose modification: 80 mg/m2 (original dose 80 mg/m2, Cycle 1)     5/19/2020 - 1/19/2021 Chemotherapy    Treatment Summary   Plan Name:  PACLITAXEL CARBOPLATIN WEEKLY  Treatment Goal: Palliative  Status: Inactive  Start Date: 5/19/2020  End Date: 12/29/2020  Provider: Annette Donis NP  Chemotherapy: CARBOplatin (PARAPLATIN) 115 mg in sodium chloride 0.9% 250 mL chemo infusion, 115 mg (100 % of original dose 114.8 mg), Intravenous, Clinic/HOD 1 time, 3 of 3 cycles  Dose modification:   (original dose 114.8 mg, Cycle 1, Reason: MD  Discretion), 100 mg (original dose 114.8 mg, Cycle 1),   (original dose 114.8 mg, Cycle 2, Reason: MD Discretion),   (original dose 114.8 mg, Cycle 2, Reason: MD Discretion),   (original dose 114.8 mg, Cycle 2),   (original dose 114.8 mg, Cycle 2)  Administration: 115 mg (9/17/2020), 115 mg (9/24/2020), 125 mg (10/8/2020), 140 mg (10/15/2020), 130 mg (9/3/2020)  PACLitaxeL (TAXOL) 80 mg/m2 = 126 mg in sodium chloride 0.9% 250 mL chemo infusion, 80 mg/m2 = 126 mg (100 % of original dose 80 mg/m2), Intravenous, Clinic/HOD 1 time, 3 of 3 cycles  Dose modification: 80 mg/m2 (original dose 80 mg/m2, Cycle 1), 50 mg/m2 (original dose 80 mg/m2, Cycle 1), 50 mg/m2 (original dose 80 mg/m2, Cycle 3)  Administration: 84 mg (9/3/2020), 84 mg (9/17/2020), 84 mg (9/24/2020), 90 mg (10/8/2020), 84 mg (10/15/2020)     12/30/2020 - 8/19/2021 Chemotherapy    Treatment Summary   Plan Name: OP GYN PACLITAXEL WEEKLY + CARBOPLATIN  (AUC2) WEEKLY  Treatment Goal: Maintenance  Status: Inactive  Start Date: 12/30/2020  End Date: 8/19/2021  Provider: Abundio Tabares MD  Chemotherapy: CARBOplatin (PARAPLATIN) 125 mg in sodium chloride 0.9% 250 mL chemo infusion, 125 mg, Intravenous, Clinic/HOD 1 time, 30 of 35 cycles  Dose modification: 145 mg (original dose 125.6 mg, Cycle 11), 145 mg (original dose 125.6 mg, Cycle 15), 145 mg (original dose 125.6 mg, Cycle 17), 145 mg (original dose 125.6 mg, Cycle 18), 145 mg (original dose 125.6 mg, Cycle 20), 145 mg (original dose 125.6 mg, Cycle 22), 126.8 mg (original dose 125.6 mg, Cycle 30)  PACLitaxeL (TAXOL) 50 mg/m2 = 84 mg in sodium chloride 0.9% 250 mL chemo infusion, 50 mg/m2 = 84 mg (100 % of original dose 50 mg/m2), Intravenous, Clinic/HOD 1 time, 30 of 35 cycles  Dose modification: 50 mg/m2 (original dose 50 mg/m2, Cycle 1)     6/24/2021 - 6/24/2021 Chemotherapy    Treatment Summary   Plan Name: OP BEVACIZUMAB Q2W  Treatment Goal: Maintenance  Status: Inactive  Start Date:   End Date:    Provider: Annette Donis NP  Chemotherapy: bevacizumab-bvzr 653 mg in sodium chloride 0.9% 126.12 mL infusion, 10 mg/kg, Intravenous, Clinic/HOD 1 time, 0 of 26 cycles     9/16/2021 - 9/16/2021 Chemotherapy    Treatment Summary   Plan Name: OP GYN GEMCITABINE (Q4W)  Treatment Goal: Palliative  Status: Inactive  Start Date: 9/16/2021  End Date: 9/16/2021  Provider: Annette Donis NP  Chemotherapy: gemcitabine (GEMZAR) 1,010 mg in sodium chloride 0.9% 250 mL chemo infusion, 600 mg/m2 = 1,010 mg, Intravenous, Clinic/HOD 1 time, 1 of 6 cycles     9/30/2021 -  Chemotherapy    Treatment Summary   Plan Name: OP PEMBROLIZUMAB 200MG Q3W  Treatment Goal: Palliative  Status: Active  Start Date: 9/30/2021  End Date: 2/3/2022 (Planned)  Provider: Annette Donis NP  Chemotherapy: pembrolizumab (KEYTRUDA) 200 mg in sodium chloride 0.9% 108 mL infusion, 200 mg, Intravenous, Clinic/HOD 1 time, 2 of 6 cycles         Past Medical History:   Past Medical History:   Diagnosis Date    Anemia     Cataract     Cervical cancer 05/2019    Cervical cancer 6/25/2019    Hypertension     Malignant neoplasm metastatic to right lung 9/1/2021       Past Surgical HIstory:   Past Surgical History:   Procedure Laterality Date    CATARACT EXTRACTION, BILATERAL      WRIST SURGERY  1984       Allergies:  Review of patient's allergies indicates:   Allergen Reactions    Strawberry Rash       Medications:  Current Outpatient Medications   Medication Sig Dispense Refill    cloNIDine (CATAPRES) 0.3 MG tablet TAKE 1 TABLET(0.3 MG) BY MOUTH TWICE DAILY 180 tablet 3    FENOFIBRATE ORAL Take by mouth 2 (two) times daily.      iron-vit c-vit b12-folic acid (IRON-C PLUS) tablet Daily 30 each 6    NIFEdipine (PROCARDIA-XL) 30 MG (OSM) 24 hr tablet Take 1 tablet (30 mg total) by mouth once daily. 90 tablet 3    ondansetron (ZOFRAN-ODT) 8 MG TbDL Take 1 tablet (8 mg total) by mouth every 8 (eight) hours as needed (chemotherapy-induced nausea and vomiting).  40 tablet 5    oxyCODONE-acetaminophen (PERCOCET) 5-325 mg per tablet Every 6 Hours as needed for Moderate Pain(4-6)      rivaroxaban (XARELTO) 10 mg Tab Take 1 tablet (10 mg total) by mouth daily with dinner or evening meal. 30 tablet 3    simvastatin (ZOCOR) 20 MG tablet Take 1 tablet (20 mg total) by mouth every evening. 90 tablet 3    varicella-zoster gE-[id number], PF, (SHINGRIX, PF,) 50 mcg/0.5 mL injection Administer two doses 2 months apart 0.5 mL 0     Current Facility-Administered Medications   Medication Dose Route Frequency Provider Last Rate Last Admin    alteplase injection 2 mg  2 mg Intra-Catheter PRN Annette Donis NP        EPINEPHrine (EPIPEN) 0.3 mg/0.3 mL pen injection 0.3 mg  0.3 mg Intramuscular Once PRN Annette Donis NP           Family History:   Family History   Problem Relation Age of Onset    Hypertension Mother     Pneumonia Father     Breast cancer Sister     No Known Problems Brother     Leukemia Daughter     No Known Problems Son        Social History:  reports that she has never smoked. She has never used smokeless tobacco. She reports previous alcohol use. She reports that she does not use drugs.    Review of Systemas  Constitutional: No change in weight, appetie, fatigue, fever, or night sweats  Eyes: No changes in vision  Ears, Nose, Mouth, Throat, and Face: No hearing problems, ear pain, rummy nose, or sore throat  Respiratory: No shortness of breath or cough  Cardiovascular: No chest pain, palpitations or dizziness  Gastrointestinal: No abdominal pain, hematochezia, melena  Genitourinary: No dysuria, hematuria, nocturia, menstrual problems, post menopausal  Integumentary/Breast: No rashes or itching  Hematologic/Lymphatic: No anemia or bleeding abnormalities  Musculoskeletal: No joint or muscle abnormalities  Neurological: No sensory or motor problems, no headaches  Behavioral/Psych: No depression, anxiety, cognitive problems, or stress  Endocrine: No diabetes or thyroid  problems  Allergic/Immunologic: See allergy above    Physical Exam      Vitals:   There were no vitals filed for this visit.  BMI: There is no height or weight on file to calculate BMI.  BSA There is no height or weight on file to calculate BSA.  ECOG Performance Status:   ECOG SCORE         GENERAL APPEARANCE: Well developed, well nourished, in no acute distress.      Labs and Imagings     Lab Results   Component Value Date    WBC 6.0 01/20/2022    HGB 10.2 (L) 01/20/2022    HCT 31.8 (L) 01/20/2022    MCV 98.1 (H) 01/20/2022    LABPLAT 225 01/20/2022       CMP  Sodium   Date Value Ref Range Status   01/20/2022 138 135 - 145 mmol/L Final   03/16/2020 140 136 - 145 mmol/L Final     Potassium   Date Value Ref Range Status   01/20/2022 4.3 3.6 - 5.2 mmol/L Final   03/16/2020 4.5 3.5 - 5.1 mmol/L Final     Chloride   Date Value Ref Range Status   01/20/2022 105 100 - 108 mmol/L Final   03/16/2020 107 98 - 107 mmol/L Final     CO2   Date Value Ref Range Status   01/20/2022 22 21 - 32 mmol/L Final   03/16/2020 21 (L) 22 - 29 mmol/L Final     Glucose   Date Value Ref Range Status   01/20/2022 95 70 - 110 mg/dL Final     BUN   Date Value Ref Range Status   01/20/2022 15 7 - 18 mg/dL Final   03/16/2020 17.2 8 - 23 mg/dL Final     Creatinine   Date Value Ref Range Status   01/20/2022 0.99 0.55 - 1.02 mg/dL Final   03/16/2020 1.17 (H) 0.50 - 0.90 mg/dL Final     Calcium   Date Value Ref Range Status   01/20/2022 9.2 8.8 - 10.5 mg/dL Final   03/16/2020 10.0 8.6 - 10.2 mg/dL Final     Total Protein   Date Value Ref Range Status   01/20/2022 6.4 6.4 - 8.2 g/dL Final     Albumin   Date Value Ref Range Status   01/20/2022 3.0 (L) 3.4 - 5.0 g/dL Final   03/16/2020 4.6 3.5 - 5.2 g/dL Final     Total Bilirubin   Date Value Ref Range Status   01/20/2022 0.4 0.0 - 1.0 mg/dL Final     Alkaline Phosphatase   Date Value Ref Range Status   01/20/2022 88 46 - 116 U/L Final     AST   Date Value Ref Range Status   01/20/2022 30 15 - 37 U/L  Final   03/16/2020 17 0 - 32 U/L Final     ALT   Date Value Ref Range Status   01/20/2022 20 12 - 78 U/L Final     Anion Gap   Date Value Ref Range Status   01/20/2022 11.0 3.0 - 11.0 mmol/L Final   03/16/2020 12.0 8.0 - 17.0 mmol/L Final     Comment:     NOTE  Testing performed at:  [facility], [address] [place] [address]  [id number] CLIA #:[id number]       eGFR if    Date Value Ref Range Status   10/15/2020 >60 >60 mL/min/1.73 m^2 Final     eGFR if non    Date Value Ref Range Status   10/15/2020 52 (A) >60 mL/min/1.73 m^2 Final     Comment:     Calculation used to obtain the estimated glomerular filtration  rate (eGFR) is the CKD-EPI equation.            Imaging:                       Assessment:     Principle Problem: No primary diagnosis found.   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 female with PMH of The encounter diagnosis was Anemia, unspecified type., with No primary diagnosis found.     ==============================================  *  Assessment:       1. 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 Malignant neoplasm of cervix, unspecified site    3. Increased creatine kinase level    4. Neoplastic malignant related fatigue       Cervical cancer stage IV disease  BRCA2 +  mutation   PARP inhibitor   Lynparza 300 mg BID PET scan shows early progression 4/2020, dc'd  Mid June due to excessive pancytopenia   weekly carbo/taxol added  Pancytopenia and Sr Cr improving with lynparza held       Plan:   Overall PLANS:  carbo Taxol weekly   Lynparza  dc'd pancytopenia   S/p covid vaccine    == lab CBC CMP every week with weekly chemo, seen in clinic every other week  == asymptomatic anemia noted Hgb7.4, will plan for transfusion today   ==Pet/Ct 2/21 shows stable to decrease in disease, will repeat PET May   ==continue with combined weekly carbo/taxol, low dose as planned for today  == PET scan 5/25/21 shows essentially stable disease plan is to continue weekly carbotaxol  == asymptomatic anemia, will continue to monitor and transfuse if drops below 7   == new onset urinary incontinence, no hematuria noted. Eval with Jcarlos Wong NP, incontinence resolved   7/21/21:  == pancytopenia noted today with HGB 7.5 WBC 2.5 will hold tx for tomorrow and repeat labs in 1 week  == new c/o melena, occasionally in the am only. FOBT neg for occult blood 7/21/21 7/28/21:  == anemia improving with week break, HGB 7.9, neutropenia resolved. Will hold additional week to allow HGB to recover further and plan to resume tx next week.  == rtc in 2 weeks with labs   == will change therapy to 3 weeks on 1 week off  == pet due in 8/21 8/11/21: labs reviewed and anemia worsened this week while off chemo. Will plan to transfuse 1 unit PRBC tomorrow with B12 injection. Will proceed with tx this week and next week and PET/CT orders placed.   RTC in 2 weeks   9/1/21:  Discussed recent CT/PET with patient showing progression of disease.  Discussed previously with Dr. Davey.  Will discontinue current chemotherapy and plan for weekly use are x3 weeks every 28 days.  Will attempt to have pathology check PDL1 on original biopsy.  But discussed with patient possibility of rebiopsy if if unable to assess PDL1 on original biopsy.  Chemotherapy  education and consents signed today plan will be to begin Gemzar next Thursday.  Patient will return to clinic in 2 weeks.  Called and spoke with patient's granddaughter to update family on changes in plan of care.  Patient is instructed to proceed with COVID vaccine booster  PDL-1 request sent today.   9/15/21: pt here today after recent eval with Urology where spotting is unlikely hematuria. Pt has stated all spotting has ceased. Encouraged pt to report bleeding if it reoccurs. She is to begin gemzar tomorrow as planned. PDL-1 results not obtained and will continue to attempt to locate original Path. [facility] locally states they only have slides form 6/2020 to current. 9/22/21: PDL-1 shows CPS score of >1. Due to progressive anemia after 1 cycle of gemzar, new auth has been submitted for Keytruda. Will contact pt one IO is approved. SE discussed and consents signed.   10/13/21:  Patient tolerated 1st cycle of immunotherapy very well.  She denies any diarrhea or cough but did note occasional itching to bilateral forearms.  No rash noted today.  Labs are reviewed and patient is cleared for treatment next week.  She will return to clinic in months with labs prior for cycle 3 of IO.   11/17/21:  Patient here today with her daughter and granddaughter after recent hospitalization due to AMS. She was driving and someone called the  because she seemed confused.  She was brought to ED and initially was unable to answer most questions appropriately.  Labs showed an KALPANA on CKD. She was hospitalized for further evaluation and management of acute metabolic encephalopathy with unclear etiology.  CT head was negative for acute ischemia.  Contrast MRI subsequently did not show any areas of infarction, specifically there were no areas of micrometastasis.  EEG did not demonstrate epileptiform waveforms.  Hemoglobin on admission was 6.5 g, she received a single unit of PRBCs with normalization to 8.6 on discharge.  She was  made n.p.o., received IV fluids, IV PPI and underwent EGD/colonoscopy with Dr. Cordova on 11/11 which showed atrophic gastritis and nonbleeding AV malformations.  She was seen by neurologist, Dr. Hall, who diagnosed her with severe dementia based on MOCA assessment at bedside.  Patient to follow with Dr. Hall for further evaluation and management of this. Patient does desire to be referred to Dr Sullivan for evaluation of dementia.  The patient has mobility limitation that significantly impairs her abilities and is in need of rollator for unsteady gait and it will significantly improve her ability to participate in his ADLs.  Palliative care referral placed as well. We will see the patient back on 12/2/21 to evaluate her performance status.  If she has returned to baseline, we will likely resume Keytruda with IV hydration support provided.  Patient will resume her ICAR-C and labs to be drawn prior to visit.  12/27/21:  Patient has had recent performance status decline with recurrent hospitalization in mid December for fall with resulting right shoulder fracture.  Patient was then discharged to [facility] for strengthening with PT and OT.  On 12/24/2021 patient awoke at 3:00 a.m. with new onset vaginal bleeding and presented to ER for evaluation.  Trans abdominal transpelvic ultrasound showed a large cystic mass consistent with her known cervical cancer diagnosis.  At that time hemoglobin was approximately 8.0, and bleeding spontaneously resolved.  Patient was not given a blood transfusion and was released back to [facility].  She denies any recurrent episodes of vaginal bleeding since that time.  Plan will be to proceed with repeat PET CT to evaluate current disease status as she has been off of IO since mid October and will refer patient for evaluation with Dr. Beckwith for palliative radiation.  Patient will return to clinic next week with PET scan and labs to discuss further management and the possibility of  resuming Keytruda.  1/20/22:  Patient recently hospitalized for PE as well as bilateral lower DVTs.  Patient completed palliative radiation to cervical mass and bleeding has resolved, patient has been started on low-dose Xarelto to manage clots and has been discharged home.  Her daughter has moved in to provide full time assistance.  Patient is suffering from weakness and fatigue, but otherwise seems to be recovering well.  Labs reviewed today and hemoglobin greater than 10 no indication for transfusions needed Tentatively we will plan to resume I 0 on February 2nd with labs the day before.  ANISA Torres

## 2022-02-01 LAB
ALBUMIN SERPL BCP-MCNC: 2.9 G/DL (ref 3.4–5)
ALBUMIN/GLOBULIN RATIO: 0.85 RATIO (ref 1.1–1.8)
ALP SERPL-CCNC: 79 U/L (ref 46–116)
ALT SERPL W P-5'-P-CCNC: 17 U/L (ref 12–78)
ANION GAP SERPL CALC-SCNC: 12 MMOL/L (ref 3–11)
AST SERPL-CCNC: 23 U/L (ref 15–37)
BASOPHILS NFR BLD: 0.4 % (ref 0–3)
BILIRUB SERPL-MCNC: 0.3 MG/DL (ref 0–1)
BUN SERPL-MCNC: 14 MG/DL (ref 7–18)
BUN/CREAT SERPL: 15.05 RATIO (ref 7–18)
CALCIUM SERPL-MCNC: 9.4 MG/DL (ref 8.8–10.5)
CHLORIDE SERPL-SCNC: 104 MMOL/L (ref 100–108)
CO2 SERPL-SCNC: 23 MMOL/L (ref 21–32)
CREAT SERPL-MCNC: 0.93 MG/DL (ref 0.55–1.02)
EOSINOPHIL NFR BLD: 3.7 % (ref 1–3)
ERYTHROCYTE [DISTWIDTH] IN BLOOD BY AUTOMATED COUNT: 17.8 % (ref 12.5–18)
GFR ESTIMATION: > 60
GLOBULIN: 3.4 G/DL (ref 2.3–3.5)
GLUCOSE SERPL-MCNC: 103 MG/DL (ref 70–110)
HCT VFR BLD AUTO: 29.6 % (ref 37–47)
HGB BLD-MCNC: 9.4 G/DL (ref 12–16)
LYMPHOCYTES NFR BLD: 11.2 % (ref 25–40)
MCH RBC QN AUTO: 31.5 PG (ref 27–31.2)
MCHC RBC AUTO-ENTMCNC: 31.8 G/DL (ref 31.8–35.4)
MCV RBC AUTO: 99.3 FL (ref 80–97)
MONOCYTES NFR BLD: 8.9 % (ref 1–15)
NEUTROPHILS # BLD AUTO: 5.49 10*3/UL (ref 1.8–7.7)
NEUTROPHILS NFR BLD: 75.4 % (ref 37–80)
NUCLEATED RED BLOOD CELLS: 0 %
PLATELETS: 212 10*3/UL (ref 142–424)
POTASSIUM SERPL-SCNC: 4.1 MMOL/L (ref 3.6–5.2)
PROT SERPL-MCNC: 6.3 G/DL (ref 6.4–8.2)
RBC # BLD AUTO: 2.98 10*6/UL (ref 4.2–5.4)
SODIUM BLD-SCNC: 139 MMOL/L (ref 135–145)
TSH SERPL DL<=0.005 MIU/L-ACNC: 0.74 UIU/ML (ref 0.36–3.74)
WBC # BLD: 7.3 10*3/UL (ref 4.6–10.2)

## 2022-02-02 ENCOUNTER — OFFICE VISIT (OUTPATIENT)
Dept: HEMATOLOGY/ONCOLOGY | Facility: CLINIC | Age: 87
End: 2022-02-02
Payer: MEDICARE

## 2022-02-02 VITALS
HEART RATE: 82 BPM | BODY MASS INDEX: 23.35 KG/M2 | HEIGHT: 62 IN | RESPIRATION RATE: 16 BRPM | OXYGEN SATURATION: 98 % | WEIGHT: 126.88 LBS | TEMPERATURE: 98 F | DIASTOLIC BLOOD PRESSURE: 70 MMHG | SYSTOLIC BLOOD PRESSURE: 103 MMHG

## 2022-02-02 DIAGNOSIS — C77.8 MALIGNANT NEOPLASM METASTATIC TO LYMPH NODES OF MULTIPLE SITES: ICD-10-CM

## 2022-02-02 DIAGNOSIS — C53.0 MALIGNANT NEOPLASM OF ENDOCERVIX: ICD-10-CM

## 2022-02-02 DIAGNOSIS — Z29.89 ENCOUNTER FOR IMMUNOTHERAPY: ICD-10-CM

## 2022-02-02 DIAGNOSIS — C78.01 MALIGNANT NEOPLASM METASTATIC TO RIGHT LUNG: ICD-10-CM

## 2022-02-02 DIAGNOSIS — L29.8 ITCHING DUE TO DRUG: Primary | ICD-10-CM

## 2022-02-02 DIAGNOSIS — T50.905A ITCHING DUE TO DRUG: Primary | ICD-10-CM

## 2022-02-02 DIAGNOSIS — I82.403 ACUTE DEEP VEIN THROMBOSIS (DVT) OF BOTH LOWER EXTREMITIES, UNSPECIFIED VEIN: ICD-10-CM

## 2022-02-02 PROCEDURE — 99214 OFFICE O/P EST MOD 30 MIN: CPT | Mod: S$GLB,,, | Performed by: NURSE PRACTITIONER

## 2022-02-02 PROCEDURE — 99214 PR OFFICE/OUTPT VISIT, EST, LEVL IV, 30-39 MIN: ICD-10-PCS | Mod: S$GLB,,, | Performed by: NURSE PRACTITIONER

## 2022-02-02 RX ORDER — HEPARIN 100 UNIT/ML
500 SYRINGE INTRAVENOUS
Status: CANCELLED | OUTPATIENT
Start: 2022-02-03

## 2022-02-02 RX ORDER — SODIUM CHLORIDE 0.9 % (FLUSH) 0.9 %
10 SYRINGE (ML) INJECTION
Status: CANCELLED | OUTPATIENT
Start: 2022-02-03

## 2022-02-02 RX ORDER — HYDROXYZINE HYDROCHLORIDE 10 MG/1
10 TABLET, FILM COATED ORAL 3 TIMES DAILY PRN
Qty: 60 TABLET | Refills: 2 | Status: SHIPPED | OUTPATIENT
Start: 2022-02-02

## 2022-02-02 RX ORDER — LORATADINE 10 MG/1
10 TABLET ORAL
COMMUNITY

## 2022-02-02 NOTE — PROGRESS NOTES
"    Medical Oncology Progress Note  [facility]     PATIENT: Adri Seay  : 1935 86 y.o. female  MRN [id number]     PCP: Primary Doctor No  Consult Requested By:      Date of Service: 2022    Subjective:   Interim History:    Oncology History:    History of Present Illness: Ms. Seay is a 86 y.o. female who presents for evaluation and management of cervical cancer. SHe was seen at [facility] on 19.      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 The patient had a CT scan consistent with widely metastatic disease including multiple sites of lymphadenopathy, a possible lung metastases, liver metastases, nodule in the anterior abdominal wall, and carcinomatosis. The patient is mostly asymptomatic but does have a vaginal discharge. Her performance status is 0. On exam, there is a 6 centimeter mass in the anterior abdominal wall superior to the umbilicus. On bimanual examination, tumor is a place the entire cervix apex of the vagina, and on rectovaginal examination, there is a large pelvic mass extending to the bilateral pelvic sidewalls.    We will get her CT reviewed at [facility] and if it is consistent with metastatic disease, we have recommended palliative chemotherapy with either carboplatinum as a single agent or carboplatinum and paclitaxel. Due to concern for bowel involvement on the CT scan, I have recommended not giving bevacizumab as part of this regimen. Patient will return home to [place] and begin chemotherapy with a medical oncologist there. I've also recommended a consultation with radiation oncology in [place] for consideration of palliative radiation to lower the chances of significant vaginal bleeding. We will see her back on an as-needed basis."        Oncology History   Malignant neoplasm of endocervix   2019 Initial Diagnosis    Malignant " neoplasm of exocervix     7/8/2019 - 7/8/2019 Chemotherapy    Treatment Summary   Plan Name: OP CARBOPLATIN WEEKLY  Treatment Goal: Palliative  Status: Inactive  Start Date: [No treatment day found]  End Date: [No treatment day found]  Provider: Jaime Pinon MD  Chemotherapy: CARBOplatin (PARAPLATIN) in sodium chloride 0.9% 250 mL chemo infusion, , Intravenous, Clinic/HOD 1 time, 0 of 4 cycles     7/9/2019 - 11/25/2019 Chemotherapy    Treatment Summary   Plan Name: OP GYN CARBOPLATIN (AUC) Q4W  Treatment Goal: Palliative  Status: Inactive  Start Date: 7/9/2019  End Date: 10/29/2019  Provider: Jaime Pinon MD  Chemotherapy: CARBOplatin (PARAPLATIN) in sodium chloride 0.9% 250 mL chemo infusion,  (original dose 341.5 mg), Intravenous, Clinic/HOD 1 time, 5 of 6 cycles  Dose modification:   (original dose 341.5 mg, Cycle 1)     5/19/2020 - 6/16/2020 Chemotherapy    Treatment Summary   Plan Name: OP  PACLITAXEL CARBOPLATIN WEEKLY  Treatment Goal: Palliative  Status: Inactive  Start Date: 5/19/2020  End Date: 5/22/2020  Provider: Annette Donis NP  Chemotherapy: CARBOplatin (PARAPLATIN) 115 mg in sodium chloride 0.9% 250 mL chemo infusion, 115 mg (98.6 % of original dose 118.4 mg), Intravenous, Clinic/HOD 1 time, 0 of 1 cycle  Dose modification:   (original dose 118.4 mg, Cycle 1)  PACLitaxeL (TAXOL) 80 mg/m2 = 126 mg in sodium chloride 0.9% 250 mL chemo infusion, 80 mg/m2 = 126 mg (100 % of original dose 80 mg/m2), Intravenous, Clinic/HOD 1 time, 0 of 1 cycle  Dose modification: 80 mg/m2 (original dose 80 mg/m2, Cycle 1)     5/19/2020 - 1/19/2021 Chemotherapy    Treatment Summary   Plan Name:  PACLITAXEL CARBOPLATIN WEEKLY  Treatment Goal: Palliative  Status: Inactive  Start Date: 5/19/2020  End Date: 12/29/2020  Provider: Annette Donis NP  Chemotherapy: CARBOplatin (PARAPLATIN) 115 mg in sodium chloride 0.9% 250 mL chemo infusion, 115 mg (100 % of original dose 114.8 mg), Intravenous, Clinic/HOD 1 time, 3 of 3  cycles  Dose modification:   (original dose 114.8 mg, Cycle 1, Reason: MD Discretion), 100 mg (original dose 114.8 mg, Cycle 1),   (original dose 114.8 mg, Cycle 2, Reason: MD Discretion),   (original dose 114.8 mg, Cycle 2, Reason: MD Discretion),   (original dose 114.8 mg, Cycle 2),   (original dose 114.8 mg, Cycle 2)  Administration: 115 mg (9/17/2020), 115 mg (9/24/2020), 125 mg (10/8/2020), 140 mg (10/15/2020), 130 mg (9/3/2020)  PACLitaxeL (TAXOL) 80 mg/m2 = 126 mg in sodium chloride 0.9% 250 mL chemo infusion, 80 mg/m2 = 126 mg (100 % of original dose 80 mg/m2), Intravenous, Clinic/HOD 1 time, 3 of 3 cycles  Dose modification: 80 mg/m2 (original dose 80 mg/m2, Cycle 1), 50 mg/m2 (original dose 80 mg/m2, Cycle 1), 50 mg/m2 (original dose 80 mg/m2, Cycle 3)  Administration: 84 mg (9/3/2020), 84 mg (9/17/2020), 84 mg (9/24/2020), 90 mg (10/8/2020), 84 mg (10/15/2020)     12/30/2020 - 8/19/2021 Chemotherapy    Treatment Summary   Plan Name: OP GYN PACLITAXEL WEEKLY + CARBOPLATIN  (AUC2) WEEKLY  Treatment Goal: Maintenance  Status: Inactive  Start Date: 12/30/2020  End Date: 8/19/2021  Provider: Abundio Tabares MD  Chemotherapy: CARBOplatin (PARAPLATIN) 125 mg in sodium chloride 0.9% 250 mL chemo infusion, 125 mg, Intravenous, Clinic/HOD 1 time, 30 of 35 cycles  Dose modification: 145 mg (original dose 125.6 mg, Cycle 11), 145 mg (original dose 125.6 mg, Cycle 15), 145 mg (original dose 125.6 mg, Cycle 17), 145 mg (original dose 125.6 mg, Cycle 18), 145 mg (original dose 125.6 mg, Cycle 20), 145 mg (original dose 125.6 mg, Cycle 22), 126.8 mg (original dose 125.6 mg, Cycle 30)  PACLitaxeL (TAXOL) 50 mg/m2 = 84 mg in sodium chloride 0.9% 250 mL chemo infusion, 50 mg/m2 = 84 mg (100 % of original dose 50 mg/m2), Intravenous, Clinic/[facility] 1 time, 30 of 35 cycles  Dose modification: 50 mg/m2 (original dose 50 mg/m2, Cycle 1)     6/24/2021 - 6/24/2021 Chemotherapy    Treatment Summary   Plan Name: OP BEVACIZUMAB  Q2W  Treatment Goal: Maintenance  Status: Inactive  Start Date:   End Date:   Provider: Annette Donis NP  Chemotherapy: bevacizumab-bvzr 653 mg in sodium chloride 0.9% 126.12 mL infusion, 10 mg/kg, Intravenous, Clinic/HOD 1 time, 0 of 26 cycles     9/16/2021 - 9/16/2021 Chemotherapy    Treatment Summary   Plan Name: OP GYN GEMCITABINE (Q4W)  Treatment Goal: Palliative  Status: Inactive  Start Date: 9/16/2021  End Date: 9/16/2021  Provider: Annette Donis NP  Chemotherapy: gemcitabine (GEMZAR) 1,010 mg in sodium chloride 0.9% 250 mL chemo infusion, 600 mg/m2 = 1,010 mg, Intravenous, Clinic/HOD 1 time, 1 of 6 cycles     9/30/2021 -  Chemotherapy    Treatment Summary   Plan Name: OP PEMBROLIZUMAB 200MG Q3W  Treatment Goal: Palliative  Status: Active  Start Date: 9/30/2021  End Date: 4/6/2022 (Planned)  Provider: Annette Donis NP  Chemotherapy: pembrolizumab (KEYTRUDA) 200 mg in sodium chloride 0.9% 108 mL infusion, 200 mg, Intravenous, Clinic/HOD 1 time, 2 of 6 cycles         Past Medical History:   Past Medical History:   Diagnosis Date    Anemia     Cataract     Cervical cancer 05/2019    Cervical cancer 6/25/2019    Hypertension     Malignant neoplasm metastatic to right lung 9/1/2021       Past Surgical HIstory:   Past Surgical History:   Procedure Laterality Date    CATARACT EXTRACTION, BILATERAL      WRIST SURGERY  1984       Allergies:  Review of patient's allergies indicates:   Allergen Reactions    Strawberry Rash       Medications:  Current Outpatient Medications   Medication Sig Dispense Refill    cloNIDine (CATAPRES) 0.3 MG tablet TAKE 1 TABLET(0.3 MG) BY MOUTH TWICE DAILY 180 tablet 3    iron-vit c-vit b12-folic acid (IRON-C PLUS) tablet Daily 30 each 6    NIFEdipine (PROCARDIA-XL) 30 MG (OSM) 24 hr tablet Take 1 tablet (30 mg total) by mouth once daily. 90 tablet 3    rivaroxaban (XARELTO) 10 mg Tab Take 1 tablet (10 mg total) by mouth daily with dinner or evening meal. 30 tablet 3     simvastatin (ZOCOR) 20 MG tablet Take 1 tablet (20 mg total) by mouth every evening. 90 tablet 3    FENOFIBRATE ORAL Take by mouth 2 (two) times daily.      hydrOXYzine HCL (ATARAX) 10 MG Tab Take 1 tablet (10 mg total) by mouth 3 (three) times daily as needed. 60 tablet 2    loratadine (CLARITIN) 10 mg tablet 10 mg.      ondansetron (ZOFRAN-ODT) 8 MG TbDL Take 1 tablet (8 mg total) by mouth every 8 (eight) hours as needed (chemotherapy-induced nausea and vomiting). (Patient not taking: Reported on 2/2/2022) 40 tablet 5    oxyCODONE-acetaminophen (PERCOCET) 5-325 mg per tablet Every 6 Hours as needed for Moderate Pain(4-6)      varicella-zoster [id number], PF, (SHINGRIX, PF,) 50 mcg/0.5 mL injection Administer two doses 2 months apart (Patient not taking: Reported on 2/2/2022) 0.5 mL 0     Current Facility-Administered Medications   Medication Dose Route Frequency Provider Last Rate Last Admin    alteplase injection 2 mg  2 mg Intra-Catheter PRN Annette Donis NP        EPINEPHrine (EPIPEN) 0.3 mg/0.3 mL pen injection 0.3 mg  0.3 mg Intramuscular Once PRN Annette Donis NP           Family History:   Family History   Problem Relation Age of Onset    Hypertension Mother     Pneumonia Father     Breast cancer Sister     No Known Problems Brother     Leukemia Daughter     No Known Problems Son        Social History:  reports that she has never smoked. She has never used smokeless tobacco. She reports previous alcohol use. She reports that she does not use drugs.    Review of Systemas  Constitutional: No change in weight, appetie, fatigue, fever, or night sweats  Eyes: No changes in vision  Ears, Nose, Mouth, Throat, and Face: No hearing problems, ear pain, rummy nose, or sore throat  Respiratory: No shortness of breath or cough  Cardiovascular: No chest pain, palpitations or dizziness  Gastrointestinal: No abdominal pain, hematochezia, melena  Genitourinary: No dysuria, hematuria, nocturia, menstrual problems,  "post menopausal  Integumentary/Breast: No rashes or itching  Hematologic/Lymphatic: No anemia or bleeding abnormalities  Musculoskeletal: No joint or muscle abnormalities  Neurological: No sensory or motor problems, no headaches  Behavioral/Psych: No depression, anxiety, cognitive problems, or stress  Endocrine: No diabetes or thyroid problems  Allergic/Immunologic: See allergy above    Physical Exam      Vitals:   Vitals:    02/02/22 1004   BP: 103/70   BP Location: Left arm   Patient Position: Sitting   BP Method: Medium (Automatic)   Pulse: 82   Resp: 16   Temp: 97.8 °F (36.6 °C)   TempSrc: Temporal   SpO2: 98%   Weight: 57.6 kg (126 lb 14.4 oz)   Height: 5' 2" (1.575 m)     BMI: Body mass index is 23.21 kg/m².  BSA Body surface area is 1.59 meters squared.  ECOG Performance Status:   ECOG SCORE         GENERAL APPEARANCE: Well developed, well nourished, in no acute distress.      Labs and Imagings     Lab Results   Component Value Date    WBC 7.3 02/01/2022    HGB 9.4 (L) 02/01/2022    HCT 29.6 (L) 02/01/2022    MCV 99.3 (H) 02/01/2022    LABPLAT 212 02/01/2022       CMP  Sodium   Date Value Ref Range Status   02/01/2022 139 135 - 145 mmol/L Final   03/16/2020 140 136 - 145 mmol/L Final     Potassium   Date Value Ref Range Status   02/01/2022 4.1 3.6 - 5.2 mmol/L Final   03/16/2020 4.5 3.5 - 5.1 mmol/L Final     Chloride   Date Value Ref Range Status   02/01/2022 104 100 - 108 mmol/L Final   03/16/2020 107 98 - 107 mmol/L Final     CO2   Date Value Ref Range Status   02/01/2022 23 21 - 32 mmol/L Final   03/16/2020 21 (L) 22 - 29 mmol/L Final     Glucose   Date Value Ref Range Status   02/01/2022 103 70 - 110 mg/dL Final     BUN   Date Value Ref Range Status   02/01/2022 14 7 - 18 mg/dL Final   03/16/2020 17.2 8 - 23 mg/dL Final     Creatinine   Date Value Ref Range Status   02/01/2022 0.93 0.55 - 1.02 mg/dL Final   03/16/2020 1.17 (H) 0.50 - 0.90 mg/dL Final     Calcium   Date Value Ref Range Status   02/01/2022 " 9.4 8.8 - 10.5 mg/dL Final   03/16/2020 10.0 8.6 - 10.2 mg/dL Final     Total Protein   Date Value Ref Range Status   02/01/2022 6.3 (L) 6.4 - 8.2 g/dL Final     Albumin   Date Value Ref Range Status   02/01/2022 2.9 (L) 3.4 - 5.0 g/dL Final   03/16/2020 4.6 3.5 - 5.2 g/dL Final     Total Bilirubin   Date Value Ref Range Status   02/01/2022 0.3 0.0 - 1.0 mg/dL Final     Alkaline Phosphatase   Date Value Ref Range Status   02/01/2022 79 46 - 116 U/L Final     AST   Date Value Ref Range Status   02/01/2022 23 15 - 37 U/L Final   03/16/2020 17 0 - 32 U/L Final     ALT   Date Value Ref Range Status   02/01/2022 17 12 - 78 U/L Final     Anion Gap   Date Value Ref Range Status   02/01/2022 12.0 (H) 3.0 - 11.0 mmol/L Final   03/16/2020 12.0 8.0 - 17.0 mmol/L Final     Comment:     NOTE  Testing performed at:  [facility], [address] [place] [address] CLIA #:[id number]       eGFR if    Date Value Ref Range Status   10/15/2020 >60 >60 mL/min/1.73 m^2 Final     eGFR if non    Date Value Ref Range Status   10/15/2020 52 (A) >60 mL/min/1.73 m^2 Final     Comment:     Calculation used to obtain the estimated glomerular filtration  rate (eGFR) is the CKD-EPI equation.            Imaging:                       Assessment:     Principle Problem: Itching due to drug [L29.8, T50.905A]   Co-morbidity/Active Problems:        Adri Seay is a 86 y.o. female with PMH of The primary encounter diagnosis was Itching due to drug. 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 Malignant neoplasm of cervix, unspecified site    3. Increased creatine  kinase level    4. Neoplastic malignant related fatigue              Plan:       Cervical cancer stage IV disease diagnosed 6/2019  == 8/5/19 Carbo    ==PET/CT done on 12/2/19. Previous CT imaging done at outside facility, discussed with Dr Nguyen for comparison and he stated pelvic mass smaller, abdominal mass smaller liver lesion likely benign stable but difficult to say if peritoneal implants stable to progressed.    ==FoundationOne Liquid Bx on 12/16/19.  BRCA 2 gene mutation.  ==1/14/20 Pt started lynparza   ==5/13/2020 PET showing early progression.    ==5/2020 resume low dose carbo and taxol.   ==11/2020 PET decreased to stable disease  == 2/25/21 Stable Disease  ==8/27/21 PET progression of disease  == 9/22/21 PDL1 CPS>1  == 10/22 Keytruda started     11/17/21:  Patient here today with her daughter and granddaughter after recent hospitalization due to AMS. She was driving and someone called the  because she seemed confused.  She was brought to ED and initially was unable to answer most questions appropriately.  Labs showed an KALPANA on CKD. She was hospitalized for further evaluation and management of acute metabolic encephalopathy with unclear etiology.  CT head was negative for acute ischemia.  Contrast MRI subsequently did not show any areas of infarction, specifically there were no areas of micrometastasis.  EEG did not demonstrate epileptiform waveforms.  Hemoglobin on admission was 6.5 g, she received a single unit of PRBCs with normalization to 8.6 on discharge.  She was made n.p.o., received IV fluids, IV PPI and underwent EGD/colonoscopy with Dr. Cordova on 11/11 which showed atrophic gastritis and nonbleeding AV malformations.  She was seen by neurologist, Dr. Hall, who diagnosed her with severe dementia based on MOCA assessment at bedside.  Patient to follow with Dr. Hall for further evaluation and management of this. Patient does desire to be referred to Dr Sullivan for evaluation of  dementia.  The patient has mobility limitation that significantly impairs her abilities and is in need of rollator for unsteady gait and it will significantly improve her ability to participate in his ADLs.  Palliative care referral placed as well. We will see the patient back on 12/2/21 to evaluate her performance status.  If she has returned to baseline, we will likely resume Keytruda with IV hydration support provided.  Patient will resume her ICAR-C and labs to be drawn prior to visit.  12/27/21:  Patient has had recent performance status decline with recurrent hospitalization in mid December for fall with resulting right shoulder fracture.  Patient was then discharged to [facility] for strengthening with PT and OT.  On 12/24/2021 patient awoke at 3:00 a.m. with new onset vaginal bleeding and presented to ER for evaluation.  Trans abdominal transpelvic ultrasound showed a large cystic mass consistent with her known cervical cancer diagnosis.  At that time hemoglobin was approximately 8.0, and bleeding spontaneously resolved.  Patient was not given a blood transfusion and was released back to [facility].  She denies any recurrent episodes of vaginal bleeding since that time.  Plan will be to proceed with repeat PET CT to evaluate current disease status as she has been off of IO since mid October and will refer patient for evaluation with Dr. Beckwith for palliative radiation.  Patient will return to clinic next week with PET scan and labs to discuss further management and the possibility of resuming Keytruda.  1/20/22:  Patient recently hospitalized for PE as well as bilateral lower DVTs.  Patient completed palliative radiation to cervical mass and bleeding has resolved, patient has been started on low-dose Xarelto to manage clots and has been discharged home.  Her daughter has moved in to provide full time assistance.  Patient is suffering from weakness and fatigue, but otherwise seems to be recovering well.  Labs  reviewed today and hemoglobin greater than 10 no indication for transfusions needed Tentatively we will plan to resume I 0 on February 2nd with labs the day before.  2/2/22: patient and daughter in clinic, daughter states Ms August has been feeling good and she appears to back at baseline. Her only c/o is new onset itching, no rash noted. Pt denies any recent/recurrent vaginal bleeding in the last 2 weeks. Labs reviewed and pt is cleared for tx as planned for tomorrow.     RTC in 3 weeks with labs       ANISA Torres

## 2022-02-02 NOTE — LETTER
February 2, 2022        Clair Rolon NP  [address]  [place] [address]             [place] [facility]  [address]  [place] [address]  Phone: [phone]  Fax: [phone]   Patient: Adri Seay   MR Number: [id number]   YOB: 1935   Date of Visit: 2/2/2022       Dear Dr. Rolon:    Thank you for referring Adri Seay to me for evaluation. Attached you will find relevant portions of my assessment and plan of care.    If you have questions, please do not hesitate to call me. I look forward to following Adri Seay along with you.    Sincerely,      Annette Donis NP            CC  No Recipients    Enclosure

## 2022-02-07 ENCOUNTER — TELEPHONE (OUTPATIENT)
Dept: HEMATOLOGY/ONCOLOGY | Facility: CLINIC | Age: 87
End: 2022-02-07
Payer: MEDICAID

## 2022-02-07 NOTE — TELEPHONE ENCOUNTER
----- Message from Monica Solis sent at 2/7/2022  2:41 PM CST -----  Contact: patient daughter  Patient daughter returned nurse call. Please call her back at [phone]

## 2022-02-10 ENCOUNTER — TELEPHONE (OUTPATIENT)
Dept: HEMATOLOGY/ONCOLOGY | Facility: CLINIC | Age: 87
End: 2022-02-10
Payer: MEDICAID

## 2022-02-10 DIAGNOSIS — C53.0 MALIGNANT NEOPLASM OF ENDOCERVIX: Primary | ICD-10-CM

## 2022-02-10 LAB
BANDS: 1 % (ref 0–5)
CELLS COUNTED: 100
EOSINOPHIL NFR BLD: 3 % (ref 1–3)
ERYTHROCYTE [DISTWIDTH] IN BLOOD BY AUTOMATED COUNT: 17.5 % (ref 12.5–18)
HCT VFR BLD AUTO: 29.7 % (ref 37–47)
HGB BLD-MCNC: 9.6 G/DL (ref 12–16)
LYMPHOCYTES NFR BLD: 9 % (ref 25–40)
MCH RBC QN AUTO: 32 PG (ref 27–31.2)
MCHC RBC AUTO-ENTMCNC: 32.3 G/DL (ref 31.8–35.4)
MCV RBC AUTO: 99 FL (ref 80–97)
MONOCYTES NFR BLD: 6 % (ref 1–15)
NEUTROPHILS # BLD AUTO: 8.8 10*3/UL (ref 1.8–7.7)
NEUTROPHILS NFR BLD: 81 % (ref 37–80)
NUCLEATED RED BLOOD CELLS: 0 %
PLATELETS: 234 10*3/UL (ref 142–424)
RBC # BLD AUTO: 3 10*6/UL (ref 4.2–5.4)
WBC # BLD: 10.7 10*3/UL (ref 4.6–10.2)

## 2022-02-10 NOTE — TELEPHONE ENCOUNTER
----- Message from Dolly Estrada sent at 2/10/2022  9:37 AM CST -----  Adri Seay would like for the nurse to give her a call back regarding a script. She haven't heard back from the insurance and she is completely out of her medication and she's starting to bleed again. She said its urgent.   Phone: [phone]

## 2022-02-10 NOTE — TELEPHONE ENCOUNTER
Called [employer] to start a prior authorization for eliquis 2.5 mg and it is under review for 72 hrs. Reference # is [id number]. Phone number is [phone]. [employer] ID# is [id number].

## 2022-02-21 DIAGNOSIS — C53.0 MALIGNANT NEOPLASM OF ENDOCERVIX: Primary | ICD-10-CM

## 2022-02-21 DIAGNOSIS — E03.9 HYPOTHYROIDISM, UNSPECIFIED TYPE: ICD-10-CM

## 2022-02-22 DIAGNOSIS — D84.9 IMMUNOSUPPRESSED STATUS: ICD-10-CM

## 2022-02-22 LAB
ALBUMIN SERPL BCP-MCNC: 3 G/DL (ref 3.4–5)
ALBUMIN/GLOBULIN RATIO: 0.86 RATIO (ref 1.1–1.8)
ALP SERPL-CCNC: 83 U/L (ref 46–116)
ALT SERPL W P-5'-P-CCNC: 15 U/L (ref 12–78)
ANION GAP SERPL CALC-SCNC: 11 MMOL/L (ref 3–11)
AST SERPL-CCNC: 29 U/L (ref 15–37)
BASOPHILS NFR BLD: 0.3 % (ref 0–3)
BILIRUB SERPL-MCNC: 0.5 MG/DL (ref 0–1)
BUN SERPL-MCNC: 13 MG/DL (ref 7–18)
BUN/CREAT SERPL: 13.82 RATIO (ref 7–18)
CALCIUM SERPL-MCNC: 8.8 MG/DL (ref 8.8–10.5)
CHLORIDE SERPL-SCNC: 102 MMOL/L (ref 100–108)
CO2 SERPL-SCNC: 22 MMOL/L (ref 21–32)
CREAT SERPL-MCNC: 0.94 MG/DL (ref 0.55–1.02)
EOSINOPHIL NFR BLD: 1.7 % (ref 1–3)
ERYTHROCYTE [DISTWIDTH] IN BLOOD BY AUTOMATED COUNT: 17.2 % (ref 12.5–18)
GFR ESTIMATION: > 60
GLOBULIN: 3.5 G/DL (ref 2.3–3.5)
GLUCOSE SERPL-MCNC: 127 MG/DL (ref 70–110)
HCT VFR BLD AUTO: 30.7 % (ref 37–47)
HGB BLD-MCNC: 9.6 G/DL (ref 12–16)
LYMPHOCYTES NFR BLD: 30.5 % (ref 25–40)
MCH RBC QN AUTO: 30.8 PG (ref 27–31.2)
MCHC RBC AUTO-ENTMCNC: 31.3 G/DL (ref 31.8–35.4)
MCV RBC AUTO: 98.4 FL (ref 80–97)
MONOCYTES NFR BLD: 6.9 % (ref 1–15)
NEUTROPHILS # BLD AUTO: 4.72 10*3/UL (ref 1.8–7.7)
NEUTROPHILS NFR BLD: 60.1 % (ref 37–80)
NUCLEATED RED BLOOD CELLS: 0 %
PLATELETS: 205 10*3/UL (ref 142–424)
POTASSIUM SERPL-SCNC: 3.4 MMOL/L (ref 3.6–5.2)
PROT SERPL-MCNC: 6.5 G/DL (ref 6.4–8.2)
RBC # BLD AUTO: 3.12 10*6/UL (ref 4.2–5.4)
SODIUM BLD-SCNC: 135 MMOL/L (ref 135–145)
TSH SERPL DL<=0.005 MIU/L-ACNC: 0.91 UIU/ML (ref 0.36–3.74)
WBC # BLD: 7.8 10*3/UL (ref 4.6–10.2)

## 2022-02-23 ENCOUNTER — OFFICE VISIT (OUTPATIENT)
Dept: HEMATOLOGY/ONCOLOGY | Facility: CLINIC | Age: 87
End: 2022-02-23
Payer: MEDICARE

## 2022-02-23 VITALS
SYSTOLIC BLOOD PRESSURE: 148 MMHG | DIASTOLIC BLOOD PRESSURE: 55 MMHG | HEIGHT: 62 IN | WEIGHT: 121.38 LBS | TEMPERATURE: 98 F | RESPIRATION RATE: 18 BRPM | BODY MASS INDEX: 22.34 KG/M2 | HEART RATE: 58 BPM | OXYGEN SATURATION: 96 %

## 2022-02-23 DIAGNOSIS — I82.403 ACUTE DEEP VEIN THROMBOSIS (DVT) OF BOTH LOWER EXTREMITIES, UNSPECIFIED VEIN: ICD-10-CM

## 2022-02-23 DIAGNOSIS — C53.0 MALIGNANT NEOPLASM OF ENDOCERVIX: Primary | ICD-10-CM

## 2022-02-23 DIAGNOSIS — Z29.89 ENCOUNTER FOR IMMUNOTHERAPY: ICD-10-CM

## 2022-02-23 PROCEDURE — 99214 PR OFFICE/OUTPT VISIT, EST, LEVL IV, 30-39 MIN: ICD-10-PCS | Mod: S$GLB,,, | Performed by: NURSE PRACTITIONER

## 2022-02-23 PROCEDURE — 99214 OFFICE O/P EST MOD 30 MIN: CPT | Mod: S$GLB,,, | Performed by: NURSE PRACTITIONER

## 2022-02-23 RX ORDER — HEPARIN 100 UNIT/ML
500 SYRINGE INTRAVENOUS
Status: CANCELLED | OUTPATIENT
Start: 2022-02-24

## 2022-02-23 RX ORDER — SODIUM CHLORIDE 0.9 % (FLUSH) 0.9 %
10 SYRINGE (ML) INJECTION
Status: CANCELLED | OUTPATIENT
Start: 2022-02-24

## 2022-02-23 NOTE — PROGRESS NOTES
"    Medical Oncology Progress Note  [facility]     PATIENT: Adri Seay  : 1935 86 y.o. female  MRN [id number]     PCP: Primary Doctor No  Consult Requested By:      Date of Service: 2022    Subjective:   Interim History:    Oncology History:    History of Present Illness: Ms. Seay is a 86 y.o. female who presents for evaluation and management of cervical cancer. SHe was seen at [facility] on 19.      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 The patient had a CT scan consistent with widely metastatic disease including multiple sites of lymphadenopathy, a possible lung metastases, liver metastases, nodule in the anterior abdominal wall, and carcinomatosis. The patient is mostly asymptomatic but does have a vaginal discharge. Her performance status is 0. On exam, there is a 6 centimeter mass in the anterior abdominal wall superior to the umbilicus. On bimanual examination, tumor is a place the entire cervix apex of the vagina, and on rectovaginal examination, there is a large pelvic mass extending to the bilateral pelvic sidewalls.    We will get her CT reviewed at [facility] and if it is consistent with metastatic disease, we have recommended palliative chemotherapy with either carboplatinum as a single agent or carboplatinum and paclitaxel. Due to concern for bowel involvement on the CT scan, I have recommended not giving bevacizumab as part of this regimen. Patient will return home to [place] and begin chemotherapy with a medical oncologist there. I've also recommended a consultation with radiation oncology in [place] for consideration of palliative radiation to lower the chances of significant vaginal bleeding. We will see her back on an as-needed basis."        Oncology History   Malignant neoplasm of endocervix   2019 Initial Diagnosis    Malignant " neoplasm of exocervix     7/8/2019 - 7/8/2019 Chemotherapy    Treatment Summary   Plan Name: OP CARBOPLATIN WEEKLY  Treatment Goal: Palliative  Status: Inactive  Start Date: [No treatment day found]  End Date: [No treatment day found]  Provider: Jaime Pinon MD  Chemotherapy: CARBOplatin (PARAPLATIN) in sodium chloride 0.9% 250 mL chemo infusion, , Intravenous, Clinic/HOD 1 time, 0 of 4 cycles     7/9/2019 - 11/25/2019 Chemotherapy    Treatment Summary   Plan Name: OP GYN CARBOPLATIN (AUC) Q4W  Treatment Goal: Palliative  Status: Inactive  Start Date: 7/9/2019  End Date: 10/29/2019  Provider: Jaime Pinon MD  Chemotherapy: CARBOplatin (PARAPLATIN) in sodium chloride 0.9% 250 mL chemo infusion,  (original dose 341.5 mg), Intravenous, Clinic/HOD 1 time, 5 of 6 cycles  Dose modification:   (original dose 341.5 mg, Cycle 1)     5/19/2020 - 6/16/2020 Chemotherapy    Treatment Summary   Plan Name: OP  PACLITAXEL CARBOPLATIN WEEKLY  Treatment Goal: Palliative  Status: Inactive  Start Date: 5/19/2020  End Date: 5/22/2020  Provider: Annette Donis NP  Chemotherapy: CARBOplatin (PARAPLATIN) 115 mg in sodium chloride 0.9% 250 mL chemo infusion, 115 mg (98.6 % of original dose 118.4 mg), Intravenous, Clinic/HOD 1 time, 0 of 1 cycle  Dose modification:   (original dose 118.4 mg, Cycle 1)  PACLitaxeL (TAXOL) 80 mg/m2 = 126 mg in sodium chloride 0.9% 250 mL chemo infusion, 80 mg/m2 = 126 mg (100 % of original dose 80 mg/m2), Intravenous, Clinic/HOD 1 time, 0 of 1 cycle  Dose modification: 80 mg/m2 (original dose 80 mg/m2, Cycle 1)     5/19/2020 - 1/19/2021 Chemotherapy    Treatment Summary   Plan Name:  PACLITAXEL CARBOPLATIN WEEKLY  Treatment Goal: Palliative  Status: Inactive  Start Date: 5/19/2020  End Date: 12/29/2020  Provider: Annette Donis NP  Chemotherapy: CARBOplatin (PARAPLATIN) 115 mg in sodium chloride 0.9% 250 mL chemo infusion, 115 mg (100 % of original dose 114.8 mg), Intravenous, Clinic/HOD 1 time, 3 of 3  cycles  Dose modification:   (original dose 114.8 mg, Cycle 1, Reason: MD Discretion), 100 mg (original dose 114.8 mg, Cycle 1),   (original dose 114.8 mg, Cycle 2, Reason: MD Discretion),   (original dose 114.8 mg, Cycle 2, Reason: MD Discretion),   (original dose 114.8 mg, Cycle 2),   (original dose 114.8 mg, Cycle 2)  Administration: 115 mg (9/17/2020), 115 mg (9/24/2020), 125 mg (10/8/2020), 140 mg (10/15/2020), 130 mg (9/3/2020)  PACLitaxeL (TAXOL) 80 mg/m2 = 126 mg in sodium chloride 0.9% 250 mL chemo infusion, 80 mg/m2 = 126 mg (100 % of original dose 80 mg/m2), Intravenous, Clinic/HOD 1 time, 3 of 3 cycles  Dose modification: 80 mg/m2 (original dose 80 mg/m2, Cycle 1), 50 mg/m2 (original dose 80 mg/m2, Cycle 1), 50 mg/m2 (original dose 80 mg/m2, Cycle 3)  Administration: 84 mg (9/3/2020), 84 mg (9/17/2020), 84 mg (9/24/2020), 90 mg (10/8/2020), 84 mg (10/15/2020)     12/30/2020 - 8/19/2021 Chemotherapy    Treatment Summary   Plan Name: OP GYN PACLITAXEL WEEKLY + CARBOPLATIN  (AUC2) WEEKLY  Treatment Goal: Maintenance  Status: Inactive  Start Date: 12/30/2020  End Date: 8/19/2021  Provider: Abundio Tabares MD  Chemotherapy: CARBOplatin (PARAPLATIN) 125 mg in sodium chloride 0.9% 250 mL chemo infusion, 125 mg, Intravenous, Clinic/HOD 1 time, 30 of 35 cycles  Dose modification: 145 mg (original dose 125.6 mg, Cycle 11), 145 mg (original dose 125.6 mg, Cycle 15), 145 mg (original dose 125.6 mg, Cycle 17), 145 mg (original dose 125.6 mg, Cycle 18), 145 mg (original dose 125.6 mg, Cycle 20), 145 mg (original dose 125.6 mg, Cycle 22), 126.8 mg (original dose 125.6 mg, Cycle 30)  PACLitaxeL (TAXOL) 50 mg/m2 = 84 mg in sodium chloride 0.9% 250 mL chemo infusion, 50 mg/m2 = 84 mg (100 % of original dose 50 mg/m2), Intravenous, Clinic/[facility] 1 time, 30 of 35 cycles  Dose modification: 50 mg/m2 (original dose 50 mg/m2, Cycle 1)     6/24/2021 - 6/24/2021 Chemotherapy    Treatment Summary   Plan Name: OP BEVACIZUMAB  Q2W  Treatment Goal: Maintenance  Status: Inactive  Start Date:   End Date:   Provider: Annette Donis NP  Chemotherapy: bevacizumab-bvzr 653 mg in sodium chloride 0.9% 126.12 mL infusion, 10 mg/kg, Intravenous, Clinic/HOD 1 time, 0 of 26 cycles     9/16/2021 - 9/16/2021 Chemotherapy    Treatment Summary   Plan Name: OP GYN GEMCITABINE (Q4W)  Treatment Goal: Palliative  Status: Inactive  Start Date: 9/16/2021  End Date: 9/16/2021  Provider: Annette Donis NP  Chemotherapy: gemcitabine (GEMZAR) 1,010 mg in sodium chloride 0.9% 250 mL chemo infusion, 600 mg/m2 = 1,010 mg, Intravenous, Clinic/HOD 1 time, 1 of 6 cycles     9/30/2021 -  Chemotherapy    Treatment Summary   Plan Name: OP PEMBROLIZUMAB 200MG Q3W  Treatment Goal: Palliative  Status: Active  Start Date: 9/30/2021  End Date: 4/7/2022 (Planned)  Provider: Annette Donis NP  Chemotherapy: pembrolizumab (KEYTRUDA) 200 mg in sodium chloride 0.9% 108 mL infusion, 200 mg, Intravenous, Clinic/HOD 1 time, 3 of 6 cycles         Past Medical History:   Past Medical History:   Diagnosis Date    Anemia     Cataract     Cervical cancer 05/2019    Cervical cancer 6/25/2019    Hypertension     Malignant neoplasm metastatic to right lung 9/1/2021       Past Surgical HIstory:   Past Surgical History:   Procedure Laterality Date    CATARACT EXTRACTION, BILATERAL      WRIST SURGERY  1984       Allergies:  Review of patient's allergies indicates:   Allergen Reactions    Strawberry Rash       Medications:  Current Outpatient Medications   Medication Sig Dispense Refill    apixaban (ELIQUIS) 2.5 mg Tab Take 1 tablet (2.5 mg total) by mouth 2 (two) times daily. 60 tablet 3    cloNIDine (CATAPRES) 0.3 MG tablet TAKE 1 TABLET(0.3 MG) BY MOUTH TWICE DAILY 180 tablet 3    FENOFIBRATE ORAL Take by mouth 2 (two) times daily.      hydrOXYzine HCL (ATARAX) 10 MG Tab Take 1 tablet (10 mg total) by mouth 3 (three) times daily as needed. 60 tablet 2    iron-vit c-vit b12-folic acid  (IRON-C PLUS) tablet Daily 30 each 6    loratadine (CLARITIN) 10 mg tablet 10 mg.      NIFEdipine (PROCARDIA-XL) 30 MG (OSM) 24 hr tablet Take 1 tablet (30 mg total) by mouth once daily. 90 tablet 3    ondansetron (ZOFRAN-ODT) 8 MG TbDL Take 1 tablet (8 mg total) by mouth every 8 (eight) hours as needed (chemotherapy-induced nausea and vomiting). (Patient not taking: Reported on 2/2/2022) 40 tablet 5    oxyCODONE-acetaminophen (PERCOCET) 5-325 mg per tablet Every 6 Hours as needed for Moderate Pain(4-6)      simvastatin (ZOCOR) 20 MG tablet Take 1 tablet (20 mg total) by mouth every evening. 90 tablet 3    varicella-zoster gE-AS01B, PF, (SHINGRIX, PF,) 50 mcg/0.5 mL injection Administer two doses 2 months apart (Patient not taking: Reported on 2/2/2022) 0.5 mL 0     Current Facility-Administered Medications   Medication Dose Route Frequency Provider Last Rate Last Admin    alteplase injection 2 mg  2 mg Intra-Catheter PRN Annette Donis NP        EPINEPHrine (EPIPEN) 0.3 mg/0.3 mL pen injection 0.3 mg  0.3 mg Intramuscular Once PRN Annette Donis NP           Family History:   Family History   Problem Relation Age of Onset    Hypertension Mother     Pneumonia Father     Breast cancer Sister     No Known Problems Brother     Leukemia Daughter     No Known Problems Son        Social History:  reports that she has never smoked. She has never used smokeless tobacco. She reports previous alcohol use. She reports that she does not use drugs.    Review of Systemas  Constitutional: No change in weight, appetie, fatigue, fever, or night sweats  Eyes: No changes in vision  Ears, Nose, Mouth, Throat, and Face: No hearing problems, ear pain, rummy nose, or sore throat  Respiratory: No shortness of breath or cough  Cardiovascular: No chest pain, palpitations or dizziness  Gastrointestinal: No abdominal pain, hematochezia, melena  Genitourinary: No dysuria, hematuria, nocturia, menstrual problems, post  "menopausal  Integumentary/Breast: No rashes or itching  Hematologic/Lymphatic: No anemia or bleeding abnormalities  Musculoskeletal: No joint or muscle abnormalities  Neurological: No sensory or motor problems, no headaches  Behavioral/Psych: No depression, anxiety, cognitive problems, or stress  Endocrine: No diabetes or thyroid problems  Allergic/Immunologic: See allergy above    Physical Exam      Vitals:   Vitals:    02/23/22 0920   BP: (!) 148/55   Pulse: (!) 58   Resp: 18   Temp: 98.2 °F (36.8 °C)   TempSrc: Oral   SpO2: 96%   Weight: 55.1 kg (121 lb 6.4 oz)   Height: 5' 2" (1.575 m)     BMI: Body mass index is 22.2 kg/m².  BSA Body surface area is 1.55 meters squared.  ECOG Performance Status:   ECOG SCORE         GENERAL APPEARANCE: Well developed, well nourished, in no acute distress.      Labs and Imagings     Lab Results   Component Value Date    WBC 7.8 02/22/2022    HGB 9.6 (L) 02/22/2022    HCT 30.7 (L) 02/22/2022    MCV 98.4 (H) 02/22/2022    LABPLAT 205 02/22/2022       CMP  Sodium   Date Value Ref Range Status   02/22/2022 135 135 - 145 mmol/L Final   03/16/2020 140 136 - 145 mmol/L Final     Potassium   Date Value Ref Range Status   02/22/2022 3.4 (L) 3.6 - 5.2 mmol/L Final   03/16/2020 4.5 3.5 - 5.1 mmol/L Final     Chloride   Date Value Ref Range Status   02/22/2022 102 100 - 108 mmol/L Final   03/16/2020 107 98 - 107 mmol/L Final     CO2   Date Value Ref Range Status   02/22/2022 22 21 - 32 mmol/L Final   03/16/2020 21 (L) 22 - 29 mmol/L Final     Glucose   Date Value Ref Range Status   02/22/2022 127 (H) 70 - 110 mg/dL Final     BUN   Date Value Ref Range Status   02/22/2022 13 7 - 18 mg/dL Final   03/16/2020 17.2 8 - 23 mg/dL Final     Creatinine   Date Value Ref Range Status   02/22/2022 0.94 0.55 - 1.02 mg/dL Final   03/16/2020 1.17 (H) 0.50 - 0.90 mg/dL Final     Calcium   Date Value Ref Range Status   02/22/2022 8.8 8.8 - 10.5 mg/dL Final   03/16/2020 10.0 8.6 - 10.2 mg/dL Final     Total " Protein   Date Value Ref Range Status   02/22/2022 6.5 6.4 - 8.2 g/dL Final     Albumin   Date Value Ref Range Status   02/22/2022 3.0 (L) 3.4 - 5.0 g/dL Final   03/16/2020 4.6 3.5 - 5.2 g/dL Final     Total Bilirubin   Date Value Ref Range Status   02/22/2022 0.5 0.0 - 1.0 mg/dL Final     Alkaline Phosphatase   Date Value Ref Range Status   02/22/2022 83 46 - 116 U/L Final     AST   Date Value Ref Range Status   02/22/2022 29 15 - 37 U/L Final   03/16/2020 17 0 - 32 U/L Final     ALT   Date Value Ref Range Status   02/22/2022 15 12 - 78 U/L Final     Anion Gap   Date Value Ref Range Status   02/22/2022 11.0 3.0 - 11.0 mmol/L Final   03/16/2020 12.0 8.0 - 17.0 mmol/L Final     Comment:     NOTE  Testing performed at:  [facility], [address] [place] [address] CLIA #:[id number]       eGFR if    Date Value Ref Range Status   10/15/2020 >60 >60 mL/min/1.73 m^2 Final     eGFR if non    Date Value Ref Range Status   10/15/2020 52 (A) >60 mL/min/1.73 m^2 Final     Comment:     Calculation used to obtain the estimated glomerular filtration  rate (eGFR) is the CKD-EPI equation.            Imaging:                       Assessment:     Principle Problem: No primary diagnosis found.   Co-morbidity/Active Problems:        Adri Seay is a 86 y.o. female with PMH of There were no encounter diagnoses., with No primary diagnosis found.     ==============================================  *  Assessment:       1. 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 Malignant neoplasm of cervix, unspecified site    3. Increased creatine kinase level    4. Neoplastic malignant related fatigue              Plan:       Cervical cancer stage IV disease diagnosed 6/2019  == 8/5/19 Carbo    ==PET/CT done on 12/2/19. Previous CT imaging done at outside facility, discussed with Dr Nguyen for comparison and he stated pelvic mass smaller, abdominal mass smaller liver lesion likely benign stable  but difficult to say if peritoneal implants stable to progressed.    ==FoundationOne Liquid Bx on 12/16/19.  BRCA 2 gene mutation.  ==1/14/20 Pt started lynparza   ==5/13/2020 PET showing early progression.    ==5/2020 resume low dose carbo and taxol.   ==11/2020 PET decreased to stable disease  == 2/25/21 Stable Disease  ==8/27/21 PET progression of disease  == 9/22/21 PDL1 CPS>1  == 10/22 Keytruda started     11/17/21:  Patient here today with her daughter and granddaughter after recent hospitalization due to AMS. She was driving and someone called the  because she seemed confused.  She was brought to ED and initially was unable to answer most questions appropriately.  Labs showed an KALPANA on CKD. She was hospitalized for further evaluation and management of acute metabolic encephalopathy with unclear etiology.  CT head was negative for acute ischemia.  Contrast MRI subsequently did not show any areas of infarction, specifically there were no areas of micrometastasis.  EEG did not demonstrate epileptiform waveforms.  Hemoglobin on admission was 6.5 g, she received a single unit of PRBCs with normalization to 8.6 on discharge.  She was made n.p.o., received IV fluids, IV PPI and underwent EGD/colonoscopy with Dr. Cordova on 11/11 which showed atrophic gastritis and nonbleeding AV malformations.  She was seen by neurologist, Dr. Hall, who diagnosed her with severe dementia based on MOCA assessment at bedside.  Patient to follow with Dr. Hall for further evaluation and management of this. Patient does desire to be referred to Dr Sullivan for evaluation of dementia.  The patient has mobility limitation that significantly impairs her abilities and is in need of rollator for unsteady gait and it will significantly improve her ability to participate in his ADLs.  Palliative care referral placed as well. We will see the patient back on 12/2/21 to evaluate her performance status.  If she has returned to baseline, we  will likely resume Keytruda with IV hydration support provided.  Patient will resume her ICAR-C and labs to be drawn prior to visit.  12/27/21:  Patient has had recent performance status decline with recurrent hospitalization in mid December for fall with resulting right shoulder fracture.  Patient was then discharged to [place] for strengthening with PT and OT.  On 12/24/2021 patient awoke at 3:00 a.m. with new onset vaginal bleeding and presented to ER for evaluation.  Trans abdominal transpelvic ultrasound showed a large cystic mass consistent with her known cervical cancer diagnosis.  At that time hemoglobin was approximately 8.0, and bleeding spontaneously resolved.  Patient was not given a blood transfusion and was released back to [place].  She denies any recurrent episodes of vaginal bleeding since that time.  Plan will be to proceed with repeat PET CT to evaluate current disease status as she has been off of IO since mid October and will refer patient for evaluation with Dr. Beckwith for palliative radiation.  Patient will return to clinic next week with PET scan and labs to discuss further management and the possibility of resuming Keytruda.  1/20/22:  Patient recently hospitalized for PE as well as bilateral lower DVTs.  Patient completed palliative radiation to cervical mass and bleeding has resolved, patient has been started on low-dose Xarelto to manage clots and has been discharged home.  Her daughter has moved in to provide full time assistance.  Patient is suffering from weakness and fatigue, but otherwise seems to be recovering well.  Labs reviewed today and hemoglobin greater than 10 no indication for transfusions needed Tentatively we will plan to resume I 0 on February 2nd with labs the day before.  2/2/22: patient and daughter in clinic, daughter states Ms August has been feeling good and she appears to back at baseline. Her only c/o is new onset itching, no rash noted. Pt denies any  recent/recurrent vaginal bleeding in the last 2 weeks. Labs reviewed and pt is cleared for tx as planned for tomorrow.   2/23/22:  Patient in clinic with granddaughter today with no new complaints.  Itching has resolved and patient is tolerant of Eliquis.  She denies any active bleeding, and has no abdominal pain or discomfort.  Labs are reviewed and patient is cleared for immunotherapy as planned for tomorrow.  Discussed with patient and family mild weight loss is noted, encouraged patient to attempt to eat more frequently with nutrient dense focus.    -Total time spent in counseling and discussion about further management options including relevant lab work, treatment,  prognosis, medications and intended side effects was more than 25 minutes. More than 50% of the time was spent on counseling and coordination of care.  This includes face to face time and non-face to face time preparing to see the patient (eg, review of tests), Obtaining and/or reviewing separately obtained history, Documenting clinical information in the electronic or other health record, Independently interpreting resultsand communicating results to the patient/family/caregiver, or Care coordination.          RTC in 3 weeks with labs       ANISA Torres

## 2022-02-24 ENCOUNTER — DOCUMENT SCAN (OUTPATIENT)
Dept: HOME HEALTH SERVICES | Facility: HOSPITAL | Age: 87
End: 2022-02-24
Payer: MEDICAID

## 2022-03-01 DIAGNOSIS — C53.0 MALIGNANT NEOPLASM OF ENDOCERVIX: Primary | ICD-10-CM

## 2022-03-01 DIAGNOSIS — E03.9 HYPOTHYROIDISM, UNSPECIFIED TYPE: ICD-10-CM

## 2022-03-15 ENCOUNTER — CLINICAL SUPPORT (OUTPATIENT)
Dept: HEMATOLOGY/ONCOLOGY | Facility: CLINIC | Age: 87
End: 2022-03-15
Payer: MEDICARE

## 2022-03-15 DIAGNOSIS — C53.0 MALIGNANT NEOPLASM OF ENDOCERVIX: ICD-10-CM

## 2022-03-15 DIAGNOSIS — E03.9 HYPOTHYROIDISM, UNSPECIFIED TYPE: ICD-10-CM

## 2022-03-15 LAB
ALBUMIN SERPL BCP-MCNC: 2.9 G/DL (ref 3.4–5)
ALBUMIN/GLOBULIN RATIO: 0.85 RATIO (ref 1.1–1.8)
ALP SERPL-CCNC: 90 U/L (ref 46–116)
ALT SERPL W P-5'-P-CCNC: 14 U/L (ref 12–78)
ANION GAP SERPL CALC-SCNC: 12 MMOL/L (ref 3–11)
AST SERPL-CCNC: 22 U/L (ref 15–37)
BASOPHILS NFR BLD: 0.3 % (ref 0–3)
BILIRUB SERPL-MCNC: 0.6 MG/DL (ref 0–1)
BUN SERPL-MCNC: 19 MG/DL (ref 7–18)
BUN/CREAT SERPL: 15.83 RATIO (ref 7–18)
CALCIUM SERPL-MCNC: 8.7 MG/DL (ref 8.8–10.5)
CHLORIDE SERPL-SCNC: 107 MMOL/L (ref 100–108)
CO2 SERPL-SCNC: 22 MMOL/L (ref 21–32)
CREAT SERPL-MCNC: 1.2 MG/DL (ref 0.55–1.02)
EOSINOPHIL NFR BLD: 1.5 % (ref 1–3)
ERYTHROCYTE [DISTWIDTH] IN BLOOD BY AUTOMATED COUNT: 17.5 % (ref 12.5–18)
GFR ESTIMATION: 52
GLOBULIN: 3.4 G/DL (ref 2.3–3.5)
GLUCOSE SERPL-MCNC: 149 MG/DL (ref 70–110)
HCT VFR BLD AUTO: 26 % (ref 37–47)
HGB BLD-MCNC: 8.2 G/DL (ref 12–16)
LYMPHOCYTES NFR BLD: 12.2 % (ref 25–40)
MACROCYTES BLD QL SMEAR: NORMAL
MCH RBC QN AUTO: 31.9 PG (ref 27–31.2)
MCHC RBC AUTO-ENTMCNC: 31.5 G/DL (ref 31.8–35.4)
MCV RBC AUTO: 101.2 FL (ref 80–97)
MONOCYTES NFR BLD: 7.8 % (ref 1–15)
NEUTROPHILS # BLD AUTO: 7.29 10*3/UL (ref 1.8–7.7)
NEUTROPHILS NFR BLD: 77.5 % (ref 37–80)
NUCLEATED RED BLOOD CELLS: 0 %
PLATELETS: 222 10*3/UL (ref 142–424)
POTASSIUM SERPL-SCNC: 3.5 MMOL/L (ref 3.6–5.2)
PROT SERPL-MCNC: 6.3 G/DL (ref 6.4–8.2)
RBC # BLD AUTO: 2.57 10*6/UL (ref 4.2–5.4)
SODIUM BLD-SCNC: 141 MMOL/L (ref 135–145)
TSH SERPL DL<=0.005 MIU/L-ACNC: 0.46 UIU/ML (ref 0.36–3.74)
WBC # BLD: 9.4 10*3/UL (ref 4.6–10.2)

## 2022-03-16 ENCOUNTER — OFFICE VISIT (OUTPATIENT)
Dept: HEMATOLOGY/ONCOLOGY | Facility: CLINIC | Age: 87
End: 2022-03-16
Payer: MEDICARE

## 2022-03-16 VITALS
WEIGHT: 125.69 LBS | SYSTOLIC BLOOD PRESSURE: 131 MMHG | BODY MASS INDEX: 23.13 KG/M2 | HEART RATE: 66 BPM | TEMPERATURE: 97 F | HEIGHT: 62 IN | DIASTOLIC BLOOD PRESSURE: 69 MMHG | OXYGEN SATURATION: 98 % | RESPIRATION RATE: 18 BRPM

## 2022-03-16 DIAGNOSIS — C53.0 MALIGNANT NEOPLASM OF ENDOCERVIX: Primary | ICD-10-CM

## 2022-03-16 DIAGNOSIS — Z29.89 ENCOUNTER FOR IMMUNOTHERAPY: ICD-10-CM

## 2022-03-16 DIAGNOSIS — I82.403 ACUTE DEEP VEIN THROMBOSIS (DVT) OF BOTH LOWER EXTREMITIES, UNSPECIFIED VEIN: ICD-10-CM

## 2022-03-16 DIAGNOSIS — C78.01 MALIGNANT NEOPLASM METASTATIC TO RIGHT LUNG: ICD-10-CM

## 2022-03-16 PROCEDURE — 99214 PR OFFICE/OUTPT VISIT, EST, LEVL IV, 30-39 MIN: ICD-10-PCS | Mod: S$GLB,,, | Performed by: NURSE PRACTITIONER

## 2022-03-16 PROCEDURE — 99214 OFFICE O/P EST MOD 30 MIN: CPT | Mod: S$GLB,,, | Performed by: NURSE PRACTITIONER

## 2022-03-16 RX ORDER — SODIUM CHLORIDE 0.9 % (FLUSH) 0.9 %
10 SYRINGE (ML) INJECTION
Status: CANCELLED | OUTPATIENT
Start: 2022-03-17

## 2022-03-16 RX ORDER — CYPROHEPTADINE HYDROCHLORIDE 4 MG/1
TABLET ORAL
COMMUNITY
Start: 2022-03-04

## 2022-03-16 RX ORDER — HEPARIN 100 UNIT/ML
500 SYRINGE INTRAVENOUS
Status: CANCELLED | OUTPATIENT
Start: 2022-03-17

## 2022-03-16 RX ORDER — CYANOCOBALAMIN 1000 UG/ML
1000 INJECTION, SOLUTION INTRAMUSCULAR; SUBCUTANEOUS
Status: CANCELLED | OUTPATIENT
Start: 2022-04-08

## 2022-03-16 NOTE — PROGRESS NOTES
"    Medical Oncology Progress Note  [facility]     PATIENT: Adri Seay  : 1935 86 y.o. female  MRN [id number]     PCP: Primary Doctor No  Consult Requested By:      Date of Service: 3/16/2022    Subjective:   Interim History:    Oncology History:    History of Present Illness: Ms. Seay is a 86 y.o. female who presents for evaluation and management of cervical cancer. SHe was seen at [facility] on 19.      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 The patient had a CT scan consistent with widely metastatic disease including multiple sites of lymphadenopathy, a possible lung metastases, liver metastases, nodule in the anterior abdominal wall, and carcinomatosis. The patient is mostly asymptomatic but does have a vaginal discharge. Her performance status is 0. On exam, there is a 6 centimeter mass in the anterior abdominal wall superior to the umbilicus. On bimanual examination, tumor is a place the entire cervix apex of the vagina, and on rectovaginal examination, there is a large pelvic mass extending to the bilateral pelvic sidewalls.    We will get her CT reviewed at [facility] and if it is consistent with metastatic disease, we have recommended palliative chemotherapy with either carboplatinum as a single agent or carboplatinum and paclitaxel. Due to concern for bowel involvement on the CT scan, I have recommended not giving bevacizumab as part of this regimen. Patient will return home to [place] and begin chemotherapy with a medical oncologist there. I've also recommended a consultation with radiation oncology in [place] for consideration of palliative radiation to lower the chances of significant vaginal bleeding. We will see her back on an as-needed basis."        Oncology History   Malignant neoplasm of endocervix   2019 Initial Diagnosis    Malignant " neoplasm of exocervix     7/8/2019 - 7/8/2019 Chemotherapy    Treatment Summary   Plan Name: OP CARBOPLATIN WEEKLY  Treatment Goal: Palliative  Status: Inactive  Start Date: [No treatment day found]  End Date: [No treatment day found]  Provider: Jaime Pinon MD  Chemotherapy: CARBOplatin (PARAPLATIN) in sodium chloride 0.9% 250 mL chemo infusion, , Intravenous, Clinic/HOD 1 time, 0 of 4 cycles     7/9/2019 - 11/25/2019 Chemotherapy    Treatment Summary   Plan Name: OP GYN CARBOPLATIN (AUC) Q4W  Treatment Goal: Palliative  Status: Inactive  Start Date: 7/9/2019  End Date: 10/29/2019  Provider: Jaime Pinon MD  Chemotherapy: CARBOplatin (PARAPLATIN) in sodium chloride 0.9% 250 mL chemo infusion,  (original dose 341.5 mg), Intravenous, Clinic/HOD 1 time, 5 of 6 cycles  Dose modification:   (original dose 341.5 mg, Cycle 1)     5/19/2020 - 6/16/2020 Chemotherapy    Treatment Summary   Plan Name: OP  PACLITAXEL CARBOPLATIN WEEKLY  Treatment Goal: Palliative  Status: Inactive  Start Date: 5/19/2020  End Date: 5/22/2020  Provider: Annette Donis NP  Chemotherapy: CARBOplatin (PARAPLATIN) 115 mg in sodium chloride 0.9% 250 mL chemo infusion, 115 mg (98.6 % of original dose 118.4 mg), Intravenous, Clinic/HOD 1 time, 0 of 1 cycle  Dose modification:   (original dose 118.4 mg, Cycle 1)  PACLitaxeL (TAXOL) 80 mg/m2 = 126 mg in sodium chloride 0.9% 250 mL chemo infusion, 80 mg/m2 = 126 mg (100 % of original dose 80 mg/m2), Intravenous, Clinic/HOD 1 time, 0 of 1 cycle  Dose modification: 80 mg/m2 (original dose 80 mg/m2, Cycle 1)     5/19/2020 - 1/19/2021 Chemotherapy    Treatment Summary   Plan Name:  PACLITAXEL CARBOPLATIN WEEKLY  Treatment Goal: Palliative  Status: Inactive  Start Date: 5/19/2020  End Date: 12/29/2020  Provider: Annette Donis NP  Chemotherapy: CARBOplatin (PARAPLATIN) 115 mg in sodium chloride 0.9% 250 mL chemo infusion, 115 mg (100 % of original dose 114.8 mg), Intravenous, Clinic/HOD 1 time, 3 of 3  cycles  Dose modification:   (original dose 114.8 mg, Cycle 1, Reason: MD Discretion), 100 mg (original dose 114.8 mg, Cycle 1),   (original dose 114.8 mg, Cycle 2, Reason: MD Discretion),   (original dose 114.8 mg, Cycle 2, Reason: MD Discretion),   (original dose 114.8 mg, Cycle 2),   (original dose 114.8 mg, Cycle 2)  Administration: 115 mg (9/17/2020), 115 mg (9/24/2020), 125 mg (10/8/2020), 140 mg (10/15/2020), 130 mg (9/3/2020)  PACLitaxeL (TAXOL) 80 mg/m2 = 126 mg in sodium chloride 0.9% 250 mL chemo infusion, 80 mg/m2 = 126 mg (100 % of original dose 80 mg/m2), Intravenous, Clinic/HOD 1 time, 3 of 3 cycles  Dose modification: 80 mg/m2 (original dose 80 mg/m2, Cycle 1), 50 mg/m2 (original dose 80 mg/m2, Cycle 1), 50 mg/m2 (original dose 80 mg/m2, Cycle 3)  Administration: 84 mg (9/3/2020), 84 mg (9/17/2020), 84 mg (9/24/2020), 90 mg (10/8/2020), 84 mg (10/15/2020)     12/30/2020 - 8/19/2021 Chemotherapy    Treatment Summary   Plan Name: OP GYN PACLITAXEL WEEKLY + CARBOPLATIN  (AUC2) WEEKLY  Treatment Goal: Maintenance  Status: Inactive  Start Date: 12/30/2020  End Date: 8/19/2021  Provider: Abundio Tabares MD  Chemotherapy: CARBOplatin (PARAPLATIN) 125 mg in sodium chloride 0.9% 250 mL chemo infusion, 125 mg, Intravenous, Clinic/HOD 1 time, 30 of 35 cycles  Dose modification: 145 mg (original dose 125.6 mg, Cycle 11), 145 mg (original dose 125.6 mg, Cycle 15), 145 mg (original dose 125.6 mg, Cycle 17), 145 mg (original dose 125.6 mg, Cycle 18), 145 mg (original dose 125.6 mg, Cycle 20), 145 mg (original dose 125.6 mg, Cycle 22), 126.8 mg (original dose 125.6 mg, Cycle 30)  PACLitaxeL (TAXOL) 50 mg/m2 = 84 mg in sodium chloride 0.9% 250 mL chemo infusion, 50 mg/m2 = 84 mg (100 % of original dose 50 mg/m2), Intravenous, [facility] 1 time, 30 of 35 cycles  Dose modification: 50 mg/m2 (original dose 50 mg/m2, Cycle 1)     6/24/2021 - 6/24/2021 Chemotherapy    Treatment Summary   Plan Name: OP BEVACIZUMAB  Q2W  Treatment Goal: Maintenance  Status: Inactive  Start Date:   End Date:   Provider: Annette Donis NP  Chemotherapy: bevacizumab-bvzr 653 mg in sodium chloride 0.9% 126.12 mL infusion, 10 mg/kg, Intravenous, Clinic/HOD 1 time, 0 of 26 cycles     9/16/2021 - 9/16/2021 Chemotherapy    Treatment Summary   Plan Name: OP GYN GEMCITABINE (Q4W)  Treatment Goal: Palliative  Status: Inactive  Start Date: 9/16/2021  End Date: 9/16/2021  Provider: Annette Donis NP  Chemotherapy: gemcitabine (GEMZAR) 1,010 mg in sodium chloride 0.9% 250 mL chemo infusion, 600 mg/m2 = 1,010 mg, Intravenous, Clinic/HOD 1 time, 1 of 6 cycles     9/30/2021 -  Chemotherapy    Treatment Summary   Plan Name: OP PEMBROLIZUMAB 200MG Q3W  Treatment Goal: Palliative  Status: Active  Start Date: 9/30/2021  End Date: 4/7/2022 (Planned)  Provider: Annette Donis NP  Chemotherapy: pembrolizumab (KEYTRUDA) 200 mg in sodium chloride 0.9% 108 mL infusion, 200 mg, Intravenous, Clinic/HOD 1 time, 4 of 6 cycles         Past Medical History:   Past Medical History:   Diagnosis Date    Anemia     Cataract     Cervical cancer 05/2019    Cervical cancer 6/25/2019    Hypertension     Malignant neoplasm metastatic to right lung 9/1/2021       Past Surgical HIstory:   Past Surgical History:   Procedure Laterality Date    CATARACT EXTRACTION, BILATERAL      WRIST SURGERY  1984       Allergies:  Review of patient's allergies indicates:   Allergen Reactions    Strawberry Rash       Medications:  Current Outpatient Medications   Medication Sig Dispense Refill    apixaban (ELIQUIS) 2.5 mg Tab Take 1 tablet (2.5 mg total) by mouth 2 (two) times daily. 60 tablet 3    cloNIDine (CATAPRES) 0.3 MG tablet TAKE 1 TABLET(0.3 MG) BY MOUTH TWICE DAILY 180 tablet 3    cyproheptadine (PERIACTIN) 4 mg tablet TAKE 1/2 (ONE-HALF) TABLET BY MOUTH THREE TIMES DAILY      FENOFIBRATE ORAL Take by mouth 2 (two) times daily.      hydrOXYzine HCL (ATARAX) 10 MG Tab Take 1 tablet (10 mg  total) by mouth 3 (three) times daily as needed. 60 tablet 2    iron-vit c-vit b12-folic acid (IRON-C PLUS) tablet Daily 30 each 6    loratadine (CLARITIN) 10 mg tablet 10 mg.      NIFEdipine (PROCARDIA-XL) 30 MG (OSM) 24 hr tablet Take 1 tablet (30 mg total) by mouth once daily. 90 tablet 3    ondansetron (ZOFRAN-ODT) 8 MG TbDL Take 1 tablet (8 mg total) by mouth every 8 (eight) hours as needed (chemotherapy-induced nausea and vomiting). (Patient not taking: Reported on 2/2/2022) 40 tablet 5    oxyCODONE-acetaminophen (PERCOCET) 5-325 mg per tablet Every 6 Hours as needed for Moderate Pain(4-6)      simvastatin (ZOCOR) 20 MG tablet Take 1 tablet (20 mg total) by mouth every evening. 90 tablet 3    varicella-zoster gE-AS01B, PF, (SHINGRIX, PF,) 50 mcg/0.5 mL injection Administer two doses 2 months apart (Patient not taking: Reported on 2/2/2022) 0.5 mL 0     Current Facility-Administered Medications   Medication Dose Route Frequency Provider Last Rate Last Admin    alteplase injection 2 mg  2 mg Intra-Catheter PRN Annette Donis NP        EPINEPHrine (EPIPEN) 0.3 mg/0.3 mL pen injection 0.3 mg  0.3 mg Intramuscular Once PRN Annette Donis NP           Family History:   Family History   Problem Relation Age of Onset    Hypertension Mother     Pneumonia Father     Breast cancer Sister     No Known Problems Brother     Leukemia Daughter     No Known Problems Son        Social History:  reports that she has never smoked. She has never used smokeless tobacco. She reports previous alcohol use. She reports that she does not use drugs.    Review of Systemas  Constitutional: No change in weight, appetie, fatigue, fever, or night sweats  Eyes: No changes in vision  Ears, Nose, Mouth, Throat, and Face: No hearing problems, ear pain, rummy nose, or sore throat  Respiratory: No shortness of breath or cough  Cardiovascular: No chest pain, palpitations or dizziness  Gastrointestinal: No abdominal pain, hematochezia,  "melena  Genitourinary: No dysuria, hematuria, nocturia, menstrual problems, post menopausal  Integumentary/Breast: No rashes or itching  Hematologic/Lymphatic: No anemia or bleeding abnormalities  Musculoskeletal: No joint or muscle abnormalities  Neurological: No sensory or motor problems, no headaches  Behavioral/Psych: No depression, anxiety, cognitive problems, or stress  Endocrine: No diabetes or thyroid problems  Allergic/Immunologic: See allergy above    Physical Exam      Vitals:   Vitals:    03/16/22 0834   BP: 131/69   Pulse: 66   Resp: 18   Temp: 97 °F (36.1 °C)   TempSrc: Temporal   SpO2: 98%   Weight: 57 kg (125 lb 11.2 oz)   Height: 5' 2" (1.575 m)     BMI: Body mass index is 22.99 kg/m².  BSA Body surface area is 1.58 meters squared.  ECOG Performance Status:   ECOG SCORE         GENERAL APPEARANCE: Well developed, well nourished, in no acute distress.      Labs and Imagings     Lab Results   Component Value Date    WBC 9.4 03/15/2022    HGB 8.2 (L) 03/15/2022    HCT 26.0 (L) 03/15/2022    .2 (H) 03/15/2022    LABPLAT 222 03/15/2022       CMP  Sodium   Date Value Ref Range Status   03/15/2022 141 135 - 145 mmol/L Final   03/16/2020 140 136 - 145 mmol/L Final     Potassium   Date Value Ref Range Status   03/15/2022 3.5 (L) 3.6 - 5.2 mmol/L Final   03/16/2020 4.5 3.5 - 5.1 mmol/L Final     Chloride   Date Value Ref Range Status   03/15/2022 107 100 - 108 mmol/L Final   03/16/2020 107 98 - 107 mmol/L Final     CO2   Date Value Ref Range Status   03/15/2022 22 21 - 32 mmol/L Final   03/16/2020 21 (L) 22 - 29 mmol/L Final     Glucose   Date Value Ref Range Status   03/15/2022 149 (H) 70 - 110 mg/dL Final     BUN   Date Value Ref Range Status   03/15/2022 19 (H) 7 - 18 mg/dL Final   03/16/2020 17.2 8 - 23 mg/dL Final     Creatinine   Date Value Ref Range Status   03/15/2022 1.20 (H) 0.55 - 1.02 mg/dL Final   03/16/2020 1.17 (H) 0.50 - 0.90 mg/dL Final     Calcium   Date Value Ref Range Status "   03/15/2022 8.7 (L) 8.8 - 10.5 mg/dL Final   03/16/2020 10.0 8.6 - 10.2 mg/dL Final     Total Protein   Date Value Ref Range Status   03/15/2022 6.3 (L) 6.4 - 8.2 g/dL Final     Albumin   Date Value Ref Range Status   03/15/2022 2.9 (L) 3.4 - 5.0 g/dL Final   03/16/2020 4.6 3.5 - 5.2 g/dL Final     Total Bilirubin   Date Value Ref Range Status   03/15/2022 0.6 0.0 - 1.0 mg/dL Final     Alkaline Phosphatase   Date Value Ref Range Status   03/15/2022 90 46 - 116 U/L Final     AST   Date Value Ref Range Status   03/15/2022 22 15 - 37 U/L Final   03/16/2020 17 0 - 32 U/L Final     ALT   Date Value Ref Range Status   03/15/2022 14 12 - 78 U/L Final     Anion Gap   Date Value Ref Range Status   03/15/2022 12.0 (H) 3.0 - 11.0 mmol/L Final   03/16/2020 12.0 8.0 - 17.0 mmol/L Final     Comment:     NOTE  Testing performed at:  [facility], [address] [place] [address] CLIA #[id number]       eGFR if    Date Value Ref Range Status   10/15/2020 >60 >60 mL/min/1.73 m^2 Final     eGFR if non    Date Value Ref Range Status   10/15/2020 52 (A) >60 mL/min/1.73 m^2 Final     Comment:     Calculation used to obtain the estimated glomerular filtration  rate (eGFR) is the CKD-EPI equation.            Imaging:                       Assessment:     Principle Problem: No primary diagnosis found.   Co-morbidity/Active Problems:        Adri Seay is a 86 y.o. female with PMH of The primary encounter diagnosis was Malignant neoplasm of endocervix. Diagnoses of Acute deep vein thrombosis (DVT) of both lower extremities, unspecified vein, Encounter for immunotherapy, and Malignant neoplasm metastatic to right lung were also pertinent to this visit., with Malignant neoplasm of endocervix [C53.0]     ==============================================  *  Assessment:       1. 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 Malignant neoplasm of cervix, unspecified site    3. Increased creatine  kinase level    4. Neoplastic malignant related fatigue              Plan:       Cervical cancer stage IV disease diagnosed 6/2019  == 8/5/19 Carbo    ==PET/CT done on 12/2/19. Previous CT imaging done at outside facility, discussed with Dr Nguyen for comparison and he stated pelvic mass smaller, abdominal mass smaller liver lesion likely benign stable but difficult to say if peritoneal implants stable to progressed.    ==FoundationOne Liquid Bx on 12/16/19.  BRCA 2 gene mutation.  ==1/14/20 Pt started lynparza   ==5/13/2020 PET showing early progression.    ==5/2020 resume low dose carbo and taxol.   ==11/2020 PET decreased to stable disease  == 2/25/21 Stable Disease  ==8/27/21 PET progression of disease  == 9/22/21 PDL1 CPS>1  == 10/22 Keytruda started     11/17/21:  Patient here today with her daughter and granddaughter after recent hospitalization due to AMS. She was driving and someone called the  because she seemed confused.  She was brought to ED and initially was unable to answer most questions appropriately.  Labs showed an KALPANA on CKD. She was hospitalized for further evaluation and management of acute metabolic encephalopathy with unclear etiology.  CT head was negative for acute ischemia.  Contrast MRI subsequently did not show any areas of infarction, specifically there were no areas of micrometastasis.  EEG did not demonstrate epileptiform waveforms.  Hemoglobin on admission was 6.5 g, she received a single unit of PRBCs with normalization to 8.6 on disch patient feeling well today, denies any recent bleeding, hemoglobin 8.2 today arge.  She was made n.p.o., received IV fluids, IV PPI and underwent EGD/colonoscopy with Dr. Cordova on 11/11 which showed atrophic gastritis and nonbleeding AV malformations.  She was seen by neurologist, Dr. Hall, who diagnosed her with severe dementia based on MOCA assessment at bedside.  Patient to follow with Dr. Hall for further evaluation and management of  this. Patient does desire to be referred to Dr Sullivan for evaluation of dementia.  The patient has mobility limitation that significantly impairs her abilities and is in need of rollator for unsteady gait and it will significantly improve her ability to participate in his ADLs.  Palliative care referral placed as well. We will see the patient back on 12/2/21 to evaluate her performance status.  If she has returned to baseline, we will likely resume Keytruda with IV hydration support provided.  Patient will resume her ICAR-C and labs to be drawn prior to visit.  12/27/21:  Patient has had recent performance status decline with recurrent hospitalization in mid December for fall with resulting right shoulder fracture.  Patient was then discharged to [place] for strengthening with PT and OT.  On 12/24/2021 patient awoke at 3:00 a.m. with new onset vaginal bleeding and presented to ER for evaluation.  Trans abdominal transpelvic ultrasound showed a large cystic mass consistent with her known cervical cancer diagnosis.  At that time hemoglobin was approximately 8.0, and bleeding spontaneously resolved.  Patient was not given a blood transfusion and was released back to [place].  She denies any recurrent episodes of vaginal bleeding since that time.  Plan will be to proceed with repeat PET CT to evaluate current disease status as she has been off of IO since mid October and will refer patient for evaluation with Dr. Beckwith for palliative radiation.  Patient will return to clinic next week with PET scan and labs to discuss further management and the possibility of resuming Keytruda.  1/20/22:  Patient recently hospitalized for PE as well as bilateral lower DVTs.  Patient completed palliative radiation to cervical mass and bleeding has resolved, patient has been started on low-dose Xarelto to manage clots and has been discharged home.  Her daughter has moved in to provide full time assistance.  Patient is suffering from  weakness and fatigue, but otherwise seems to be recovering well.  Labs reviewed today and hemoglobin greater than 10 no indication for transfusions needed Tentatively we will plan to resume I 0 on February 2nd with labs the day before.  2/2/22: patient and daughter in clinic, daughter states Ms August has been feeling good and she appears to back at baseline. Her only c/o is new onset itching, no rash noted. Pt denies any recent/recurrent vaginal bleeding in the last 2 weeks. Labs reviewed and pt is cleared for tx as planned for tomorrow.   2/23/22:  Patient in clinic with granddaughter today with no new complaints.  Itching has resolved and patient is tolerant of Eliquis.  She denies any active bleeding, and has no abdominal pain or discomfort.  Labs are reviewed and patient is cleared for immunotherapy as planned for tomorrow.  Discussed with patient and family mild weight loss is noted, encouraged patient to attempt to eat more frequently with nutrient dense focus.  3/16/22:  Patient in clinic today states feeling okay, does complain of some fatigue but denies any recent bleeding episodes.  Hemoglobin 8.2 will proceed with B12 injection with infusion that is planned for tomorrow.  Patient does report increase in appetite and noted 3 lb of weight gain after starting Periactin per palliative Care recommendations.  Encouraged patient to increase fluids as serum creatinine mildly elevated.    -Total time spent in counseling and discussion about further management options including relevant lab work, treatment,  prognosis, medications and intended side effects was more than 25 minutes. More than 50% of the time was spent on counseling and coordination of care.  This includes face to face time and non-face to face time preparing to see the patient (eg, review of tests), Obtaining and/or reviewing separately obtained history, Documenting clinical information in the electronic or other health record, Independently  interpreting resultsand communicating results to the patient/family/caregiver, or Care coordination.          RTC in 3 weeks with labs       ANISA Torres

## 2022-04-05 ENCOUNTER — CLINICAL SUPPORT (OUTPATIENT)
Dept: HEMATOLOGY/ONCOLOGY | Facility: CLINIC | Age: 87
End: 2022-04-05
Payer: MEDICARE

## 2022-04-05 DIAGNOSIS — C53.0 MALIGNANT NEOPLASM OF ENDOCERVIX: ICD-10-CM

## 2022-04-05 DIAGNOSIS — E03.9 HYPOTHYROIDISM, UNSPECIFIED TYPE: ICD-10-CM

## 2022-04-05 LAB
ALBUMIN SERPL BCP-MCNC: 2.7 G/DL (ref 3.4–5)
ALBUMIN/GLOBULIN RATIO: 0.79 RATIO (ref 1.1–1.8)
ALP SERPL-CCNC: 89 U/L (ref 46–116)
ALT SERPL W P-5'-P-CCNC: 15 U/L (ref 12–78)
ANION GAP SERPL CALC-SCNC: 11 MMOL/L (ref 3–11)
AST SERPL-CCNC: 29 U/L (ref 15–37)
BASOPHILS NFR BLD: 0.6 % (ref 0–3)
BILIRUB SERPL-MCNC: 0.4 MG/DL (ref 0–1)
BUN SERPL-MCNC: 18 MG/DL (ref 7–18)
BUN/CREAT SERPL: 14.87 RATIO
CALCIUM SERPL-MCNC: 8.5 MG/DL (ref 8.8–10.5)
CHLORIDE SERPL-SCNC: 105 MMOL/L (ref 100–108)
CO2 SERPL-SCNC: 24 MMOL/L (ref 21–32)
CREAT SERPL-MCNC: 1.21 MG/DL (ref 0.55–1.02)
EOSINOPHIL NFR BLD: 2.6 % (ref 1–3)
ERYTHROCYTE [DISTWIDTH] IN BLOOD BY AUTOMATED COUNT: 15.6 % (ref 12.5–18)
GFR ESTIMATION: 51
GLOBULIN: 3.4 G/DL (ref 2.3–3.5)
GLUCOSE SERPL-MCNC: 141 MG/DL (ref 70–110)
HCT VFR BLD AUTO: 26.7 % (ref 37–47)
HGB BLD-MCNC: 8.4 G/DL (ref 12–16)
LYMPHOCYTES NFR BLD: 13.3 % (ref 25–40)
MACROCYTES BLD QL SMEAR: NORMAL
MCH RBC QN AUTO: 33.2 PG (ref 27–31.2)
MCHC RBC AUTO-ENTMCNC: 31.5 G/DL (ref 31.8–35.4)
MCV RBC AUTO: 105.5 FL (ref 80–97)
MONOCYTES NFR BLD: 8.9 % (ref 1–15)
NEUTROPHILS # BLD AUTO: 5.2 10*3/UL (ref 1.8–7.7)
NEUTROPHILS NFR BLD: 74.2 % (ref 37–80)
NUCLEATED RED BLOOD CELLS: 0 %
PLATELETS: 251 10*3/UL (ref 142–424)
POTASSIUM SERPL-SCNC: 4 MMOL/L (ref 3.6–5.2)
PROT SERPL-MCNC: 6.1 G/DL (ref 6.4–8.2)
RBC # BLD AUTO: 2.53 10*6/UL (ref 4.2–5.4)
SODIUM BLD-SCNC: 140 MMOL/L (ref 135–145)
TSH SERPL DL<=0.005 MIU/L-ACNC: 1.55 UIU/ML (ref 0.36–3.74)
WBC # BLD: 7 10*3/UL (ref 4.6–10.2)

## 2022-04-06 ENCOUNTER — OFFICE VISIT (OUTPATIENT)
Dept: HEMATOLOGY/ONCOLOGY | Facility: CLINIC | Age: 87
End: 2022-04-06
Payer: MEDICARE

## 2022-04-06 VITALS
RESPIRATION RATE: 18 BRPM | SYSTOLIC BLOOD PRESSURE: 129 MMHG | WEIGHT: 125.31 LBS | BODY MASS INDEX: 23.06 KG/M2 | HEART RATE: 67 BPM | DIASTOLIC BLOOD PRESSURE: 76 MMHG | TEMPERATURE: 98 F | OXYGEN SATURATION: 98 % | HEIGHT: 62 IN

## 2022-04-06 DIAGNOSIS — I82.403 ACUTE DEEP VEIN THROMBOSIS (DVT) OF BOTH LOWER EXTREMITIES, UNSPECIFIED VEIN: ICD-10-CM

## 2022-04-06 DIAGNOSIS — Z29.89 ENCOUNTER FOR IMMUNOTHERAPY: ICD-10-CM

## 2022-04-06 DIAGNOSIS — C53.0 MALIGNANT NEOPLASM OF ENDOCERVIX: Primary | ICD-10-CM

## 2022-04-06 DIAGNOSIS — C77.8 MALIGNANT NEOPLASM METASTATIC TO LYMPH NODES OF MULTIPLE SITES: ICD-10-CM

## 2022-04-06 DIAGNOSIS — R73.01 ELEVATED FASTING BLOOD SUGAR: ICD-10-CM

## 2022-04-06 LAB — HBA1C MFR BLD: 4.7 % (ref 4.5–6.2)

## 2022-04-06 PROCEDURE — 99214 OFFICE O/P EST MOD 30 MIN: CPT | Mod: S$GLB,,, | Performed by: NURSE PRACTITIONER

## 2022-04-06 PROCEDURE — 99214 PR OFFICE/OUTPT VISIT, EST, LEVL IV, 30-39 MIN: ICD-10-PCS | Mod: S$GLB,,, | Performed by: NURSE PRACTITIONER

## 2022-04-06 RX ORDER — CYANOCOBALAMIN 1000 UG/ML
1000 INJECTION, SOLUTION INTRAMUSCULAR; SUBCUTANEOUS
Status: CANCELLED | OUTPATIENT
Start: 2022-04-08

## 2022-04-06 RX ORDER — SODIUM CHLORIDE 0.9 % (FLUSH) 0.9 %
10 SYRINGE (ML) INJECTION
Status: CANCELLED | OUTPATIENT
Start: 2022-04-07

## 2022-04-06 RX ORDER — HEPARIN 100 UNIT/ML
500 SYRINGE INTRAVENOUS
Status: CANCELLED | OUTPATIENT
Start: 2022-04-07

## 2022-04-06 NOTE — PROGRESS NOTES
"    Medical Oncology Progress Note  [facility]     PATIENT: Adri Seay  : 1935 86 y.o. female  MRN [id number]     PCP: Primary Doctor No  Consult Requested By:      Date of Service: 2022    Subjective:   Interim History:    Oncology History:    History of Present Illness: Ms. Seay is a 86 y.o. female who presents for evaluation and management of cervical cancer. SHe was seen at [facility] on 19.      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 The patient had a CT scan consistent with widely metastatic disease including multiple sites of lymphadenopathy, a possible lung metastases, liver metastases, nodule in the anterior abdominal wall, and carcinomatosis. The patient is mostly asymptomatic but does have a vaginal discharge. Her performance status is 0. On exam, there is a 6 centimeter mass in the anterior abdominal wall superior to the umbilicus. On bimanual examination, tumor is a place the entire cervix apex of the vagina, and on rectovaginal examination, there is a large pelvic mass extending to the bilateral pelvic sidewalls.    We will get her CT reviewed at [facility] and if it is consistent with metastatic disease, we have recommended palliative chemotherapy with either carboplatinum as a single agent or carboplatinum and paclitaxel. Due to concern for bowel involvement on the CT scan, I have recommended not giving bevacizumab as part of this regimen. Patient will return home to [place] and begin chemotherapy with a medical oncologist there. I've also recommended a consultation with radiation oncology in [place] for consideration of palliative radiation to lower the chances of significant vaginal bleeding. We will see her back on an as-needed basis."        Oncology History   Malignant neoplasm of endocervix   2019 Initial Diagnosis    Malignant " neoplasm of exocervix     7/8/2019 - 7/8/2019 Chemotherapy    Treatment Summary   Plan Name: OP CARBOPLATIN WEEKLY  Treatment Goal: Palliative  Status: Inactive  Start Date: [No treatment day found]  End Date: [No treatment day found]  Provider: Jaime Pinon MD  Chemotherapy: CARBOplatin (PARAPLATIN) in sodium chloride 0.9% 250 mL chemo infusion, , Intravenous, Clinic/HOD 1 time, 0 of 4 cycles     7/9/2019 - 11/25/2019 Chemotherapy    Treatment Summary   Plan Name: OP GYN CARBOPLATIN (AUC) Q4W  Treatment Goal: Palliative  Status: Inactive  Start Date: 7/9/2019  End Date: 10/29/2019  Provider: Jaime Pinon MD  Chemotherapy: CARBOplatin (PARAPLATIN) in sodium chloride 0.9% 250 mL chemo infusion,  (original dose 341.5 mg), Intravenous, Clinic/HOD 1 time, 5 of 6 cycles  Dose modification:   (original dose 341.5 mg, Cycle 1)     5/19/2020 - 6/16/2020 Chemotherapy    Treatment Summary   Plan Name: OP  PACLITAXEL CARBOPLATIN WEEKLY  Treatment Goal: Palliative  Status: Inactive  Start Date: 5/19/2020  End Date: 5/22/2020  Provider: Annette Donis NP  Chemotherapy: CARBOplatin (PARAPLATIN) 115 mg in sodium chloride 0.9% 250 mL chemo infusion, 115 mg (98.6 % of original dose 118.4 mg), Intravenous, Clinic/HOD 1 time, 0 of 1 cycle  Dose modification:   (original dose 118.4 mg, Cycle 1)  PACLitaxeL (TAXOL) 80 mg/m2 = 126 mg in sodium chloride 0.9% 250 mL chemo infusion, 80 mg/m2 = 126 mg (100 % of original dose 80 mg/m2), Intravenous, Clinic/HOD 1 time, 0 of 1 cycle  Dose modification: 80 mg/m2 (original dose 80 mg/m2, Cycle 1)     5/19/2020 - 1/19/2021 Chemotherapy    Treatment Summary   Plan Name:  PACLITAXEL CARBOPLATIN WEEKLY  Treatment Goal: Palliative  Status: Inactive  Start Date: 5/19/2020  End Date: 12/29/2020  Provider: Annette Donis NP  Chemotherapy: CARBOplatin (PARAPLATIN) 115 mg in sodium chloride 0.9% 250 mL chemo infusion, 115 mg (100 % of original dose 114.8 mg), Intravenous, Clinic/HOD 1 time, 3 of 3  cycles  Dose modification:   (original dose 114.8 mg, Cycle 1, Reason: MD Discretion), 100 mg (original dose 114.8 mg, Cycle 1),   (original dose 114.8 mg, Cycle 2, Reason: MD Discretion),   (original dose 114.8 mg, Cycle 2, Reason: MD Discretion),   (original dose 114.8 mg, Cycle 2),   (original dose 114.8 mg, Cycle 2)  Administration: 115 mg (9/17/2020), 115 mg (9/24/2020), 125 mg (10/8/2020), 140 mg (10/15/2020), 130 mg (9/3/2020)  PACLitaxeL (TAXOL) 80 mg/m2 = 126 mg in sodium chloride 0.9% 250 mL chemo infusion, 80 mg/m2 = 126 mg (100 % of original dose 80 mg/m2), Intravenous, Clinic/HOD 1 time, 3 of 3 cycles  Dose modification: 80 mg/m2 (original dose 80 mg/m2, Cycle 1), 50 mg/m2 (original dose 80 mg/m2, Cycle 1), 50 mg/m2 (original dose 80 mg/m2, Cycle 3)  Administration: 84 mg (9/3/2020), 84 mg (9/17/2020), 84 mg (9/24/2020), 90 mg (10/8/2020), 84 mg (10/15/2020)     12/30/2020 - 8/19/2021 Chemotherapy    Treatment Summary   Plan Name: OP GYN PACLITAXEL WEEKLY + CARBOPLATIN  (AUC2) WEEKLY  Treatment Goal: Maintenance  Status: Inactive  Start Date: 12/30/2020  End Date: 8/19/2021  Provider: Abundio Tabares MD  Chemotherapy: CARBOplatin (PARAPLATIN) 125 mg in sodium chloride 0.9% 250 mL chemo infusion, 125 mg, Intravenous, Clinic/HOD 1 time, 30 of 35 cycles  Dose modification: 145 mg (original dose 125.6 mg, Cycle 11), 145 mg (original dose 125.6 mg, Cycle 15), 145 mg (original dose 125.6 mg, Cycle 17), 145 mg (original dose 125.6 mg, Cycle 18), 145 mg (original dose 125.6 mg, Cycle 20), 145 mg (original dose 125.6 mg, Cycle 22), 126.8 mg (original dose 125.6 mg, Cycle 30)  PACLitaxeL (TAXOL) 50 mg/m2 = 84 mg in sodium chloride 0.9% 250 mL chemo infusion, 50 mg/m2 = 84 mg (100 % of original dose 50 mg/m2), Intravenous, Clinic/[facility] 1 time, 30 of 35 cycles  Dose modification: 50 mg/m2 (original dose 50 mg/m2, Cycle 1)     6/24/2021 - 6/24/2021 Chemotherapy    Treatment Summary   Plan Name: OP BEVACIZUMAB  Q2W  Treatment Goal: Maintenance  Status: Inactive  Start Date:   End Date:   Provider: Annette Donis NP  Chemotherapy: bevacizumab-bvzr 653 mg in sodium chloride 0.9% 126.12 mL infusion, 10 mg/kg, Intravenous, Clinic/HOD 1 time, 0 of 26 cycles     9/16/2021 - 9/16/2021 Chemotherapy    Treatment Summary   Plan Name: OP GYN GEMCITABINE (Q4W)  Treatment Goal: Palliative  Status: Inactive  Start Date: 9/16/2021  End Date: 9/16/2021  Provider: Annette Donis NP  Chemotherapy: gemcitabine (GEMZAR) 1,010 mg in sodium chloride 0.9% 250 mL chemo infusion, 600 mg/m2 = 1,010 mg, Intravenous, Clinic/HOD 1 time, 1 of 6 cycles     9/30/2021 -  Chemotherapy    Treatment Summary   Plan Name: OP PEMBROLIZUMAB 200MG Q3W  Treatment Goal: Palliative  Status: Active  Start Date: 9/30/2021  End Date: 5/19/2022 (Planned)  Provider: Annette Donis NP  Chemotherapy: pembrolizumab (KEYTRUDA) 200 mg in sodium chloride 0.9% 108 mL infusion, 200 mg, Intravenous, Clinic/HOD 1 time, 4 of 8 cycles         Past Medical History:   Past Medical History:   Diagnosis Date    Anemia     Cataract     Cervical cancer 05/2019    Cervical cancer 6/25/2019    Hypertension     Malignant neoplasm metastatic to right lung 9/1/2021       Past Surgical HIstory:   Past Surgical History:   Procedure Laterality Date    CATARACT EXTRACTION, BILATERAL      WRIST SURGERY  1984       Allergies:  Review of patient's allergies indicates:   Allergen Reactions    Strawberry Rash       Medications:  Current Outpatient Medications   Medication Sig Dispense Refill    apixaban (ELIQUIS) 2.5 mg Tab Take 1 tablet (2.5 mg total) by mouth 2 (two) times daily. 60 tablet 3    cloNIDine (CATAPRES) 0.3 MG tablet TAKE 1 TABLET(0.3 MG) BY MOUTH TWICE DAILY 180 tablet 3    cyproheptadine (PERIACTIN) 4 mg tablet TAKE 1/2 (ONE-HALF) TABLET BY MOUTH THREE TIMES DAILY      FENOFIBRATE ORAL Take by mouth 2 (two) times daily.      hydrOXYzine HCL (ATARAX) 10 MG Tab Take 1 tablet (10  mg total) by mouth 3 (three) times daily as needed. 60 tablet 2    iron-vit c-vit b12-folic acid (IRON-C PLUS) tablet Daily 30 each 6    loratadine (CLARITIN) 10 mg tablet 10 mg.      NIFEdipine (PROCARDIA-XL) 30 MG (OSM) 24 hr tablet Take 1 tablet (30 mg total) by mouth once daily. 90 tablet 3    ondansetron (ZOFRAN-ODT) 8 MG TbDL Take 1 tablet (8 mg total) by mouth every 8 (eight) hours as needed (chemotherapy-induced nausea and vomiting). 40 tablet 5    oxyCODONE-acetaminophen (PERCOCET) 5-325 mg per tablet Every 6 Hours as needed for Moderate Pain(4-6)      simvastatin (ZOCOR) 20 MG tablet Take 1 tablet (20 mg total) by mouth every evening. 90 tablet 3     Current Facility-Administered Medications   Medication Dose Route Frequency Provider Last Rate Last Admin    alteplase injection 2 mg  2 mg Intra-Catheter PRN Annette Donis NP        EPINEPHrine (EPIPEN) 0.3 mg/0.3 mL pen injection 0.3 mg  0.3 mg Intramuscular Once PRN Annette Donis NP           Family History:   Family History   Problem Relation Age of Onset    Hypertension Mother     Pneumonia Father     Breast cancer Sister     No Known Problems Brother     Leukemia Daughter     No Known Problems Son        Social History:  reports that she has never smoked. She has never used smokeless tobacco. She reports previous alcohol use. She reports that she does not use drugs.    Review of Systemas  Constitutional: No change in weight, appetie, fatigue, fever, or night sweats  Eyes: No changes in vision  Ears, Nose, Mouth, Throat, and Face: No hearing problems, ear pain, rummy nose, or sore throat  Respiratory: No shortness of breath or cough  Cardiovascular: No chest pain, palpitations or dizziness  Gastrointestinal: No abdominal pain, hematochezia, melena  Genitourinary: No dysuria, hematuria, nocturia, menstrual problems, post menopausal  Integumentary/Breast: No rashes or itching  Hematologic/Lymphatic: No anemia or bleeding  "abnormalities  Musculoskeletal: No joint or muscle abnormalities  Neurological: No sensory or motor problems, no headaches  Behavioral/Psych: No depression, anxiety, cognitive problems, or stress  Endocrine: No diabetes or thyroid problems  Allergic/Immunologic: See allergy above    Physical Exam      Vitals:   Vitals:    04/06/22 0814   BP: 129/76   BP Location: Right arm   Patient Position: Sitting   BP Method: Medium (Automatic)   Pulse: 67   Resp: 18   Temp: 97.6 °F (36.4 °C)   TempSrc: Temporal   SpO2: 98%   Weight: 56.8 kg (125 lb 4.8 oz)   Height: 5' 2" (1.575 m)     BMI: Body mass index is 22.92 kg/m².  BSA Body surface area is 1.58 meters squared.  ECOG Performance Status:   ECOG SCORE         GENERAL APPEARANCE: Well developed, well nourished, in no acute distress.      Labs and Imagings     Lab Results   Component Value Date    WBC 7.0 04/05/2022    HGB 8.4 (L) 04/05/2022    HCT 26.7 (L) 04/05/2022    .5 (H) 04/05/2022    LABPLAT 251 04/05/2022       CMP  Sodium   Date Value Ref Range Status   04/05/2022 140 135 - 145 mmol/L Final   03/16/2020 140 136 - 145 mmol/L Final     Potassium   Date Value Ref Range Status   04/05/2022 4.0 3.6 - 5.2 mmol/L Final   03/16/2020 4.5 3.5 - 5.1 mmol/L Final     Chloride   Date Value Ref Range Status   04/05/2022 105 100 - 108 mmol/L Final   03/16/2020 107 98 - 107 mmol/L Final     CO2   Date Value Ref Range Status   04/05/2022 24 21 - 32 mmol/L Final   03/16/2020 21 (L) 22 - 29 mmol/L Final     Glucose   Date Value Ref Range Status   04/05/2022 141 (H) 70 - 110 mg/dL Final     BUN   Date Value Ref Range Status   04/05/2022 18 7 - 18 mg/dL Final   03/16/2020 17.2 8 - 23 mg/dL Final     Creatinine   Date Value Ref Range Status   04/05/2022 1.210 (H) 0.550 - 1.02 mg/dL Final   03/16/2020 1.17 (H) 0.50 - 0.90 mg/dL Final     Calcium   Date Value Ref Range Status   04/05/2022 8.5 (L) 8.8 - 10.5 mg/dL Final   03/16/2020 10.0 8.6 - 10.2 mg/dL Final     Total Protein "   Date Value Ref Range Status   04/05/2022 6.1 (L) 6.4 - 8.2 g/dL Final     Albumin   Date Value Ref Range Status   04/05/2022 2.7 (L) 3.4 - 5.0 g/dL Final   03/16/2020 4.6 3.5 - 5.2 g/dL Final     Total Bilirubin   Date Value Ref Range Status   04/05/2022 0.4 0.0 - 1.0 mg/dL Final     Alkaline Phosphatase   Date Value Ref Range Status   04/05/2022 89 46 - 116 U/L Final     AST   Date Value Ref Range Status   04/05/2022 29 15 - 37 U/L Final   03/16/2020 17 0 - 32 U/L Final     ALT   Date Value Ref Range Status   04/05/2022 15 12 - 78 U/L Final     Anion Gap   Date Value Ref Range Status   04/05/2022 11.0 3.0 - 11.0 mmol/L Final   03/16/2020 12.0 8.0 - 17.0 mmol/L Final     Comment:     NOTE  Testing performed at:  [facility], [address] [place] [address] CLIA #:[id number]       eGFR if    Date Value Ref Range Status   10/15/2020 >60 >60 mL/min/1.73 m^2 Final     eGFR if non    Date Value Ref Range Status   10/15/2020 52 (A) >60 mL/min/1.73 m^2 Final     Comment:     Calculation used to obtain the estimated glomerular filtration  rate (eGFR) is the CKD-EPI equation.            Imaging:                       Assessment:     Principle Problem: Malignant neoplasm of endocervix [C53.0]   Co-morbidity/Active Problems:        Adri Seay is a 86 y.o. female with PMH of The primary encounter diagnosis was Malignant neoplasm of endocervix. 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 Malignant neoplasm of cervix, unspecified site    3. Increased creatine kinase level    4. Neoplastic malignant  related fatigue              Plan:       Cervical cancer stage IV disease diagnosed 6/2019  == 8/5/19 Carbo    ==PET/CT done on 12/2/19. Previous CT imaging done at outside facility, discussed with Dr Nguyen for comparison and he stated pelvic mass smaller, abdominal mass smaller liver lesion likely benign stable but difficult to say if peritoneal implants stable to progressed.    ==FoundationOne Liquid Bx on 12/16/19.  BRCA 2 gene mutation.  ==1/14/20 Pt started lynparza   ==5/13/2020 PET showing early progression.    ==5/2020 resume low dose carbo and taxol.   ==11/2020 PET decreased to stable disease  == 2/25/21 Stable Disease  ==8/27/21 PET progression of disease  == 9/22/21 PDL1 CPS>1  == 10/22 Keytruda started     11/17/21:  Patient here today with her daughter and granddaughter after recent hospitalization due to AMS. She was driving and someone called the  because she seemed confused.  She was brought to ED and initially was unable to answer most questions appropriately.  Labs showed an KALPANA on CKD. She was hospitalized for further evaluation and management of acute metabolic encephalopathy with unclear etiology.  CT head was negative for acute ischemia.  Contrast MRI subsequently did not show any areas of infarction, specifically there were no areas of micrometastasis.  EEG did not demonstrate epileptiform waveforms.  Hemoglobin on admission was 6.5 g, she received a single unit of PRBCs with normalization to 8.6 on disch patient feeling well today, denies any recent bleeding, hemoglobin 8.2 today arge.  She was made n.p.o., received IV fluids, IV PPI and underwent EGD/colonoscopy with Dr. Cordova on 11/11 which showed atrophic gastritis and nonbleeding AV malformations.  She was seen by neurologist, Dr. Hall, who diagnosed her with severe dementia based on MOCA assessment at bedside.  Patient to follow with Dr. Hall for further evaluation and management of this. Patient does desire to be referred  to Dr Sullivan for evaluation of dementia.  The patient has mobility limitation that significantly impairs her abilities and is in need of rollator for unsteady gait and it will significantly improve her ability to participate in his ADLs.  Palliative care referral placed as well. We will see the patient back on 12/2/21 to evaluate her performance status.  If she has returned to baseline, we will likely resume Keytruda with IV hydration support provided.  Patient will resume her ICAR-C and labs to be drawn prior to visit.  12/27/21:  Patient has had recent performance status decline with recurrent hospitalization in mid December for fall with resulting right shoulder fracture.  Patient was then discharged to [place] for strengthening with PT and OT.  On 12/24/2021 patient awoke at 3:00 a.m. with new onset vaginal bleeding and presented to ER for evaluation.  Trans abdominal transpelvic ultrasound showed a large cystic mass consistent with her known cervical cancer diagnosis.  At that time hemoglobin was approximately 8.0, and bleeding spontaneously resolved.  Patient was not given a blood transfusion and was released back to [place].  She denies any recurrent episodes of vaginal bleeding since that time.  Plan will be to proceed with repeat PET CT to evaluate current disease status as she has been off of IO since mid October and will refer patient for evaluation with Dr. Beckwith for palliative radiation.  Patient will return to clinic next week with PET scan and labs to discuss further management and the possibility of resuming Keytruda.  1/20/22:  Patient recently hospitalized for PE as well as bilateral lower DVTs.  Patient completed palliative radiation to cervical mass and bleeding has resolved, patient has been started on low-dose Xarelto to manage clots and has been discharged home.  Her daughter has moved in to provide full time assistance.  Patient is suffering from weakness and fatigue, but otherwise seems  to be recovering well.  Labs reviewed today and hemoglobin greater than 10 no indication for transfusions needed Tentatively we will plan to resume I 0 on February 2nd with labs the day before.  2/2/22: patient and daughter in clinic, daughter states Ms Seay has been feeling good and she appears to back at baseline. Her only c/o is new onset itching, no rash noted. Pt denies any recent/recurrent vaginal bleeding in the last 2 weeks. Labs reviewed and pt is cleared for tx as planned for tomorrow.   2/23/22:  Patient in clinic with granddaughter today with no new complaints.  Itching has resolved and patient is tolerant of Eliquis.  She denies any active bleeding, and has no abdominal pain or discomfort.  Labs are reviewed and patient is cleared for immunotherapy as planned for tomorrow.  Discussed with patient and family mild weight loss is noted, encouraged patient to attempt to eat more frequently with nutrient dense focus.  3/16/22:  Patient in clinic today states feeling okay, does complain of some fatigue but denies any recent bleeding episodes.  Hemoglobin 8.2 will proceed with B12 injection with infusion that is planned for tomorrow.  Patient does report increase in appetite and noted 3 lb of weight gain after starting Periactin per palliative Care recommendations.  Encouraged patient to increase fluids as serum creatinine mildly elevated.  4/6/22: no new c/o, feeling good. ;labs reviewed  will check A1C at next visit. Ok to proceed with tx as planned. Will continue with B12 injection.     -Total time spent in counseling and discussion about further management options including relevant lab work, treatment,  prognosis, medications and intended side effects was more than 25 minutes. More than 50% of the time was spent on counseling and coordination of care.  This includes face to face time and non-face to face time preparing to see the patient (eg, review of tests), Obtaining and/or reviewing  separately obtained history, Documenting clinical information in the electronic or other health record, Independently interpreting resultsand communicating results to the patient/family/caregiver, or Care coordination.          RTC in 3 weeks with labs       ANISA Torres

## 2022-04-07 RX ORDER — CYANOCOBALAMIN 1000 UG/ML
1000 INJECTION, SOLUTION INTRAMUSCULAR; SUBCUTANEOUS
Status: CANCELLED | OUTPATIENT
Start: 2022-04-07

## 2022-04-08 ENCOUNTER — PATIENT MESSAGE (OUTPATIENT)
Dept: HEMATOLOGY/ONCOLOGY | Facility: CLINIC | Age: 87
End: 2022-04-08
Payer: MEDICARE

## 2022-04-11 DIAGNOSIS — I82.403 ACUTE DEEP VEIN THROMBOSIS (DVT) OF BOTH LOWER EXTREMITIES, UNSPECIFIED VEIN: Primary | ICD-10-CM

## 2022-04-11 DIAGNOSIS — I82.403 ACUTE DEEP VEIN THROMBOSIS (DVT) OF BOTH LOWER EXTREMITIES, UNSPECIFIED VEIN: ICD-10-CM

## 2022-04-26 ENCOUNTER — CLINICAL SUPPORT (OUTPATIENT)
Dept: HEMATOLOGY/ONCOLOGY | Facility: CLINIC | Age: 87
End: 2022-04-26
Payer: MEDICARE

## 2022-04-26 DIAGNOSIS — E03.9 HYPOTHYROIDISM, UNSPECIFIED TYPE: ICD-10-CM

## 2022-04-26 DIAGNOSIS — C53.0 MALIGNANT NEOPLASM OF ENDOCERVIX: ICD-10-CM

## 2022-04-26 LAB
ALBUMIN SERPL BCP-MCNC: 3 G/DL (ref 3.4–5)
ALBUMIN/GLOBULIN RATIO: 0.81 RATIO (ref 1.1–1.8)
ALP SERPL-CCNC: 102 U/L (ref 46–116)
ALT SERPL W P-5'-P-CCNC: 14 U/L (ref 12–78)
ANION GAP SERPL CALC-SCNC: 9 MMOL/L (ref 3–11)
AST SERPL-CCNC: 28 U/L (ref 15–37)
BASOPHILS NFR BLD: 0.5 % (ref 0–3)
BILIRUB SERPL-MCNC: 0.5 MG/DL (ref 0–1)
BUN SERPL-MCNC: 17 MG/DL (ref 7–18)
BUN/CREAT SERPL: 14.28 RATIO (ref 7–18)
CALCIUM SERPL-MCNC: 9.3 MG/DL (ref 8.8–10.5)
CHLORIDE SERPL-SCNC: 106 MMOL/L (ref 100–108)
CO2 SERPL-SCNC: 24 MMOL/L (ref 21–32)
CREAT SERPL-MCNC: 1.19 MG/DL (ref 0.55–1.02)
EOSINOPHIL NFR BLD: 0.9 % (ref 1–3)
ERYTHROCYTE [DISTWIDTH] IN BLOOD BY AUTOMATED COUNT: 13.2 % (ref 12.5–18)
GFR ESTIMATION: 52
GLOBULIN: 3.7 G/DL (ref 2.3–3.5)
GLUCOSE SERPL-MCNC: 105 MG/DL (ref 70–110)
HCT VFR BLD AUTO: 28.9 % (ref 37–47)
HGB BLD-MCNC: 8.9 G/DL (ref 12–16)
HYPOCHROMIA BLD QL SMEAR: NORMAL
LYMPHOCYTES NFR BLD: 13.1 % (ref 25–40)
MACROCYTES BLD QL SMEAR: NORMAL
MCH RBC QN AUTO: 33.1 PG (ref 27–31.2)
MCHC RBC AUTO-ENTMCNC: 30.8 G/DL (ref 31.8–35.4)
MCV RBC AUTO: 107.4 FL (ref 80–97)
MONOCYTES NFR BLD: 8.2 % (ref 1–15)
NEUTROPHILS # BLD AUTO: 7.58 10*3/UL (ref 1.8–7.7)
NEUTROPHILS NFR BLD: 76.7 % (ref 37–80)
NUCLEATED RED BLOOD CELLS: 0 %
PLATELETS: 268 10*3/UL (ref 142–424)
POTASSIUM SERPL-SCNC: 4.8 MMOL/L (ref 3.6–5.2)
PROT SERPL-MCNC: 6.7 G/DL (ref 6.4–8.2)
RBC # BLD AUTO: 2.69 10*6/UL (ref 4.2–5.4)
SODIUM BLD-SCNC: 139 MMOL/L (ref 135–145)
TSH SERPL DL<=0.005 MIU/L-ACNC: 1 UIU/ML (ref 0.36–3.74)
WBC # BLD: 9.9 10*3/UL (ref 4.6–10.2)

## 2022-04-27 ENCOUNTER — TELEPHONE (OUTPATIENT)
Dept: HEMATOLOGY/ONCOLOGY | Facility: CLINIC | Age: 87
End: 2022-04-27

## 2022-04-27 ENCOUNTER — OFFICE VISIT (OUTPATIENT)
Dept: HEMATOLOGY/ONCOLOGY | Facility: CLINIC | Age: 87
End: 2022-04-27
Payer: MEDICARE

## 2022-04-27 VITALS
WEIGHT: 126.19 LBS | HEIGHT: 62 IN | OXYGEN SATURATION: 98 % | HEART RATE: 81 BPM | RESPIRATION RATE: 16 BRPM | DIASTOLIC BLOOD PRESSURE: 77 MMHG | BODY MASS INDEX: 23.22 KG/M2 | SYSTOLIC BLOOD PRESSURE: 122 MMHG | TEMPERATURE: 97 F

## 2022-04-27 DIAGNOSIS — C53.0 MALIGNANT NEOPLASM OF ENDOCERVIX: Primary | ICD-10-CM

## 2022-04-27 DIAGNOSIS — Z29.89 ENCOUNTER FOR IMMUNOTHERAPY: ICD-10-CM

## 2022-04-27 DIAGNOSIS — I82.403 ACUTE DEEP VEIN THROMBOSIS (DVT) OF BOTH LOWER EXTREMITIES, UNSPECIFIED VEIN: ICD-10-CM

## 2022-04-27 PROCEDURE — 99214 PR OFFICE/OUTPT VISIT, EST, LEVL IV, 30-39 MIN: ICD-10-PCS | Mod: S$GLB,,, | Performed by: NURSE PRACTITIONER

## 2022-04-27 PROCEDURE — 99214 OFFICE O/P EST MOD 30 MIN: CPT | Mod: S$GLB,,, | Performed by: NURSE PRACTITIONER

## 2022-04-27 RX ORDER — SODIUM CHLORIDE 0.9 % (FLUSH) 0.9 %
10 SYRINGE (ML) INJECTION
Status: CANCELLED | OUTPATIENT
Start: 2022-04-28

## 2022-04-27 RX ORDER — HEPARIN 100 UNIT/ML
500 SYRINGE INTRAVENOUS
Status: CANCELLED | OUTPATIENT
Start: 2022-04-28

## 2022-04-27 RX ORDER — CYANOCOBALAMIN 1000 UG/ML
1000 INJECTION, SOLUTION INTRAMUSCULAR; SUBCUTANEOUS
Status: CANCELLED | OUTPATIENT
Start: 2022-05-08

## 2022-04-27 NOTE — PROGRESS NOTES
"    Medical Oncology Progress Note  [facility]     PATIENT: Adri Seay  : 1935 86 y.o. female  MRN [id number]     PCP: Primary Doctor No  Consult Requested By:      Date of Service: 2022    Subjective:   Interim History:    Oncology History:    History of Present Illness: Ms. Seay is a 86 y.o. female who presents for evaluation and management of cervical cancer. SHe was seen at [facility] on 19.      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 The patient had a CT scan consistent with widely metastatic disease including multiple sites of lymphadenopathy, a possible lung metastases, liver metastases, nodule in the anterior abdominal wall, and carcinomatosis. The patient is mostly asymptomatic but does have a vaginal discharge. Her performance status is 0. On exam, there is a 6 centimeter mass in the anterior abdominal wall superior to the umbilicus. On bimanual examination, tumor is a place the entire cervix apex of the vagina, and on rectovaginal examination, there is a large pelvic mass extending to the bilateral pelvic sidewalls.    We will get her CT reviewed at [facility] and if it is consistent with metastatic disease, we have recommended palliative chemotherapy with either carboplatinum as a single agent or carboplatinum and paclitaxel. Due to concern for bowel involvement on the CT scan, I have recommended not giving bevacizumab as part of this regimen. Patient will return home to [place] and begin chemotherapy with a medical oncologist there. I've also recommended a consultation with radiation oncology in [place] for consideration of palliative radiation to lower the chances of significant vaginal bleeding. We will see her back on an as-needed basis."        Oncology History   Malignant neoplasm of endocervix   2019 Initial Diagnosis    Malignant " neoplasm of exocervix     7/8/2019 - 7/8/2019 Chemotherapy    Treatment Summary   Plan Name: OP CARBOPLATIN WEEKLY  Treatment Goal: Palliative  Status: Inactive  Start Date: [No treatment day found]  End Date: [No treatment day found]  Provider: Jaime Pinon MD  Chemotherapy: CARBOplatin (PARAPLATIN) in sodium chloride 0.9% 250 mL chemo infusion, , Intravenous, Clinic/HOD 1 time, 0 of 4 cycles     7/9/2019 - 11/25/2019 Chemotherapy    Treatment Summary   Plan Name: OP GYN CARBOPLATIN (AUC) Q4W  Treatment Goal: Palliative  Status: Inactive  Start Date: 7/9/2019  End Date: 10/29/2019  Provider: Jaime Pinon MD  Chemotherapy: CARBOplatin (PARAPLATIN) in sodium chloride 0.9% 250 mL chemo infusion,  (original dose 341.5 mg), Intravenous, Clinic/HOD 1 time, 5 of 6 cycles  Dose modification:   (original dose 341.5 mg, Cycle 1)     5/19/2020 - 6/16/2020 Chemotherapy    Treatment Summary   Plan Name: OP  PACLITAXEL CARBOPLATIN WEEKLY  Treatment Goal: Palliative  Status: Inactive  Start Date: 5/19/2020  End Date: 5/22/2020  Provider: Annette Donis NP  Chemotherapy: CARBOplatin (PARAPLATIN) 115 mg in sodium chloride 0.9% 250 mL chemo infusion, 115 mg (98.6 % of original dose 118.4 mg), Intravenous, Clinic/HOD 1 time, 0 of 1 cycle  Dose modification:   (original dose 118.4 mg, Cycle 1)  PACLitaxeL (TAXOL) 80 mg/m2 = 126 mg in sodium chloride 0.9% 250 mL chemo infusion, 80 mg/m2 = 126 mg (100 % of original dose 80 mg/m2), Intravenous, Clinic/HOD 1 time, 0 of 1 cycle  Dose modification: 80 mg/m2 (original dose 80 mg/m2, Cycle 1)     5/19/2020 - 1/19/2021 Chemotherapy    Treatment Summary   Plan Name:  PACLITAXEL CARBOPLATIN WEEKLY  Treatment Goal: Palliative  Status: Inactive  Start Date: 5/19/2020  End Date: 12/29/2020  Provider: Annette Donis NP  Chemotherapy: CARBOplatin (PARAPLATIN) 115 mg in sodium chloride 0.9% 250 mL chemo infusion, 115 mg (100 % of original dose 114.8 mg), Intravenous, Clinic/HOD 1 time, 3 of 3  cycles  Dose modification:   (original dose 114.8 mg, Cycle 1, Reason: MD Discretion), 100 mg (original dose 114.8 mg, Cycle 1),   (original dose 114.8 mg, Cycle 2, Reason: MD Discretion),   (original dose 114.8 mg, Cycle 2, Reason: MD Discretion),   (original dose 114.8 mg, Cycle 2),   (original dose 114.8 mg, Cycle 2)  Administration: 115 mg (9/17/2020), 115 mg (9/24/2020), 125 mg (10/8/2020), 140 mg (10/15/2020), 130 mg (9/3/2020)  PACLitaxeL (TAXOL) 80 mg/m2 = 126 mg in sodium chloride 0.9% 250 mL chemo infusion, 80 mg/m2 = 126 mg (100 % of original dose 80 mg/m2), Intravenous, Clinic/HOD 1 time, 3 of 3 cycles  Dose modification: 80 mg/m2 (original dose 80 mg/m2, Cycle 1), 50 mg/m2 (original dose 80 mg/m2, Cycle 1), 50 mg/m2 (original dose 80 mg/m2, Cycle 3)  Administration: 84 mg (9/3/2020), 84 mg (9/17/2020), 84 mg (9/24/2020), 90 mg (10/8/2020), 84 mg (10/15/2020)     12/30/2020 - 8/19/2021 Chemotherapy    Treatment Summary   Plan Name: OP GYN PACLITAXEL WEEKLY + CARBOPLATIN  (AUC2) WEEKLY  Treatment Goal: Maintenance  Status: Inactive  Start Date: 12/30/2020  End Date: 8/19/2021  Provider: Abundio Tabares MD  Chemotherapy: CARBOplatin (PARAPLATIN) 125 mg in sodium chloride 0.9% 250 mL chemo infusion, 125 mg, Intravenous, Clinic/HOD 1 time, 30 of 35 cycles  Dose modification: 145 mg (original dose 125.6 mg, Cycle 11), 145 mg (original dose 125.6 mg, Cycle 15), 145 mg (original dose 125.6 mg, Cycle 17), 145 mg (original dose 125.6 mg, Cycle 18), 145 mg (original dose 125.6 mg, Cycle 20), 145 mg (original dose 125.6 mg, Cycle 22), 126.8 mg (original dose 125.6 mg, Cycle 30)  PACLitaxeL (TAXOL) 50 mg/m2 = 84 mg in sodium chloride 0.9% 250 mL chemo infusion, 50 mg/m2 = 84 mg (100 % of original dose 50 mg/m2), Intravenous, Clinic[facility] 1 time, 30 of 35 cycles  Dose modification: 50 mg/m2 (original dose 50 mg/m2, Cycle 1)     6/24/2021 - 6/24/2021 Chemotherapy    Treatment Summary   Plan Name: OP BEVACIZUMAB  Q2W  Treatment Goal: Maintenance  Status: Inactive  Start Date:   End Date:   Provider: Annette Donis NP  Chemotherapy: bevacizumab-bvzr 653 mg in sodium chloride 0.9% 126.12 mL infusion, 10 mg/kg, Intravenous, Clinic/HOD 1 time, 0 of 26 cycles     9/16/2021 - 9/16/2021 Chemotherapy    Treatment Summary   Plan Name: OP GYN GEMCITABINE (Q4W)  Treatment Goal: Palliative  Status: Inactive  Start Date: 9/16/2021  End Date: 9/16/2021  Provider: Annette Donis NP  Chemotherapy: gemcitabine (GEMZAR) 1,010 mg in sodium chloride 0.9% 250 mL chemo infusion, 600 mg/m2 = 1,010 mg, Intravenous, Clinic/HOD 1 time, 1 of 6 cycles     9/30/2021 -  Chemotherapy    Treatment Summary   Plan Name: OP PEMBROLIZUMAB 200MG Q3W  Treatment Goal: Palliative  Status: Active  Start Date: 9/30/2021  End Date: 5/19/2022 (Planned)  Provider: Annette Donis NP  Chemotherapy: pembrolizumab (KEYTRUDA) 200 mg in sodium chloride 0.9% 108 mL infusion, 200 mg, Intravenous, Clinic/HOD 1 time, 6 of 8 cycles         Past Medical History:   Past Medical History:   Diagnosis Date    Anemia     Cataract     Cervical cancer 05/2019    Cervical cancer 6/25/2019    Hypertension     Malignant neoplasm metastatic to right lung 9/1/2021       Past Surgical HIstory:   Past Surgical History:   Procedure Laterality Date    CATARACT EXTRACTION, BILATERAL      WRIST SURGERY  1984       Allergies:  Review of patient's allergies indicates:   Allergen Reactions    Strawberry Rash       Medications:  Current Outpatient Medications   Medication Sig Dispense Refill    apixaban (ELIQUIS) 2.5 mg Tab Take 1 tablet (2.5 mg total) by mouth 2 (two) times daily. 60 tablet 3    cloNIDine (CATAPRES) 0.3 MG tablet TAKE 1 TABLET(0.3 MG) BY MOUTH TWICE DAILY 180 tablet 3    cyproheptadine (PERIACTIN) 4 mg tablet TAKE 1/2 (ONE-HALF) TABLET BY MOUTH THREE TIMES DAILY      FENOFIBRATE ORAL Take by mouth 2 (two) times daily.      hydrOXYzine HCL (ATARAX) 10 MG Tab Take 1 tablet (10  mg total) by mouth 3 (three) times daily as needed. 60 tablet 2    iron-vit c-vit b12-folic acid (IRON-C PLUS) tablet Daily 30 each 6    loratadine (CLARITIN) 10 mg tablet 10 mg.      NIFEdipine (PROCARDIA-XL) 30 MG (OSM) 24 hr tablet Take 1 tablet (30 mg total) by mouth once daily. 90 tablet 3    ondansetron (ZOFRAN-ODT) 8 MG TbDL Take 1 tablet (8 mg total) by mouth every 8 (eight) hours as needed (chemotherapy-induced nausea and vomiting). 40 tablet 5    oxyCODONE-acetaminophen (PERCOCET) 5-325 mg per tablet Every 6 Hours as needed for Moderate Pain(4-6)      simvastatin (ZOCOR) 20 MG tablet Take 1 tablet (20 mg total) by mouth every evening. 90 tablet 3     Current Facility-Administered Medications   Medication Dose Route Frequency Provider Last Rate Last Admin    alteplase injection 2 mg  2 mg Intra-Catheter PRN Annette Donis NP        EPINEPHrine (EPIPEN) 0.3 mg/0.3 mL pen injection 0.3 mg  0.3 mg Intramuscular Once PRN Annette Donis NP           Family History:   Family History   Problem Relation Age of Onset    Hypertension Mother     Pneumonia Father     Breast cancer Sister     No Known Problems Brother     Leukemia Daughter     No Known Problems Son        Social History:  reports that she has never smoked. She has never used smokeless tobacco. She reports previous alcohol use. She reports that she does not use drugs.    Review of Systemas  Constitutional: No change in weight, appetie, fatigue, fever, or night sweats  Eyes: No changes in vision  Ears, Nose, Mouth, Throat, and Face: No hearing problems, ear pain, rummy nose, or sore throat  Respiratory: No shortness of breath or cough  Cardiovascular: No chest pain, palpitations or dizziness  Gastrointestinal: No abdominal pain, hematochezia, melena  Genitourinary: No dysuria, hematuria, nocturia, menstrual problems, post menopausal  Integumentary/Breast: No rashes or itching  Hematologic/Lymphatic: No anemia or bleeding  "abnormalities  Musculoskeletal: No joint or muscle abnormalities  Neurological: No sensory or motor problems, no headaches  Behavioral/Psych: No depression, anxiety, cognitive problems, or stress  Endocrine: No diabetes or thyroid problems  Allergic/Immunologic: See allergy above    Physical Exam      Vitals:   Vitals:    04/27/22 0811   BP: 122/77   BP Location: Right arm   Patient Position: Sitting   BP Method: Medium (Automatic)   Pulse: 81   Resp: 16   Temp: 97 °F (36.1 °C)   TempSrc: Temporal   SpO2: 98%   Weight: 57.2 kg (126 lb 3.2 oz)   Height: 5' 2" (1.575 m)     BMI: Body mass index is 23.08 kg/m².  BSA Body surface area is 1.58 meters squared.  ECOG Performance Status:   ECOG SCORE         GENERAL APPEARANCE: Well developed, well nourished, in no acute distress.      Labs and Imagings     Lab Results   Component Value Date    WBC 9.9 04/26/2022    HGB 8.9 (L) 04/26/2022    HCT 28.9 (L) 04/26/2022    .4 (H) 04/26/2022    LABPLAT 268 04/26/2022       CMP  Sodium   Date Value Ref Range Status   04/26/2022 139 135 - 145 mmol/L Final   03/16/2020 140 136 - 145 mmol/L Final     Potassium   Date Value Ref Range Status   04/26/2022 4.8 3.6 - 5.2 mmol/L Final   03/16/2020 4.5 3.5 - 5.1 mmol/L Final     Chloride   Date Value Ref Range Status   04/26/2022 106 100 - 108 mmol/L Final   03/16/2020 107 98 - 107 mmol/L Final     CO2   Date Value Ref Range Status   04/26/2022 24 21 - 32 mmol/L Final   03/16/2020 21 (L) 22 - 29 mmol/L Final     Glucose   Date Value Ref Range Status   04/26/2022 105 70 - 110 mg/dL Final     BUN   Date Value Ref Range Status   04/26/2022 17 7 - 18 mg/dL Final   03/16/2020 17.2 8 - 23 mg/dL Final     Creatinine   Date Value Ref Range Status   04/26/2022 1.19 (H) 0.55 - 1.02 mg/dL Final   03/16/2020 1.17 (H) 0.50 - 0.90 mg/dL Final     Calcium   Date Value Ref Range Status   04/26/2022 9.3 8.8 - 10.5 mg/dL Final   03/16/2020 10.0 8.6 - 10.2 mg/dL Final     Total Protein   Date Value Ref " Range Status   04/26/2022 6.7 6.4 - 8.2 g/dL Final     Albumin   Date Value Ref Range Status   04/26/2022 3.0 (L) 3.4 - 5.0 g/dL Final   03/16/2020 4.6 3.5 - 5.2 g/dL Final     Total Bilirubin   Date Value Ref Range Status   04/26/2022 0.5 0.0 - 1.0 mg/dL Final     Alkaline Phosphatase   Date Value Ref Range Status   04/26/2022 102 46 - 116 U/L Final     AST   Date Value Ref Range Status   04/26/2022 28 15 - 37 U/L Final   03/16/2020 17 0 - 32 U/L Final     ALT   Date Value Ref Range Status   04/26/2022 14 12 - 78 U/L Final     Anion Gap   Date Value Ref Range Status   04/26/2022 9.0 3.0 - 11.0 mmol/L Final   03/16/2020 12.0 8.0 - 17.0 mmol/L Final     Comment:     NOTE  Testing performed at:  [facility], [address] [place] [address] CLIA #:[id number]       eGFR if    Date Value Ref Range Status   10/15/2020 >60 >60 mL/min/1.73 m^2 Final     eGFR if non    Date Value Ref Range Status   10/15/2020 52 (A) >60 mL/min/1.73 m^2 Final     Comment:     Calculation used to obtain the estimated glomerular filtration  rate (eGFR) is the CKD-EPI equation.            Imaging:                       Assessment:     Principle Problem: Malignant neoplasm of endocervix [C53.0]   Co-morbidity/Active Problems:        Adri Seay is a 86 y.o. female with PMH of The primary encounter diagnosis was Malignant neoplasm of endocervix. Diagnoses of Encounter for immunotherapy and Acute deep vein thrombosis (DVT) of both lower extremities, unspecified vein were also pertinent to this visit., with Malignant neoplasm of endocervix [C53.0]     ==============================================  *  Assessment:       1. 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 Malignant neoplasm of cervix, unspecified site    3. Increased creatine kinase level    4. Neoplastic malignant related fatigue              Plan:       Cervical cancer stage IV disease diagnosed 6/2019  == 8/5/19 Carbo    ==PET/CT  done on 12/2/19. Previous CT imaging done at outside facility, discussed with Dr Nguyen for comparison and he stated pelvic mass smaller, abdominal mass smaller liver lesion likely benign stable but difficult to say if peritoneal implants stable to progressed.    ==FoundationOne Liquid Bx on 12/16/19.  BRCA 2 gene mutation.  ==1/14/20 Pt started lynparza   ==5/13/2020 PET showing early progression.    ==5/2020 resume low dose carbo and taxol.   ==11/2020 PET decreased to stable disease  == 2/25/21 Stable Disease  ==8/27/21 PET progression of disease  == 9/22/21 PDL1 CPS>1  == 10/22 Keytruda started     11/17/21:  Patient here today with her daughter and granddaughter after recent hospitalization due to AMS. She was driving and someone called the  because she seemed confused.  She was brought to ED and initially was unable to answer most questions appropriately.  Labs showed an KALPANA on CKD. She was hospitalized for further evaluation and management of acute metabolic encephalopathy with unclear etiology.  CT head was negative for acute ischemia.  Contrast MRI subsequently did not show any areas of infarction, specifically there were no areas of micrometastasis.  EEG did not demonstrate epileptiform waveforms.  Hemoglobin on admission was 6.5 g, she received a single unit of PRBCs with normalization to 8.6 on disch patient feeling well today, denies any recent bleeding, hemoglobin 8.2 today arge.  She was made n.p.o., received IV fluids, IV PPI and underwent EGD/colonoscopy with Dr. Cordova on 11/11 which showed atrophic gastritis and nonbleeding AV malformations.  She was seen by neurologist, Dr. Hall, who diagnosed her with severe dementia based on MOCA assessment at bedside.  Patient to follow with Dr. Hall for further evaluation and management of this. Patient does desire to be referred to Dr Sullivan for evaluation of dementia.  The patient has mobility limitation that significantly impairs her abilities  and is in need of rollator for unsteady gait and it will significantly improve her ability to participate in his ADLs.  Palliative care referral placed as well. We will see the patient back on 12/2/21 to evaluate her performance status.  If she has returned to baseline, we will likely resume Keytruda with IV hydration support provided.  Patient will resume her ICAR-C and labs to be drawn prior to visit.  12/27/21:  Patient has had recent performance status decline with recurrent hospitalization in mid December for fall with resulting right shoulder fracture.  Patient was then discharged to [facility] for strengthening with PT and OT.  On 12/24/2021 patient awoke at 3:00 a.m. with new onset vaginal bleeding and presented to ER for evaluation.  Trans abdominal transpelvic ultrasound showed a large cystic mass consistent with her known cervical cancer diagnosis.  At that time hemoglobin was approximately 8.0, and bleeding spontaneously resolved.  Patient was not given a blood transfusion and was released back to [facility].  She denies any recurrent episodes of vaginal bleeding since that time.  Plan will be to proceed with repeat PET CT to evaluate current disease status as she has been off of IO since mid October and will refer patient for evaluation with Dr. Beckwith for palliative radiation.  Patient will return to clinic next week with PET scan and labs to discuss further management and the possibility of resuming Keytruda.  1/20/22:  Patient recently hospitalized for PE as well as bilateral lower DVTs.  Patient completed palliative radiation to cervical mass and bleeding has resolved, patient has been started on low-dose Xarelto to manage clots and has been discharged home.  Her daughter has moved in to provide full time assistance.  Patient is suffering from weakness and fatigue, but otherwise seems to be recovering well.  Labs reviewed today and hemoglobin greater than 10 no indication for transfusions needed  Tentatively we will plan to resume I 0 on February 2nd with labs the day before.  2/2/22: patient and daughter in clinic, daughter states Ms August has been feeling good and she appears to back at baseline. Her only c/o is new onset itching, no rash noted. Pt denies any recent/recurrent vaginal bleeding in the last 2 weeks. Labs reviewed and pt is cleared for tx as planned for tomorrow.   2/23/22:  Patient in clinic with granddaughter today with no new complaints.  Itching has resolved and patient is tolerant of Eliquis.  She denies any active bleeding, and has no abdominal pain or discomfort.  Labs are reviewed and patient is cleared for immunotherapy as planned for tomorrow.  Discussed with patient and family mild weight loss is noted, encouraged patient to attempt to eat more frequently with nutrient dense focus.  3/16/22:  Patient in clinic today states feeling okay, does complain of some fatigue but denies any recent bleeding episodes.  Hemoglobin 8.2 will proceed with B12 injection with infusion that is planned for tomorrow.  Patient does report increase in appetite and noted 3 lb of weight gain after starting Periactin per palliative Care recommendations.  Encouraged patient to increase fluids as serum creatinine mildly elevated.  4/27/22: no new c/o, feeling good. ;labs reviewed, Ok to proceed with tx as planned. Will continue with B12 injection.  PET -CT ordered    -Total time spent in counseling and discussion about further management options including relevant lab work, treatment,  prognosis, medications and intended side effects was more than 25 minutes. More than 50% of the time was spent on counseling and coordination of care.  This includes face to face time and non-face to face time preparing to see the patient (eg, review of tests), Obtaining and/or reviewing separately obtained history, Documenting clinical information in the electronic or other health record, Independently interpreting resultsand  communicating results to the patient/family/caregiver, or Care coordination.          RTC in 3 weeks with labs       ANISA Torres

## 2022-04-27 NOTE — LETTER
April 27, 2022        Clair Rolon NP  [address]  [place] [address]             [place] [facility]  [address]  [place] [address]  Phone: [phone]  Fax: [phone]   Patient: Adri Seay   MR Number: [id number]   YOB: 1935   Date of Visit: 4/27/2022       Dear Dr. Rolon:    Thank you for referring Adri Seay to me for evaluation. Attached you will find relevant portions of my assessment and plan of care.    If you have questions, please do not hesitate to call me. I look forward to following Adri Seay along with you.    Sincerely,      Annette Donis NP            CC  No Recipients    Enclosure

## 2022-05-17 ENCOUNTER — CLINICAL SUPPORT (OUTPATIENT)
Dept: HEMATOLOGY/ONCOLOGY | Facility: CLINIC | Age: 87
End: 2022-05-17
Payer: MEDICARE

## 2022-05-17 DIAGNOSIS — C53.0 MALIGNANT NEOPLASM OF ENDOCERVIX: ICD-10-CM

## 2022-05-17 DIAGNOSIS — E03.9 HYPOTHYROIDISM, UNSPECIFIED TYPE: ICD-10-CM

## 2022-05-17 LAB
ALBUMIN SERPL BCP-MCNC: 2.6 G/DL (ref 3.4–5)
ALBUMIN/GLOBULIN RATIO: 0.6 RATIO (ref 1.1–1.8)
ALP SERPL-CCNC: 119 U/L (ref 46–116)
ALT SERPL W P-5'-P-CCNC: 10 U/L (ref 12–78)
ANION GAP SERPL CALC-SCNC: 8 MMOL/L (ref 3–11)
AST SERPL-CCNC: 28 U/L (ref 15–37)
BASOPHILS NFR BLD: 0.3 % (ref 0–3)
BILIRUB SERPL-MCNC: 0.5 MG/DL (ref 0–1)
BUN SERPL-MCNC: 17 MG/DL (ref 7–18)
BUN/CREAT SERPL: 12.31 RATIO (ref 7–18)
CALCIUM SERPL-MCNC: 9.1 MG/DL (ref 8.8–10.5)
CHLORIDE SERPL-SCNC: 108 MMOL/L (ref 100–108)
CO2 SERPL-SCNC: 24 MMOL/L (ref 21–32)
CREAT SERPL-MCNC: 1.38 MG/DL (ref 0.55–1.02)
EOSINOPHIL NFR BLD: 0.9 % (ref 1–3)
ERYTHROCYTE [DISTWIDTH] IN BLOOD BY AUTOMATED COUNT: 13.6 % (ref 12.5–18)
GFR ESTIMATION: 44
GLOBULIN: 4.3 G/DL (ref 2.3–3.5)
GLUCOSE SERPL-MCNC: 103 MG/DL (ref 70–110)
HCT VFR BLD AUTO: 25.5 % (ref 37–47)
HGB BLD-MCNC: 7.8 G/DL (ref 12–16)
HYPOCHROMIA BLD QL SMEAR: NORMAL
LYMPHOCYTES NFR BLD: 14.4 % (ref 25–40)
MACROCYTES BLD QL SMEAR: NORMAL
MCH RBC QN AUTO: 33.2 PG (ref 27–31.2)
MCHC RBC AUTO-ENTMCNC: 30.6 G/DL (ref 31.8–35.4)
MCV RBC AUTO: 108.5 FL (ref 80–97)
MONOCYTES NFR BLD: 8.1 % (ref 1–15)
NEUTROPHILS # BLD AUTO: 8.67 10*3/UL (ref 1.8–7.7)
NEUTROPHILS NFR BLD: 75.4 % (ref 37–80)
NUCLEATED RED BLOOD CELLS: 0 %
PLATELETS: 327 10*3/UL (ref 142–424)
POTASSIUM SERPL-SCNC: 4.2 MMOL/L (ref 3.6–5.2)
PROT SERPL-MCNC: 6.9 G/DL (ref 6.4–8.2)
RBC # BLD AUTO: 2.35 10*6/UL (ref 4.2–5.4)
SODIUM BLD-SCNC: 140 MMOL/L (ref 135–145)
TSH SERPL DL<=0.005 MIU/L-ACNC: 1.39 UIU/ML (ref 0.36–3.74)
WBC # BLD: 11.5 10*3/UL (ref 4.6–10.2)

## 2022-05-18 ENCOUNTER — OFFICE VISIT (OUTPATIENT)
Dept: HEMATOLOGY/ONCOLOGY | Facility: CLINIC | Age: 87
End: 2022-05-18
Payer: MEDICARE

## 2022-05-18 VITALS
DIASTOLIC BLOOD PRESSURE: 69 MMHG | SYSTOLIC BLOOD PRESSURE: 110 MMHG | RESPIRATION RATE: 16 BRPM | OXYGEN SATURATION: 98 % | TEMPERATURE: 98 F | BODY MASS INDEX: 22.7 KG/M2 | HEIGHT: 62 IN | WEIGHT: 123.38 LBS | HEART RATE: 104 BPM

## 2022-05-18 DIAGNOSIS — C53.0 MALIGNANT NEOPLASM OF ENDOCERVIX: Primary | ICD-10-CM

## 2022-05-18 DIAGNOSIS — C78.02 MALIGNANT NEOPLASM METASTATIC TO BOTH LUNGS: ICD-10-CM

## 2022-05-18 DIAGNOSIS — C77.8 MALIGNANT NEOPLASM METASTATIC TO LYMPH NODES OF MULTIPLE SITES: ICD-10-CM

## 2022-05-18 DIAGNOSIS — C78.01 MALIGNANT NEOPLASM METASTATIC TO BOTH LUNGS: ICD-10-CM

## 2022-05-18 PROCEDURE — 3074F PR MOST RECENT SYSTOLIC BLOOD PRESSURE < 130 MM HG: ICD-10-PCS | Mod: CPTII,S$GLB,, | Performed by: NURSE PRACTITIONER

## 2022-05-18 PROCEDURE — 3074F SYST BP LT 130 MM HG: CPT | Mod: CPTII,S$GLB,, | Performed by: NURSE PRACTITIONER

## 2022-05-18 PROCEDURE — 1160F RVW MEDS BY RX/DR IN RCRD: CPT | Mod: CPTII,S$GLB,, | Performed by: NURSE PRACTITIONER

## 2022-05-18 PROCEDURE — 1101F PT FALLS ASSESS-DOCD LE1/YR: CPT | Mod: CPTII,S$GLB,, | Performed by: NURSE PRACTITIONER

## 2022-05-18 PROCEDURE — 3078F DIAST BP <80 MM HG: CPT | Mod: CPTII,S$GLB,, | Performed by: NURSE PRACTITIONER

## 2022-05-18 PROCEDURE — 3288F FALL RISK ASSESSMENT DOCD: CPT | Mod: CPTII,S$GLB,, | Performed by: NURSE PRACTITIONER

## 2022-05-18 PROCEDURE — 3288F PR FALLS RISK ASSESSMENT DOCUMENTED: ICD-10-PCS | Mod: CPTII,S$GLB,, | Performed by: NURSE PRACTITIONER

## 2022-05-18 PROCEDURE — 99215 PR OFFICE/OUTPT VISIT, EST, LEVL V, 40-54 MIN: ICD-10-PCS | Mod: S$GLB,,, | Performed by: NURSE PRACTITIONER

## 2022-05-18 PROCEDURE — 3078F PR MOST RECENT DIASTOLIC BLOOD PRESSURE < 80 MM HG: ICD-10-PCS | Mod: CPTII,S$GLB,, | Performed by: NURSE PRACTITIONER

## 2022-05-18 PROCEDURE — 1101F PR PT FALLS ASSESS DOC 0-1 FALLS W/OUT INJ PAST YR: ICD-10-PCS | Mod: CPTII,S$GLB,, | Performed by: NURSE PRACTITIONER

## 2022-05-18 PROCEDURE — 1157F ADVNC CARE PLAN IN RCRD: CPT | Mod: CPTII,S$GLB,, | Performed by: NURSE PRACTITIONER

## 2022-05-18 PROCEDURE — 1126F AMNT PAIN NOTED NONE PRSNT: CPT | Mod: CPTII,S$GLB,, | Performed by: NURSE PRACTITIONER

## 2022-05-18 PROCEDURE — 1159F MED LIST DOCD IN RCRD: CPT | Mod: CPTII,S$GLB,, | Performed by: NURSE PRACTITIONER

## 2022-05-18 PROCEDURE — 1160F PR REVIEW ALL MEDS BY PRESCRIBER/CLIN PHARMACIST DOCUMENTED: ICD-10-PCS | Mod: CPTII,S$GLB,, | Performed by: NURSE PRACTITIONER

## 2022-05-18 PROCEDURE — 99215 OFFICE O/P EST HI 40 MIN: CPT | Mod: S$GLB,,, | Performed by: NURSE PRACTITIONER

## 2022-05-18 PROCEDURE — 1126F PR PAIN SEVERITY QUANTIFIED, NO PAIN PRESENT: ICD-10-PCS | Mod: CPTII,S$GLB,, | Performed by: NURSE PRACTITIONER

## 2022-05-18 PROCEDURE — 1157F PR ADVANCE CARE PLAN OR EQUIV PRESENT IN MEDICAL RECORD: ICD-10-PCS | Mod: CPTII,S$GLB,, | Performed by: NURSE PRACTITIONER

## 2022-05-18 PROCEDURE — 1159F PR MEDICATION LIST DOCUMENTED IN MEDICAL RECORD: ICD-10-PCS | Mod: CPTII,S$GLB,, | Performed by: NURSE PRACTITIONER

## 2022-05-18 NOTE — PROGRESS NOTES
"    Medical Oncology Progress Note  [facility]     PATIENT: Adri Seay  : 1935 86 y.o. female  MRN [id number]     PCP: Primary Doctor No  Consult Requested By:      Date of Service: 2022    Subjective:   Interim History:    Oncology History:    History of Present Illness: Ms. Seay is a 86 y.o. female who presents for evaluation and management of cervical cancer. SHe was seen at [facility] on 19.      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 The patient had a CT scan consistent with widely metastatic disease including multiple sites of lymphadenopathy, a possible lung metastases, liver metastases, nodule in the anterior abdominal wall, and carcinomatosis. The patient is mostly asymptomatic but does have a vaginal discharge. Her performance status is 0. On exam, there is a 6 centimeter mass in the anterior abdominal wall superior to the umbilicus. On bimanual examination, tumor is a place the entire cervix apex of the vagina, and on rectovaginal examination, there is a large pelvic mass extending to the bilateral pelvic sidewalls.    We will get her CT reviewed at [facility] and if it is consistent with metastatic disease, we have recommended palliative chemotherapy with either carboplatinum as a single agent or carboplatinum and paclitaxel. Due to concern for bowel involvement on the CT scan, I have recommended not giving bevacizumab as part of this regimen. Patient will return home to [place] and begin chemotherapy with a medical oncologist there. I've also recommended a consultation with radiation oncology in [place] for consideration of palliative radiation to lower the chances of significant vaginal bleeding. We will see her back on an as-needed basis."        Oncology History   Malignant neoplasm of endocervix   2019 Initial Diagnosis    Malignant " neoplasm of exocervix     7/8/2019 - 7/8/2019 Chemotherapy    Treatment Summary   Plan Name: OP CARBOPLATIN WEEKLY  Treatment Goal: Palliative  Status: Inactive  Start Date: [No treatment day found]  End Date: [No treatment day found]  Provider: Jaime Pinon MD  Chemotherapy: CARBOplatin (PARAPLATIN) in sodium chloride 0.9% 250 mL chemo infusion, , Intravenous, Clinic/HOD 1 time, 0 of 4 cycles     7/9/2019 - 11/25/2019 Chemotherapy    Treatment Summary   Plan Name: OP GYN CARBOPLATIN (AUC) Q4W  Treatment Goal: Palliative  Status: Inactive  Start Date: 7/9/2019  End Date: 10/29/2019  Provider: Jaime Pinon MD  Chemotherapy: CARBOplatin (PARAPLATIN) in sodium chloride 0.9% 250 mL chemo infusion,  (original dose 341.5 mg), Intravenous, Clinic/HOD 1 time, 5 of 6 cycles  Dose modification:   (original dose 341.5 mg, Cycle 1)     5/19/2020 - 6/16/2020 Chemotherapy    Treatment Summary   Plan Name: OP  PACLITAXEL CARBOPLATIN WEEKLY  Treatment Goal: Palliative  Status: Inactive  Start Date: 5/19/2020  End Date: 5/22/2020  Provider: Annette Donis NP  Chemotherapy: CARBOplatin (PARAPLATIN) 115 mg in sodium chloride 0.9% 250 mL chemo infusion, 115 mg (98.6 % of original dose 118.4 mg), Intravenous, Clinic/HOD 1 time, 0 of 1 cycle  Dose modification:   (original dose 118.4 mg, Cycle 1)  PACLitaxeL (TAXOL) 80 mg/m2 = 126 mg in sodium chloride 0.9% 250 mL chemo infusion, 80 mg/m2 = 126 mg (100 % of original dose 80 mg/m2), Intravenous, Clinic/HOD 1 time, 0 of 1 cycle  Dose modification: 80 mg/m2 (original dose 80 mg/m2, Cycle 1)     5/19/2020 - 1/19/2021 Chemotherapy    Treatment Summary   Plan Name:  PACLITAXEL CARBOPLATIN WEEKLY  Treatment Goal: Palliative  Status: Inactive  Start Date: 5/19/2020  End Date: 12/29/2020  Provider: Annette Donis NP  Chemotherapy: CARBOplatin (PARAPLATIN) 115 mg in sodium chloride 0.9% 250 mL chemo infusion, 115 mg (100 % of original dose 114.8 mg), Intravenous, Clinic/HOD 1 time, 3 of 3  cycles  Dose modification:   (original dose 114.8 mg, Cycle 1, Reason: MD Discretion), 100 mg (original dose 114.8 mg, Cycle 1),   (original dose 114.8 mg, Cycle 2, Reason: MD Discretion),   (original dose 114.8 mg, Cycle 2, Reason: MD Discretion),   (original dose 114.8 mg, Cycle 2),   (original dose 114.8 mg, Cycle 2)  Administration: 115 mg (9/17/2020), 115 mg (9/24/2020), 125 mg (10/8/2020), 140 mg (10/15/2020), 130 mg (9/3/2020)  PACLitaxeL (TAXOL) 80 mg/m2 = 126 mg in sodium chloride 0.9% 250 mL chemo infusion, 80 mg/m2 = 126 mg (100 % of original dose 80 mg/m2), Intravenous, Clinic/HOD 1 time, 3 of 3 cycles  Dose modification: 80 mg/m2 (original dose 80 mg/m2, Cycle 1), 50 mg/m2 (original dose 80 mg/m2, Cycle 1), 50 mg/m2 (original dose 80 mg/m2, Cycle 3)  Administration: 84 mg (9/3/2020), 84 mg (9/17/2020), 84 mg (9/24/2020), 90 mg (10/8/2020), 84 mg (10/15/2020)     12/30/2020 - 8/19/2021 Chemotherapy    Treatment Summary   Plan Name: OP GYN PACLITAXEL WEEKLY + CARBOPLATIN  (AUC2) WEEKLY  Treatment Goal: Maintenance  Status: Inactive  Start Date: 12/30/2020  End Date: 8/19/2021  Provider: Abundio Tabares MD  Chemotherapy: CARBOplatin (PARAPLATIN) 125 mg in sodium chloride 0.9% 250 mL chemo infusion, 125 mg, Intravenous, Clinic/HOD 1 time, 30 of 35 cycles  Dose modification: 145 mg (original dose 125.6 mg, Cycle 11), 145 mg (original dose 125.6 mg, Cycle 15), 145 mg (original dose 125.6 mg, Cycle 17), 145 mg (original dose 125.6 mg, Cycle 18), 145 mg (original dose 125.6 mg, Cycle 20), 145 mg (original dose 125.6 mg, Cycle 22), 126.8 mg (original dose 125.6 mg, Cycle 30)  PACLitaxeL (TAXOL) 50 mg/m2 = 84 mg in sodium chloride 0.9% 250 mL chemo infusion, 50 mg/m2 = 84 mg (100 % of original dose 50 mg/m2), Intravenous, Clinic/[facility] 1 time, 30 of 35 cycles  Dose modification: 50 mg/m2 (original dose 50 mg/m2, Cycle 1)     6/24/2021 - 6/24/2021 Chemotherapy    Treatment Summary   Plan Name: OP BEVACIZUMAB  Q2W  Treatment Goal: Maintenance  Status: Inactive  Start Date:   End Date:   Provider: Annette Donis NP  Chemotherapy: bevacizumab-bvzr 653 mg in sodium chloride 0.9% 126.12 mL infusion, 10 mg/kg, Intravenous, Clinic/HOD 1 time, 0 of 26 cycles     9/16/2021 - 9/16/2021 Chemotherapy    Treatment Summary   Plan Name: OP GYN GEMCITABINE (Q4W)  Treatment Goal: Palliative  Status: Inactive  Start Date: 9/16/2021  End Date: 9/16/2021  Provider: Annette Donis NP  Chemotherapy: gemcitabine (GEMZAR) 1,010 mg in sodium chloride 0.9% 250 mL chemo infusion, 600 mg/m2 = 1,010 mg, Intravenous, Clinic/HOD 1 time, 1 of 6 cycles     9/30/2021 - 4/28/2022 Chemotherapy    Treatment Summary   Plan Name: OP PEMBROLIZUMAB 200MG Q3W  Treatment Goal: Palliative  Status: Inactive  Start Date: 9/30/2021  End Date: 4/28/2022  Provider: Annette Donis NP  Chemotherapy: pembrolizumab (KEYTRUDA) 200 mg in sodium chloride 0.9% 108 mL infusion, 200 mg, Intravenous, Clinic/HOD 1 time, 7 of 8 cycles         Past Medical History:   Past Medical History:   Diagnosis Date    Anemia     Cataract     Cervical cancer 05/2019    Cervical cancer 6/25/2019    Hypertension     Malignant neoplasm metastatic to right lung 9/1/2021       Past Surgical HIstory:   Past Surgical History:   Procedure Laterality Date    CATARACT EXTRACTION, BILATERAL      WRIST SURGERY  1984       Allergies:  Review of patient's allergies indicates:   Allergen Reactions    Strawberry Rash       Medications:  Current Outpatient Medications   Medication Sig Dispense Refill    apixaban (ELIQUIS) 2.5 mg Tab Take 1 tablet (2.5 mg total) by mouth 2 (two) times daily. 60 tablet 3    cloNIDine (CATAPRES) 0.3 MG tablet TAKE 1 TABLET(0.3 MG) BY MOUTH TWICE DAILY 180 tablet 3    cyproheptadine (PERIACTIN) 4 mg tablet TAKE 1/2 (ONE-HALF) TABLET BY MOUTH THREE TIMES DAILY      FENOFIBRATE ORAL Take by mouth 2 (two) times daily.      hydrOXYzine HCL (ATARAX) 10 MG Tab Take 1 tablet (10  mg total) by mouth 3 (three) times daily as needed. 60 tablet 2    iron-vit c-vit b12-folic acid (IRON-C PLUS) tablet Daily 30 each 6    loratadine (CLARITIN) 10 mg tablet 10 mg.      NIFEdipine (PROCARDIA-XL) 30 MG (OSM) 24 hr tablet Take 1 tablet (30 mg total) by mouth once daily. 90 tablet 3    ondansetron (ZOFRAN-ODT) 8 MG TbDL Take 1 tablet (8 mg total) by mouth every 8 (eight) hours as needed (chemotherapy-induced nausea and vomiting). 40 tablet 5    oxyCODONE-acetaminophen (PERCOCET) 5-325 mg per tablet Every 6 Hours as needed for Moderate Pain(4-6)      simvastatin (ZOCOR) 20 MG tablet Take 1 tablet (20 mg total) by mouth every evening. 90 tablet 3     Current Facility-Administered Medications   Medication Dose Route Frequency Provider Last Rate Last Admin    alteplase injection 2 mg  2 mg Intra-Catheter PRN Annette Donis NP        EPINEPHrine (EPIPEN) 0.3 mg/0.3 mL pen injection 0.3 mg  0.3 mg Intramuscular Once PRN Annette Donis NP           Family History:   Family History   Problem Relation Age of Onset    Hypertension Mother     Pneumonia Father     Breast cancer Sister     No Known Problems Brother     Leukemia Daughter     No Known Problems Son        Social History:  reports that she has never smoked. She has never used smokeless tobacco. She reports previous alcohol use. She reports that she does not use drugs.    Review of Systemas  Constitutional: No change in weight, appetie, fatigue, fever, or night sweats  Eyes: No changes in vision  Ears, Nose, Mouth, Throat, and Face: No hearing problems, ear pain, rummy nose, or sore throat  Respiratory: No shortness of breath or cough  Cardiovascular: No chest pain, palpitations or dizziness  Gastrointestinal: No abdominal pain, hematochezia, melena  Genitourinary: No dysuria, hematuria, nocturia, menstrual problems, post menopausal  Integumentary/Breast: No rashes or itching  Hematologic/Lymphatic: No anemia or bleeding  "abnormalities  Musculoskeletal: No joint or muscle abnormalities  Neurological: No sensory or motor problems, no headaches  Behavioral/Psych: No depression, anxiety, cognitive problems, or stress  Endocrine: No diabetes or thyroid problems  Allergic/Immunologic: See allergy above    Physical Exam      Vitals:   Vitals:    05/18/22 0808   BP: 110/69   BP Location: Left arm   Patient Position: Sitting   BP Method: Medium (Automatic)   Pulse: 104   Resp: 16   Temp: 97.5 °F (36.4 °C)   TempSrc: Temporal   SpO2: 98%   Weight: 56 kg (123 lb 6.4 oz)   Height: 5' 2" (1.575 m)     BMI: Body mass index is 22.57 kg/m².  BSA Body surface area is 1.57 meters squared.  ECOG Performance Status:   ECOG SCORE         GENERAL APPEARANCE: Well developed, well nourished, in no acute distress.      Labs and Imagings     Lab Results   Component Value Date    WBC 11.5 (H) 05/17/2022    HGB 7.8 (L) 05/17/2022    HCT 25.5 (L) 05/17/2022    .5 (H) 05/17/2022    LABPLAT 327 05/17/2022       CMP  Sodium   Date Value Ref Range Status   05/17/2022 140 135 - 145 mmol/L Final   03/16/2020 140 136 - 145 mmol/L Final     Potassium   Date Value Ref Range Status   05/17/2022 4.2 3.6 - 5.2 mmol/L Final   03/16/2020 4.5 3.5 - 5.1 mmol/L Final     Chloride   Date Value Ref Range Status   05/17/2022 108 100 - 108 mmol/L Final   03/16/2020 107 98 - 107 mmol/L Final     CO2   Date Value Ref Range Status   05/17/2022 24 21 - 32 mmol/L Final   03/16/2020 21 (L) 22 - 29 mmol/L Final     Glucose   Date Value Ref Range Status   05/17/2022 103 70 - 110 mg/dL Final     BUN   Date Value Ref Range Status   05/17/2022 17 7 - 18 mg/dL Final   03/16/2020 17.2 8 - 23 mg/dL Final     Creatinine   Date Value Ref Range Status   05/17/2022 1.38 (H) 0.55 - 1.02 mg/dL Final   03/16/2020 1.17 (H) 0.50 - 0.90 mg/dL Final     Calcium   Date Value Ref Range Status   05/17/2022 9.1 8.8 - 10.5 mg/dL Final   03/16/2020 10.0 8.6 - 10.2 mg/dL Final     Total Protein   Date " Value Ref Range Status   05/17/2022 6.9 6.4 - 8.2 g/dL Final     Albumin   Date Value Ref Range Status   05/17/2022 2.6 (L) 3.4 - 5.0 g/dL Final   03/16/2020 4.6 3.5 - 5.2 g/dL Final     Total Bilirubin   Date Value Ref Range Status   05/17/2022 0.5 0.0 - 1.0 mg/dL Final     Alkaline Phosphatase   Date Value Ref Range Status   05/17/2022 119 (H) 46 - 116 U/L Final     AST   Date Value Ref Range Status   05/17/2022 28 15 - 37 U/L Final   03/16/2020 17 0 - 32 U/L Final     ALT   Date Value Ref Range Status   05/17/2022 10 (L) 12 - 78 U/L Final     Anion Gap   Date Value Ref Range Status   05/17/2022 8.0 3.0 - 11.0 mmol/L Final   03/16/2020 12.0 8.0 - 17.0 mmol/L Final     Comment:     NOTE  Testing performed at:  [facility], [address] [place] [address] CLIA #:[id number]       eGFR if    Date Value Ref Range Status   10/15/2020 >60 >60 mL/min/1.73 m^2 Final     eGFR if non    Date Value Ref Range Status   10/15/2020 52 (A) >60 mL/min/1.73 m^2 Final     Comment:     Calculation used to obtain the estimated glomerular filtration  rate (eGFR) is the CKD-EPI equation.            Imaging:                   Assessment:     Principle Problem: No primary diagnosis found.   Co-morbidity/Active Problems:        Adri Seay is a 86 y.o. female with PMH of There were no encounter diagnoses., with No primary diagnosis found.     ==============================================  *  Assessment:       1. 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 Malignant neoplasm of cervix, unspecified site    3. Increased creatine kinase level    4. Neoplastic malignant related fatigue              Plan:       Cervical cancer stage IV disease diagnosed 6/2019  == 8/5/19 Carbo    ==PET/CT done on 12/2/19. Previous CT imaging done at outside facility, discussed with Dr Nguyen for comparison and he stated pelvic mass smaller, abdominal mass smaller liver lesion likely benign stable but  difficult to say if peritoneal implants stable to progressed.    ==FoundationOne Liquid Bx on 12/16/19.  BRCA 2 gene mutation.  ==1/14/20 Pt started lynparza   ==5/13/2020 PET showing early progression.    ==5/2020 resume low dose carbo and taxol.   ==11/2020 PET decreased to stable disease  == 2/25/21 Stable Disease  ==8/27/21 PET progression of disease  == 9/22/21 PDL1 CPS>1  == 10/22 - 5/2022  Keytruda   ==12/2021 palliative XRT due to vaginal bleeding  == 1/2022 hospitalized for PE , lower ext DVT   == 5/22 PET/CT Progression noted    12/27/21:  Patient has had recent performance status decline with recurrent hospitalization in mid December for fall with resulting right shoulder fracture.  Patient was then discharged to [facility] for strengthening with PT and OT.  On 12/24/2021 patient awoke at 3:00 a.m. with new onset vaginal bleeding and presented to ER for evaluation.  Trans abdominal transpelvic ultrasound showed a large cystic mass consistent with her known cervical cancer diagnosis.  At that time hemoglobin was approximately 8.0, and bleeding spontaneously resolved.  Patient was not given a blood transfusion and was released back to [facility].  She denies any recurrent episodes of vaginal bleeding since that time.  Plan will be to proceed with repeat PET CT to evaluate current disease status as she has been off of IO since mid October and will refer patient for evaluation with Dr. Beckwith for palliative radiation.  Patient will return to clinic next week with PET scan and labs to discuss further management and the possibility of resuming Keytruda.  1/20/22:  Patient recently hospitalized for PE as well as bilateral lower DVTs.  Patient completed palliative radiation to cervical mass and bleeding has resolved, patient has been started on low-dose Xarelto to manage clots and has been discharged home.  Her daughter has moved in to provide full time assistance.  Patient is suffering from weakness and fatigue,  but otherwise seems to be recovering well.  Labs reviewed today and hemoglobin greater than 10 no indication for transfusions needed Tentatively we will plan to resume I 0 on February 2nd with labs the day before.  2/2/22: patient and daughter in clinic, daughter states Ms August has been feeling good and she appears to back at baseline. Her only c/o is new onset itching, no rash noted. Pt denies any recent/recurrent vaginal bleeding in the last 2 weeks. Labs reviewed and pt is cleared for tx as planned for tomorrow.   2/23/22:  Patient in clinic with granddaughter today with no new complaints.  Itching has resolved and patient is tolerant of Eliquis.  She denies any active bleeding, and has no abdominal pain or discomfort.  Labs are reviewed and patient is cleared for immunotherapy as planned for tomorrow.  Discussed with patient and family mild weight loss is noted, encouraged patient to attempt to eat more frequently with nutrient dense focus.  3/16/22:  Patient in clinic today states feeling okay, does complain of some fatigue but denies any recent bleeding episodes.  Hemoglobin 8.2 will proceed with B12 injection with infusion that is planned for tomorrow.  Patient does report increase in appetite and noted 3 lb of weight gain after starting Periactin per palliative Care recommendations.  Encouraged patient to increase fluids as serum creatinine mildly elevated.  4/27/22: no new c/o, feeling good. ;labs reviewed, Ok to proceed with tx as planned. Will continue with B12 injection.  PET -CT ordered  5/18/22: patient and daughter in clinic today to review recent PET/CT showing overt progression of disease, with new keren pulmonary nodules, and increasing size of abdominal and pelvic lymphadenopathy. She denies any pain, n/v or SOB. We will discontinue current IO therapy and discussed continuation of treatment with single agent low dose chemotherapy vs best supportive care as she has progressed through third line  therapy. She will discuss with her family and will let me know how she would like to proceed. Even in the event we proceed with chemotherapy, I do recommend transitioning from palliative care to hospice at this time.   Discussed with Dr. Davey in will request updated FoundationOne testing to include the NTRK gene, otherwise will possible need a repeat biopsy. Will proceed with obtaining chemotherapy approval for Gemzar day 1 day 15 although patient is unsure if she will continue with chemotherapy this time.    -Total time spent in counseling and discussion about further management options including relevant lab work, treatment,  prognosis, medications and intended side effects was more than 45 minutes. More than 50% of the time was spent on counseling and coordination of care.  This includes face to face time and non-face to face time preparing to see the patient (eg, review of tests), Obtaining and/or reviewing separately obtained history, Documenting clinical information in the electronic or other health record, Independently interpreting resultsand communicating results to the patient/family/caregiver, or Care coordination.          RTC in 3 weeks with labs       ANISA Torres

## 2022-05-18 NOTE — LETTER
May 18, 2022        Clair Rolon NP  [address]  [place] [address]             [facility]  [address]  [place] [address]  Phone: [phone]  Fax: [phone]   Patient: Adri Seay   MR Number: [id number]   YOB: 1935   Date of Visit: 5/18/2022       Dear Dr. Rolon:    Thank you for referring Adri Seay to me for evaluation. Attached you will find relevant portions of my assessment and plan of care.    If you have questions, please do not hesitate to call me. I look forward to following Adri Seay along with you.    Sincerely,      Annette Donis NP            CC  No Recipients    Enclosure

## 2022-06-01 ENCOUNTER — OFFICE VISIT (OUTPATIENT)
Dept: HEMATOLOGY/ONCOLOGY | Facility: CLINIC | Age: 87
End: 2022-06-01
Payer: MEDICARE

## 2022-06-01 ENCOUNTER — TELEPHONE (OUTPATIENT)
Dept: HEMATOLOGY/ONCOLOGY | Facility: CLINIC | Age: 87
End: 2022-06-01

## 2022-06-01 ENCOUNTER — TELEPHONE (OUTPATIENT)
Dept: HEMATOLOGY/ONCOLOGY | Facility: CLINIC | Age: 87
End: 2022-06-01
Payer: MEDICARE

## 2022-06-01 VITALS
RESPIRATION RATE: 16 BRPM | HEART RATE: 94 BPM | DIASTOLIC BLOOD PRESSURE: 74 MMHG | BODY MASS INDEX: 22.2 KG/M2 | HEIGHT: 62 IN | WEIGHT: 120.63 LBS | TEMPERATURE: 97 F | SYSTOLIC BLOOD PRESSURE: 114 MMHG | OXYGEN SATURATION: 99 %

## 2022-06-01 DIAGNOSIS — C78.01 MALIGNANT NEOPLASM METASTATIC TO BOTH LUNGS: Primary | ICD-10-CM

## 2022-06-01 DIAGNOSIS — C78.02 MALIGNANT NEOPLASM METASTATIC TO BOTH LUNGS: Primary | ICD-10-CM

## 2022-06-01 DIAGNOSIS — C77.8 MALIGNANT NEOPLASM METASTATIC TO LYMPH NODES OF MULTIPLE SITES: ICD-10-CM

## 2022-06-01 DIAGNOSIS — C53.0 MALIGNANT NEOPLASM OF ENDOCERVIX: ICD-10-CM

## 2022-06-01 DIAGNOSIS — C78.01 MALIGNANT NEOPLASM METASTATIC TO RIGHT LUNG: ICD-10-CM

## 2022-06-01 LAB
ALBUMIN SERPL BCP-MCNC: 2.4 G/DL (ref 3.4–5)
ALBUMIN/GLOBULIN RATIO: 0.61 RATIO (ref 1.1–1.8)
ALP SERPL-CCNC: 115 U/L (ref 46–116)
ALT SERPL W P-5'-P-CCNC: 17 U/L (ref 12–78)
ANION GAP SERPL CALC-SCNC: 5 MMOL/L (ref 3–11)
AST SERPL-CCNC: 25 U/L (ref 15–37)
BASOPHILS NFR BLD: 0.5 % (ref 0–3)
BILIRUB SERPL-MCNC: 0.4 MG/DL (ref 0–1)
BUN SERPL-MCNC: 20 MG/DL (ref 7–18)
BUN/CREAT SERPL: 15.74 RATIO (ref 7–18)
CALCIUM SERPL-MCNC: 8.6 MG/DL (ref 8.8–10.5)
CHLORIDE SERPL-SCNC: 105 MMOL/L (ref 100–108)
CO2 SERPL-SCNC: 28 MMOL/L (ref 21–32)
CREAT SERPL-MCNC: 1.27 MG/DL (ref 0.55–1.02)
EOSINOPHIL NFR BLD: 0.9 % (ref 1–3)
ERYTHROCYTE [DISTWIDTH] IN BLOOD BY AUTOMATED COUNT: 13.5 % (ref 12.5–18)
GFR ESTIMATION: 48
GLOBULIN: 3.9 G/DL (ref 2.3–3.5)
GLUCOSE SERPL-MCNC: 116 MG/DL (ref 70–110)
HCT VFR BLD AUTO: 24.9 % (ref 37–47)
HGB BLD-MCNC: 7.6 G/DL (ref 12–16)
HYPOCHROMIA BLD QL SMEAR: NORMAL
LYMPHOCYTES NFR BLD: 17.9 % (ref 25–40)
MACROCYTES BLD QL SMEAR: NORMAL
MCH RBC QN AUTO: 33.6 PG (ref 27–31.2)
MCHC RBC AUTO-ENTMCNC: 30.5 G/DL (ref 31.8–35.4)
MCV RBC AUTO: 110.2 FL (ref 80–97)
MONOCYTES NFR BLD: 2.3 % (ref 1–15)
NEUTROPHILS # BLD AUTO: 7.81 10*3/UL (ref 1.8–7.7)
NEUTROPHILS NFR BLD: 77.9 % (ref 37–80)
NUCLEATED RED BLOOD CELLS: 0 %
PLATELETS: 298 10*3/UL (ref 142–424)
POTASSIUM SERPL-SCNC: 4.6 MMOL/L (ref 3.6–5.2)
PROT SERPL-MCNC: 6.3 G/DL (ref 6.4–8.2)
RBC # BLD AUTO: 2.26 10*6/UL (ref 4.2–5.4)
SODIUM BLD-SCNC: 138 MMOL/L (ref 135–145)
STOMATOCYTES BLD QL SMEAR: NORMAL
WBC # BLD: 10 10*3/UL (ref 4.6–10.2)

## 2022-06-01 PROCEDURE — 1157F PR ADVANCE CARE PLAN OR EQUIV PRESENT IN MEDICAL RECORD: ICD-10-PCS | Mod: CPTII,S$GLB,, | Performed by: NURSE PRACTITIONER

## 2022-06-01 PROCEDURE — 99214 PR OFFICE/OUTPT VISIT, EST, LEVL IV, 30-39 MIN: ICD-10-PCS | Mod: GV,S$GLB,, | Performed by: NURSE PRACTITIONER

## 2022-06-01 PROCEDURE — 1101F PR PT FALLS ASSESS DOC 0-1 FALLS W/OUT INJ PAST YR: ICD-10-PCS | Mod: CPTII,S$GLB,, | Performed by: NURSE PRACTITIONER

## 2022-06-01 PROCEDURE — 1101F PT FALLS ASSESS-DOCD LE1/YR: CPT | Mod: CPTII,S$GLB,, | Performed by: NURSE PRACTITIONER

## 2022-06-01 PROCEDURE — 3074F SYST BP LT 130 MM HG: CPT | Mod: CPTII,S$GLB,, | Performed by: NURSE PRACTITIONER

## 2022-06-01 PROCEDURE — 3078F DIAST BP <80 MM HG: CPT | Mod: CPTII,S$GLB,, | Performed by: NURSE PRACTITIONER

## 2022-06-01 PROCEDURE — 1160F RVW MEDS BY RX/DR IN RCRD: CPT | Mod: CPTII,S$GLB,, | Performed by: NURSE PRACTITIONER

## 2022-06-01 PROCEDURE — 3078F PR MOST RECENT DIASTOLIC BLOOD PRESSURE < 80 MM HG: ICD-10-PCS | Mod: CPTII,S$GLB,, | Performed by: NURSE PRACTITIONER

## 2022-06-01 PROCEDURE — 3288F PR FALLS RISK ASSESSMENT DOCUMENTED: ICD-10-PCS | Mod: CPTII,S$GLB,, | Performed by: NURSE PRACTITIONER

## 2022-06-01 PROCEDURE — 3074F PR MOST RECENT SYSTOLIC BLOOD PRESSURE < 130 MM HG: ICD-10-PCS | Mod: CPTII,S$GLB,, | Performed by: NURSE PRACTITIONER

## 2022-06-01 PROCEDURE — 1157F ADVNC CARE PLAN IN RCRD: CPT | Mod: CPTII,S$GLB,, | Performed by: NURSE PRACTITIONER

## 2022-06-01 PROCEDURE — 1126F PR PAIN SEVERITY QUANTIFIED, NO PAIN PRESENT: ICD-10-PCS | Mod: CPTII,S$GLB,, | Performed by: NURSE PRACTITIONER

## 2022-06-01 PROCEDURE — 1159F MED LIST DOCD IN RCRD: CPT | Mod: CPTII,S$GLB,, | Performed by: NURSE PRACTITIONER

## 2022-06-01 PROCEDURE — 99214 OFFICE O/P EST MOD 30 MIN: CPT | Mod: GV,S$GLB,, | Performed by: NURSE PRACTITIONER

## 2022-06-01 PROCEDURE — 3288F FALL RISK ASSESSMENT DOCD: CPT | Mod: CPTII,S$GLB,, | Performed by: NURSE PRACTITIONER

## 2022-06-01 PROCEDURE — 1159F PR MEDICATION LIST DOCUMENTED IN MEDICAL RECORD: ICD-10-PCS | Mod: CPTII,S$GLB,, | Performed by: NURSE PRACTITIONER

## 2022-06-01 PROCEDURE — 1126F AMNT PAIN NOTED NONE PRSNT: CPT | Mod: CPTII,S$GLB,, | Performed by: NURSE PRACTITIONER

## 2022-06-01 PROCEDURE — 1160F PR REVIEW ALL MEDS BY PRESCRIBER/CLIN PHARMACIST DOCUMENTED: ICD-10-PCS | Mod: CPTII,S$GLB,, | Performed by: NURSE PRACTITIONER

## 2022-06-01 RX ORDER — SODIUM CHLORIDE 0.9 % (FLUSH) 0.9 %
10 SYRINGE (ML) INJECTION
Status: CANCELLED | OUTPATIENT
Start: 2022-06-16

## 2022-06-01 RX ORDER — HEPARIN 100 UNIT/ML
500 SYRINGE INTRAVENOUS
Status: CANCELLED | OUTPATIENT
Start: 2022-06-16

## 2022-06-01 RX ORDER — HEPARIN 100 UNIT/ML
500 SYRINGE INTRAVENOUS
Status: CANCELLED | OUTPATIENT
Start: 2022-06-30

## 2022-06-01 RX ORDER — SODIUM CHLORIDE 0.9 % (FLUSH) 0.9 %
10 SYRINGE (ML) INJECTION
Status: CANCELLED | OUTPATIENT
Start: 2022-06-30

## 2022-06-01 NOTE — PROGRESS NOTES
"    Medical Oncology Progress Note  [facility]     PATIENT: Adri Seay  : 1935 87 y.o. female  MRN [id number]     PCP: Primary Doctor No  Consult Requested By:      Date of Service: 2022    Subjective:   Interim History:    Oncology History:    History of Present Illness: Ms. Seay is a 86 y.o. female who presents for evaluation and management of cervical cancer. SHe was seen at [facility] on 19.      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 The patient had a CT scan consistent with widely metastatic disease including multiple sites of lymphadenopathy, a possible lung metastases, liver metastases, nodule in the anterior abdominal wall, and carcinomatosis. The patient is mostly asymptomatic but does have a vaginal discharge. Her performance status is 0. On exam, there is a 6 centimeter mass in the anterior abdominal wall superior to the umbilicus. On bimanual examination, tumor is a place the entire cervix apex of the vagina, and on rectovaginal examination, there is a large pelvic mass extending to the bilateral pelvic sidewalls.    We will get her CT reviewed at [facility] and if it is consistent with metastatic disease, we have recommended palliative chemotherapy with either carboplatinum as a single agent or carboplatinum and paclitaxel. Due to concern for bowel involvement on the CT scan, I have recommended not giving bevacizumab as part of this regimen. Patient will return home to [place] and begin chemotherapy with a medical oncologist there. I've also recommended a consultation with radiation oncology in [place] for consideration of palliative radiation to lower the chances of significant vaginal bleeding. We will see her back on an as-needed basis."        Oncology History   Malignant neoplasm of endocervix   2019 Initial Diagnosis    Malignant " neoplasm of exocervix     7/8/2019 - 7/8/2019 Chemotherapy    Treatment Summary   Plan Name: OP CARBOPLATIN WEEKLY  Treatment Goal: Palliative  Status: Inactive  Start Date: [No treatment day found]  End Date: [No treatment day found]  Provider: Jaime Pinon MD  Chemotherapy: CARBOplatin (PARAPLATIN) in sodium chloride 0.9% 250 mL chemo infusion, , Intravenous, Clinic/HOD 1 time, 0 of 4 cycles     7/9/2019 - 11/25/2019 Chemotherapy    Treatment Summary   Plan Name: OP GYN CARBOPLATIN (AUC) Q4W  Treatment Goal: Palliative  Status: Inactive  Start Date: 7/9/2019  End Date: 10/29/2019  Provider: Jaime Pinon MD  Chemotherapy: CARBOplatin (PARAPLATIN) in sodium chloride 0.9% 250 mL chemo infusion,  (original dose 341.5 mg), Intravenous, Clinic/HOD 1 time, 5 of 6 cycles  Dose modification:   (original dose 341.5 mg, Cycle 1)     5/19/2020 - 6/16/2020 Chemotherapy    Treatment Summary   Plan Name: OP  PACLITAXEL CARBOPLATIN WEEKLY  Treatment Goal: Palliative  Status: Inactive  Start Date: 5/19/2020  End Date: 5/22/2020  Provider: Annette Donis NP  Chemotherapy: CARBOplatin (PARAPLATIN) 115 mg in sodium chloride 0.9% 250 mL chemo infusion, 115 mg (98.6 % of original dose 118.4 mg), Intravenous, Clinic/HOD 1 time, 0 of 1 cycle  Dose modification:   (original dose 118.4 mg, Cycle 1)  PACLitaxeL (TAXOL) 80 mg/m2 = 126 mg in sodium chloride 0.9% 250 mL chemo infusion, 80 mg/m2 = 126 mg (100 % of original dose 80 mg/m2), Intravenous, Clinic/HOD 1 time, 0 of 1 cycle  Dose modification: 80 mg/m2 (original dose 80 mg/m2, Cycle 1)     5/19/2020 - 1/19/2021 Chemotherapy    Treatment Summary   Plan Name:  PACLITAXEL CARBOPLATIN WEEKLY  Treatment Goal: Palliative  Status: Inactive  Start Date: 5/19/2020  End Date: 12/29/2020  Provider: Annette Donis NP  Chemotherapy: CARBOplatin (PARAPLATIN) 115 mg in sodium chloride 0.9% 250 mL chemo infusion, 115 mg (100 % of original dose 114.8 mg), Intravenous, Clinic/HOD 1 time, 3 of 3  cycles  Dose modification:   (original dose 114.8 mg, Cycle 1, Reason: MD Discretion), 100 mg (original dose 114.8 mg, Cycle 1),   (original dose 114.8 mg, Cycle 2, Reason: MD Discretion),   (original dose 114.8 mg, Cycle 2, Reason: MD Discretion),   (original dose 114.8 mg, Cycle 2),   (original dose 114.8 mg, Cycle 2)  Administration: 115 mg (9/17/2020), 115 mg (9/24/2020), 125 mg (10/8/2020), 140 mg (10/15/2020), 130 mg (9/3/2020)  PACLitaxeL (TAXOL) 80 mg/m2 = 126 mg in sodium chloride 0.9% 250 mL chemo infusion, 80 mg/m2 = 126 mg (100 % of original dose 80 mg/m2), Intravenous, Clinic/HOD 1 time, 3 of 3 cycles  Dose modification: 80 mg/m2 (original dose 80 mg/m2, Cycle 1), 50 mg/m2 (original dose 80 mg/m2, Cycle 1), 50 mg/m2 (original dose 80 mg/m2, Cycle 3)  Administration: 84 mg (9/3/2020), 84 mg (9/17/2020), 84 mg (9/24/2020), 90 mg (10/8/2020), 84 mg (10/15/2020)     12/30/2020 - 8/19/2021 Chemotherapy    Treatment Summary   Plan Name: OP GYN PACLITAXEL WEEKLY + CARBOPLATIN  (AUC2) WEEKLY  Treatment Goal: Maintenance  Status: Inactive  Start Date: 12/30/2020  End Date: 8/19/2021  Provider: Aubndio Tabares MD  Chemotherapy: CARBOplatin (PARAPLATIN) 125 mg in sodium chloride 0.9% 250 mL chemo infusion, 125 mg, Intravenous, Clinic/HOD 1 time, 30 of 35 cycles  Dose modification: 145 mg (original dose 125.6 mg, Cycle 11), 145 mg (original dose 125.6 mg, Cycle 15), 145 mg (original dose 125.6 mg, Cycle 17), 145 mg (original dose 125.6 mg, Cycle 18), 145 mg (original dose 125.6 mg, Cycle 20), 145 mg (original dose 125.6 mg, Cycle 22), 126.8 mg (original dose 125.6 mg, Cycle 30)  PACLitaxeL (TAXOL) 50 mg/m2 = 84 mg in sodium chloride 0.9% 250 mL chemo infusion, 50 mg/m2 = 84 mg (100 % of original dose 50 mg/m2), Intravenous, Clinic[facility] 1 time, 30 of 35 cycles  Dose modification: 50 mg/m2 (original dose 50 mg/m2, Cycle 1)     6/24/2021 - 6/24/2021 Chemotherapy    Treatment Summary   Plan Name: OP BEVACIZUMAB  Q2W  Treatment Goal: Maintenance  Status: Inactive  Start Date:   End Date:   Provider: Annette Donis NP  Chemotherapy: bevacizumab-bvzr 653 mg in sodium chloride 0.9% 126.12 mL infusion, 10 mg/kg, Intravenous, Clinic/HOD 1 time, 0 of 26 cycles     9/16/2021 - 9/16/2021 Chemotherapy    Treatment Summary   Plan Name: OP GYN GEMCITABINE (Q4W)  Treatment Goal: Palliative  Status: Inactive  Start Date: 9/16/2021  End Date: 9/16/2021  Provider: Annette Donis NP  Chemotherapy: gemcitabine (GEMZAR) 1,010 mg in sodium chloride 0.9% 250 mL chemo infusion, 600 mg/m2 = 1,010 mg, Intravenous, Clinic/HOD 1 time, 1 of 6 cycles     9/30/2021 - 4/28/2022 Chemotherapy    Treatment Summary   Plan Name: OP PEMBROLIZUMAB 200MG Q3W  Treatment Goal: Palliative  Status: Inactive  Start Date: 9/30/2021  End Date: 4/28/2022  Provider: Annette Donis NP  Chemotherapy: pembrolizumab (KEYTRUDA) 200 mg in sodium chloride 0.9% 108 mL infusion, 200 mg, Intravenous, Clinic/HOD 1 time, 7 of 8 cycles     6/9/2022 -  Chemotherapy    Treatment Summary   Plan Name: OP BREAST GEMCITABINE QW  Treatment Goal: Palliative  Status: Active  Start Date: 6/9/2022 (Planned)  End Date: 4/27/2023 (Planned)  Provider: Annette Donis NP  Chemotherapy: gemcitabine (GEMZAR) 1,255 mg in sodium chloride 0.9% 250 mL chemo infusion, 800 mg/m2 = 1,255 mg (original dose ), Intravenous, Clinic/HOD 1 time, 0 of 12 cycles  Dose modification: 800 mg/m2 (Cycle 1, Reason: MD Discretion)         Past Medical History:   Past Medical History:   Diagnosis Date    Anemia     Cataract     Cervical cancer 05/2019    Cervical cancer 6/25/2019    Hypertension     Malignant neoplasm metastatic to right lung 9/1/2021       Past Surgical HIstory:   Past Surgical History:   Procedure Laterality Date    CATARACT EXTRACTION, BILATERAL      WRIST SURGERY  1984       Allergies:  Review of patient's allergies indicates:   Allergen Reactions    Strawberry Rash       Medications:  Current  Outpatient Medications   Medication Sig Dispense Refill    apixaban (ELIQUIS) 2.5 mg Tab Take 1 tablet (2.5 mg total) by mouth 2 (two) times daily. 60 tablet 3    cloNIDine (CATAPRES) 0.3 MG tablet TAKE 1 TABLET(0.3 MG) BY MOUTH TWICE DAILY 180 tablet 3    cyproheptadine (PERIACTIN) 4 mg tablet TAKE 1/2 (ONE-HALF) TABLET BY MOUTH THREE TIMES DAILY      FENOFIBRATE ORAL Take by mouth 2 (two) times daily.      hydrOXYzine HCL (ATARAX) 10 MG Tab Take 1 tablet (10 mg total) by mouth 3 (three) times daily as needed. 60 tablet 2    iron-vit c-vit b12-folic acid (IRON-C PLUS) tablet Daily 30 each 6    loratadine (CLARITIN) 10 mg tablet 10 mg.      NIFEdipine (PROCARDIA-XL) 30 MG (OSM) 24 hr tablet Take 1 tablet (30 mg total) by mouth once daily. 90 tablet 3    ondansetron (ZOFRAN-ODT) 8 MG TbDL Take 1 tablet (8 mg total) by mouth every 8 (eight) hours as needed (chemotherapy-induced nausea and vomiting). 40 tablet 5    oxyCODONE-acetaminophen (PERCOCET) 5-325 mg per tablet Every 6 Hours as needed for Moderate Pain(4-6)      simvastatin (ZOCOR) 20 MG tablet Take 1 tablet (20 mg total) by mouth every evening. 90 tablet 3     Current Facility-Administered Medications   Medication Dose Route Frequency Provider Last Rate Last Admin    alteplase injection 2 mg  2 mg Intra-Catheter PRN Annette Donis NP        EPINEPHrine (EPIPEN) 0.3 mg/0.3 mL pen injection 0.3 mg  0.3 mg Intramuscular Once PRN Annette Donis NP           Family History:   Family History   Problem Relation Age of Onset    Hypertension Mother     Pneumonia Father     Breast cancer Sister     No Known Problems Brother     Leukemia Daughter     No Known Problems Son        Social History:  reports that she has never smoked. She has never used smokeless tobacco. She reports previous alcohol use. She reports that she does not use drugs.    Review of Systemas  Constitutional: No change in weight, appetie, fatigue, fever, or night sweats  Eyes: No  "changes in vision  Ears, Nose, Mouth, Throat, and Face: No hearing problems, ear pain, rummy nose, or sore throat  Respiratory: No shortness of breath or cough  Cardiovascular: No chest pain, palpitations or dizziness  Gastrointestinal: No abdominal pain, hematochezia, melena  Genitourinary: No dysuria, hematuria, nocturia, menstrual problems, post menopausal  Integumentary/Breast: No rashes or itching  Hematologic/Lymphatic: No anemia or bleeding abnormalities  Musculoskeletal: No joint or muscle abnormalities  Neurological: No sensory or motor problems, no headaches  Behavioral/Psych: No depression, anxiety, cognitive problems, or stress  Endocrine: No diabetes or thyroid problems  Allergic/Immunologic: See allergy above    Physical Exam      Vitals:   Vitals:    06/01/22 0810   BP: 114/74   BP Location: Right arm   Patient Position: Sitting   BP Method: Medium (Automatic)   Pulse: 94   Resp: 16   Temp: 97.3 °F (36.3 °C)   TempSrc: Temporal   SpO2: 99%   Weight: 54.7 kg (120 lb 9.6 oz)   Height: 5' 2" (1.575 m)     BMI: Body mass index is 22.06 kg/m².  BSA Body surface area is 1.55 meters squared.  ECOG Performance Status:   ECOG SCORE         GENERAL APPEARANCE: Well developed, well nourished, in no acute distress.      Labs and Imagings     Lab Results   Component Value Date    WBC 10.0 06/01/2022    HGB 7.6 (L) 06/01/2022    HCT 24.9 (L) 06/01/2022    .2 (H) 06/01/2022    LABPLAT 298 06/01/2022       CMP  Sodium   Date Value Ref Range Status   06/01/2022 138 135 - 145 mmol/L Final   03/16/2020 140 136 - 145 mmol/L Final     Potassium   Date Value Ref Range Status   06/01/2022 4.6 3.6 - 5.2 mmol/L Final   03/16/2020 4.5 3.5 - 5.1 mmol/L Final     Chloride   Date Value Ref Range Status   06/01/2022 105 100 - 108 mmol/L Final   03/16/2020 107 98 - 107 mmol/L Final     CO2   Date Value Ref Range Status   06/01/2022 28 21 - 32 mmol/L Final   03/16/2020 21 (L) 22 - 29 mmol/L Final     Glucose   Date Value Ref " Range Status   06/01/2022 116 (H) 70 - 110 mg/dL Final     BUN   Date Value Ref Range Status   06/01/2022 20 (H) 7 - 18 mg/dL Final   03/16/2020 17.2 8 - 23 mg/dL Final     Creatinine   Date Value Ref Range Status   06/01/2022 1.27 (H) 0.55 - 1.02 mg/dL Final   03/16/2020 1.17 (H) 0.50 - 0.90 mg/dL Final     Calcium   Date Value Ref Range Status   06/01/2022 8.6 (L) 8.8 - 10.5 mg/dL Final   03/16/2020 10.0 8.6 - 10.2 mg/dL Final     Total Protein   Date Value Ref Range Status   06/01/2022 6.3 (L) 6.4 - 8.2 g/dL Final     Albumin   Date Value Ref Range Status   06/01/2022 2.4 (L) 3.4 - 5.0 g/dL Final   03/16/2020 4.6 3.5 - 5.2 g/dL Final     Total Bilirubin   Date Value Ref Range Status   06/01/2022 0.4 0.0 - 1.0 mg/dL Final     Alkaline Phosphatase   Date Value Ref Range Status   06/01/2022 115 46 - 116 U/L Final     AST   Date Value Ref Range Status   06/01/2022 25 15 - 37 U/L Final   03/16/2020 17 0 - 32 U/L Final     ALT   Date Value Ref Range Status   06/01/2022 17 12 - 78 U/L Final     Anion Gap   Date Value Ref Range Status   06/01/2022 5.0 3.0 - 11.0 mmol/L Final   03/16/2020 12.0 8.0 - 17.0 mmol/L Final     Comment:     NOTE  Testing performed at:  [facility], [address] [place] [address]  [address] CLIA #:[id number]       eGFR if    Date Value Ref Range Status   10/15/2020 >60 >60 mL/min/1.73 m^2 Final     eGFR if non    Date Value Ref Range Status   10/15/2020 52 (A) >60 mL/min/1.73 m^2 Final     Comment:     Calculation used to obtain the estimated glomerular filtration  rate (eGFR) is the CKD-EPI equation.            Imaging:                   Assessment:     Principle Problem: Malignant neoplasm metastatic to both lungs [C78.01, C78.02]   Co-morbidity/Active Problems:        Adri Seay is a 87 y.o. female with PMH of The primary encounter diagnosis was Malignant neoplasm metastatic to both lungs. Diagnoses of Malignant neoplasm metastatic to lymph nodes of  multiple sites, Malignant neoplasm metastatic to right lung, and Malignant neoplasm of endocervix were also pertinent to this visit., with Malignant neoplasm metastatic to both lungs [C78.01, C78.02]     ==============================================  *  Assessment:       1. 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 Malignant neoplasm of cervix, unspecified site    3. Increased creatine kinase level    4. Neoplastic malignant related fatigue              Plan:       Cervical cancer stage IV disease diagnosed 6/2019  == 8/5/19 Carbo    ==PET/CT done on 12/2/19. Previous CT imaging done at outside facility, discussed with Dr Nguyen for comparison and he stated pelvic mass smaller, abdominal mass smaller liver lesion likely benign stable but difficult to say if peritoneal implants stable to progressed.    ==FoundationOne Liquid Bx on 12/16/19.  BRCA 2 gene mutation.  ==1/14/20 Pt started lynparza   ==5/13/2020 PET showing early progression.    ==5/2020 resume low dose carbo and taxol.   ==11/2020 PET decreased to stable disease  == 2/25/21 Stable Disease  ==8/27/21 PET progression of disease  == 9/22/21 PDL1 CPS>1  == 10/22 - 5/2022  Keytruda   ==12/2021 palliative XRT due to vaginal bleeding  == 1/2022 hospitalized for PE , lower ext DVT   == 5/22 PET/CT Progression noted    12/27/21:  Patient has had recent performance status decline with recurrent hospitalization in mid December for fall with resulting right shoulder fracture.  Patient was then discharged to [facility] for strengthening with PT and OT.  On 12/24/2021 patient awoke at 3:00 a.m. with new onset vaginal bleeding and presented to ER for evaluation.  Trans abdominal transpelvic ultrasound showed a large cystic mass consistent with her known cervical cancer diagnosis.  At that time hemoglobin was approximately 8.0, and bleeding spontaneously resolved.  Patient was not given a blood transfusion and was released back to [facility].  She denies any  recurrent episodes of vaginal bleeding since that time.  Plan will be to proceed with repeat PET CT to evaluate current disease status as she has been off of IO since mid October and will refer patient for evaluation with Dr. Beckwith for palliative radiation.  Patient will return to clinic next week with PET scan and labs to discuss further management and the possibility of resuming Keytruda.  1/20/22:  Patient recently hospitalized for PE as well as bilateral lower DVTs.  Patient completed palliative radiation to cervical mass and bleeding has resolved, patient has been started on low-dose Xarelto to manage clots and has been discharged home.  Her daughter has moved in to provide full time assistance.  Patient is suffering from weakness and fatigue, but otherwise seems to be recovering well.  Labs reviewed today and hemoglobin greater than 10 no indication for transfusions needed Tentatively we will plan to resume I 0 on February 2nd with labs the day before.  2/2/22: patient and daughter in clinic, daughter states Ms August has been feeling good and she appears to back at baseline. Her only c/o is new onset itching, no rash noted. Pt denies any recent/recurrent vaginal bleeding in the last 2 weeks. Labs reviewed and pt is cleared for tx as planned for tomorrow.   2/23/22:  Patient in clinic with granddaughter today with no new complaints.  Itching has resolved and patient is tolerant of Eliquis.  She denies any active bleeding, and has no abdominal pain or discomfort.  Labs are reviewed and patient is cleared for immunotherapy as planned for tomorrow.  Discussed with patient and family mild weight loss is noted, encouraged patient to attempt to eat more frequently with nutrient dense focus.  3/16/22:  Patient in clinic today states feeling okay, does complain of some fatigue but denies any recent bleeding episodes.  Hemoglobin 8.2 will proceed with B12 injection with infusion that is planned for tomorrow.  Patient  does report increase in appetite and noted 3 lb of weight gain after starting Periactin per palliative Care recommendations.  Encouraged patient to increase fluids as serum creatinine mildly elevated.  4/27/22: no new c/o, feeling good. ;labs reviewed, Ok to proceed with tx as planned. Will continue with B12 injection.  PET -CT ordered  5/18/22: patient and daughter in clinic today to review recent PET/CT showing overt progression of disease, with new keren pulmonary nodules, and increasing size of abdominal and pelvic lymphadenopathy. She denies any pain, n/v or SOB. We will discontinue current IO therapy and discussed continuation of treatment with single agent low dose chemotherapy vs best supportive care as she has progressed through third line therapy. She will discuss with her family and will let me know how she would like to proceed. Even in the event we proceed with chemotherapy, I do recommend transitioning from palliative care to hospice at this time.   Discussed with Dr. Davey in will request updated FoundationOne testing to include the NTRK gene, otherwise will possible need a repeat biopsy. Will proceed with obtaining chemotherapy approval for Gemzar day 1 day 15 although patient is unsure if she will continue with chemotherapy this time.  6/1/22: patient and family here today and she has decided she would like to pursue additional chemotherapy with Gemzar. Will plan to start reduced dose Gemzar 800 mg/m2 Day 1 and Day 15, to begin next week. Consents signed and hospice has been notified. likely she will be able to continue with hospice while on palliative chemotherapy. Labs drawn today and she will RTC in 2weeks to evaluate tolerability.     -Total time spent in counseling and discussion about further management options including relevant lab work, treatment,  prognosis, medications and intended side effects was more than 45 minutes. More than 50% of the time was spent on counseling and coordination of  care.  This includes face to face time and non-face to face time preparing to see the patient (eg, review of tests), Obtaining and/or reviewing separately obtained history, Documenting clinical information in the electronic or other health record, Independently interpreting resultsand communicating results to the patient/family/caregiver, or Care coordination.          RTC in 3 weeks with labs       ANISA Torres

## 2022-06-01 NOTE — LETTER
June 1, 2022        Clair Rolon NP  [address]  [place] [address]             [facility] [facility]  [address]  [place] [address]  Phone: [phone]  Fax: [phone]   Patient: Adri Seay   MR Number: [id number]   YOB: 1935   Date of Visit: 6/1/2022       Dear Dr. Rolon:    Thank you for referring Adri Seay to me for evaluation. Attached you will find relevant portions of my assessment and plan of care.    If you have questions, please do not hesitate to call me. I look forward to following Adri Seay along with you.    Sincerely,      Annette Donis NP            CC  No Recipients    Enclosure

## 2022-06-01 NOTE — TELEPHONE ENCOUNTER
Demographics and Chemotherapy plan faxed to [facility] ([phone]) with confirmation. TTRN

## 2022-06-01 NOTE — TELEPHONE ENCOUNTER
----- Message from Yolanda Tinsley sent at 6/1/2022 10:03 AM CDT -----  ms ariadne arroyo/ [facility]\"" states that stat records request was sent in today...[phone]

## 2022-06-14 ENCOUNTER — CLINICAL SUPPORT (OUTPATIENT)
Dept: HEMATOLOGY/ONCOLOGY | Facility: CLINIC | Age: 87
End: 2022-06-14
Payer: MEDICARE

## 2022-06-14 DIAGNOSIS — E03.9 HYPOTHYROIDISM, UNSPECIFIED TYPE: ICD-10-CM

## 2022-06-14 DIAGNOSIS — C53.0 MALIGNANT NEOPLASM OF ENDOCERVIX: ICD-10-CM

## 2022-06-14 LAB
ALBUMIN SERPL BCP-MCNC: 2.5 G/DL (ref 3.4–5)
ALBUMIN/GLOBULIN RATIO: 0.58 RATIO (ref 1.1–1.8)
ALP SERPL-CCNC: 116 U/L (ref 46–116)
ALT SERPL W P-5'-P-CCNC: 8 U/L (ref 12–78)
ANION GAP SERPL CALC-SCNC: 6 MMOL/L (ref 3–11)
AST SERPL-CCNC: 23 U/L (ref 15–37)
BASOPHILS NFR BLD: 0.5 % (ref 0–3)
BILIRUB SERPL-MCNC: 0.5 MG/DL (ref 0–1)
BUN SERPL-MCNC: 19 MG/DL (ref 7–18)
BUN/CREAT SERPL: 15.44 RATIO (ref 7–18)
CALCIUM SERPL-MCNC: 8.9 MG/DL (ref 8.8–10.5)
CHLORIDE SERPL-SCNC: 104 MMOL/L (ref 100–108)
CO2 SERPL-SCNC: 25 MMOL/L (ref 21–32)
CREAT SERPL-MCNC: 1.23 MG/DL (ref 0.55–1.02)
EOSINOPHIL NFR BLD: 0.6 % (ref 1–3)
ERYTHROCYTE [DISTWIDTH] IN BLOOD BY AUTOMATED COUNT: 13.4 % (ref 12.5–18)
GFR ESTIMATION: 50
GLOBULIN: 4.3 G/DL (ref 2.3–3.5)
GLUCOSE SERPL-MCNC: 101 MG/DL (ref 70–110)
HCT VFR BLD AUTO: 26 % (ref 37–47)
HGB BLD-MCNC: 7.8 G/DL (ref 12–16)
HYPOCHROMIA BLD QL SMEAR: NORMAL
LYMPHOCYTES NFR BLD: 13.3 % (ref 25–40)
MACROCYTES BLD QL SMEAR: NORMAL
MCH RBC QN AUTO: 32.9 PG (ref 27–31.2)
MCHC RBC AUTO-ENTMCNC: 30 G/DL (ref 31.8–35.4)
MCV RBC AUTO: 109.7 FL (ref 80–97)
MONOCYTES NFR BLD: 8.8 % (ref 1–15)
NEUTROPHILS # BLD AUTO: 9.4 10*3/UL (ref 1.8–7.7)
NEUTROPHILS NFR BLD: 76.4 % (ref 37–80)
NUCLEATED RED BLOOD CELLS: 0 %
PLATELETS: 291 10*3/UL (ref 142–424)
POTASSIUM SERPL-SCNC: 4.6 MMOL/L (ref 3.6–5.2)
PROT SERPL-MCNC: 6.8 G/DL (ref 6.4–8.2)
RBC # BLD AUTO: 2.37 10*6/UL (ref 4.2–5.4)
SODIUM BLD-SCNC: 135 MMOL/L (ref 135–145)
TSH SERPL DL<=0.005 MIU/L-ACNC: 1.03 UIU/ML (ref 0.36–3.74)
WBC # BLD: 12.3 10*3/UL (ref 4.6–10.2)

## 2022-06-15 ENCOUNTER — OFFICE VISIT (OUTPATIENT)
Dept: HEMATOLOGY/ONCOLOGY | Facility: CLINIC | Age: 87
End: 2022-06-15
Payer: MEDICARE

## 2022-06-15 VITALS
BODY MASS INDEX: 22.24 KG/M2 | OXYGEN SATURATION: 96 % | DIASTOLIC BLOOD PRESSURE: 70 MMHG | HEIGHT: 62 IN | RESPIRATION RATE: 18 BRPM | TEMPERATURE: 98 F | WEIGHT: 120.88 LBS | SYSTOLIC BLOOD PRESSURE: 115 MMHG | HEART RATE: 68 BPM

## 2022-06-15 DIAGNOSIS — D63.0 ANEMIA IN NEOPLASTIC DISEASE: Primary | ICD-10-CM

## 2022-06-15 DIAGNOSIS — C78.01 MALIGNANT NEOPLASM METASTATIC TO RIGHT LUNG: ICD-10-CM

## 2022-06-15 DIAGNOSIS — I82.403 ACUTE DEEP VEIN THROMBOSIS (DVT) OF BOTH LOWER EXTREMITIES, UNSPECIFIED VEIN: ICD-10-CM

## 2022-06-15 DIAGNOSIS — C53.0 MALIGNANT NEOPLASM OF ENDOCERVIX: ICD-10-CM

## 2022-06-15 DIAGNOSIS — R53.1 WEAKNESS: ICD-10-CM

## 2022-06-15 DIAGNOSIS — C78.02 MALIGNANT NEOPLASM METASTATIC TO BOTH LUNGS: ICD-10-CM

## 2022-06-15 DIAGNOSIS — C53.0 MALIGNANT NEOPLASM OF ENDOCERVIX: Primary | ICD-10-CM

## 2022-06-15 DIAGNOSIS — R60.0 LOCALIZED EDEMA: ICD-10-CM

## 2022-06-15 DIAGNOSIS — R53.0 NEOPLASTIC MALIGNANT RELATED FATIGUE: ICD-10-CM

## 2022-06-15 DIAGNOSIS — C78.01 MALIGNANT NEOPLASM METASTATIC TO BOTH LUNGS: ICD-10-CM

## 2022-06-15 PROCEDURE — 1101F PR PT FALLS ASSESS DOC 0-1 FALLS W/OUT INJ PAST YR: ICD-10-PCS | Mod: CPTII,S$GLB,, | Performed by: NURSE PRACTITIONER

## 2022-06-15 PROCEDURE — 1101F PT FALLS ASSESS-DOCD LE1/YR: CPT | Mod: CPTII,S$GLB,, | Performed by: NURSE PRACTITIONER

## 2022-06-15 PROCEDURE — 3078F DIAST BP <80 MM HG: CPT | Mod: CPTII,S$GLB,, | Performed by: NURSE PRACTITIONER

## 2022-06-15 PROCEDURE — 3078F PR MOST RECENT DIASTOLIC BLOOD PRESSURE < 80 MM HG: ICD-10-PCS | Mod: CPTII,S$GLB,, | Performed by: NURSE PRACTITIONER

## 2022-06-15 PROCEDURE — 3288F FALL RISK ASSESSMENT DOCD: CPT | Mod: CPTII,S$GLB,, | Performed by: NURSE PRACTITIONER

## 2022-06-15 PROCEDURE — 3074F PR MOST RECENT SYSTOLIC BLOOD PRESSURE < 130 MM HG: ICD-10-PCS | Mod: CPTII,S$GLB,, | Performed by: NURSE PRACTITIONER

## 2022-06-15 PROCEDURE — 99215 OFFICE O/P EST HI 40 MIN: CPT | Mod: GV,S$GLB,, | Performed by: NURSE PRACTITIONER

## 2022-06-15 PROCEDURE — 1125F PR PAIN SEVERITY QUANTIFIED, PAIN PRESENT: ICD-10-PCS | Mod: CPTII,S$GLB,, | Performed by: NURSE PRACTITIONER

## 2022-06-15 PROCEDURE — 1160F RVW MEDS BY RX/DR IN RCRD: CPT | Mod: CPTII,S$GLB,, | Performed by: NURSE PRACTITIONER

## 2022-06-15 PROCEDURE — 3288F PR FALLS RISK ASSESSMENT DOCUMENTED: ICD-10-PCS | Mod: CPTII,S$GLB,, | Performed by: NURSE PRACTITIONER

## 2022-06-15 PROCEDURE — 1157F PR ADVANCE CARE PLAN OR EQUIV PRESENT IN MEDICAL RECORD: ICD-10-PCS | Mod: CPTII,S$GLB,, | Performed by: NURSE PRACTITIONER

## 2022-06-15 PROCEDURE — 1159F PR MEDICATION LIST DOCUMENTED IN MEDICAL RECORD: ICD-10-PCS | Mod: CPTII,S$GLB,, | Performed by: NURSE PRACTITIONER

## 2022-06-15 PROCEDURE — 1160F PR REVIEW ALL MEDS BY PRESCRIBER/CLIN PHARMACIST DOCUMENTED: ICD-10-PCS | Mod: CPTII,S$GLB,, | Performed by: NURSE PRACTITIONER

## 2022-06-15 PROCEDURE — 1125F AMNT PAIN NOTED PAIN PRSNT: CPT | Mod: CPTII,S$GLB,, | Performed by: NURSE PRACTITIONER

## 2022-06-15 PROCEDURE — 1157F ADVNC CARE PLAN IN RCRD: CPT | Mod: CPTII,S$GLB,, | Performed by: NURSE PRACTITIONER

## 2022-06-15 PROCEDURE — 99215 PR OFFICE/OUTPT VISIT, EST, LEVL V, 40-54 MIN: ICD-10-PCS | Mod: GV,S$GLB,, | Performed by: NURSE PRACTITIONER

## 2022-06-15 PROCEDURE — 1159F MED LIST DOCD IN RCRD: CPT | Mod: CPTII,S$GLB,, | Performed by: NURSE PRACTITIONER

## 2022-06-15 PROCEDURE — 3074F SYST BP LT 130 MM HG: CPT | Mod: CPTII,S$GLB,, | Performed by: NURSE PRACTITIONER

## 2022-06-15 RX ORDER — FUROSEMIDE 20 MG/1
20 TABLET ORAL DAILY
Qty: 3 TABLET | Refills: 0 | Status: SHIPPED | OUTPATIENT
Start: 2022-06-15 | End: 2022-06-18

## 2022-06-15 RX ORDER — CYANOCOBALAMIN 1000 UG/ML
1000 INJECTION, SOLUTION INTRAMUSCULAR; SUBCUTANEOUS
Status: CANCELLED | OUTPATIENT
Start: 2022-07-08

## 2022-06-15 RX ORDER — FUROSEMIDE 20 MG/1
20 TABLET ORAL DAILY
Qty: 3 TABLET | Refills: 0 | OUTPATIENT
Start: 2022-06-15 | End: 2022-06-15

## 2022-06-15 NOTE — PROGRESS NOTES
"    Medical Oncology Progress Note  [facility]     PATIENT: Adri Seay  : 1935 87 y.o. female  MRN [id number]     PCP: Primary Doctor No  Consult Requested By:      Date of Service: 6/15/2022    Subjective:   Interim History:    Oncology History:    History of Present Illness: Ms. Seay is a 86 y.o. female who presents for evaluation and management of cervical cancer. SHe was seen at [facility] on 19.      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 The patient had a CT scan consistent with widely metastatic disease including multiple sites of lymphadenopathy, a possible lung metastases, liver metastases, nodule in the anterior abdominal wall, and carcinomatosis. The patient is mostly asymptomatic but does have a vaginal discharge. Her performance status is 0. On exam, there is a 6 centimeter mass in the anterior abdominal wall superior to the umbilicus. On bimanual examination, tumor is a place the entire cervix apex of the vagina, and on rectovaginal examination, there is a large pelvic mass extending to the bilateral pelvic sidewalls.    We will get her CT reviewed at [facility] and if it is consistent with metastatic disease, we have recommended palliative chemotherapy with either carboplatinum as a single agent or carboplatinum and paclitaxel. Due to concern for bowel involvement on the CT scan, I have recommended not giving bevacizumab as part of this regimen. Patient will return home to [place] and begin chemotherapy with a medical oncologist there. I've also recommended a consultation with radiation oncology in [place] for consideration of palliative radiation to lower the chances of significant vaginal bleeding. We will see her back on an as-needed basis."        Oncology History   Malignant neoplasm of endocervix   2019 Initial Diagnosis    Malignant " neoplasm of exocervix     7/8/2019 - 7/8/2019 Chemotherapy    Treatment Summary   Plan Name: OP CARBOPLATIN WEEKLY  Treatment Goal: Palliative  Status: Inactive  Start Date: [No treatment day found]  End Date: [No treatment day found]  Provider: Jaime Pinon MD  Chemotherapy: CARBOplatin (PARAPLATIN) in sodium chloride 0.9% 250 mL chemo infusion, , Intravenous, Clinic/HOD 1 time, 0 of 4 cycles     7/9/2019 - 11/25/2019 Chemotherapy    Treatment Summary   Plan Name: OP GYN CARBOPLATIN (AUC) Q4W  Treatment Goal: Palliative  Status: Inactive  Start Date: 7/9/2019  End Date: 10/29/2019  Provider: Jaime Pinon MD  Chemotherapy: CARBOplatin (PARAPLATIN) in sodium chloride 0.9% 250 mL chemo infusion,  (original dose 341.5 mg), Intravenous, Clinic/HOD 1 time, 5 of 6 cycles  Dose modification:   (original dose 341.5 mg, Cycle 1)     5/19/2020 - 6/16/2020 Chemotherapy    Treatment Summary   Plan Name: OP  PACLITAXEL CARBOPLATIN WEEKLY  Treatment Goal: Palliative  Status: Inactive  Start Date: 5/19/2020  End Date: 5/22/2020  Provider: Annette Donis NP  Chemotherapy: CARBOplatin (PARAPLATIN) 115 mg in sodium chloride 0.9% 250 mL chemo infusion, 115 mg (98.6 % of original dose 118.4 mg), Intravenous, Clinic/HOD 1 time, 0 of 1 cycle  Dose modification:   (original dose 118.4 mg, Cycle 1)  PACLitaxeL (TAXOL) 80 mg/m2 = 126 mg in sodium chloride 0.9% 250 mL chemo infusion, 80 mg/m2 = 126 mg (100 % of original dose 80 mg/m2), Intravenous, Clinic/HOD 1 time, 0 of 1 cycle  Dose modification: 80 mg/m2 (original dose 80 mg/m2, Cycle 1)     5/19/2020 - 1/19/2021 Chemotherapy    Treatment Summary   Plan Name:  PACLITAXEL CARBOPLATIN WEEKLY  Treatment Goal: Palliative  Status: Inactive  Start Date: 5/19/2020  End Date: 12/29/2020  Provider: Annette Donis NP  Chemotherapy: CARBOplatin (PARAPLATIN) 115 mg in sodium chloride 0.9% 250 mL chemo infusion, 115 mg (100 % of original dose 114.8 mg), Intravenous, Clinic/HOD 1 time, 3 of 3  cycles  Dose modification:   (original dose 114.8 mg, Cycle 1, Reason: MD Discretion), 100 mg (original dose 114.8 mg, Cycle 1),   (original dose 114.8 mg, Cycle 2, Reason: MD Discretion),   (original dose 114.8 mg, Cycle 2, Reason: MD Discretion),   (original dose 114.8 mg, Cycle 2),   (original dose 114.8 mg, Cycle 2)  Administration: 115 mg (9/17/2020), 115 mg (9/24/2020), 125 mg (10/8/2020), 140 mg (10/15/2020), 130 mg (9/3/2020)  PACLitaxeL (TAXOL) 80 mg/m2 = 126 mg in sodium chloride 0.9% 250 mL chemo infusion, 80 mg/m2 = 126 mg (100 % of original dose 80 mg/m2), Intravenous, Clinic/HOD 1 time, 3 of 3 cycles  Dose modification: 80 mg/m2 (original dose 80 mg/m2, Cycle 1), 50 mg/m2 (original dose 80 mg/m2, Cycle 1), 50 mg/m2 (original dose 80 mg/m2, Cycle 3)  Administration: 84 mg (9/3/2020), 84 mg (9/17/2020), 84 mg (9/24/2020), 90 mg (10/8/2020), 84 mg (10/15/2020)     12/30/2020 - 8/19/2021 Chemotherapy    Treatment Summary   Plan Name: OP GYN PACLITAXEL WEEKLY + CARBOPLATIN  (AUC2) WEEKLY  Treatment Goal: Maintenance  Status: Inactive  Start Date: 12/30/2020  End Date: 8/19/2021  Provider: Abundio Tabares MD  Chemotherapy: CARBOplatin (PARAPLATIN) 125 mg in sodium chloride 0.9% 250 mL chemo infusion, 125 mg, Intravenous, Clinic/HOD 1 time, 30 of 35 cycles  Dose modification: 145 mg (original dose 125.6 mg, Cycle 11), 145 mg (original dose 125.6 mg, Cycle 15), 145 mg (original dose 125.6 mg, Cycle 17), 145 mg (original dose 125.6 mg, Cycle 18), 145 mg (original dose 125.6 mg, Cycle 20), 145 mg (original dose 125.6 mg, Cycle 22), 126.8 mg (original dose 125.6 mg, Cycle 30)  PACLitaxeL (TAXOL) 50 mg/m2 = 84 mg in sodium chloride 0.9% 250 mL chemo infusion, 50 mg/m2 = 84 mg (100 % of original dose 50 mg/m2), Intravenous, Clinic/[facility] 1 time, 30 of 35 cycles  Dose modification: 50 mg/m2 (original dose 50 mg/m2, Cycle 1)     6/24/2021 - 6/24/2021 Chemotherapy    Treatment Summary   Plan Name: OP BEVACIZUMAB  Q2W  Treatment Goal: Maintenance  Status: Inactive  Start Date:   End Date:   Provider: Anentte Donis NP  Chemotherapy: bevacizumab-bvzr 653 mg in sodium chloride 0.9% 126.12 mL infusion, 10 mg/kg, Intravenous, Clinic/HOD 1 time, 0 of 26 cycles     9/16/2021 - 9/16/2021 Chemotherapy    Treatment Summary   Plan Name: OP GYN GEMCITABINE (Q4W)  Treatment Goal: Palliative  Status: Inactive  Start Date: 9/16/2021  End Date: 9/16/2021  Provider: Annette Donis NP  Chemotherapy: gemcitabine (GEMZAR) 1,010 mg in sodium chloride 0.9% 250 mL chemo infusion, 600 mg/m2 = 1,010 mg, Intravenous, Clinic/HOD 1 time, 1 of 6 cycles     9/30/2021 - 4/28/2022 Chemotherapy    Treatment Summary   Plan Name: OP PEMBROLIZUMAB 200MG Q3W  Treatment Goal: Palliative  Status: Inactive  Start Date: 9/30/2021  End Date: 4/28/2022  Provider: Annette Donis NP  Chemotherapy: pembrolizumab (KEYTRUDA) 200 mg in sodium chloride 0.9% 108 mL infusion, 200 mg, Intravenous, Clinic/HOD 1 time, 7 of 8 cycles     6/16/2022 -  Chemotherapy    Treatment Summary   Plan Name: OP BREAST GEMCITABINE QW  Treatment Goal: Palliative  Status: Active  Start Date: 6/16/2022 (Planned)  End Date: 5/4/2023 (Planned)  Provider: Annette Donis NP  Chemotherapy: gemcitabine (GEMZAR) 1,255 mg in sodium chloride 0.9% 250 mL chemo infusion, 800 mg/m2 = 1,255 mg (100 % of original dose 800 mg/m2), Intravenous, Clinic/HOD 1 time, 0 of 12 cycles  Dose modification: 800 mg/m2 (original dose 800 mg/m2, Cycle 1, Reason: MD Discretion)         Past Medical History:   Past Medical History:   Diagnosis Date    Anemia     Cataract     Cervical cancer 05/2019    Cervical cancer 6/25/2019    Hypertension     Malignant neoplasm metastatic to right lung 9/1/2021       Past Surgical HIstory:   Past Surgical History:   Procedure Laterality Date    CATARACT EXTRACTION, BILATERAL      WRIST SURGERY  1984       Allergies:  Review of patient's allergies indicates:   Allergen Reactions     Strawberry Rash       Medications:  Current Outpatient Medications   Medication Sig Dispense Refill    apixaban (ELIQUIS) 2.5 mg Tab Take 1 tablet (2.5 mg total) by mouth 2 (two) times daily. 60 tablet 3    cloNIDine (CATAPRES) 0.3 MG tablet TAKE 1 TABLET(0.3 MG) BY MOUTH TWICE DAILY 180 tablet 3    cyproheptadine (PERIACTIN) 4 mg tablet TAKE 1/2 (ONE-HALF) TABLET BY MOUTH THREE TIMES DAILY      FENOFIBRATE ORAL Take by mouth 2 (two) times daily.      furosemide (LASIX) 20 MG tablet Take 1 tablet (20 mg total) by mouth once daily. for 3 doses 3 tablet 0    hydrOXYzine HCL (ATARAX) 10 MG Tab Take 1 tablet (10 mg total) by mouth 3 (three) times daily as needed. 60 tablet 2    iron-vit c-vit b12-folic acid (IRON-C PLUS) tablet Daily 30 each 6    loratadine (CLARITIN) 10 mg tablet 10 mg.      NIFEdipine (PROCARDIA-XL) 30 MG (OSM) 24 hr tablet Take 1 tablet (30 mg total) by mouth once daily. 90 tablet 3    ondansetron (ZOFRAN-ODT) 8 MG TbDL Take 1 tablet (8 mg total) by mouth every 8 (eight) hours as needed (chemotherapy-induced nausea and vomiting). 40 tablet 5    oxyCODONE-acetaminophen (PERCOCET) 5-325 mg per tablet Every 6 Hours as needed for Moderate Pain(4-6)      simvastatin (ZOCOR) 20 MG tablet Take 1 tablet (20 mg total) by mouth every evening. 90 tablet 3     Current Facility-Administered Medications   Medication Dose Route Frequency Provider Last Rate Last Admin    alteplase injection 2 mg  2 mg Intra-Catheter PRN Annette Donis NP        EPINEPHrine (EPIPEN) 0.3 mg/0.3 mL pen injection 0.3 mg  0.3 mg Intramuscular Once PRN Annette Donis NP           Family History:   Family History   Problem Relation Age of Onset    Hypertension Mother     Pneumonia Father     Breast cancer Sister     No Known Problems Brother     Leukemia Daughter     No Known Problems Son        Social History:  reports that she has never smoked. She has never used smokeless tobacco. She reports previous alcohol use. She  "reports that she does not use drugs.    Review of Systemas  Constitutional: No change in weight, appetie, fatigue, fever, or night sweats  Eyes: No changes in vision  Ears, Nose, Mouth, Throat, and Face: No hearing problems, ear pain, rummy nose, or sore throat  Respiratory: No shortness of breath or cough  Cardiovascular: No chest pain, palpitations or dizziness  Gastrointestinal: No abdominal pain, hematochezia, melena  Genitourinary: No dysuria, hematuria, nocturia, menstrual problems, post menopausal  Integumentary/Breast: No rashes or itching  Hematologic/Lymphatic: No anemia or bleeding abnormalities  Musculoskeletal: No joint or muscle abnormalities  Neurological: No sensory or motor problems, no headaches  Behavioral/Psych: No depression, anxiety, cognitive problems, or stress  Endocrine: No diabetes or thyroid problems  Allergic/Immunologic: See allergy above    Physical Exam      Vitals:   Vitals:    06/15/22 0859   BP: 115/70   Pulse: 68   Resp: 18   Temp: 97.7 °F (36.5 °C)   TempSrc: Oral   SpO2: 96%   Weight: 54.8 kg (120 lb 14.4 oz)   Height: 5' 2" (1.575 m)     BMI: Body mass index is 22.11 kg/m².  BSA Body surface area is 1.55 meters squared.  ECOG Performance Status:   ECOG SCORE         GENERAL APPEARANCE: Well developed, well nourished, in no acute distress.      Labs and Imagings     Lab Results   Component Value Date    WBC 12.3 (H) 06/14/2022    HGB 7.8 (L) 06/14/2022    HCT 26.0 (L) 06/14/2022    .7 (H) 06/14/2022    LABPLAT 291 06/14/2022       CMP  Sodium   Date Value Ref Range Status   06/14/2022 135 135 - 145 mmol/L Final   03/16/2020 140 136 - 145 mmol/L Final     Potassium   Date Value Ref Range Status   06/14/2022 4.6 3.6 - 5.2 mmol/L Final   03/16/2020 4.5 3.5 - 5.1 mmol/L Final     Chloride   Date Value Ref Range Status   06/14/2022 104 100 - 108 mmol/L Final   03/16/2020 107 98 - 107 mmol/L Final     CO2   Date Value Ref Range Status   06/14/2022 25 21 - 32 mmol/L Final "   03/16/2020 21 (L) 22 - 29 mmol/L Final     Glucose   Date Value Ref Range Status   06/14/2022 101 70 - 110 mg/dL Final     BUN   Date Value Ref Range Status   06/14/2022 19 (H) 7 - 18 mg/dL Final   03/16/2020 17.2 8 - 23 mg/dL Final     Creatinine   Date Value Ref Range Status   06/14/2022 1.23 (H) 0.55 - 1.02 mg/dL Final   03/16/2020 1.17 (H) 0.50 - 0.90 mg/dL Final     Calcium   Date Value Ref Range Status   06/14/2022 8.9 8.8 - 10.5 mg/dL Final   03/16/2020 10.0 8.6 - 10.2 mg/dL Final     Total Protein   Date Value Ref Range Status   06/14/2022 6.8 6.4 - 8.2 g/dL Final     Albumin   Date Value Ref Range Status   06/14/2022 2.5 (L) 3.4 - 5.0 g/dL Final   03/16/2020 4.6 3.5 - 5.2 g/dL Final     Total Bilirubin   Date Value Ref Range Status   06/14/2022 0.5 0.0 - 1.0 mg/dL Final     Alkaline Phosphatase   Date Value Ref Range Status   06/14/2022 116 46 - 116 U/L Final     AST   Date Value Ref Range Status   06/14/2022 23 15 - 37 U/L Final   03/16/2020 17 0 - 32 U/L Final     ALT   Date Value Ref Range Status   06/14/2022 8 (L) 12 - 78 U/L Final     Anion Gap   Date Value Ref Range Status   06/14/2022 6.0 3.0 - 11.0 mmol/L Final   03/16/2020 12.0 8.0 - 17.0 mmol/L Final     Comment:     NOTE  Testing performed at:  [facility], [address] [place] [address]  [id number] CLIA #:[id number]       eGFR if    Date Value Ref Range Status   10/15/2020 >60 >60 mL/min/1.73 m^2 Final     eGFR if non    Date Value Ref Range Status   10/15/2020 52 (A) >60 mL/min/1.73 m^2 Final     Comment:     Calculation used to obtain the estimated glomerular filtration  rate (eGFR) is the CKD-EPI equation.            Imaging:                   Assessment:     Principle Problem: Localized edema [R60.0]   Co-morbidity/Active Problems:        Adri Seay is a 87 y.o. female with PMH of The encounter diagnosis was Localized edema., with Localized edema [R60.0]      ==============================================  *  Assessment:       1. 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 Malignant neoplasm of cervix, unspecified site    3. Increased creatine kinase level    4. Neoplastic malignant related fatigue              Plan:       Cervical cancer stage IV disease diagnosed 6/2019  == 8/5/19 Carbo    ==PET/CT done on 12/2/19. Previous CT imaging done at outside facility, discussed with Dr Nguyen for comparison and he stated pelvic mass smaller, abdominal mass smaller liver lesion likely benign stable but difficult to say if peritoneal implants stable to progressed.    ==FoundationOne Liquid Bx on 12/16/19.  BRCA 2 gene mutation.  ==1/14/20 Pt started lynparza   ==5/13/2020 PET showing early progression.    ==5/2020 resume low dose carbo and taxol.   ==11/2020 PET decreased to stable disease  == 2/25/21 Stable Disease  ==8/27/21 PET progression of disease  == 9/22/21 PDL1 CPS>1  == 10/22 - 5/2022  Keytruda   ==12/2021 palliative XRT due to vaginal bleeding  == 1/2022 hospitalized for PE , lower ext DVT   == 5/22 PET/CT Progression noted    12/27/21:  Patient has had recent performance status decline with recurrent hospitalization in mid December for fall with resulting right shoulder fracture.  Patient was then discharged to [facility] [facility] for strengthening with PT and OT.  On 12/24/2021 patient awoke at 3:00 a.m. with new onset vaginal bleeding and presented to ER for evaluation.  Trans abdominal transpelvic ultrasound showed a large cystic mass consistent with her known cervical cancer diagnosis.  At that time hemoglobin was approximately 8.0, and bleeding spontaneously resolved.  Patient was not given a blood transfusion and was released back to [place].  She denies any recurrent episodes of vaginal bleeding since that time.  Plan will be to proceed with repeat PET CT to evaluate current disease status as she has been off of IO since mid October and will refer patient for  evaluation with Dr. Beckwith for palliative radiation.  Patient will return to clinic next week with PET scan and labs to discuss further management and the possibility of resuming Keytruda.  1/20/22:  Patient recently hospitalized for PE as well as bilateral lower DVTs.  Patient completed palliative radiation to cervical mass and bleeding has resolved, patient has been started on low-dose Xarelto to manage clots and has been discharged home.  Her daughter has moved in to provide full time assistance.  Patient is suffering from weakness and fatigue, but otherwise seems to be recovering well.  Labs reviewed today and hemoglobin greater than 10 no indication for transfusions needed Tentatively we will plan to resume I 0 on February 2nd with labs the day before.  2/2/22: patient and daughter in clinic, daughter states Ms August has been feeling good and she appears to back at baseline. Her only c/o is new onset itching, no rash noted. Pt denies any recent/recurrent vaginal bleeding in the last 2 weeks. Labs reviewed and pt is cleared for tx as planned for tomorrow.   2/23/22:  Patient in clinic with granddaughter today with no new complaints.  Itching has resolved and patient is tolerant of Eliquis.  She denies any active bleeding, and has no abdominal pain or discomfort.  Labs are reviewed and patient is cleared for immunotherapy as planned for tomorrow.  Discussed with patient and family mild weight loss is noted, encouraged patient to attempt to eat more frequently with nutrient dense focus.  3/16/22:  Patient in clinic today states feeling okay, does complain of some fatigue but denies any recent bleeding episodes.  Hemoglobin 8.2 will proceed with B12 injection with infusion that is planned for tomorrow.  Patient does report increase in appetite and noted 3 lb of weight gain after starting Periactin per palliative Care recommendations.  Encouraged patient to increase fluids as serum creatinine mildly  elevated.  4/27/22: no new c/o, feeling good. ;labs reviewed, Ok to proceed with tx as planned. Will continue with B12 injection.  PET -CT ordered  5/18/22: patient and daughter in clinic today to review recent PET/CT showing overt progression of disease, with new keren pulmonary nodules, and increasing size of abdominal and pelvic lymphadenopathy. She denies any pain, n/v or SOB. We will discontinue current IO therapy and discussed continuation of treatment with single agent low dose chemotherapy vs best supportive care as she has progressed through third line therapy. She will discuss with her family and will let me know how she would like to proceed. Even in the event we proceed with chemotherapy, I do recommend transitioning from palliative care to hospice at this time.   Discussed with Dr. Davey in will request updated FoundationOne testing to include the NTRK gene, otherwise will possible need a repeat biopsy. Will proceed with obtaining chemotherapy approval for Gemzar day 1 day 15 although patient is unsure if she will continue with chemotherapy this time.  6/1/22: patient and family here today and she has decided she would like to pursue additional chemotherapy with Gemzar. Will plan to start reduced dose Gemzar 800 mg/m2 Day 1 and Day 15, to begin next week. Consents signed and hospice has been notified. likely she will be able to continue with hospice while on palliative chemotherapy. Labs drawn today and she will RTC in 2weeks to evaluate tolerability.   6/15/22:  Patient has been approved to continue treatment with Gemzar with [facility].  She reports bilateral leg swelling and weakness  And fatigue progressing over the last several days.  We will send out 3 days of Lasix and advised patient to elevate legs while at home,  She is compliant with anticoagulants as she has a history of DVTs.  We will proceed with B12, and recheck iron studies at next visit,  As she is anemic on labs reviewed today.   She is cleared to begin cycle 1 day 1 of Gemzar     return to clinic in 2 weeks with labs CBC, CMP, iron studies  -Total time spent in counseling and discussion about further management options including relevant lab work, treatment,  prognosis, medications and intended side effects was more than 35 minutes. More than 50% of the time was spent on counseling and coordination of care.  This includes face to face time and non-face to face time preparing to see the patient (eg, review of tests), Obtaining and/or reviewing separately obtained history, Documenting clinical information in the electronic or other health record, Independently interpreting resultsand communicating results to the patient/family/caregiver, or Care coordination.          RTC in 3 weeks with labs       ANISA Torres

## 2022-06-21 ENCOUNTER — CLINICAL SUPPORT (OUTPATIENT)
Dept: HEMATOLOGY/ONCOLOGY | Facility: CLINIC | Age: 87
End: 2022-06-21
Payer: MEDICARE

## 2022-06-21 ENCOUNTER — TELEPHONE (OUTPATIENT)
Dept: HEMATOLOGY/ONCOLOGY | Facility: CLINIC | Age: 87
End: 2022-06-21

## 2022-06-21 DIAGNOSIS — E03.9 HYPOTHYROIDISM, UNSPECIFIED TYPE: ICD-10-CM

## 2022-06-21 DIAGNOSIS — C53.0 MALIGNANT NEOPLASM OF ENDOCERVIX: ICD-10-CM

## 2022-06-21 LAB
ALBUMIN SERPL BCP-MCNC: 2.2 G/DL (ref 3.4–5)
ALBUMIN/GLOBULIN RATIO: 0.51 RATIO (ref 1.1–1.8)
ALP SERPL-CCNC: 103 U/L (ref 46–116)
ALT SERPL W P-5'-P-CCNC: 29 U/L (ref 12–78)
ANION GAP SERPL CALC-SCNC: 7 MMOL/L (ref 3–11)
AST SERPL-CCNC: 37 U/L (ref 15–37)
BANDS: 2 % (ref 0–5)
BILIRUB SERPL-MCNC: 0.4 MG/DL (ref 0–1)
BUN SERPL-MCNC: 20 MG/DL (ref 7–18)
BUN/CREAT SERPL: 15.5 RATIO (ref 7–18)
CALCIUM SERPL-MCNC: 8.4 MG/DL (ref 8.8–10.5)
CELLS COUNTED: 100
CHLORIDE SERPL-SCNC: 103 MMOL/L (ref 100–108)
CO2 SERPL-SCNC: 26 MMOL/L (ref 21–32)
CREAT SERPL-MCNC: 1.29 MG/DL (ref 0.55–1.02)
EOSINOPHIL NFR BLD: 4 % (ref 1–3)
ERYTHROCYTE [DISTWIDTH] IN BLOOD BY AUTOMATED COUNT: 12.9 % (ref 12.5–18)
GFR ESTIMATION: 48
GLOBULIN: 4.3 G/DL (ref 2.3–3.5)
GLUCOSE SERPL-MCNC: 102 MG/DL (ref 70–110)
HCT VFR BLD AUTO: 23.5 % (ref 37–47)
HGB BLD-MCNC: 7.1 G/DL (ref 12–16)
HYPOCHROMIA BLD QL SMEAR: ABNORMAL
LYMPHOCYTES NFR BLD: 30 % (ref 25–40)
MACROCYTES BLD QL SMEAR: ABNORMAL
MCH RBC QN AUTO: 33 PG (ref 27–31.2)
MCHC RBC AUTO-ENTMCNC: 30.2 G/DL (ref 31.8–35.4)
MCV RBC AUTO: 109.3 FL (ref 80–97)
METAMYELOCYTES # BLD MANUAL: 1 % (ref 0–0)
MONOCYTES NFR BLD: 1 % (ref 1–15)
NEUTROPHILS # BLD AUTO: 2.6 10*3/UL (ref 1.8–7.7)
NEUTROPHILS NFR BLD: 62 % (ref 37–80)
NUCLEATED RED BLOOD CELLS: 0 %
PLATELETS: 204 10*3/UL (ref 142–424)
POTASSIUM SERPL-SCNC: 4.1 MMOL/L (ref 3.6–5.2)
PROT SERPL-MCNC: 6.5 G/DL (ref 6.4–8.2)
RBC # BLD AUTO: 2.15 10*6/UL (ref 4.2–5.4)
SMALL PLATELETS BLD QL SMEAR: ADEQUATE
SODIUM BLD-SCNC: 136 MMOL/L (ref 135–145)
TSH SERPL DL<=0.005 MIU/L-ACNC: 0.65 UIU/ML (ref 0.36–3.74)
WBC # BLD: 4.1 10*3/UL (ref 4.6–10.2)

## 2022-06-21 NOTE — TELEPHONE ENCOUNTER
Voice to pt's daughter Annette already spoke to Zaida about pt getting a blood transfusion. She voiced understanding.

## 2022-06-21 NOTE — TELEPHONE ENCOUNTER
----- Message from Amy Guido sent at 6/21/2022  9:50 AM CDT -----  Type:  Patient Returning Call    Who Called: Adri Seay ( jorge Beckett's dtr )     Who Left Message for Patient: office   Does the patient know what this is regarding?:-  Would the patient rather a call back or a response via [employer]?   Best Call Back Number: [phone]  Additional Information: returning a call

## 2022-06-28 ENCOUNTER — CLINICAL SUPPORT (OUTPATIENT)
Dept: HEMATOLOGY/ONCOLOGY | Facility: CLINIC | Age: 87
End: 2022-06-28
Payer: MEDICARE

## 2022-06-28 DIAGNOSIS — E03.9 HYPOTHYROIDISM, UNSPECIFIED TYPE: ICD-10-CM

## 2022-06-28 DIAGNOSIS — C53.0 MALIGNANT NEOPLASM OF ENDOCERVIX: ICD-10-CM

## 2022-06-28 LAB
ALBUMIN SERPL BCP-MCNC: 2.5 G/DL (ref 3.4–5)
ALBUMIN/GLOBULIN RATIO: 0.64 RATIO (ref 1.1–1.8)
ALP SERPL-CCNC: 107 U/L (ref 46–116)
ALT SERPL W P-5'-P-CCNC: 23 U/L (ref 12–78)
ANION GAP SERPL CALC-SCNC: 6 MMOL/L (ref 3–11)
AST SERPL-CCNC: 65 U/L (ref 15–37)
BASOPHILS NFR BLD: 0.2 % (ref 0–3)
BILIRUB SERPL-MCNC: 0.6 MG/DL (ref 0–1)
BUN SERPL-MCNC: 16 MG/DL (ref 7–18)
BUN/CREAT SERPL: 12.8 RATIO (ref 7–18)
CALCIUM SERPL-MCNC: 8.7 MG/DL (ref 8.8–10.5)
CHLORIDE SERPL-SCNC: 105 MMOL/L (ref 100–108)
CO2 SERPL-SCNC: 25 MMOL/L (ref 21–32)
CREAT SERPL-MCNC: 1.25 MG/DL (ref 0.55–1.02)
EOSINOPHIL NFR BLD: 0.5 % (ref 1–3)
ERYTHROCYTE [DISTWIDTH] IN BLOOD BY AUTOMATED COUNT: 18 % (ref 12.5–18)
FERRITIN SERPL-MCNC: 3382 NG/ML (ref 8–388)
GFR ESTIMATION: 49
GLOBULIN: 3.9 G/DL (ref 2.3–3.5)
GLUCOSE SERPL-MCNC: 109 MG/DL (ref 70–110)
HCT VFR BLD AUTO: 31 % (ref 37–47)
HGB BLD-MCNC: 9.5 G/DL (ref 12–16)
HYPOCHROMIA BLD QL SMEAR: NORMAL
IRON: 36 UG/DL (ref 26–170)
LYMPHOCYTES NFR BLD: 11.4 % (ref 25–40)
MACROCYTES BLD QL SMEAR: NORMAL
MCH RBC QN AUTO: 31 PG (ref 27–31.2)
MCHC RBC AUTO-ENTMCNC: 30.6 G/DL (ref 31.8–35.4)
MCV RBC AUTO: 101.3 FL (ref 80–97)
MONOCYTES NFR BLD: 10.1 % (ref 1–15)
NEUTROPHILS # BLD AUTO: 6.35 10*3/UL (ref 1.8–7.7)
NEUTROPHILS NFR BLD: 77.3 % (ref 37–80)
NUCLEATED RED BLOOD CELLS: 0 %
PLATELETS: 134 10*3/UL (ref 142–424)
POTASSIUM SERPL-SCNC: 4.2 MMOL/L (ref 3.6–5.2)
PROT SERPL-MCNC: 6.4 G/DL (ref 6.4–8.2)
RBC # BLD AUTO: 3.06 10*6/UL (ref 4.2–5.4)
SODIUM BLD-SCNC: 136 MMOL/L (ref 135–145)
TOTAL IRON BINDING CAPACITY: 108 UG/DL (ref 250–450)
TSH SERPL DL<=0.005 MIU/L-ACNC: 1.12 UIU/ML (ref 0.36–3.74)
WBC # BLD: 8.2 10*3/UL (ref 4.6–10.2)

## 2022-08-22 ENCOUNTER — TELEPHONE (OUTPATIENT)
Dept: HEMATOLOGY/ONCOLOGY | Facility: CLINIC | Age: 87
End: 2022-08-22
Payer: MEDICARE

## 2022-08-22 NOTE — TELEPHONE ENCOUNTER
----- Message from Yolanda Tinsley sent at 8/22/2022  9:41 AM CDT -----  jospeh/son needs call back regarding doing [facility] paperwork, will elaborate...[phone]